# Patient Record
Sex: FEMALE | Race: WHITE | NOT HISPANIC OR LATINO | Employment: OTHER | URBAN - METROPOLITAN AREA
[De-identification: names, ages, dates, MRNs, and addresses within clinical notes are randomized per-mention and may not be internally consistent; named-entity substitution may affect disease eponyms.]

---

## 2017-12-15 ENCOUNTER — TELEPHONE (OUTPATIENT)
Dept: INTERNAL MEDICINE | Facility: CLINIC | Age: 62
End: 2017-12-15

## 2017-12-15 NOTE — TELEPHONE ENCOUNTER
Patient called and left message stating she received 6 months of refills for her synthroid instead of the yearly. She is wanting a year supply as she was recently seen for a medication check. Patient would like a call back to confirm a year supply of her synthroid.

## 2017-12-26 PROBLEM — G47.33 OBSTRUCTIVE SLEEP APNEA SYNDROME: Status: ACTIVE | Noted: 2017-12-26

## 2017-12-29 ENCOUNTER — APPOINTMENT (OUTPATIENT)
Dept: LAB | Facility: HOSPITAL | Age: 62
End: 2017-12-29
Payer: COMMERCIAL

## 2017-12-29 DIAGNOSIS — R82.81 PYURIA: ICD-10-CM

## 2017-12-29 LAB
BACTERIA #/AREA URNS AUTO: ABNORMAL /HPF
BILIRUB UR QL STRIP.AUTO: NEGATIVE
CLARITY UR: ABNORMAL
COLOR UR: YELLOW
GLUCOSE UR STRIP.AUTO-MCNC: NEGATIVE MG/DL
HGB UR QL STRIP.AUTO: NEGATIVE
KETONES UR STRIP.AUTO-MCNC: ABNORMAL MG/DL
LEUKOCYTE ESTERASE UR QL STRIP: ABNORMAL
MUCOUS THREADS #/AREA URNS HPF: PRESENT /[HPF]
NITRITE UR QL STRIP.AUTO: NEGATIVE
PH UR STRIP.AUTO: 5 PH
PROT UR STRIP.AUTO-MCNC: NEGATIVE MG/DL
RBC #/AREA URNS AUTO: ABNORMAL /HPF
SP GR UR STRIP.AUTO: 1.02 (ref 1–1.03)
SQUAMOUS #/AREA URNS AUTO: ABNORMAL /HPF
UROBILINOGEN UR STRIP.AUTO-MCNC: <2 E.U./DL
WBC #/AREA URNS AUTO: ABNORMAL /HPF

## 2017-12-29 PROCEDURE — 81001 URINALYSIS AUTO W/SCOPE: CPT

## 2018-01-02 ENCOUNTER — TELEPHONE (OUTPATIENT)
Dept: INTERNAL MEDICINE | Facility: CLINIC | Age: 63
End: 2018-01-02

## 2018-01-02 DIAGNOSIS — R31.9 HEMATURIA, UNSPECIFIED TYPE: Primary | ICD-10-CM

## 2018-01-02 NOTE — TELEPHONE ENCOUNTER
----- Message from Becka Joy MD sent at 1/2/2018 11:55 AM [facility] -----  Still shows some wbc and now a little RBC - is she having any symptoms of a UTI?  We did not order it to send for a culture so I think we should have her come back and order this one with a culture (not a reflex - make sure culture is ordered regardless).  Then if uti we can treat and then repeat again after treatment.      [facility]    Patient returned call.  Above information given.  We had ordered a 1 month repeat Ua.  I ordered a UA and Culture as directed above.

## 2018-01-02 NOTE — TELEPHONE ENCOUNTER
----- Message from Becka Joy MD sent at 1/2/2018 11:55 AM [facility] -----  Still shows some wbc and now a little RBC - is she having any symptoms of a UTI?  We did not order it to send for a culture so I think we should have her come back and order this one with a culture (not a reflex - make sure culture is ordered regardless).  Then if uti we can treat and then repeat again after treatment.

## 2018-01-03 ENCOUNTER — APPOINTMENT (OUTPATIENT)
Dept: LAB | Facility: HOSPITAL | Age: 63
End: 2018-01-03
Payer: COMMERCIAL

## 2018-01-03 DIAGNOSIS — R31.9 HEMATURIA, UNSPECIFIED TYPE: ICD-10-CM

## 2018-01-03 LAB
BACTERIA #/AREA URNS AUTO: ABNORMAL /HPF
BILIRUB UR QL STRIP.AUTO: NEGATIVE
CLARITY UR: ABNORMAL
COLOR UR: YELLOW
GLUCOSE UR STRIP.AUTO-MCNC: NEGATIVE MG/DL
HGB UR QL STRIP.AUTO: NEGATIVE
KETONES UR STRIP.AUTO-MCNC: NEGATIVE MG/DL
LEUKOCYTE ESTERASE UR QL STRIP: ABNORMAL
MUCOUS THREADS #/AREA URNS HPF: PRESENT /[HPF]
NITRITE UR QL STRIP.AUTO: NEGATIVE
PH UR STRIP.AUTO: 5 PH
PROT UR STRIP.AUTO-MCNC: NEGATIVE MG/DL
RBC #/AREA URNS AUTO: ABNORMAL /HPF
SP GR UR STRIP.AUTO: 1.02 (ref 1–1.03)
SQUAMOUS #/AREA URNS AUTO: ABNORMAL /HPF
UROBILINOGEN UR STRIP.AUTO-MCNC: <2 E.U./DL
WBC #/AREA URNS AUTO: ABNORMAL /HPF

## 2018-01-03 PROCEDURE — 81001 URINALYSIS AUTO W/SCOPE: CPT

## 2018-01-03 PROCEDURE — 87088 URINE BACTERIA CULTURE: CPT

## 2018-01-05 LAB — BACTERIA UR CULT: NORMAL

## 2018-01-08 ENCOUNTER — TELEPHONE (OUTPATIENT)
Dept: INTERNAL MEDICINE | Facility: CLINIC | Age: 63
End: 2018-01-08

## 2018-01-08 DIAGNOSIS — R82.81 PYURIA: Primary | ICD-10-CM

## 2018-01-08 DIAGNOSIS — R31.9 HEMATURIA, UNSPECIFIED TYPE: ICD-10-CM

## 2018-01-08 NOTE — TELEPHONE ENCOUNTER
----- Message from Becka Joy MD sent at 1/8/2018  8:29 AM [facility] -----  No organisms growing.  I think I would send to urology for pyuria/hematuria just to get their thoughts      [facility] 9:40am 1/8/18    Contacted patient.  Patient request Dr. Maria at [facility].  Then a copy of results to be sent to her.

## 2018-01-08 NOTE — TELEPHONE ENCOUNTER
----- Message from Becka Joy MD sent at 1/8/2018  8:29 AM [facility] -----  No organisms growing.  I think I would send to urology for pyuria/hematuria just to get their thoughts

## 2018-03-15 DIAGNOSIS — J30.9 CHRONIC ALLERGIC RHINITIS, UNSPECIFIED SEASONALITY, UNSPECIFIED TRIGGER: ICD-10-CM

## 2018-03-15 RX ORDER — FLUTICASONE PROPIONATE 50 MCG
2 SPRAY, SUSPENSION (ML) NASAL DAILY
Qty: 16 G | Refills: 1 | Status: SHIPPED | OUTPATIENT
Start: 2018-03-15 | End: 2018-03-26 | Stop reason: SDUPTHER

## 2018-03-26 DIAGNOSIS — M19.90 OSTEOARTHRITIS, UNSPECIFIED OSTEOARTHRITIS TYPE, UNSPECIFIED SITE: Primary | ICD-10-CM

## 2018-03-26 DIAGNOSIS — J30.9 CHRONIC ALLERGIC RHINITIS, UNSPECIFIED SEASONALITY, UNSPECIFIED TRIGGER: ICD-10-CM

## 2018-03-26 RX ORDER — DICLOFENAC SODIUM 10 MG/G
GEL TOPICAL
Qty: 1 TUBE | Refills: 2 | Status: SHIPPED | OUTPATIENT
Start: 2018-03-26 | End: 2018-04-09 | Stop reason: SDUPTHER

## 2018-03-26 RX ORDER — FLUTICASONE PROPIONATE 50 MCG
2 SPRAY, SUSPENSION (ML) NASAL DAILY
Qty: 16 G | Refills: 2 | Status: SHIPPED | OUTPATIENT
Start: 2018-03-26 | End: 2019-01-21 | Stop reason: SDUPTHER

## 2018-03-26 NOTE — TELEPHONE ENCOUNTER
Patient calls states she needs 3 month  Of her medications and a new Rx for diclofenac to [facility] pharm

## 2018-04-09 DIAGNOSIS — M19.90 OSTEOARTHRITIS, UNSPECIFIED OSTEOARTHRITIS TYPE, UNSPECIFIED SITE: ICD-10-CM

## 2018-04-09 RX ORDER — DICLOFENAC SODIUM 10 MG/G
GEL TOPICAL
Qty: 100 G | Status: SHIPPED | OUTPATIENT
Start: 2018-04-09 | End: 2019-05-26 | Stop reason: SDUPTHER

## 2018-04-30 ENCOUNTER — APPOINTMENT (OUTPATIENT)
Dept: LAB | Facility: HOSPITAL | Age: 63
End: 2018-04-30
Payer: COMMERCIAL

## 2018-05-25 ENCOUNTER — HOSPITAL ENCOUNTER (OUTPATIENT)
Dept: CT IMAGING | Facility: HOSPITAL | Age: 63
Discharge: HOME | End: 2018-05-25
Payer: COMMERCIAL

## 2018-05-25 VITALS — DIASTOLIC BLOOD PRESSURE: 101 MMHG | HEART RATE: 60 BPM | SYSTOLIC BLOOD PRESSURE: 161 MMHG

## 2018-05-25 DIAGNOSIS — R31.9 HEMATURIA: ICD-10-CM

## 2018-05-25 PROCEDURE — 6360000200 HC RX 636 W HCPCS (ALT 250 FOR IP): Performed by: NURSE PRACTITIONER

## 2018-05-25 PROCEDURE — 2550000100 HC RX 255: Performed by: NURSE PRACTITIONER

## 2018-05-25 PROCEDURE — 74178 CT ABD&PLV WO CNTR FLWD CNTR: CPT

## 2018-05-25 RX ORDER — IOPAMIDOL 755 MG/ML
100 INJECTION, SOLUTION INTRAVASCULAR ONCE
Status: COMPLETED | OUTPATIENT
Start: 2018-05-25 | End: 2018-05-25

## 2018-05-25 RX ORDER — FUROSEMIDE 10 MG/ML
10 INJECTION INTRAMUSCULAR; INTRAVENOUS ONCE
Status: COMPLETED | OUTPATIENT
Start: 2018-05-25 | End: 2018-05-25

## 2018-05-25 RX ADMIN — FUROSEMIDE 10 MG: 10 INJECTION, SOLUTION INTRAMUSCULAR; INTRAVENOUS at 11:22

## 2018-05-25 RX ADMIN — IOPAMIDOL 100 ML: 755 INJECTION, SOLUTION INTRAVENOUS at 11:28

## 2018-06-26 ENCOUNTER — TELEPHONE (OUTPATIENT)
Dept: INTERNAL MEDICINE | Facility: CLINIC | Age: 63
End: 2018-06-26

## 2018-06-26 NOTE — TELEPHONE ENCOUNTER
----- Message from Becka Joy MD sent at 6/25/2018 12:47 PM MDT -----  normal    LMTRC if any questions.  Mammo WNL.

## 2018-06-27 ENCOUNTER — TELEPHONE (OUTPATIENT)
Dept: INTERNAL MEDICINE | Facility: CLINIC | Age: 63
End: 2018-06-27

## 2018-06-27 NOTE — TELEPHONE ENCOUNTER
----- Message from Becka Joy MD sent at 6/25/2018 12:47 PM MDT -----  normal    Patient notified.  No questions.

## 2018-09-19 ENCOUNTER — TELEPHONE (OUTPATIENT)
Dept: INTERNAL MEDICINE | Facility: CLINIC | Age: 63
End: 2018-09-19

## 2018-09-19 DIAGNOSIS — Z23 NEED FOR VACCINATION: Primary | ICD-10-CM

## 2018-12-06 DIAGNOSIS — K21.9 GASTROESOPHAGEAL REFLUX DISEASE WITHOUT ESOPHAGITIS: ICD-10-CM

## 2018-12-06 RX ORDER — OMEPRAZOLE 20 MG/1
CAPSULE, DELAYED RELEASE ORAL
Qty: 90 CAPSULE | Status: SHIPPED | OUTPATIENT
Start: 2018-12-06 | End: 2019-01-21 | Stop reason: SDUPTHER

## 2018-12-14 ENCOUNTER — TELEPHONE (OUTPATIENT)
Dept: FAMILY MEDICINE | Facility: CLINIC | Age: 63
End: 2018-12-14

## 2018-12-14 NOTE — TELEPHONE ENCOUNTER
Caller would like to discuss (a) labs Writer has advised caller of a callback from within 24 hours.    Patient: Corrine Holden    Caller Name (Last and first, relation/role): Self      Callback Number: [phone]    Best Availability: anytime    Additional Info:   Patient will be seeing Dr. Joy on 1/21 for her yearly visit. Wanted to know if there was any labs she needed and would like a call back from [facility] about this.     Did you confirm the message with the caller: Yes    Is it okay that the nurse communicates your response through [place]? No

## 2018-12-17 DIAGNOSIS — E03.9 HYPOTHYROIDISM, UNSPECIFIED TYPE: Primary | ICD-10-CM

## 2018-12-17 DIAGNOSIS — E78.5 HYPERLIPIDEMIA, UNSPECIFIED HYPERLIPIDEMIA TYPE: ICD-10-CM

## 2018-12-17 DIAGNOSIS — R73.01 IMPAIRED FASTING GLUCOSE: ICD-10-CM

## 2018-12-26 ASSESSMENT — ENCOUNTER SYMPTOMS
COUGH: 0
EYE REDNESS: 0
AGITATION: 0
VOMITING: 0
FATIGUE: 0
SINUS PRESSURE: 0
SINUS PAIN: 0
NECK PAIN: 0
ENDOCRINE COMMENTS: HYPOTHYROIDISM
PALPITATIONS: 0
CHILLS: 0
ROS GI COMMENTS: HISTORY OF GERD
FEVER: 0
BACK PAIN: 0
APNEA: 1
ARTHRALGIAS: 0
HEADACHES: 0
SLEEP DISTURBANCE: 1
SHORTNESS OF BREATH: 0
SORE THROAT: 0
WEAKNESS: 0
NAUSEA: 0

## 2018-12-26 NOTE — PROGRESS NOTES
"Sleep Progress Note    12/27/18  11:30 AM    Chief Complaint   Patient presents with   • Sleep Apnea     Gets supplies from [facility].        HPI  Sleep study date: 8/15/2014  Severity: Moderate   AHI: 13.4  Minimum O2 saturation: 82%  Machine type: CPAP   Mask:  Nasal pillows     Corrine Holden is a 63 y.o. female who returns to the clinic in follow-up for obstructive sleep apnea and annual CPAP follow-up. It is noted that the patient has a long-standing history of SHANA dating back to 2014. At that time she underwent sleep study secondary to complaints of excessive daytime fatigability and somnolence. She also had complaints of significant morning headaches which were debilitating for her and she found it hard to work. Sleep study was completed on 8/15/2014 and demonstrated moderate obstructive sleep apnea with 13.4 apneas and hypopneas per hour. Minimum oxygen saturation was noted to be 82%. Currently the patient is on CPAP with a set pressure of 8.6 cm H2O.     She does have a past medical history positive for GERD, hypothyroidism, history of supraventricular tachycardia, osteoarthritis, and obesity.     At today’s appointment the patient reports that she tolerates CPAP and pressure well. She denies any complaints or concerns in regards to CPAP.  Patient continues to report that CPAP is a \"life changer\". She sleeps well throughout the night. She feels well rested upon awakening and denies daytime fatigue. She no longer experiences headaches, which she reports is a huge benefit of CPAP. Patient is currently utilizing a nasal pillows mask without the use of a chin strap. She recently switched from a nasal wisp to the pillows. She reports that the pillows are working well and are comfortable. Compliance report today demonstrates 100% usage over 4 hours. On average she utilizes CPAP 7 hours and 57 minutes. AHI is very well controlled at 0.4. Overall, from a sleep standpoint the patient is noted " to be doing very well. However, she does report that her current CPAP machine will occasionally turn off on its own during the night. I will request that diagnostics be run on her machine. We have also discussed the possibility of obtaining a new machine in the near future. Patient will visit with her vendor in regards to eligibility for a new machine. Her questions were addressed and answered. Patient is pleasant to visit with. 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 She really enjoys doing this. She also works part time at the [employer].    Of note, [place] Sleepiness Scale was completed in the office today with a total score of 3/24.      Histories:  Past Medical History:   Diagnosis Date   • Cardiovascular system problem     PSVT - breaks with vagal maneuvers   • Disease of thyroid gland 1997    hypothyroidism   • GERD (gastroesophageal reflux disease)    • H. pylori infection    • SHANA (obstructive sleep apnea)      Family History   Problem Relation Age of Onset   • Cancer Mother 84        Bladder   • Osteoporosis Mother    • Hypertension Mother    • Colon cancer Father 65   • Kidney cancer Father 77   • Other Father's Brother         sudden cardiac death, 60s   • Heart disease Maternal Grandfather    • Heart attack Maternal Grandfather 59   • Cancer Paternal Grandfather      Social History     Socioeconomic History   • Marital status: Single     Spouse name: Not on file   • Number of children: Not on file   • Years of education: Not on file   • Highest education level: Not on file   Social Needs   • Financial resource strain: Not on file   • Food insecurity - worry: Not on file   • Food insecurity - inability: Not on file   • Transportation needs - medical: Not on file   • Transportation needs - non-medical: Not on file   Occupational History   • Not on file   Tobacco Use   • Smoking status: Never Smoker   • Smokeless tobacco: Never Used   Substance and Sexual Activity   •  Alcohol use: No   • Drug use: No   • Sexual activity: Not on file   Other Topics Concern   • Not on file   Social History Narrative   • Not on file       Allergies   Allergen Reactions   • No Known Drug Allergies        Current Outpatient Medications   Medication Sig Dispense Refill   • acetaminophen (TYLENOL EXTRA STRENGTH) 500 mg tablet Take 2 tablets every 12 hours by oral route as needed.     • albuterol HFA (VENTOLIN HFA) 90 mcg/actuation inhaler Inhale 2 puffs every 4 hours by inhalation route as needed. 3 1   • amoxicillin (AMOXIL) 500 mg capsule PRN      • aspirin 325 mg tablet prn     • calcium carbonate (OS-RONNIE) 500 mg calcium tablet 2 Every Day, As Needed     • codeine-guaifenesin (CHERATUSSIN AC)  mg/5 mL liquid Take 10 ML EVERY 4 HOURS by oral route prn 200 0   • diazePAM (VALIUM) 5 mg tablet Take by mouth nightly as needed   30 1   • diclofenac sodium (VOLTAREN) 1 % gel APPLY 2 GRAMS TO THE AFFECTED AREA(S) BY TOPICAL ROUTE 4 TIMES PER DAY AS NEEDED 100 g PRN   • fluticasone (FLONASE) 50 mcg/actuation nasal spray Administer 2 sprays into each nostril daily. 16 g 2   • levothyroxine (SYNTHROID) 112 mcg tablet Take 1 tablet every day by oral route for 90 days. 90 3   • meloxicam (MOBIC) 15 mg tablet Take 1 tablet (15 mg total) by mouth once daily. 90 tablet 3   • multivitamin (THERAGRAN) tablet tablet 1 Every Day     • omega 3-dha-epa-fish oil 1,000 mg (120 mg-180 mg) capsule 2 Every Day     • omeprazole (PriLOSEC) 20 mg capsule TAKE 1 CAPSULE BY MOUTH DAILY 90 capsule PRN   • SHINGRIX, PF, 50 mcg/0.5 mL suspension for reconstitution vaccine      • cyclobenzaprine (FLEXERIL) 10 mg tablet Take 1 tablet (10 mg total) by mouth 2 (two) times a day as needed for muscle spasms. 60 tablet 0     No current facility-administered medications for this visit.        Review of Systems   Constitutional: Negative for chills, fatigue and fever.   HENT: Negative for congestion, ear pain, sinus pressure, sinus pain  "and sore throat.    Eyes: Negative for pain, redness and itching.   Respiratory: Positive for apnea (Known diagnosis of SHANA. On CPAP). Negative for cough and shortness of breath.         No snoring, unusual movements or abnormal dream activity, restless legs, or trouble sleeping (insomnia) and not excessively sleepy during daytime.   Cardiovascular: Negative for chest pain, palpitations and leg swelling.        History of supraventricular tachycardia   Gastrointestinal: Negative for nausea and vomiting.        History of GERD   Endocrine:        Hypothyroidism   Musculoskeletal: Negative for arthralgias, back pain and neck pain.        Osteoarthritis   Allergic/Immunologic: Negative for environmental allergies.   Neurological: Negative for syncope, weakness and headaches.   Psychiatric/Behavioral: Positive for sleep disturbance. Negative for agitation and behavioral problems.        OBJECTIVE    VS/Weight:  Ht 1.626 m (5' 4\")       Physical Exam:    Results of Compliance Report:  Pressure: 8.6 cmH2O  AHI: 0.4  % usage over 4 hours: 100%   Average hourly usage: 7:57  O2: No      Physical Exam   Constitutional: She appears well-developed and well-nourished. No distress.   HENT:   Head: Normocephalic and atraumatic.   Eyes: Conjunctivae are normal.   Neck: Neck supple.   Cardiovascular: Normal rate and regular rhythm.   Pulmonary/Chest: Effort normal and breath sounds normal.   Neurological: She is alert.   Psychiatric: She has a normal mood and affect. Her behavior is normal.   Vitals reviewed.      ASSESSMENT:     1. Patient underwent sleep study on 8/15/2014 and was found to have moderate obstructive sleep apnea with an AHI of 13.4 and a minimum oxygen saturation of 82%. She is currently on CPAP with a set pressure of 8.6 cm H2O.   2.  ESS was completed in the office today with a total score of 3/24.  3.  GERD.  4.  Hypothyroidism.  5.  History of supraventricular tachycardia.  6.  Osteoarthritis.  7.  " Obesity.      PLAN:    1. Patient is currently using and benefiting from current CPAP therapy. She tolerates CPAP well. There is no need to make any changes in regards to her settings. She is noted to be compliant with 100% usage over 4 hours. AHI is well controlled at 0.4. Overall, from a sleep standpoint, she is noted to be stable.   2. The patient will be provided with a prescription to obtain new CPAP supplies today. I will also request that diagnostics be run on patient's machine related to complaints of functioning issues.  3. She will follow up in 1 year, sooner if needed. Her questions were addressed and answered.  4. She was counseled on what to look for if pressure is too high or too low.   5. She is to stay active both physically and mentally.   6. She will follow up with her PCP for general medical needs.  7. The diagnostic assessment has been reviewed. Patient has expressed a good understanding of the assessment and recommendation from today's visit. There are no apparent barriers to understanding this information. She has been advised to contact this office or the answering service with questions or concerns that may arise. A total of 30 minutes was required for this visit. Greater than 50% of this time was spent in direct face to face communication with the patient in order to provide counseling and coordination of care in regards to the treatment and management of obstructive sleep apnea.       A voice recognition program was used to aid in documentation of the record.  Sometimes words are not printed exactly as they were spoken.  While efforts were made to carefully edit and correct any inaccuracies, some may be present; please take these into context.  Please contact the provider if areas are identified.        TOMAS Pollock

## 2018-12-27 ENCOUNTER — OFFICE VISIT (OUTPATIENT)
Dept: NEUROLOGY | Facility: CLINIC | Age: 63
End: 2018-12-27
Payer: COMMERCIAL

## 2018-12-27 VITALS — HEIGHT: 64 IN

## 2018-12-27 DIAGNOSIS — G47.33 OBSTRUCTIVE SLEEP APNEA SYNDROME: Primary | ICD-10-CM

## 2018-12-27 PROCEDURE — 99214 OFFICE O/P EST MOD 30 MIN: CPT | Performed by: PHYSICIAN ASSISTANT

## 2018-12-27 RX ORDER — AMOXICILLIN 500 MG/1
500 CAPSULE ORAL
COMMUNITY
Start: 2018-12-19 | End: 2022-09-21 | Stop reason: ALTCHOICE

## 2018-12-27 RX ORDER — ZOSTER VACCINE RECOMBINANT, ADJUVANTED 50 MCG/0.5
KIT INTRAMUSCULAR
COMMUNITY
Start: 2018-12-20 | End: 2019-01-21 | Stop reason: WASHOUT

## 2018-12-27 ASSESSMENT — ENCOUNTER SYMPTOMS
EYE ITCHING: 0
EYE PAIN: 0

## 2019-01-19 ENCOUNTER — APPOINTMENT (OUTPATIENT)
Dept: LAB | Facility: HOSPITAL | Age: 64
End: 2019-01-19
Payer: COMMERCIAL

## 2019-01-21 ENCOUNTER — APPOINTMENT (OUTPATIENT)
Dept: LAB | Facility: CLINIC | Age: 64
End: 2019-01-21
Payer: COMMERCIAL

## 2019-01-21 ENCOUNTER — OFFICE VISIT (OUTPATIENT)
Dept: INTERNAL MEDICINE | Facility: CLINIC | Age: 64
End: 2019-01-21
Payer: COMMERCIAL

## 2019-01-21 VITALS
TEMPERATURE: 97.4 F | RESPIRATION RATE: 20 BRPM | HEIGHT: 64 IN | OXYGEN SATURATION: 97 % | SYSTOLIC BLOOD PRESSURE: 140 MMHG | DIASTOLIC BLOOD PRESSURE: 90 MMHG | HEART RATE: 63 BPM

## 2019-01-21 DIAGNOSIS — R03.0 ELEVATED BLOOD PRESSURE READING IN OFFICE WITHOUT DIAGNOSIS OF HYPERTENSION: Primary | ICD-10-CM

## 2019-01-21 DIAGNOSIS — J31.0 CHRONIC NONALLERGIC RHINITIS: ICD-10-CM

## 2019-01-21 DIAGNOSIS — E83.51 HYPOCALCEMIA: ICD-10-CM

## 2019-01-21 DIAGNOSIS — M62.838 MUSCLE SPASM: ICD-10-CM

## 2019-01-21 DIAGNOSIS — K42.9 PERIUMBILICAL HERNIA: ICD-10-CM

## 2019-01-21 DIAGNOSIS — M25.571 CHRONIC PAIN OF BOTH ANKLES: ICD-10-CM

## 2019-01-21 DIAGNOSIS — K21.9 GASTROESOPHAGEAL REFLUX DISEASE WITHOUT ESOPHAGITIS: ICD-10-CM

## 2019-01-21 DIAGNOSIS — M15.9 OSTEOARTHRITIS OF MULTIPLE JOINTS, UNSPECIFIED OSTEOARTHRITIS TYPE: ICD-10-CM

## 2019-01-21 DIAGNOSIS — E03.9 HYPOTHYROIDISM, UNSPECIFIED TYPE: ICD-10-CM

## 2019-01-21 DIAGNOSIS — M25.572 CHRONIC PAIN OF BOTH ANKLES: ICD-10-CM

## 2019-01-21 DIAGNOSIS — G89.29 CHRONIC PAIN OF BOTH ANKLES: ICD-10-CM

## 2019-01-21 DIAGNOSIS — R01.1 MURMUR: ICD-10-CM

## 2019-01-21 PROCEDURE — 82306 VITAMIN D 25 HYDROXY: CPT | Performed by: INTERNAL MEDICINE

## 2019-01-21 PROCEDURE — 36415 COLL VENOUS BLD VENIPUNCTURE: CPT | Performed by: INTERNAL MEDICINE

## 2019-01-21 PROCEDURE — 99214 OFFICE O/P EST MOD 30 MIN: CPT | Performed by: INTERNAL MEDICINE

## 2019-01-21 RX ORDER — DIAZEPAM 5 MG/1
5 TABLET ORAL EVERY 12 HOURS PRN
Qty: 30 TABLET | Refills: 1 | Status: SHIPPED | OUTPATIENT
Start: 2019-01-21 | End: 2020-01-27 | Stop reason: SDUPTHER

## 2019-01-21 RX ORDER — MELOXICAM 15 MG/1
15 TABLET ORAL DAILY
Qty: 90 TABLET | Refills: 3 | Status: SHIPPED | OUTPATIENT
Start: 2019-01-21 | End: 2020-01-27 | Stop reason: SDUPTHER

## 2019-01-21 RX ORDER — FLUTICASONE PROPIONATE 50 MCG
2 SPRAY, SUSPENSION (ML) NASAL DAILY
Qty: 16 G | Refills: 3 | Status: SHIPPED | OUTPATIENT
Start: 2019-01-21 | End: 2019-01-23 | Stop reason: SDUPTHER

## 2019-01-21 RX ORDER — OMEPRAZOLE 20 MG/1
20 CAPSULE, DELAYED RELEASE ORAL DAILY
Qty: 90 CAPSULE | Refills: 3 | Status: SHIPPED | OUTPATIENT
Start: 2019-01-21 | End: 2020-01-27 | Stop reason: SDUPTHER

## 2019-01-21 RX ORDER — LEVOTHYROXINE SODIUM 112 UG/1
112 TABLET ORAL DAILY
Qty: 90 TABLET | Refills: 3 | Status: SHIPPED | OUTPATIENT
Start: 2019-01-21 | End: 2020-01-21 | Stop reason: WASHOUT

## 2019-01-21 ASSESSMENT — PAIN SCALES - GENERAL: PAINLEVEL: 0-NO PAIN

## 2019-01-21 NOTE — PROGRESS NOTES
Michaela Holden is a 63 y.o. female who presents for Follow-up (yearly med check)  .    SHANNON Castle is here for her yearly med check.    Recently, she has been struggling a lot with bilateral foot pain.  Her left foot is worse than the right.  She has had flat feet and has worn orthotics for many years.  However in February of last year she been on her feet a lot cooking for several birthdays in the family.  She had not been wearing her orthotics regularly at that time.  Since then her feet have been much more painful.  Eventually did go to see [facility] orthopedics and has had a couple x-rays done but she has had a few steroid injections as well.  They think she has flat feet but also may have some ankle issues as well.  She cannot walk in from the parking lot.  She is having her daughters drop her off at work so she does not have to walk in.  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  She also has another job at  where she is sitting most of the day but is up and down some.  She is been wearing ankle braces.  Sometimes when that feet get really bad and she is having some muscle cramping she will use a Valium.  This happens only may be a couple times per month.  She has been able to exercise nonweightbearing activities.  She goes swimming, rides a stationary bike, uses an elliptical about 2-3 times per week.  She is also taking meloxicam daily.  She is not sure if she is going to follow-up with orthopedics or perhaps get into podiatry for some new orthotics.    Like to work on weight loss.  She knows her weight is contributing to her foot and ankle pain.  She is considering referral for bariatric surgery and or a diet such as the Larsen profile.    We did refer her to urology last year.  She had some mild hematuria does have family history of urologic cancer.  That workup was negative per her report.  I do not have the clinic notes  today.    Blood pressure is elevated here today but she says she checks at home and is usually in the 130s over 80s.    She recently switched from Synthroid over to levothyroxine.  Most recent TSH is stable but she only made the switch 10 days ago.    She follows with Dr. Cevallos for her well woman visits.  She does have a history of a hysterectomy and oophorectomy.  Last exam with Mart was in 2017.  Mammogram was about 6 months ago.  She is never had a bone density scan and declines that because she would have to be weighed.  She had a flu shot in November.  She is up-to-date on her shingles vaccines.    On her labs she does have impaired fasting glucose but her A1c is in a good range of 5.5.  Lipids look good.  TSH is good.  CBC is good.  Her CMP shows hypocalcemia.  She has not been taking calcium or vitamin D.    She needs a refill of her omeprazole, fluticasone, levothyroxine, meloxicam, diazepam.  She tried taking ranitidine instead of omeprazole and still had breakthrough heartburn.    Past Medical History:   Diagnosis Date   • Allergic rhinitis    • Asthma    • Cardiovascular system problem     PSVT - breaks with vagal maneuvers   • Cellulitis    • Disease of thyroid gland 1997    hypothyroidism   • Dizziness and giddiness    • FAN (dyspnea on exertion)    • Dysuria    • Elevated blood pressure reading    • Fatigue    • GERD (gastroesophageal reflux disease)    • H. pylori infection    • Headache    • Hyperlipidemia    • Hypothyroidism    • Hypoxia    • Irregular heart beat    • Microscopic hematuria    • Motion sickness    • Obstructive sleep apnea syndrome    • SHANA (obstructive sleep apnea)    • Osteoarthritis    • Palpitations    • Snoring    • Supraventricular tachycardia (CMS/HCC) (HCC)    • Tinnitus        Current Outpatient Medications   Medication Sig Dispense Refill   • acetaminophen (TYLENOL EXTRA STRENGTH) 500 mg tablet Take 2 tablets every 12 hours by oral route as needed.     • albuterol  HFA (VENTOLIN HFA) 90 mcg/actuation inhaler Inhale 2 puffs every 4 hours by inhalation route as needed. 3 1   • amoxicillin (AMOXIL) 500 mg capsule PRN      • aspirin 325 mg tablet prn     • calcium carbonate (OS-RONNIE) 500 mg calcium tablet 2 Every Day, As Needed     • diazePAM (VALIUM) 5 mg tablet Take 1 tablet (5 mg total) by mouth every 12 (twelve) hours as needed for muscle spasms 30 tablet 1   • diclofenac sodium (VOLTAREN) 1 % gel APPLY 2 GRAMS TO THE AFFECTED AREA(S) BY TOPICAL ROUTE 4 TIMES PER DAY AS NEEDED 100 g PRN   • levothyroxine (SYNTHROID) 112 mcg tablet Take 1 tablet (112 mcg total) by mouth daily 90 tablet 3   • meloxicam (MOBIC) 15 mg tablet Take 1 tablet (15 mg total) by mouth daily 90 tablet 3   • multivitamin (THERAGRAN) tablet tablet 1 Every Day     • omega 3-dha-epa-fish oil 1,000 mg (120 mg-180 mg) capsule 2 Every Day     • omeprazole (PriLOSEC) 20 mg capsule Take 1 capsule (20 mg total) by mouth daily 90 capsule 3   • cyclobenzaprine (FLEXERIL) 10 mg tablet Take 1 tablet (10 mg total) by mouth 2 (two) times a day as needed for muscle spasms. 60 tablet 0   • fluticasone (FLONASE) 50 mcg/actuation nasal spray Administer 2 sprays into each nostril daily 48 g 1     No current facility-administered medications for this visit.       Allergies   Allergen Reactions   • No Known Drug Allergies        Past Surgical History:   Procedure Laterality Date   • CARDIAC ELECTROPHYSIOLOGY STUDY AND ABLATION  01/01/2007    attempted ablation for SVT   • COLONOSCOPY  01/21/2015    cecal polyp-tubular adenoma-recommend repeat 5 years   • DILATION AND CURETTAGE OF UTERUS  01/01/2006   • ENDOMETRIAL BIOPSY  01/01/2012    pt reports negative   • ESOPHAGOGASTRODUODENOSCOPY  01/01/2009    H pylori   • KNEE ARTHROPLASTY  04/01/2015    Dr Placido BRICE   • VAGINAL HYSTERECTOMY  12/16/2013    with BSO, fibroids     Family History   Problem Relation Age of Onset   • Cancer Mother 84        Bladder   • Osteoporosis Mother   "  • Hypertension Mother    • Colon cancer Father 65   • Kidney cancer Father 77   • Other Father's Brother         sudden cardiac death, 60s   • Heart disease Maternal Grandfather    • Heart attack Maternal Grandfather 59   • Cancer Paternal Grandfather      Social History     Socioeconomic History   • Marital status: Single     Spouse name: None   • Number of children: None   • Years of education: None   • Highest education level: None   Social Needs   • Financial resource strain: None   • Food insecurity - worry: None   • Food insecurity - inability: None   • Transportation needs - medical: None   • Transportation needs - non-medical: None   Occupational History   • None   Tobacco Use   • Smoking status: Never Smoker   • Smokeless tobacco: Never Used   Substance and Sexual Activity   • Alcohol use: No   • Drug use: No   • Sexual activity: None   Other Topics Concern   • None   Social History Narrative   • None       Review of Systems    As stated in HPI.        Objective     Vitals  /90 (BP Location: Right arm, Patient Position: Sitting, Cuff Size: Reg)   Pulse 63   Temp 36.3 °C (97.4 °F) (Temporal)   Resp 20   Ht 1.626 m (5' 4\")   SpO2 97%     Physical Exam   GEN: pleasant, NAD  HEENT: PERRLA, TMs clear, posterior pharynx benign  NECK: no LAD or thyromegaly, no carotid bruits  LUNGS: CTAB, no wheezing rhonchi or rales  HEART: RRR no murmurs rubs or gallops  ABD: Soft, non tender, non distended, normal bowel sounds , no hepatosplenomegaly, ~4cm periumbilical hernia  Ext: Warm and well perfused, no edema  SKIN: no rash  Neuro:  CN 2-12 grossly intact, non focal, moving all extremities        Assessment/Plan   Diagnoses and all orders for this visit:    Elevated blood pressure reading in office without diagnosis of hypertension    Hypocalcemia  -     25-Hydroxyvitamin D2 and D3, serum Blood, Venous; Future    Murmur  -     US Echo complete; Future    Chronic pain of both ankles    Periumbilical " hernia    Osteoarthritis of multiple joints, unspecified osteoarthritis type  -     meloxicam (MOBIC) 15 mg tablet; Take 1 tablet (15 mg total) by mouth daily    Gastroesophageal reflux disease without esophagitis  -     omeprazole (PriLOSEC) 20 mg capsule; Take 1 capsule (20 mg total) by mouth daily    Chronic nonallergic rhinitis    Hypothyroidism, unspecified type  -     levothyroxine (SYNTHROID) 112 mcg tablet; Take 1 tablet (112 mcg total) by mouth daily    Muscle spasm  -     diazePAM (VALIUM) 5 mg tablet; Take 1 tablet (5 mg total) by mouth every 12 (twelve) hours as needed for muscle spasms    1.  Blood pressure elevated here today but better control at home.  We will continue to monitor.  2.  Hypocalcemia.  Calcium slightly low.  We will send for vitamin D level.  3.  Faint murmur.  Will order echo.  4.  Pain in feet.  She is Ken been to an orthopedist.  She may need surgery.  She is thinking about making appointment with her podiatrist as well.  5.  GERD.  Controlled on omeprazole.  6.  Hypothyroidism.  Refill her levothyroxine.  7.  Periumbilical hernia.  This was noted on her CT abdomen.  I do notice it on exam.  She is not having any pain in the area.  It does feel like a fairly large hernia.  Discussed referral to surgery if starting to have pain.  8.  Overweight.  She is considering referral to bariatric surgery.  9.  Muscle spasms.  We will refill her Valium.  This has been helping on bad days with her feet.      CM MON MD

## 2019-01-23 DIAGNOSIS — J31.0 CHRONIC NONALLERGIC RHINITIS: ICD-10-CM

## 2019-01-23 RX ORDER — FLUTICASONE PROPIONATE 50 MCG
2 SPRAY, SUSPENSION (ML) NASAL DAILY
Qty: 48 G | Refills: 1 | Status: SHIPPED | OUTPATIENT
Start: 2019-01-23 | End: 2019-09-02 | Stop reason: SDUPTHER

## 2019-01-24 LAB
25(OH)D2 SERPL-MCNC: <4 NG/ML
25(OH)D3 SERPL-MCNC: 34 NG/ML
25(OH)D3+25(OH)D2 SERPL-MCNC: 34 NG/ML

## 2019-01-30 ENCOUNTER — ANCILLARY PROCEDURE (OUTPATIENT)
Dept: CARDIOLOGY | Facility: CLINIC | Age: 64
End: 2019-01-30
Payer: COMMERCIAL

## 2019-01-30 VITALS
SYSTOLIC BLOOD PRESSURE: 120 MMHG | WEIGHT: 270 LBS | BODY MASS INDEX: 46.1 KG/M2 | DIASTOLIC BLOOD PRESSURE: 60 MMHG | HEIGHT: 64 IN

## 2019-01-30 DIAGNOSIS — R01.1 MURMUR: ICD-10-CM

## 2019-01-30 PROCEDURE — 93306 TTE W/DOPPLER COMPLETE: CPT | Performed by: INTERNAL MEDICINE

## 2019-01-31 LAB
ASCENDING AORTA: 3.39 CM
AV LVOT PEAK GRADIENT: 5.5 MMHG
AV MEAN GRADIENT: 6.75 MMHG
AV PEAK GRADIENT: 14.14 MMHG
BSA FOR ECHO PROCEDURE: 2.35 M2
DOP CALC AO PEAK VEL: 1.88 M/S
DOP CALC AO VTI: 35.4 CM
DOP CALC LVOT DIAMETER: 2.11 CM
DOP CALC LVOT STROKE VOLUME: 79 CM3
DOP CALC MV VTI: 29.5 CM
DOP CALC RVOT PEAK VEL: 0.7 M/S
DOP CALCLVOT PEAK VEL VTI: 22.7 CM
E/A RATIO: 1.1
E/E' RATIO (AVERAGE): 13.5
E/E' RATIO: 13.1
EJECTION FRACTION: 66 %
ERAP: 5 MMHG
INTERVENTRICULAR SEPTUM: 1.1 CM (ref 0.6–1.1)
IVC PROX: 1.69 CM
LA AREA A4C SYSTOLE: 44.5 CM3
LA AREA A4C SYSTOLE: 44.6 CM3
LA AREA A4C SYSTOLE: 44.6 CM3
LEFT ATRIUM SIZE: 3.98 CM
LEFT ATRIUM VOLUME INDEX: 23 ML/M2
LEFT ATRIUM VOLUME: 50.9 CM3
LEFT INTERNAL DIMENSION IN SYSTOLE: 2.1 CM (ref 3.84–5.81)
LEFT VENTRICLE DIASTOLIC VOLUME: 68 CM3
LEFT VENTRICLE SYSTOLIC VOLUME: 24 CM3
LEFT VENTRICULAR INTERNAL DIMENSION IN DIASTOLE: 4.8 CM (ref 6.6–9.17)
LVAD-AP2: 24 CM2
MV DEC SLOPE: 3460 MM/S2
MV DT: 239 MS
MV MEAN GRADIENT: 1.1 MMHG
MV PEAK A VEL: 73 CM/S
MV PEAK E VEL: 81 CM/S
MV PEAK GRADIENT: 3 MMHG
MV STENOSIS PRESSURE HALF TIME: 72 MS
MV VALVE AREA P 1/2 METHOD: 3.06 CM2
MV VMAX: 88 CM/S
MVA (VTI): 2.69 CM2
POSTERIOR WALL: 1.2 CM (ref 0.6–1.1)
PULM VEIN S/D RATIO: 1.7
PV PEAK D VEL: 36 CM/S
PV PEAK GRADIENT: 4.58 MMHG
PV PEAK S VEL: 62 CM/S
PV VMAX: 1.07 M/S
RA AREA: 22.3 CM2
RH CV ECHO AV VALVE AREA VEL: 2.2 CM2
RH CV ECHO AV VALVE AREA VTI: 2.2 CM2
RH LVOT PEAK VELOCITY REST: 1.2 M/S
RIGHT VENTRICULAR INTERNAL DIMENSION IN DIASTOLE: 3.6 CM
RV AP4 BASE: 4 CM
S': 13 CM/S
TDI: 6.2 CM/S
TDILATERAL: 5.9 CM/S
TR MAX PG: 28.94 MMHG
TRICUSPID ANNULAR PLANE SYSTOLIC EXCURSION: 3 CM
TRICUSPID VALVE PEAK REGURGITATION VELOCITY: 2.69 M/S
TV REST PULMONARY ARTERY PRESSURE: 33 MMHG
Z-SCORE OF LEFT VENTRICULAR DIMENSION IN END DIASTOLE: -4.83
Z-SCORE OF LEFT VENTRICULAR DIMENSION IN END SYSTOLE: -6.4

## 2019-02-20 ENCOUNTER — TELEPHONE (OUTPATIENT)
Dept: FAMILY MEDICINE | Facility: CLINIC | Age: 64
End: 2019-02-20

## 2019-02-20 NOTE — TELEPHONE ENCOUNTER
Caller would like to discuss (a) return call Writer has advised caller of a callback from within 24 hours.    Patient: Corrine Holden    Caller Name (Last and first, relation/role): Self      Callback Number: [phone]      Additional Info: Patient would like to talk about getting a handicap sticker. Can they mail  or e-mail forms to the patient? Can call patient back and let her now.         Did you confirm the message with the caller: Yes    Is it okay that the nurse communicates your response through [place]? No

## 2019-04-02 ENCOUNTER — TELEPHONE (OUTPATIENT)
Dept: BARIATRICS | Facility: HOSPITAL | Age: 64
End: 2019-04-02

## 2019-04-18 ENCOUNTER — TELEPHONE (OUTPATIENT)
Dept: BARIATRICS | Facility: HOSPITAL | Age: 64
End: 2019-04-18

## 2019-04-18 NOTE — TELEPHONE ENCOUNTER
Pt calls asking if bariatric surgery would be possible before July 1st.  Informed pt that [facility] does require patients do a medically supervised diet but no time frame is specified.  Informed pt [facility] will want to see that pt attempted a few months of diet before considering approval for surgery.  Also the psych evaluation can take a couple weeks to a couple months to get scheduled. Pt is [facility] so if she wants it covered by insurance she has to stay in-network.  Pt states understanding and scheduled an initial consult for May 3rd.

## 2019-05-03 ENCOUNTER — OFFICE VISIT (OUTPATIENT)
Dept: BARIATRICS | Facility: HOSPITAL | Age: 64
End: 2019-05-03
Payer: COMMERCIAL

## 2019-05-03 VITALS
DIASTOLIC BLOOD PRESSURE: 90 MMHG | HEART RATE: 66 BPM | BODY MASS INDEX: 47.66 KG/M2 | SYSTOLIC BLOOD PRESSURE: 130 MMHG | WEIGHT: 279.2 LBS | OXYGEN SATURATION: 95 % | TEMPERATURE: 98.2 F | HEIGHT: 64 IN | RESPIRATION RATE: 16 BRPM

## 2019-05-03 DIAGNOSIS — E66.01 CLASS 3 SEVERE OBESITY DUE TO EXCESS CALORIES WITH SERIOUS COMORBIDITY AND BODY MASS INDEX (BMI) OF 45.0 TO 49.9 IN ADULT (CMS/HCC): Primary | ICD-10-CM

## 2019-05-03 DIAGNOSIS — G47.33 OBSTRUCTIVE SLEEP APNEA SYNDROME: ICD-10-CM

## 2019-05-03 DIAGNOSIS — K21.9 GASTROESOPHAGEAL REFLUX DISEASE, ESOPHAGITIS PRESENCE NOT SPECIFIED: ICD-10-CM

## 2019-05-03 DIAGNOSIS — M25.571 ARTHRALGIA OF BOTH ANKLES: ICD-10-CM

## 2019-05-03 DIAGNOSIS — R03.0 ELEVATED BP WITHOUT DIAGNOSIS OF HYPERTENSION: ICD-10-CM

## 2019-05-03 DIAGNOSIS — M25.572 ARTHRALGIA OF BOTH ANKLES: ICD-10-CM

## 2019-05-03 DIAGNOSIS — E66.813 CLASS 3 SEVERE OBESITY DUE TO EXCESS CALORIES WITH SERIOUS COMORBIDITY AND BODY MASS INDEX (BMI) OF 45.0 TO 49.9 IN ADULT (CMS/HCC): Primary | ICD-10-CM

## 2019-05-03 PROCEDURE — 99204 OFFICE O/P NEW MOD 45 MIN: CPT | Performed by: NURSE PRACTITIONER

## 2019-05-03 PROCEDURE — G0463 HOSPITAL OUTPT CLINIC VISIT: HCPCS

## 2019-05-03 ASSESSMENT — ENCOUNTER SYMPTOMS
FATIGUE: 0
CONSTIPATION: 0
DIZZINESS: 0
ACTIVITY CHANGE: 0
COUGH: 0
UNEXPECTED WEIGHT CHANGE: 0
NAUSEA: 0
PALPITATIONS: 0
APPETITE CHANGE: 0
CHOKING: 0
HEADACHES: 0
BRUISES/BLEEDS EASILY: 0
SORE THROAT: 0
VOMITING: 0
ABDOMINAL PAIN: 0
TROUBLE SWALLOWING: 0
SLEEP DISTURBANCE: 0
DIARRHEA: 0
SHORTNESS OF BREATH: 0
ARTHRALGIAS: 1

## 2019-05-03 ASSESSMENT — PAIN SCALES - GENERAL: PAINLEVEL: 0-NO PAIN

## 2019-05-03 NOTE — PROGRESS NOTES
History and Physical      Subjective     Chief Complaint   Patient presents with   • Weight Management     New - Possibly Surgery - has seen          HPI:  Patient is a 64 y.o. female that presents to discuss process to bariatric surgery.  She reports she has seen Dr. Henry for consult and had some questions about proceeding.  She reports that she has always struggled with her weight.  She reports her current weight of 275 pounds as her top adult weight.  She reports the least amount she weighed as an adult was 150 pounds.  She reports previous weight loss efforts to include Fen/Phen in the 70s, weight watchers as well as low carbohydrate diet.  She reports she lost over 100 pounds with weight watchers however did not meet her goals so became discouraged and stopped the program.  She did subsequently regain her weight.  Patient reports a diagnosis of sleep apnea in 2014.  She reports she uses her CPAP and notes significant benefit to use.  Patient reports typically waking around 5:00.  She will have a protein bar or some cheese and fruit for breakfast.  She states lunch is typically some sort of protein and dinner again is either a soup or a protein-based meal.  She reports snacking occasionally on anything she can.  She reports she does exercise routinely.  She does vigorous channel walking at the swim center at least 9-10 times per month.  She feels at this point she is at a place where she is willing to make substantial lifestyle changes to help with weight loss.  She does have some concerns about proceeding with weight loss surgery but has spoken with many people that have done well with the surgery and thinks this might be the best option for her.      Review of Systems   Constitutional: Negative for activity change, appetite change, fatigue and unexpected weight change.   HENT: Negative for sore throat and trouble swallowing.    Eyes: Negative for visual disturbance.   Respiratory: Negative for cough,  choking and shortness of breath.    Cardiovascular: Negative for chest pain, palpitations and leg swelling.   Gastrointestinal: Negative for abdominal pain, constipation, diarrhea, nausea and vomiting.   Musculoskeletal: Positive for arthralgias.   Neurological: Negative for dizziness and headaches.   Hematological: Does not bruise/bleed easily.   Psychiatric/Behavioral: Negative for sleep disturbance.          Past Medical History:   Diagnosis Date   • Allergic rhinitis    • Asthma    • FAN (dyspnea on exertion)    • GERD (gastroesophageal reflux disease)    • H. pylori infection    • Headache    • Hypothyroidism    • Microscopic hematuria    • SHANA (obstructive sleep apnea)    • Osteoarthritis    • Palpitations    • Supraventricular tachycardia (CMS/HCC) (HCC)    • Tinnitus        Past Surgical History:   Procedure Laterality Date   • CARDIAC ELECTROPHYSIOLOGY STUDY AND ABLATION  01/01/2007    attempted ablation for SVT   • COLONOSCOPY  01/21/2015    cecal polyp-tubular adenoma-recommend repeat 5 years   • DILATION AND CURETTAGE OF UTERUS  01/01/2006   • ENDOMETRIAL BIOPSY  01/01/2012    pt reports negative   • ESOPHAGOGASTRODUODENOSCOPY  01/01/2009    H pylori   • KNEE ARTHROPLASTY  04/01/2015    Dr Placido BRICE   • VAGINAL HYSTERECTOMY  12/16/2013    with BSO, fibroids       Allergies   Allergen Reactions   • No Known Drug Allergies          Current Outpatient Medications:   •  fluticasone (FLONASE) 50 mcg/actuation nasal spray, Administer 2 sprays into each nostril daily, Disp: 48 g, Rfl: 1  •  meloxicam (MOBIC) 15 mg tablet, Take 1 tablet (15 mg total) by mouth daily, Disp: 90 tablet, Rfl: 3  •  omeprazole (PriLOSEC) 20 mg capsule, Take 1 capsule (20 mg total) by mouth daily, Disp: 90 capsule, Rfl: 3  •  levothyroxine (SYNTHROID) 112 mcg tablet, Take 1 tablet (112 mcg total) by mouth daily, Disp: 90 tablet, Rfl: 3  •  diazePAM (VALIUM) 5 mg tablet, Take 1 tablet (5 mg total) by mouth every 12 (twelve) hours as  needed for muscle spasms, Disp: 30 tablet, Rfl: 1  •  amoxicillin (AMOXIL) 500 mg capsule, PRN , Disp: , Rfl:   •  diclofenac sodium (VOLTAREN) 1 % gel, APPLY 2 GRAMS TO THE AFFECTED AREA(S) BY TOPICAL ROUTE 4 TIMES PER DAY AS NEEDED, Disp: 100 g, Rfl: PRN  •  aspirin 325 mg tablet, prn, Disp: , Rfl:   •  calcium carbonate (OS-RONNIE) 500 mg calcium tablet, 2 Every Day, As Needed, Disp: , Rfl:   •  multivitamin (THERAGRAN) tablet tablet, 1 Every Day, Disp: , Rfl:   •  omega 3-dha-epa-fish oil 1,000 mg (120 mg-180 mg) capsule, 2 Every Day, Disp: , Rfl:   •  albuterol HFA (VENTOLIN HFA) 90 mcg/actuation inhaler, Inhale 2 puffs every 4 hours by inhalation route as needed., Disp: 3, Rfl: 1  •  acetaminophen (TYLENOL EXTRA STRENGTH) 500 mg tablet, Take 2 tablets every 12 hours by oral route as needed., Disp: , Rfl:   •  cyclobenzaprine (FLEXERIL) 10 mg tablet, Take 1 tablet (10 mg total) by mouth 2 (two) times a day as needed for muscle spasms., Disp: 60 tablet, Rfl: 0    Family History   Problem Relation Age of Onset   • Cancer Mother 84        Bladder   • Osteoporosis Mother    • Hypertension Mother    • Colon cancer Father 65   • Kidney cancer Father 77   • Other Father's Brother         sudden cardiac death, 60s   • Heart disease Maternal Grandfather    • Heart attack Maternal Grandfather 59   • Cancer Paternal Grandfather        Social History     Socioeconomic History   • Marital status: Single     Spouse name: Not on file   • Number of children: Not on file   • Years of education: Not on file   • Highest education level: Not on file   Social Needs   • Financial resource strain: Not on file   • Food insecurity - worry: Not on file   • Food insecurity - inability: Not on file   • Transportation needs - medical: Not on file   • Transportation needs - non-medical: Not on file   Occupational History   • Not on file   Tobacco Use   • Smoking status: Never Smoker   • Smokeless tobacco: Never Used   Substance and Sexual  "Activity   • Alcohol use: No   • Drug use: No   • Sexual activity: Not on file   Other Topics Concern   • Not on file   Social History Narrative   • Not on file          Objective       /90 (BP Location: Left arm, Patient Position: Sitting, Cuff Size: Large)   Pulse 66   Temp 36.8 °C (98.2 °F) (Oral)   Resp 16   Ht 1.626 m (5' 4\")   Wt 127 kg (279 lb 3.2 oz)   SpO2 95%   BMI 47.92 kg/m²       Physical Exam   Constitutional: She is oriented to person, place, and time. She appears well-developed and well-nourished. No distress.   HENT:   Head: Normocephalic and atraumatic.   Right Ear: External ear normal.   Left Ear: External ear normal.   Mouth/Throat: Oropharynx is clear and moist. No oropharyngeal exudate.   Neck: Normal range of motion. Neck supple. No thyromegaly present.   Cardiovascular: Normal rate, regular rhythm and normal heart sounds.   Pulmonary/Chest: Effort normal and breath sounds normal. No respiratory distress.   Abdominal: Soft. Bowel sounds are normal. She exhibits no distension. There is no tenderness.   Musculoskeletal: She exhibits no edema.   Neurological: She is alert and oriented to person, place, and time.   Skin: Skin is warm and dry.   Psychiatric: She has a normal mood and affect.         ASSESSMENT AND PLAN    1. Class 3 severe obesity due to excess calories with serious comorbidity and body mass index (BMI) of 45.0 to 49.9 in adult (CMS/HCC) ([facility])  Discussed the process to bariatric surgery to include preoperative medically supervised diet, education with the dietitian, psychological evaluation, medical clearance.  Patient has consulted with Dr. Henry however he is not covered by her insurance so she is interested in other options.  After discussion of other available surgeons she is interested in consulting with Dr. Richards in [place].  She would like to complete preoperative work-up here in [place] for convenience.  We will get her started with the dietitian in " the next couple weeks.  She will track all of her intake up until then for further review by the dietitian.  Also send referral to psychologist to get her set up for evaluation.  We discussed initial recommendations.  Patient should eat 3 times per day whether this is defined as a meal or snack.  Would like patient to focus on protein intake with each of these eating episodes.  Patient should aim for 20 to 30 g per episode.  Would also like patient to omit any other containing beverages.  Patient should focus on appropriate food choices, increase vegetable intake and adequate water intake.  This was all outlined for her on a handout and we did review this as well.  We also discussed the importance of exercise.  Patient is currently participating in water exercise at least 9-10 times per month.  Would like patient to do this on all of her days off of work.    - Ambulatory referral to Nutrition Services; Future  - Ambulatory referral to Neuropsychology; Future  - Ambulatory referral to General Surgery; Future    2. Obstructive sleep apnea syndrome  Patient reports compliance with CPAP therapy.    3. Gastroesophageal reflux disease, esophagitis presence not specified  Patient is currently taking PPI.    4. Arthralgia of both ankles  Patient would definitely benefit from weight loss to improve symptoms.    5. Elevated BP without diagnosis of hypertension  Patient has had multiple elevated blood pressure readings noted in her record.  She is not currently taking any antihypertensives.  We will see how she does with weight loss.  May need better control of her blood pressure prior to surgery.    All other medical conditions are stable.  We will see her back in 1 month for monitoring.            A voice recognition program was used to aid in medical record documentation. Sometimes words are printed not exactly as they were spoken. While efforts were made to carefully edit and correct any inaccuracies, some errors may be  present and should be taken within the context of the discussion.  Please contact our office if you need assistance interpreting this medical record or notice any mistakes.      Louann Edwards CNP  4:22 PM, 5/3/2019.

## 2019-05-26 DIAGNOSIS — M19.90 OSTEOARTHRITIS, UNSPECIFIED OSTEOARTHRITIS TYPE, UNSPECIFIED SITE: ICD-10-CM

## 2019-05-28 RX ORDER — DICLOFENAC SODIUM 10 MG/G
GEL TOPICAL
Qty: 100 G | Status: SHIPPED | OUTPATIENT
Start: 2019-05-28 | End: 2020-09-08

## 2019-05-29 ENCOUNTER — CONSULT (OUTPATIENT)
Dept: SURGERY | Facility: CLINIC | Age: 64
End: 2019-05-29
Payer: COMMERCIAL

## 2019-05-29 VITALS
SYSTOLIC BLOOD PRESSURE: 138 MMHG | DIASTOLIC BLOOD PRESSURE: 84 MMHG | WEIGHT: 279 LBS | OXYGEN SATURATION: 96 % | TEMPERATURE: 98.7 F | HEART RATE: 64 BPM | HEIGHT: 64 IN | BODY MASS INDEX: 47.63 KG/M2

## 2019-05-29 DIAGNOSIS — E66.01 MORBID OBESITY WITH BMI OF 45.0-49.9, ADULT (CMS/HCC): Primary | ICD-10-CM

## 2019-05-29 DIAGNOSIS — M15.9 OSTEOARTHRITIS OF MULTIPLE JOINTS, UNSPECIFIED OSTEOARTHRITIS TYPE: ICD-10-CM

## 2019-05-29 DIAGNOSIS — K42.9 PERIUMBILICAL HERNIA: ICD-10-CM

## 2019-05-29 DIAGNOSIS — K21.9 GASTROESOPHAGEAL REFLUX DISEASE, ESOPHAGITIS PRESENCE NOT SPECIFIED: ICD-10-CM

## 2019-05-29 PROCEDURE — 99205 OFFICE O/P NEW HI 60 MIN: CPT | Performed by: NURSE PRACTITIONER

## 2019-05-29 ASSESSMENT — ENCOUNTER SYMPTOMS
ACTIVITY CHANGE: 0
DIZZINESS: 0
DIARRHEA: 0
PSYCHIATRIC NEGATIVE: 1
UNEXPECTED WEIGHT CHANGE: 0
DIAPHORESIS: 0
CHILLS: 0
APPETITE CHANGE: 0
TROUBLE SWALLOWING: 0
FEVER: 0
HEADACHES: 0
NAUSEA: 0
SHORTNESS OF BREATH: 0
ARTHRALGIAS: 1
CHEST TIGHTNESS: 0
ABDOMINAL PAIN: 0
PALPITATIONS: 0
SEIZURES: 0
LIGHT-HEADEDNESS: 0
FATIGUE: 0
CONSTIPATION: 0
VOMITING: 0

## 2019-05-29 NOTE — PATIENT INSTRUCTIONS
"1. Review online media \"Time to Act on Obesity\" on You Tube with family.   2. Maintain food/fluid log- written on with mobile rik ([employer], My Fitness Pal, etc)  3. Aim for 60-80 gm protein per day (20-30 gm per meal), refer to protein handout. May use protein shake for meal replacement if needed (I.e. Premier Protein, Pure Protein).  4. Exercise by elliptical or water exercise for 10-15 or 30 minutes 3 days per week. Increase intensity, duration, and frequency as tolerated.  "

## 2019-05-29 NOTE — PROGRESS NOTES
GENERAL SURGERY ADMISSION HISTORY & PHYSICAL     5/29/2019     Corrine Holden     [id number]    REFERRING PROVIDER: PAPO FIGUEROA CNP     Chief Complaint   Patient presents with   • Obesity     Bariatric consult per KASEY Figueroa- Has also seen Dr Henry     HISTORY OF PRESENT ILLNESS:  Corrine Holden is a 64 y.o.  female who presents to the weight management and bariatric surgery clinic today to discuss weight loss options.  Her Body mass index is 47.89 kg/m².  She has a longstanding history of obesity with multiple failed attempts at weight loss in the past including self directed dieting with reduced carbohydrates, Weight Watchers program, and prescription weight loss with Fen-Phen with most successful attempt being Weight Watchers with a loss of 100 lbs.  She reports highest is current at 279 lbs, and lowest adult weight 151 lbs.  She has been overweight most of her adult life and reports a gradual gain over time, starting in her 20s.  She was very athletic in her high school years and weight about 140 lbs throughout her teens.  She is most interested in sleeve gastrectomy for weight loss.  She is a non-smoker and her biggest motivation for weight loss is to reduce chronic arthritic pain and increase quality of life.    Significant family history of obesity in sisters and paternal family.  Weight related co morbidities including obstructive sleep apnea on CPAP therapy, GERD on PPI, osteoarthritis on meloxicam, and elevated blood pressure without diagnosis of hypertension.  She has been diagnosed with asthma, but denies the need for inhaler in at least 2 years.  Also reports umbilical hernia that is not bothersome to her and denies symptoms of incarceration or even intermittent protrusion.  This was a subsequent finding on CT urogram.  Denies any psychological weight related co morbidities.      She reports a current diet that consists of:   -Breakfast: Protein bar, cheese, fresh fruit  -Lunch:  "meat-protein  -Dinner: soup or meat-protein  -Snacks: variety, \"anything\"     She reports current physical activity with water exercises, namely water walking, 2-3 days/week.       Past Medical History:   Diagnosis Date   • Allergic rhinitis    • Asthma    • FAN (dyspnea on exertion)    • GERD (gastroesophageal reflux disease)    • H. pylori infection    • Headache    • Hypothyroidism    • Microscopic hematuria    • SHANA (obstructive sleep apnea)    • Osteoarthritis    • Palpitations    • Supraventricular tachycardia (CMS/HCC) (HCC)    • Tinnitus         Past Surgical History:   Procedure Laterality Date   • CARDIAC ELECTROPHYSIOLOGY STUDY AND ABLATION  01/01/2007    attempted ablation for SVT   • COLONOSCOPY  01/21/2015    cecal polyp-tubular adenoma-recommend repeat 5 years   • DILATION AND CURETTAGE OF UTERUS  01/01/2006   • ENDOMETRIAL BIOPSY  01/01/2012    pt reports negative   • ESOPHAGOGASTRODUODENOSCOPY  01/01/2009    H pylori   • KNEE ARTHROPLASTY  04/01/2015    [facility], Dr Cummins   • VAGINAL HYSTERECTOMY  12/16/2013    with BSO, fibroids         Current Outpatient Medications:   •  diclofenac sodium (VOLTAREN) 1 % gel, APPLY 2 GRAMS TO THE AFFECTED AREA(S) BY TOPICAL ROUTE 4 TIMES PER DAY AS NEEDED, Disp: 100 g, Rfl: PRN  •  fluticasone (FLONASE) 50 mcg/actuation nasal spray, Administer 2 sprays into each nostril daily, Disp: 48 g, Rfl: 1  •  meloxicam (MOBIC) 15 mg tablet, Take 1 tablet (15 mg total) by mouth daily, Disp: 90 tablet, Rfl: 3  •  omeprazole (PriLOSEC) 20 mg capsule, Take 1 capsule (20 mg total) by mouth daily, Disp: 90 capsule, Rfl: 3  •  levothyroxine (SYNTHROID) 112 mcg tablet, Take 1 tablet (112 mcg total) by mouth daily, Disp: 90 tablet, Rfl: 3  •  diazePAM (VALIUM) 5 mg tablet, Take 1 tablet (5 mg total) by mouth every 12 (twelve) hours as needed for muscle spasms, Disp: 30 tablet, Rfl: 1  •  amoxicillin (AMOXIL) 500 mg capsule, PRN , Disp: , Rfl:   •  cyclobenzaprine (FLEXERIL) 10 mg tablet, " Take 1 tablet (10 mg total) by mouth 2 (two) times a day as needed for muscle spasms., Disp: 60 tablet, Rfl: 0  •  aspirin 325 mg tablet, prn, Disp: , Rfl:   •  calcium carbonate (OS-RONNIE) 500 mg calcium tablet, 2 Every Day, As Needed, Disp: , Rfl:   •  multivitamin (THERAGRAN) tablet tablet, 1 Every Day, Disp: , Rfl:   •  omega 3-dha-epa-fish oil 1,000 mg (120 mg-180 mg) capsule, 2 Every Day, Disp: , Rfl:   •  albuterol HFA (VENTOLIN HFA) 90 mcg/actuation inhaler, Inhale 2 puffs every 4 hours by inhalation route as needed., Disp: 3, Rfl: 1  •  acetaminophen (TYLENOL EXTRA STRENGTH) 500 mg tablet, Take 2 tablets every 12 hours by oral route as needed., Disp: , Rfl:     Allergies   Allergen Reactions   • No Known Drug Allergies        Family History   Problem Relation Age of Onset   • Cancer Mother 84        Bladder   • Osteoporosis Mother    • Hypertension Mother    • Colon cancer Father 65   • Kidney cancer Father 77   • Other Father's Brother         sudden cardiac death, 60s   • Heart disease Maternal Grandfather    • Heart attack Maternal Grandfather 59   • Cancer Paternal Grandfather        Social History     Socioeconomic History   • Marital status: Single     Spouse name: Not on file   • Number of children: Not on file   • Years of education: Not on file   • Highest education level: Not on file   Occupational History   • Not on file   Social Needs   • Financial resource strain: Not on file   • Food insecurity:     Worry: Not on file     Inability: Not on file   • Transportation needs:     Medical: Not on file     Non-medical: Not on file   Tobacco Use   • Smoking status: Never Smoker   • Smokeless tobacco: Never Used   Substance and Sexual Activity   • Alcohol use: No   • Drug use: No   • Sexual activity: Not on file   Lifestyle   • Physical activity:     Days per week: Not on file     Minutes per session: Not on file   • Stress: Not on file   Relationships   • Social connections:     Talks on phone: Not on  "file     Gets together: Not on file     Attends Jain service: Not on file     Active member of club or organization: Not on file     Attends meetings of clubs or organizations: Not on file     Relationship status: Not on file   • Intimate partner violence:     Fear of current or ex partner: Not on file     Emotionally abused: Not on file     Physically abused: Not on file     Forced sexual activity: Not on file   Other Topics Concern   • Not on file   Social History Narrative   • Not on file          Review of Systems   Constitutional: Negative for activity change, appetite change, chills, diaphoresis, fatigue, fever and unexpected weight change.   HENT: Negative for trouble swallowing.    Eyes: Negative for visual disturbance.   Respiratory: Negative for chest tightness and shortness of breath.    Cardiovascular: Negative for chest pain and palpitations.   Gastrointestinal: Negative for abdominal pain, constipation, diarrhea, nausea and vomiting.   Musculoskeletal: Positive for arthralgias (ankles).   Neurological: Negative for dizziness, seizures, syncope, light-headedness and headaches.   Psychiatric/Behavioral: Negative.        Objective    Vital Signs  /84   Pulse 64   Temp 37.1 °C (98.7 °F) (Temporal)   Ht 1.626 m (5' 4\")   Wt 127 kg (279 lb)   SpO2 96%   BMI 47.89 kg/m²         Physical Exam   Constitutional: She is oriented to person, place, and time. Vital signs are normal. She appears well-developed and well-nourished. No distress.   Morbidly obese; refused weight today- weight documented from most recent visit with ELLI Edwards CNP.   HENT:   Head: Normocephalic and atraumatic.   Eyes: Conjunctivae are normal.   Neck: Neck supple.   Cardiovascular: Normal rate, regular rhythm, S1 normal and S2 normal.   No murmur heard.  Pulses:       Radial pulses are 2+ on the right side, and 2+ on the left side.   Pulmonary/Chest: Effort normal and breath sounds normal.   Abdominal: Soft. Bowel sounds " are normal. There is no tenderness. A hernia is present. Hernia confirmed positive in the ventral area (umbilical defect).   Neurological: She is alert and oriented to person, place, and time.   Skin: Skin is warm, dry and intact. No rash (no rash to exposed skin surfaces) noted.   Nursing note and vitals reviewed.       Automated Differential:   Lab Results   Component Value Date    NEUTROABS 3.90 12/02/2017    NEUTROPCT 70 01/19/2019    LYMPHOPCT 23 01/19/2019    LYMPHOABS 1.8 01/19/2019    MONOPCT 1 (L) 01/19/2019    MONOSABS 0.50 12/02/2017    EOSPCT 5 (H) 01/19/2019    EOSABS 0.4 01/19/2019    BASOSABS 0.1 01/19/2019    BASOPCT 1 01/19/2019       CBC with Platelet:  Lab Results   Component Value Date    WBC 8.0 01/19/2019    HGB 14.7 01/19/2019    HCT 42.7 01/19/2019     01/19/2019    RBC 4.58 01/19/2019    MCV 93.3 01/19/2019    MCH 32.1 01/19/2019    MCHC 34.4 01/19/2019    RDW 13.7 01/19/2019    MPV 8.1 01/19/2019     CMP  Lab Results   Component Value Date     01/19/2019    K 4.5 01/19/2019     (H) 01/19/2019    CO2 22 01/19/2019    BUN 16 01/19/2019    CREATININE 0.83 01/19/2019    GLUCOSE 113 (H) 01/19/2019    CALCIUM 8.5 (L) 01/19/2019    PROT 6.6 01/19/2019    ALBUMIN 4.1 01/19/2019    AST 25 01/19/2019    ALT 26 01/19/2019    ALKPHOS 69 01/19/2019    BILITOT 0.40 01/19/2019     Lipid  Lab Results   Component Value Date    CHOL 179 01/19/2019    TRIG 90 01/19/2019    HDL 54 01/19/2019     Magnesium  Lab Results   Component Value Date    MG 2.1 06/05/2015     TSH  Lab Results   Component Value Date    TSH 2.438 01/19/2019        ASSESSMENT & PLAN:  1. Morbid obesity with BMI of 45.0-49.9, adult (CMS/HCC) (formerly [facility])  Corrine Holden is a 64 y.o. female.  Her Body mass index is 47.89 kg/m² and co morbid conditions that are noted in the HPI.  She presents today to discuss weight loss options in order to improve her health and quality of life.  We reviewed weight management options including  "medically supervised weight management, prescription weight loss, and bariatric surgery.  She was provided the Bariatric Welcome Packet and is aware of the bariatric support group.    Corrine Holden has had multiple failed attempts at weight loss in the past that are noted in the HPI.  We reviewed foundational components of weight loss including nutrition, physical activity, and behavioral health as well as metabolic adaptation with weight loss and the effects of dieting.  We discussed importance of protein-rich diet and resistance training to combat metabolic adaptation, as well as to prevent loss of lean muscle and bone mass following bariatric surgery.      Upon conclusion of our discussion she reports that she is most interested in pursuing a surgical weight loss solution with sleeve gastrectomy.  She will meet with the bariatric surgeon in 1 month for further discussion regarding risks/benefits of sleeve gastrectomy vs RYGB.  The following bariatric surgery program requirements were discussed:      Preop:   - Medically supervised visits x 4 visits   - Nutrition counseling x 5 visits  - Behavioral health counseling x 3 visits  - No further weight gain  - Remain free of nicotine/tobacco use  - Preoperative lab work and EGD with Dr. Atkins  - Preop diet     Postop:   - Ongoing follow-up at 3 weeks, 3 months, 6 months, 9 months, 12 months, 18 months, 2 years, and as needed thereafter  - 1 behavioral health counseling follow up within 6 months following surgery.  - Annual labs  Discussed that we prefer patients to use providers established within the [facility] Bariatric surgery program for preop counseling and postop follow up, but she would like to complete nutrition and behavioral health counseling in [place] as this is where she resides.     The following 1-month weight loss goals were discussed and mutually agreed upon:   1. Review online media \"Time to Act on Obesity\" on You Tube with family. "   2. Maintain food/fluid log- written on with mobile rik ([employer]it[employer], My Fitness Pal, etc)  3. Aim for 60-80 gm protein per day (20-30 gm per meal), refer to protein handout. May use protein shake for meal replacement if needed (I.e. Premier Protein, Pure Protein).  4. Exercise by elliptical or water exercise for 10-15 or 30 minutes 3 days per week. Increase intensity, duration, and frequency as tolerated.    2. Periumbilical hernia  Periumbilical hernia is present on exam. This is not bothersome to her at this point. We discussed that if she ever did desire umbilical hernia repair, would recommend substantial weight loss prior with achievement of weight maintenance.  Would recommend repair at least 1 year following bariatric surgery if desired.    3. Osteoarthritis of multiple joints, unspecified osteoarthritis type  She does use meloxicam in the treatment of arthritic pain and also takes high dose ASA. Discussed stopping these medications for at least 1 month prior to bariatric surgery and 6 months postop to reduce risk for leak. May continue use of acetaminophen and topical NSAID.    4. Gastroesophageal reflux disease, esophagitis presence not specified  Treated for GERD with PPI. Did review RYGB as both metabolic and anti-reflux surgery, as well incidence of worsening or new onset reflux with sleeve gastrectomy. Despite this she remains most interested in sleeve. With appropriate weight loss following sleeve, but with persistent GERD would be a potential candidate for LINX MSA.       Corrine Holden will RTC in 1 month for ongoing medically supervised weight management and to meet with the bariatric surgeon.  She participated in the decision making process and agreed with the plan of care.  All questions were sought and answered to her satisfaction.      Karely Cantor CNP  1:01 PM, 5/29/2019    Total Time spent with the patient is 60 min with more than 50% in direct counseling and coordination of care regarding  the above diagnoses.

## 2019-06-07 ENCOUNTER — OFFICE VISIT (OUTPATIENT)
Dept: BARIATRICS | Facility: HOSPITAL | Age: 64
End: 2019-06-07
Payer: COMMERCIAL

## 2019-06-07 ENCOUNTER — OFFICE VISIT NO LOS (OUTPATIENT)
Dept: NUTRITION | Facility: HOSPITAL | Age: 64
End: 2019-06-07
Payer: COMMERCIAL

## 2019-06-07 VITALS
SYSTOLIC BLOOD PRESSURE: 142 MMHG | OXYGEN SATURATION: 95 % | DIASTOLIC BLOOD PRESSURE: 86 MMHG | BODY MASS INDEX: 47.07 KG/M2 | TEMPERATURE: 98.4 F | WEIGHT: 274.2 LBS | HEART RATE: 83 BPM

## 2019-06-07 DIAGNOSIS — K21.9 GASTROESOPHAGEAL REFLUX DISEASE, ESOPHAGITIS PRESENCE NOT SPECIFIED: ICD-10-CM

## 2019-06-07 DIAGNOSIS — E66.01 CLASS 3 SEVERE OBESITY DUE TO EXCESS CALORIES WITH SERIOUS COMORBIDITY AND BODY MASS INDEX (BMI) OF 45.0 TO 49.9 IN ADULT (CMS/HCC): Primary | ICD-10-CM

## 2019-06-07 DIAGNOSIS — E66.813 CLASS 3 SEVERE OBESITY DUE TO EXCESS CALORIES WITH SERIOUS COMORBIDITY AND BODY MASS INDEX (BMI) OF 45.0 TO 49.9 IN ADULT (CMS/HCC): ICD-10-CM

## 2019-06-07 DIAGNOSIS — E66.813 CLASS 3 SEVERE OBESITY DUE TO EXCESS CALORIES WITH SERIOUS COMORBIDITY AND BODY MASS INDEX (BMI) OF 45.0 TO 49.9 IN ADULT (CMS/HCC): Primary | ICD-10-CM

## 2019-06-07 DIAGNOSIS — E66.01 CLASS 3 SEVERE OBESITY DUE TO EXCESS CALORIES WITH SERIOUS COMORBIDITY AND BODY MASS INDEX (BMI) OF 45.0 TO 49.9 IN ADULT (CMS/HCC): ICD-10-CM

## 2019-06-07 DIAGNOSIS — G47.33 OBSTRUCTIVE SLEEP APNEA SYNDROME: ICD-10-CM

## 2019-06-07 DIAGNOSIS — M25.50 ARTHRALGIA, UNSPECIFIED JOINT: ICD-10-CM

## 2019-06-07 PROCEDURE — 99402 PREV MED CNSL INDIV APPRX 30: CPT | Performed by: NURSE PRACTITIONER

## 2019-06-07 PROCEDURE — G0463 HOSPITAL OUTPT CLINIC VISIT: HCPCS

## 2019-06-07 PROCEDURE — 97802 MEDICAL NUTRITION INDIV IN: CPT | Performed by: DIETITIAN, REGISTERED

## 2019-06-07 ASSESSMENT — ENCOUNTER SYMPTOMS
FATIGUE: 0
ARTHRALGIAS: 1
SLEEP DISTURBANCE: 0
ACTIVITY CHANGE: 1

## 2019-06-07 NOTE — PATIENT INSTRUCTIONS
Preop:   - Medically supervised visits x 4 visits   - Nutrition counseling x 5 visits  - Behavioral health counseling x 3 visits  - No further weight gain  - Remain free of nicotine/tobacco use  - Preoperative lab work and EGD with Dr. Atkins  - Preop diet

## 2019-06-07 NOTE — PROGRESS NOTES
Medical Nutrition Therapy (MNT) General Bariatric  Preventative Medicine Counseling -obesity ,BMI 47.89     Beginning Time: 12:00     End Time: 12:30  MNT Provider Order: medically supervised diet for obesity/nutrition education for bariatric surgery  64 y.o.    female  Others Present: unaccompanied  Desired Procedure/Procedure Completed: gastric sleeve desired    Nutrition Assessment  Food/Nutrition - Related History   Previous MNT/Date: No previous visit found  Pertinent Medications:   Current Outpatient Medications on File Prior to Visit   Medication Sig Dispense Refill   • diclofenac sodium (VOLTAREN) 1 % gel APPLY 2 GRAMS TO THE AFFECTED AREA(S) BY TOPICAL ROUTE 4 TIMES PER DAY AS NEEDED 100 g PRN   • fluticasone (FLONASE) 50 mcg/actuation nasal spray Administer 2 sprays into each nostril daily 48 g 1   • meloxicam (MOBIC) 15 mg tablet Take 1 tablet (15 mg total) by mouth daily 90 tablet 3   • omeprazole (PriLOSEC) 20 mg capsule Take 1 capsule (20 mg total) by mouth daily 90 capsule 3   • levothyroxine (SYNTHROID) 112 mcg tablet Take 1 tablet (112 mcg total) by mouth daily 90 tablet 3   • diazePAM (VALIUM) 5 mg tablet Take 1 tablet (5 mg total) by mouth every 12 (twelve) hours as needed for muscle spasms 30 tablet 1   • amoxicillin (AMOXIL) 500 mg capsule PRN      • cyclobenzaprine (FLEXERIL) 10 mg tablet Take 1 tablet (10 mg total) by mouth 2 (two) times a day as needed for muscle spasms. 60 tablet 0   • aspirin 325 mg tablet prn     • calcium carbonate (OS-RONNIE) 500 mg calcium tablet 2 Every Day, As Needed     • multivitamin (THERAGRAN) tablet tablet 1 Every Day     • omega 3-dha-epa-fish oil 1,000 mg (120 mg-180 mg) capsule 2 Every Day     • albuterol HFA (VENTOLIN HFA) 90 mcg/actuation inhaler Inhale 2 puffs every 4 hours by inhalation route as needed. 3 1   • acetaminophen (TYLENOL EXTRA STRENGTH) 500 mg tablet Take 2 tablets every 12 hours by oral route as needed.       No current facility-administered  "medications on file prior to visit.      Supplements/Herbals: multivitamin, calcium, omega 3s  Alcohol Use:   Social History     Substance and Sexual Activity   Alcohol Use No     [employer] / Cultural / Ethnic Food Practices: none  Physical Activity: Yes   Type: cardiovascular workout on exercise equipment, walking and yard work   Frequency: intermittently Duration: NA  Usual Intake: B- coffee w/ creamer. Eggs w/ cheese, toast  L- jerky  S-steal,baked potato w/ butter, asparagus  48 oz water      Client History  Medical History:  Past Medical History:   Diagnosis Date   • Allergic rhinitis    • Asthma    • FAN (dyspnea on exertion)    • GERD (gastroesophageal reflux disease)    • H. pylori infection    • Headache    • Hypothyroidism    • Microscopic hematuria    • SHANA (obstructive sleep apnea)    • Osteoarthritis    • Palpitations    • Supraventricular tachycardia (CMS/HCC) (HCC)    • Tinnitus      Problems:  Specialty Problems     None        Occupation: RN   Socioeconomic Concerns:  none  Shops/Cooks for Self: Yes    Anthropometrics  Ht: 5'4\"  Ht Readings from Last 1 Encounters:   05/29/19 1.626 m (5' 4\")      Wt: 274.2#  Wt Readings from Last 1 Encounters:   05/29/19 127 kg (279 lb)      BMI:47.07  BMI Readings from Last 1 Encounters:   05/29/19 47.89 kg/m²      BP:   BP Readings from Last 1 Encounters:   05/29/19 138/84     Patient declined body composition, waist and neck circumference today  Patient reports usual body weight (UBW) of: top adult weight 275#, low adult 150# ( lost 100# with Weight Watchers but regained)    Recent weight loss of 5 pounds    Biochemical Data, Medical Tests, and Procedures  Pertinent Labs: Comments:  no recent but A1 C and Vitamin normal in past        Social / Diet History    Allergies/Intolerances:   Allergies   Allergen Reactions   • No Known Drug Allergies        Nutrition Prescription: 1200 kcal, 64 oz water, 60 gm protein       Nutrition Diagnosis  Problem #1:obesity  Related " to: excessive energy intake  As evidenced by: BMI 47.07    Intervention / Plan  Bariatric Diet Discussion: Adequate nutrient intake  Decreasing fat/sat fat  Menu planning/meal ideas  Increase HBV protein  CHO counting trategies  Portion control  Behavior changes  Mindful eating  Dining out/special events  Handouts provided: Nutrition, Exercise and Lifestyle Changes After Bariatric Surgery  Bariatric Shopping Guide, protein snack ideas    Goals  Adheres to activity  Meets protein intake  Adhere to portion sizes   Avoid artificial sweeteners  20 min per meal and chew food 20 times    Evaluation  Receptivity to education: good      Comment: Reviewed bariatric key points-protein, fluids, diet progression, long term use of vitamins/minerals.   Patient needs reinforcement of the information.Patient is busy and often travels for work. She states she usually inhales her food. Discussed not eating in the car and taking time for her meals.Provided healthier snack ideas for on the go.  Discussed eating high quality foods since she needs to eat things such as protein bars, ice cream bars etc. Some days she doesn't eat any vegetables. Discussed the importance of protein and vegetables. Provided encouraged patient to avoid all artificial sweeteners and reviewed rationale why.   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  Other: none    Time spent with patient: 30 minutes

## 2019-06-07 NOTE — PROGRESS NOTES
Follow Up Weight Managment    Subjective      Patient ID: Corrine Holden is a 64 y.o. female.    HPI  Patient presents today for follow-up medically supervised diet in preparation for bariatric surgery.  She has been tracking her food for the last couple days.  She is trying to focus on protein and decrease carbs in general.  She is finding some difficulty with finding appropriate food choices for when she is busy and on the go.  She states she does not necessarily eat out a hunger but is eating out of boredom.  She is trying to exercise more.  She has been biking and has recently bought an exercise bike for home as well.  She would like to continue with her current goal of exercising 3 times a week.    Review of Systems   Constitutional: Positive for activity change. Negative for fatigue.   Musculoskeletal: Positive for arthralgias.   Psychiatric/Behavioral: Negative for sleep disturbance.          Past Medical History:   Diagnosis Date   • Allergic rhinitis    • Asthma    • FAN (dyspnea on exertion)    • GERD (gastroesophageal reflux disease)    • H. pylori infection    • Headache    • Hypothyroidism    • Microscopic hematuria    • SHANA (obstructive sleep apnea)    • Osteoarthritis    • Palpitations    • Supraventricular tachycardia (CMS/HCC) (HCC)    • Tinnitus        Past Surgical History:   Procedure Laterality Date   • CARDIAC ELECTROPHYSIOLOGY STUDY AND ABLATION  01/01/2007    attempted ablation for SVT   • COLONOSCOPY  01/21/2015    cecal polyp-tubular adenoma-recommend repeat 5 years   • DILATION AND CURETTAGE OF UTERUS  01/01/2006   • ENDOMETRIAL BIOPSY  01/01/2012    pt reports negative   • ESOPHAGOGASTRODUODENOSCOPY  01/01/2009    H pylori   • KNEE ARTHROPLASTY  04/01/2015    Dr Placido BRICE   • VAGINAL HYSTERECTOMY  12/16/2013    with BSO, fibroids       Allergies   Allergen Reactions   • No Known Drug Allergies          Current Outpatient Medications:   •  diclofenac sodium (VOLTAREN) 1 % gel, APPLY 2  GRAMS TO THE AFFECTED AREA(S) BY TOPICAL ROUTE 4 TIMES PER DAY AS NEEDED, Disp: 100 g, Rfl: PRN  •  fluticasone (FLONASE) 50 mcg/actuation nasal spray, Administer 2 sprays into each nostril daily, Disp: 48 g, Rfl: 1  •  meloxicam (MOBIC) 15 mg tablet, Take 1 tablet (15 mg total) by mouth daily, Disp: 90 tablet, Rfl: 3  •  omeprazole (PriLOSEC) 20 mg capsule, Take 1 capsule (20 mg total) by mouth daily, Disp: 90 capsule, Rfl: 3  •  levothyroxine (SYNTHROID) 112 mcg tablet, Take 1 tablet (112 mcg total) by mouth daily, Disp: 90 tablet, Rfl: 3  •  diazePAM (VALIUM) 5 mg tablet, Take 1 tablet (5 mg total) by mouth every 12 (twelve) hours as needed for muscle spasms, Disp: 30 tablet, Rfl: 1  •  amoxicillin (AMOXIL) 500 mg capsule, PRN , Disp: , Rfl:   •  cyclobenzaprine (FLEXERIL) 10 mg tablet, Take 1 tablet (10 mg total) by mouth 2 (two) times a day as needed for muscle spasms., Disp: 60 tablet, Rfl: 0  •  aspirin 325 mg tablet, prn, Disp: , Rfl:   •  calcium carbonate (OS-RONNIE) 500 mg calcium tablet, 2 Every Day, As Needed, Disp: , Rfl:   •  multivitamin (THERAGRAN) tablet tablet, 1 Every Day, Disp: , Rfl:   •  omega 3-dha-epa-fish oil 1,000 mg (120 mg-180 mg) capsule, 2 Every Day, Disp: , Rfl:   •  albuterol HFA (VENTOLIN HFA) 90 mcg/actuation inhaler, Inhale 2 puffs every 4 hours by inhalation route as needed., Disp: 3, Rfl: 1  •  acetaminophen (TYLENOL EXTRA STRENGTH) 500 mg tablet, Take 2 tablets every 12 hours by oral route as needed., Disp: , Rfl:     Family History   Problem Relation Age of Onset   • Cancer Mother 84        Bladder   • Osteoporosis Mother    • Hypertension Mother    • Colon cancer Father 65   • Kidney cancer Father 77   • Other Father's Brother         sudden cardiac death, 60s   • Heart disease Maternal Grandfather    • Heart attack Maternal Grandfather 59   • Cancer Paternal Grandfather        Social History     Socioeconomic History   • Marital status: Single     Spouse name: Not on file   •  Number of children: Not on file   • Years of education: Not on file   • Highest education level: Not on file   Occupational History   • Not on file   Social Needs   • Financial resource strain: Not on file   • Food insecurity:     Worry: Not on file     Inability: Not on file   • Transportation needs:     Medical: Not on file     Non-medical: Not on file   Tobacco Use   • Smoking status: Never Smoker   • Smokeless tobacco: Never Used   Substance and Sexual Activity   • Alcohol use: No   • Drug use: No   • Sexual activity: Not on file   Lifestyle   • Physical activity:     Days per week: Not on file     Minutes per session: Not on file   • Stress: Not on file   Relationships   • Social connections:     Talks on phone: Not on file     Gets together: Not on file     Attends [employer] service: Not on file     Active member of club or organization: Not on file     Attends meetings of clubs or organizations: Not on file     Relationship status: Not on file   • Intimate partner violence:     Fear of current or ex partner: Not on file     Emotionally abused: Not on file     Physically abused: Not on file     Forced sexual activity: Not on file   Other Topics Concern   • Not on file   Social History Narrative   • Not on file          Objective   /86   Pulse 83   Temp 36.9 °C (98.4 °F)   Wt 124 kg (274 lb 3.2 oz)   SpO2 95%   BMI 47.07 kg/m²     Physical Exam   Constitutional: She is oriented to person, place, and time. She appears well-developed and well-nourished. No distress.   HENT:   Head: Normocephalic and atraumatic.   Neurological: She is alert and oriented to person, place, and time.   Psychiatric: She has a normal mood and affect. Her behavior is normal.       Assessment and Plan:    1. Class 3 severe obesity due to excess calories with serious comorbidity and body mass index (BMI) of 45.0 to 49.9 in adult (CMS/HCC) ([facility]ly [facility])  Patient continues with medically supervised weight loss in preparation for  bariatric surgery.  She is going to be having her surgery done in [place].  She will meet with the dietitian today to start education on postoperative eating as well as improvements in her current diet.  We discussed her biggest issues with eating on the go.  Did recommend to patient avoidance of any artificial sweeteners.  We also discussed ongoing efforts towards exercise so patient will continue to aim for 3 sessions of exercise per week.    2. Obstructive sleep apnea syndrome  Patient is compliant with CPAP therapy.  3. Gastroesophageal reflux disease, esophagitis presence not specified  Patient does have some symptoms of reflux.  She will have an EGD preoperatively prior to bariatric surgery.    4. Arthralgia, unspecified joint  Patient has concerns of avoidance of NSAIDs following bariatric procedure.  Discussed with patient decrease inflammation with weight loss as well as diet and exercise.  Discussed with patient likely need for last pain medication in general.    All other medical conditions are stable.  I will see her back in 1 month for monitoring.      A voice recognition program was used to aid in medical record documentation. Sometimes words are printed not exactly as they were spoken. While efforts were made to carefully edit and correct any inaccuracies, some errors may be present and should be taken within the context of the discussion.  Please contact our office if you need assistance interpreting this medical record or notice any mistakes.        Louann Edwards, CNP  06/07/19

## 2019-07-12 ENCOUNTER — OFFICE VISIT (OUTPATIENT)
Dept: URGENT CARE | Facility: URGENT CARE | Age: 64
End: 2019-07-12
Payer: COMMERCIAL

## 2019-07-12 ENCOUNTER — ANCILLARY PROCEDURE (OUTPATIENT)
Dept: RADIOLOGY | Facility: URGENT CARE | Age: 64
End: 2019-07-12
Payer: COMMERCIAL

## 2019-07-12 VITALS
OXYGEN SATURATION: 98 % | TEMPERATURE: 97.6 F | SYSTOLIC BLOOD PRESSURE: 159 MMHG | BODY MASS INDEX: 46.95 KG/M2 | DIASTOLIC BLOOD PRESSURE: 99 MMHG | WEIGHT: 275 LBS | HEIGHT: 64 IN | RESPIRATION RATE: 20 BRPM | HEART RATE: 72 BPM

## 2019-07-12 DIAGNOSIS — M54.6 ACUTE LEFT-SIDED THORACIC BACK PAIN: Primary | ICD-10-CM

## 2019-07-12 PROCEDURE — 71046 X-RAY EXAM CHEST 2 VIEWS: CPT | Mod: TC | Performed by: PHYSICIAN ASSISTANT

## 2019-07-12 PROCEDURE — 99202 OFFICE O/P NEW SF 15 MIN: CPT | Performed by: PHYSICIAN ASSISTANT

## 2019-07-12 ASSESSMENT — PAIN SCALES - GENERAL: PAINLEVEL: 6

## 2019-07-12 NOTE — PATIENT INSTRUCTIONS
Patient Education     Acute Back Pain, Adult  Acute back pain is sudden and usually short-lived. It is often caused by an injury to the muscles and tissues in the back. The injury may result from:  · A muscle or ligament getting overstretched or torn (strained). Ligaments are tissues that connect bones to each other. Lifting something improperly can cause a back strain.  · Wear and tear (degeneration) of the spinal disks. Spinal disks are circular tissue that provides cushioning between the bones of the spine (vertebrae).  · Twisting motions, such as while playing sports or doing yard work.  · A hit to the back.  · Arthritis.  You may have a physical exam, lab tests, and imaging tests to find the cause of your pain. Acute back pain usually goes away with rest and home care.  Follow these instructions at home:  Managing pain, stiffness, and swelling  · Take over-the-counter and prescription medicines only as told by your health care provider.  · Your health care provider may recommend applying ice during the first 24-48 hours after your pain starts. To do this:  ? Put ice in a plastic bag.  ? Place a towel between your skin and the bag.  ? Leave the ice on for 20 minutes, 2-3 times a day.  · If directed, apply heat to the affected area as often as told by your health care provider. Use the heat source that your health care provider recommends, such as a moist heat pack or a heating pad.  ? Place a towel between your skin and the heat source.  ? Leave the heat on for 20-30 minutes.  ? Remove the heat if your skin turns bright red. This is especially important if you are unable to feel pain, heat, or cold. You have a greater risk of getting burned.  Activity    · Do not stay in bed. Staying in bed for more than 1-2 days can delay your recovery.  · Sit up and stand up straight. Avoid leaning forward when you sit, or hunching over when you stand.  ? If you work at a desk, sit close to it so you do not need to lean  "over. Keep your chin tucked in. Keep your neck drawn back, and keep your elbows bent at a right angle. Your arms should look like the letter \"L.\"  ? Sit high and close to the steering wheel when you drive. Add lower back (lumbar) support to your car seat, if needed.  · Take short walks on even surfaces as soon as you are able. Try to increase the length of time you walk each day.  · Do not sit, drive, or  one place for more than 30 minutes at a time. Sitting or standing for long periods of time can put stress on your back.  · Do not drive or use heavy machinery while taking prescription pain medicine.  · Use proper lifting techniques. When you bend and lift, use positions that put less stress on your back:  ? Bend your knees.  ? Keep the load close to your body.  ? Avoid twisting.  · Exercise regularly as told by your health care provider. Exercising helps your back heal faster and helps prevent back injuries by keeping muscles strong and flexible.  · Work with a physical therapist to make a safe exercise program, as recommended by your health care provider. Do any exercises as told by your physical therapist.  Lifestyle  · Maintain a healthy weight. Extra weight puts stress on your back and makes it difficult to have good posture.  · Avoid activities or situations that make you feel anxious or stressed. Stress and anxiety increase muscle tension and can make back pain worse. Learn ways to manage anxiety and stress, such as through exercise.  General instructions    · Sleep on a firm mattress in a comfortable position. Try lying on your side with your knees slightly bent. If you lie on your back, put a pillow under your knees.  · Follow your treatment plan as told by your health care provider. This may include:  ? Cognitive or behavioral therapy.  ? Acupuncture or massage therapy.  ? Meditation or yoga.  Contact a health care provider if:  · You have pain that is not relieved with rest or medicine.  · You " have increasing pain going down into your legs or buttocks.  · Your pain does not improve after 2 weeks.  · You have pain at night.  · You lose weight without trying.  · You have a fever or chills.  Get help right away if:  · You develop new bowel or bladder control problems.  · You have unusual weakness or numbness in your arms or legs.  · You develop nausea or vomiting.  · You develop abdominal pain.  · You feel faint.  Summary  · Acute back pain is sudden and usually short-lived.  · Use proper lifting techniques. When you bend and lift, use positions that put less stress on your back.  · Take over-the-counter and prescription medicines and apply heat or ice as directed by your health care provider.  This information is not intended to replace advice given to you by your health care provider. Make sure you discuss any questions you have with your health care provider.  Document Released: 12/18/2006 Document Revised: 08/01/2018 Document Reviewed: 08/01/2018  Elsevier Interactive Patient Education © 2019 [employer]

## 2019-07-12 NOTE — PROGRESS NOTES
Subjective      Corrine Holden is a 64 y.o. female who presents for left sided back pain. Started two weeks ago. No known injury. Patient states that she takes anti-inflammatories which do provide her with relief briefly. She also took aspirin. Pain is fairly constant but does get worse occasionally. It hurts when she sleeps on the left side. Denies history of back problems. Denies fever, chills, chest pain, cough, congestion, shortness of breath, nausea, vomiting, abdominal pain, numbness, paresthesias, weakness. No recent URI. She has concerns she may have pleurisy and feels she should have a chest x-ray performed.       The following have been reviewed and updated as appropriate in this visit:  Past Medical, Surgical and Social History    Allergies   Allergen Reactions   • No Known Drug Allergies      Current Outpatient Medications   Medication Sig Dispense Refill   • diclofenac sodium (VOLTAREN) 1 % gel APPLY 2 GRAMS TO THE AFFECTED AREA(S) BY TOPICAL ROUTE 4 TIMES PER DAY AS NEEDED 100 g PRN   • fluticasone (FLONASE) 50 mcg/actuation nasal spray Administer 2 sprays into each nostril daily 48 g 1   • meloxicam (MOBIC) 15 mg tablet Take 1 tablet (15 mg total) by mouth daily 90 tablet 3   • omeprazole (PriLOSEC) 20 mg capsule Take 1 capsule (20 mg total) by mouth daily 90 capsule 3   • levothyroxine (SYNTHROID) 112 mcg tablet Take 1 tablet (112 mcg total) by mouth daily 90 tablet 3   • diazePAM (VALIUM) 5 mg tablet Take 1 tablet (5 mg total) by mouth every 12 (twelve) hours as needed for muscle spasms 30 tablet 1   • amoxicillin (AMOXIL) 500 mg capsule PRN      • aspirin 325 mg tablet prn     • calcium carbonate (OS-RONNIE) 500 mg calcium tablet 2 Every Day, As Needed     • multivitamin (THERAGRAN) tablet tablet 1 Every Day     • omega 3-dha-epa-fish oil 1,000 mg (120 mg-180 mg) capsule 2 Every Day     • albuterol HFA (VENTOLIN HFA) 90 mcg/actuation inhaler Inhale 2 puffs every 4 hours by inhalation route as needed.  "3 1   • acetaminophen (TYLENOL EXTRA STRENGTH) 500 mg tablet Take 2 tablets every 12 hours by oral route as needed.     • cyclobenzaprine (FLEXERIL) 10 mg tablet Take 1 tablet (10 mg total) by mouth 2 (two) times a day as needed for muscle spasms. 60 tablet 0     No current facility-administered medications for this visit.        Review of Systems  As noted above in HPI.    Objective   /99 (BP Location: Left arm, Patient Position: Sitting, Cuff Size: Reg)   Pulse 72   Temp 36.4 °C (97.6 °F)   Resp 20   Ht 1.626 m (5' 4\")   Wt 125 kg (275 lb)   SpO2 98%   BMI 47.20 kg/m²     Physical Exam   Constitutional: She is oriented to person, place, and time. She appears well-developed and well-nourished. No distress.   HENT:   Head: Normocephalic.   Cardiovascular: Normal rate, regular rhythm and normal heart sounds.   Pulmonary/Chest: Effort normal and breath sounds normal. She has no decreased breath sounds. She has no wheezes. She has no rhonchi. She has no rales.   Musculoskeletal:        Cervical back: She exhibits no tenderness and no bony tenderness.        Thoracic back: She exhibits tenderness (Tenderness with palpation of the left thoracic paravertebral musculature. ). She exhibits no bony tenderness.        Lumbar back: She exhibits no tenderness and no bony tenderness.        Back:    Neurological: She is alert and oriented to person, place, and time. No sensory deficit.   Skin: Skin is warm and dry.   Nursing note and vitals reviewed.      LABS  No results found for this or any previous visit (from the past 2 hour(s)).    X-ray Chest 2 Views    Result Date: 7/12/2019  Exam: Chest x-ray - 2 views 07/12/2019 1342 hours Clinical History: Acute left-sided thoracic back pain; left sided thoracic back pain Comparison/s:  11/29/2015 Findings:  The heart and pulmonary vasculature are normal. The mediastinal contours are normal. Lungs are clear. No evidence of pleural effusion is present. Bones are " unremarkable.     IMPRESSION:  Normal chest.      Assessment/Plan   Diagnoses and all orders for this visit:    Acute left-sided thoracic back pain  -     X-ray chest 2 views        Patient presenting with the chief complaint of back pain. On exam, there are no neurological deficits. No evidence of cauda equina. No history of cancer or IVDU. Discussed with patient I do no feel imaging necessary at this time. Patient very concerned for respiratory source. I did reassure patient that this appears to be musculoskeletal in nature. Patient would prefer to have a chest x-ray and understands that this is radiation exposure. X-ray ordered and was negative for acute abnormalities. Discussed results with the patient.  Pain appears to be musculoskeletal in nature. Advised supportive therapies including ibuprofen 600 mg every six to eight hours with small snack and plenty of water OR naproxen 500 mg BID. Alternate ice and heat, avoid bed rest as this can worsen back pain, avoid activities that worsen back pain over the next few days. Follow up with PCP in the next 7-10 days. Be evaluated sooner if develop worsening symptoms including fever, numbness or weakness of lower extremities, numbness in groin area, urinary incontinence or retention or other new concerning symptoms.     Patient expresses understanding and agrees with plan of care.     TOMAS Orozco  July 12, 2019 2:05 PM

## 2019-07-31 ENCOUNTER — HOSPITAL ENCOUNTER (OUTPATIENT)
Dept: PHYSICAL THERAPY | Facility: HOSPITAL | Age: 64
Setting detail: THERAPIES SERIES
Discharge: STILL ADMITTED OR ADMINISTRATIVE | End: 2019-07-31
Payer: COMMERCIAL

## 2019-07-31 DIAGNOSIS — M54.6 THORACIC SPINE PAIN: ICD-10-CM

## 2019-07-31 PROCEDURE — 97161 PT EVAL LOW COMPLEX 20 MIN: CPT | Mod: GP | Performed by: PHYSICAL THERAPIST

## 2019-07-31 PROCEDURE — 97110 THERAPEUTIC EXERCISES: CPT | Mod: GP | Performed by: PHYSICAL THERAPIST

## 2019-07-31 NOTE — INTERDISCIPLINARY/THERAPY
"   [facility]  [address] [place] [address]  Dept: [phone]    Physical Therapy Initial Evaluation     Patient Name: Corrine Holden  Referring Doctor: Sierra Pitt PA-C  Date of Service: 7/31/2019  Provider name and number: Kevin Fair PT  HICN: [id number]    Primary Medical Diagnosis: acute left sided thoracic pain     Treatment Diagnosis. Impaired trunk mobility, strength, ROM, and motor control affecting functional performance and relating to a connective tissue dysfunction    Visit Number: 1    Subjective:  Corrine Holden reports to the [facility] with c/o left lateral flank pain that occurred while attempting to lift an elliptical on 7/3/19.  She had strong pain and some numbness following this incident.  Over the next week her pain worsened and was constant.  She was assessed at [facility], [facility] and [facility] urgent care.  X-rays were taken of the chest and lumbar spine and were negative.  Since being assessed at the [facility], she now reports better pain management by icing, using biofreeze, rest, and massage.  She continues to have pain but it is better controlled.  She notes pain at all times, but has had better tolerance to laying on her left side and sleeping.  She describes the current pain as soreness, like a bruise or if she was \"beaten up.\"       Past Medical History:   Diagnosis Date   • Allergic rhinitis    • Asthma    • FAN (dyspnea on exertion)    • GERD (gastroesophageal reflux disease)    • H. pylori infection    • Headache    • Hypothyroidism    • Microscopic hematuria    • SHANA (obstructive sleep apnea)    • Osteoarthritis    • Palpitations    • Supraventricular tachycardia (CMS/HCC) (HCC)    • Tinnitus        Current Outpatient Medications:   •  diclofenac sodium (VOLTAREN) 1 % gel, APPLY 2 GRAMS TO THE AFFECTED AREA(S) BY TOPICAL ROUTE 4 TIMES PER DAY AS NEEDED, Disp: 100 g, Rfl: PRN  •  fluticasone (FLONASE) 50 " mcg/actuation nasal spray, Administer 2 sprays into each nostril daily, Disp: 48 g, Rfl: 1  •  meloxicam (MOBIC) 15 mg tablet, Take 1 tablet (15 mg total) by mouth daily, Disp: 90 tablet, Rfl: 3  •  omeprazole (PriLOSEC) 20 mg capsule, Take 1 capsule (20 mg total) by mouth daily, Disp: 90 capsule, Rfl: 3  •  levothyroxine (SYNTHROID) 112 mcg tablet, Take 1 tablet (112 mcg total) by mouth daily, Disp: 90 tablet, Rfl: 3  •  diazePAM (VALIUM) 5 mg tablet, Take 1 tablet (5 mg total) by mouth every 12 (twelve) hours as needed for muscle spasms, Disp: 30 tablet, Rfl: 1  •  amoxicillin (AMOXIL) 500 mg capsule, PRN , Disp: , Rfl:   •  cyclobenzaprine (FLEXERIL) 10 mg tablet, Take 1 tablet (10 mg total) by mouth 2 (two) times a day as needed for muscle spasms., Disp: 60 tablet, Rfl: 0  •  aspirin 325 mg tablet, prn, Disp: , Rfl:   •  calcium carbonate (OS-RONNIE) 500 mg calcium tablet, 2 Every Day, As Needed, Disp: , Rfl:   •  multivitamin (THERAGRAN) tablet tablet, 1 Every Day, Disp: , Rfl:   •  omega 3-dha-epa-fish oil 1,000 mg (120 mg-180 mg) capsule, 2 Every Day, Disp: , Rfl:   •  albuterol HFA (VENTOLIN HFA) 90 mcg/actuation inhaler, Inhale 2 puffs every 4 hours by inhalation route as needed., Disp: 3, Rfl: 1  •  acetaminophen (TYLENOL EXTRA STRENGTH) 500 mg tablet, Take 2 tablets every 12 hours by oral route as needed., Disp: , Rfl:     No known drug allergies    Social History     Socioeconomic History   • Marital status: Single     Spouse name: Not on file   • Number of children: Not on file   • Years of education: Not on file   • Highest education level: Not on file   Occupational History   • Not on file   Social Needs   • Financial resource strain: Not on file   • Food insecurity:     Worry: Not on file     Inability: Not on file   • Transportation needs:     Medical: Not on file     Non-medical: Not on file   Tobacco Use   • Smoking status: Never Smoker   • Smokeless tobacco: Never Used   Substance and Sexual  Activity   • Alcohol use: No   • Drug use: No   • Sexual activity: Not on file   Lifestyle   • Physical activity:     Days per week: Not on file     Minutes per session: Not on file   • Stress: Not on file   Relationships   • Social connections:     Talks on phone: Not on file     Gets together: Not on file     Attends Yazidism service: Not on file     Active member of club or organization: Not on file     Attends meetings of clubs or organizations: Not on file     Relationship status: Not on file   • Intimate partner violence:     Fear of current or ex partner: Not on file     Emotionally abused: Not on file     Physically abused: Not on file     Forced sexual activity: Not on file   Other Topics Concern   • Not on file   Social History Narrative   • Not on file       Johnson Fall Risk  History of Falling: No  Secondary Diagnosis: No  Ambulatory Aids: None/bedrest/nurse assist  Intravenous Therapy/Heparin/Saline Lock: No  Gait/Transferring: Normal/bedrest/wheelchair  Mental Status: Oriented to own ability  Johnson Fall Risk Score: 0  Johnson Fall Risk Score: 0    Pain:  Pain Assessment  Pain Assessment: 0-10  Pain Score: 3  Pain Type: Acute pain  Pain Location: Back  Pain Orientation: Mid    Activities limited by Patient's complaint: overdoing it; lying on left side    Objective:  Mobility:  -no difficulty noted with gait or sit<>stand transfers  -some caution with bed mobility due to more strain    ROM:  -capable of normal trunk movement with flexion, extension, rotation, lateral flexion with most pain noted in flexion and left rotation and reduced pain with extension and overhead reaching; tolerates lateral flexion right to open left side without strong pain  -manual lateral flexion stretch with inferior depression of the left pelvis is pain-reducing    Strength:  -sidelying hip abduction does increase pain when using the left hip and not the right    Special Test:  -long sitting slump increases pain with less pain in  spinal extension or when the knees are flexed    Initial Treatment:  Evaluation for 35 minutes.  Therapeutic exercise for 10 minutes:  HEP development and ed/practice:  1. hooklying LTR, chang  2. Seated cats/dogs  3. Right sidelying hip abduction  4. Standing left lateral line stretch with hip shift left and overhead reach    Rehab Goals/Potential/Assessment/Plan:  Corrine has left sided flank pain that is sensitive to low load contractile loading, positions of compression, and palpation.  Responds favorably to stretch/opening.  Assessment and symptom presentation suggests some potential peripheral nerve sensitivity with slump tension testing.  She will incorporate HEP activities into current home routine.  She may benefit from e-stim and this will be attempted at her f/u appointment.      Short-Term Goals:  Short Term PT Goal 1: Corrine will report less pain with work and daily activities, rating 1-2/10 or less in 4-6 weeks.      Prognosis  Assessment: Decreased ADL status  Prognosis: Good    Plan  Treatment Interventions: Manual therapy, Modalities, Therapeutic activities, Therapeutic exercises  PT Frequency: 1-2x/wk  Treatment Duration : 4-12 weeks      Thank you for allowing us to share in the care of this patient.  If you have any questions, recommendations, or further concerns regarding this patient, please feel free to contact me.  Signed by: Kevin Fair, PT  7/31/2019  8:55 AM    * I have reviewed the plan of care and certify a continuing need for medically necessary services

## 2019-08-02 ENCOUNTER — HOSPITAL ENCOUNTER (OUTPATIENT)
Dept: PHYSICAL THERAPY | Facility: HOSPITAL | Age: 64
Setting detail: THERAPIES SERIES
Discharge: STILL ADMITTED OR ADMINISTRATIVE | End: 2019-08-02
Payer: COMMERCIAL

## 2019-08-02 PROCEDURE — 97110 THERAPEUTIC EXERCISES: CPT | Mod: GP | Performed by: PHYSICAL THERAPIST

## 2019-08-02 NOTE — INTERDISCIPLINARY/THERAPY
PT Daily Treatment Note      Patient Name: Corrine Holden  Date of Service: 8/2/2019    Visit Number: 2    Subjective:  Corrine says the lateral line stretching has been helpful.  She has been using heat instead of ice.  She will have a massage tomorrow.       Has patient fallen since last visit?  No    Pain:  Pain Assessment  Pain Assessment: 0-10  Pain Score: 3  Pain Type: Acute pain  Pain Location: Back  Pain Orientation: Mid    Have there been any changes in medications? No    Objective:  Treatment:  E-stim:  -IFC to left lateral trunk x 15' in right sidelying with MHP  TherEx:  -True Flex lateral line stretching variations x 10'      Rehab Goals/Potential/Assessment/Plan:  Corrine reports better pain control.  She says that her pain is better after e-stim.    Short-Term Goals:  Short Term PT Goal 1: Corrine will report less pain with work and daily activities, rating 1-2/10 or less in 4-6 weeks.      Thank you for allowing us to share in the care of this patient.  If you have any questions, recommendations, or further concerns regarding this patient, please feel free to contact me at [phone].    Signed by: Kevin Fair, PT  8/2/2019  7:48 AM

## 2019-08-06 ENCOUNTER — HOSPITAL ENCOUNTER (OUTPATIENT)
Dept: PHYSICAL THERAPY | Facility: HOSPITAL | Age: 64
Setting detail: THERAPIES SERIES
Discharge: STILL ADMITTED OR ADMINISTRATIVE | End: 2019-08-06
Payer: COMMERCIAL

## 2019-08-06 PROCEDURE — 97110 THERAPEUTIC EXERCISES: CPT | Mod: GP | Performed by: PHYSICAL THERAPIST

## 2019-08-06 PROCEDURE — 97014 ELECTRIC STIMULATION THERAPY: CPT | Mod: GP | Performed by: PHYSICAL THERAPIST

## 2019-08-06 NOTE — INTERDISCIPLINARY/THERAPY
PT Daily Treatment Note      Patient Name: Corrine Holden  Date of Service: 8/6/2019    Visit Number: 3    Subjective:  Corrine says the lateral line stretching has been helpful.  She has been using heat instead of ice.  She will have a massage tomorrow.       Has patient fallen since last visit?  No    Pain:  Pain Assessment  Pain Assessment: 0-10  Pain Score: 2  Pain Type: Acute pain  Pain Location: Back  Pain Orientation: Mid    Have there been any changes in medications? No    Objective:  Treatment:  E-stim:  -IFC to left lateral trunk x 15' in right sidelying with MHP  TherEx:  -USFT, chang  -hooklying leg crossed with LTR, chang    Rehab Goals/Potential/Assessment/Plan:  Less tightness with rotational stretching than lateral flexion stretching in her area of pain.    Short-Term Goals:  Short Term PT Goal 1: Corrine will report less pain with work and daily activities, rating 1-2/10 or less in 4-6 weeks.      Thank you for allowing us to share in the care of this patient.  If you have any questions, recommendations, or further concerns regarding this patient, please feel free to contact me at [phone].    Signed by: Keivn Fair, PT  8/6/2019  8:18 AM

## 2019-08-14 ENCOUNTER — HOSPITAL ENCOUNTER (OUTPATIENT)
Dept: PHYSICAL THERAPY | Facility: HOSPITAL | Age: 64
Setting detail: THERAPIES SERIES
Discharge: STILL ADMITTED OR ADMINISTRATIVE | End: 2019-08-14
Payer: COMMERCIAL

## 2019-08-14 NOTE — INTERDISCIPLINARY/THERAPY
PT Daily Treatment Note      Patient Name: Corrine Holden  Date of Service: 8/14/2019    Visit Number: 4    Subjective:  Her pain is getting better but the progress is slow.  She requests an e-stim treatment today and is may purchase a Tens for home use.  She worked in her garage recently to test herself and did not increase her pain.       Has patient fallen since last visit?  No    Pain:  Pain Assessment  Pain Assessment: 0-10  Pain Score: 3  Pain Type: Acute pain  Pain Location: Back  Pain Orientation: Mid, Left    Have there been any changes in medications? No    Objective:  Treatment:  E-stim:  -IFC to left lateral trunk x 15' in right sidelying with MHP  TherEx:  -blue sport chord:  >walk out anterior stab with punches  >bilateral  >alt unilateral  -blue sport chord Paloff rotations    Rehab Goals/Potential/Assessment/Plan:  Capable of doing the abdominal/trunk exercises without increasing pain and will do for HEP.    Short-Term Goals:  Short Term PT Goal 1: Corrine will report less pain with work and daily activities, rating 1-2/10 or less in 4-6 weeks.      Thank you for allowing us to share in the care of this patient.  If you have any questions, recommendations, or further concerns regarding this patient, please feel free to contact me at [phone].    Signed by: Kevin Fair, PT  8/14/2019  10:15 AM

## 2019-08-15 ENCOUNTER — OFFICE VISIT (OUTPATIENT)
Dept: URGENT CARE | Facility: URGENT CARE | Age: 64
End: 2019-08-15
Payer: COMMERCIAL

## 2019-08-15 VITALS
SYSTOLIC BLOOD PRESSURE: 128 MMHG | HEART RATE: 68 BPM | OXYGEN SATURATION: 98 % | HEIGHT: 64 IN | WEIGHT: 275 LBS | BODY MASS INDEX: 46.95 KG/M2 | RESPIRATION RATE: 16 BRPM | DIASTOLIC BLOOD PRESSURE: 74 MMHG | TEMPERATURE: 97.6 F

## 2019-08-15 DIAGNOSIS — T63.484A INSECT STINGS, UNDETERMINED INTENT, INITIAL ENCOUNTER: Primary | ICD-10-CM

## 2019-08-15 PROCEDURE — 99213 OFFICE O/P EST LOW 20 MIN: CPT | Performed by: PHYSICIAN ASSISTANT

## 2019-08-15 RX ORDER — PREDNISONE 20 MG/1
40 TABLET ORAL DAILY
Qty: 10 TABLET | Refills: 0 | Status: SHIPPED | OUTPATIENT
Start: 2019-08-15 | End: 2019-08-20

## 2019-08-15 ASSESSMENT — PAIN SCALES - GENERAL: PAINLEVEL: 3

## 2019-08-15 NOTE — PROGRESS NOTES
"Subjective      HPI    Corrine Holden is a 64 y.o. female who presents for swelling of her left hand after hornet sting yesterday.  The hornet sting was between the third and fourth metacarpal distally.  She reports a small amount of redness with quite a bit of swelling of the dorsal surface of the hand with associated pruritus.  She did take Benadryl last night and has been applying cool compress today.  She is concerned as the swelling has gradually been getting bigger.  She denies any fevers, chills, or pussy discharge from the sting site.  She denies allergies to bee venom.      Review of Systems    As noted in HPI.      Objective   /74   Pulse 68   Temp 36.4 °C (97.6 °F) (Temporal)   Resp 16   Ht 1.626 m (5' 4\")   Wt 125 kg (275 lb)   SpO2 98%   BMI 47.20 kg/m²     Physical Exam  Vitals signs and nursing note reviewed.   Constitutional:       General: She is not in acute distress.     Appearance: She is well-developed. She is not diaphoretic.   HENT:      Head: Normocephalic and atraumatic.   Eyes:      General: No scleral icterus.  Neck:      Musculoskeletal: Normal range of motion.   Pulmonary:      Effort: Pulmonary effort is normal. No respiratory distress.   Musculoskeletal: Normal range of motion.         General: No tenderness or deformity.   Skin:     General: Skin is warm and dry.      Capillary Refill: Capillary refill takes less than 2 seconds.      Coloration: Skin is not pale.      Findings: Erythema present. No bruising, laceration, lesion or rash.          Neurological:      Mental Status: She is alert and oriented to person, place, and time.      Sensory: No sensory deficit.   Psychiatric:         Attention and Perception: She is attentive.         Speech: Speech normal.         Behavior: Behavior normal.         Thought Content: Thought content normal.         Judgement: Judgment normal.       No results found for this or any previous visit (from the past 4 " hour(s)).    Assessment/Plan   Diagnoses and all orders for this visit:    Insect stings, undetermined intent, initial encounter  -     predniSONE (DELTASONE) 20 mg tablet; Take 2 tablets (40 mg total) by mouth daily for 5 days      Discussion:   We had a long discussion about the large local reaction she is having from the sting.  At this point there are no signs or symptoms of infection.  There is minimal erythema with a moderate amount of swelling.  We will have her use Claritin and Benadryl and will treat with a oral course of prednisone as above.  She can continue to use meloxicam and cold compress.  If she develops signs and symptoms of infection including fevers, chills, hot to the touch, pain to the touch, or purulent discharge she should return to clinic.Expected course of this diagnosis discussed with pt. Pt will f/u in clinic prn persisting or worsening symptoms.  Pt verbalizes understanding and agrees with plan.         TOMAS CARUSO

## 2019-08-20 ENCOUNTER — TELEPHONE (OUTPATIENT)
Dept: SURGERY | Facility: CLINIC | Age: 64
End: 2019-08-20

## 2019-08-20 NOTE — TELEPHONE ENCOUNTER
Received a call from Corrine. She has been following with Louann Edwards, but Louann has since moved to another facility. She is planning to schedule with Tonya Gregorio, psychologist in [place]. She will schedule a follow up appointment with Dr Richards next month. She plans to continue with the bariatric program. She did have a back injury in July which did cause a set back for her.Corrine just wanted us to know that she remains interested in our program.

## 2019-09-02 DIAGNOSIS — J31.0 CHRONIC NONALLERGIC RHINITIS: ICD-10-CM

## 2019-09-03 RX ORDER — FLUTICASONE PROPIONATE 50 MCG
SPRAY, SUSPENSION (ML) NASAL
Qty: 48 G | Status: SHIPPED | OUTPATIENT
Start: 2019-09-03 | End: 2020-01-27 | Stop reason: SDUPTHER

## 2019-09-04 ENCOUNTER — TELEPHONE (OUTPATIENT)
Dept: INTERNAL MEDICINE | Facility: CLINIC | Age: 64
End: 2019-09-04

## 2019-09-04 ENCOUNTER — OFFICE VISIT (OUTPATIENT)
Dept: INTERNAL MEDICINE | Facility: CLINIC | Age: 64
End: 2019-09-04
Payer: COMMERCIAL

## 2019-09-04 VITALS
HEIGHT: 64 IN | SYSTOLIC BLOOD PRESSURE: 128 MMHG | BODY MASS INDEX: 46.95 KG/M2 | WEIGHT: 275 LBS | RESPIRATION RATE: 16 BRPM | DIASTOLIC BLOOD PRESSURE: 84 MMHG | OXYGEN SATURATION: 94 % | HEART RATE: 66 BPM | TEMPERATURE: 97.7 F

## 2019-09-04 DIAGNOSIS — Z01.818 PREOP GENERAL PHYSICAL EXAM: ICD-10-CM

## 2019-09-04 DIAGNOSIS — M54.6 THORACIC BACK PAIN, UNSPECIFIED BACK PAIN LATERALITY, UNSPECIFIED CHRONICITY: Primary | ICD-10-CM

## 2019-09-04 DIAGNOSIS — Z87.898 HISTORY OF PALPITATIONS: ICD-10-CM

## 2019-09-04 PROCEDURE — 93000 ELECTROCARDIOGRAM COMPLETE: CPT | Performed by: INTERNAL MEDICINE

## 2019-09-04 PROCEDURE — 99214 OFFICE O/P EST MOD 30 MIN: CPT | Mod: 25 | Performed by: NURSE PRACTITIONER

## 2019-09-04 ASSESSMENT — PAIN SCALES - GENERAL: PAINLEVEL: 0-NO PAIN

## 2019-09-04 ASSESSMENT — ENCOUNTER SYMPTOMS
RHINORRHEA: 0
PALPITATIONS: 0
NAUSEA: 0
COUGH: 0
FEVER: 0
VOMITING: 0
ARTHRALGIAS: 1
SHORTNESS OF BREATH: 0
DIARRHEA: 0
CHILLS: 0
SORE THROAT: 0
BACK PAIN: 1
CONSTIPATION: 0

## 2019-09-04 NOTE — TELEPHONE ENCOUNTER
Caller would like to discuss (a) order Writer has advised caller of a callback from within 24 hours.    Patient: Corrine Holden    Caller Name (Last and first, relation/role): Self    Name of Facility: na    Callback Number: [phone] she said you don't need to call her back    Best Availability: anytime    Fax Number: na    Additional Info: Ordering provider for MRI is Sierra MARIN at [facility]    Did you confirm the message with the caller: Yes    Is it okay that the nurse communicates your response through MyChart? No

## 2019-09-04 NOTE — PROGRESS NOTES
Corrine Holdne is a 64 y.o. female who presents to my office today for a preoperative evaluation at the request of Dale Houston for CRNA sedation.  Corrine Holden is scheduled for a thoracic and lumbar spine MRI with CRNA sedation on September 30, 2019 at the [facility].    History of Present Illness  Corrine is a pleasant 64-year-old female patient whose primary care provider is Dr. Becka Joy.  I am seeing Corrine today for a preprocedure exam.  She is scheduled to undergo thoracic and lumbar spine MRI with CRNA sedation on September 30, 2019.  She reports that on July 3, she and her sister were moving her elliptical, and that she tweaked her thoracic back.  She has been evaluated at urgent care 3 times.  She has also completed conservative therapy with physical therapy and massage which helped some.  However then on August 5, she sustained a bee sting to her hand and she was treated with 40 mg of prednisone.  Corrine reports that while she was on the prednisone, she felt great, but once the prednisone was discontinued, she reports that her back pain again flared up.  She has been taking Valium a few times per week, but is still not able to lay on her back due to significant back pain.    Corrine reports that she has had oral sedation previously, but has still not been able to tolerate MRI imaging due to the length of she has to lay in the MRI machine.  She denies any changes to her past medical history, family history or surgical history.  She has her had a bad reaction to anesthesia, nor has anyone in her family had a bad reaction to anesthesia.  She does have a history of sleep apnea, requiring CPAP but otherwise reports no issues.  Her main concern is the back discomfort with lying down.      Past Medical History  Past Medical History:   Diagnosis Date   • Allergic rhinitis    • Asthma    • FAN (dyspnea on exertion)    • GERD (gastroesophageal reflux disease)    • H. pylori infection    • Headache     • Hypothyroidism    • Microscopic hematuria    • SHANA (obstructive sleep apnea)    • Osteoarthritis    • Palpitations    • Supraventricular tachycardia (CMS/HCC) (HCC)    • Tinnitus        Personal History of Clotting Disorder? No  Family History of Clotting Disorders? No  Personal History of Adverse Reaction to Anesthesia? No  Family History of Adverse Reaction to Anesthesia? No  Vaccines:    Tetanus: last tetanus booster within 10 years   Influenza vaccine up date: No   Candidate for PNA vaccines? No    Current Medications    Current Outpatient Medications:   •  krill-om-3-dha-epa-phospho-ast (KRILL OIL) 550-230-96-75 mg capsule, Take 1 capsule by mouth daily, Disp: , Rfl:   •  fluticasone propionate (FLONASE) 50 mcg/actuation nasal spray, USE 2 SPRAYS IN EACH NOSTRIL DAILY, Disp: 48 g, Rfl: PRN  •  diclofenac sodium (VOLTAREN) 1 % gel, APPLY 2 GRAMS TO THE AFFECTED AREA(S) BY TOPICAL ROUTE 4 TIMES PER DAY AS NEEDED, Disp: 100 g, Rfl: PRN  •  meloxicam (MOBIC) 15 mg tablet, Take 1 tablet (15 mg total) by mouth daily, Disp: 90 tablet, Rfl: 3  •  omeprazole (PriLOSEC) 20 mg capsule, Take 1 capsule (20 mg total) by mouth daily, Disp: 90 capsule, Rfl: 3  •  levothyroxine (SYNTHROID) 112 mcg tablet, Take 1 tablet (112 mcg total) by mouth daily, Disp: 90 tablet, Rfl: 3  •  diazePAM (VALIUM) 5 mg tablet, Take 1 tablet (5 mg total) by mouth every 12 (twelve) hours as needed for muscle spasms, Disp: 30 tablet, Rfl: 1  •  amoxicillin (AMOXIL) 500 mg capsule, PRN , Disp: , Rfl:   •  aspirin 325 mg tablet, prn, Disp: , Rfl:   •  calcium carbonate (OS-RONNIE) 500 mg calcium tablet, 2 Every Day, As Needed, Disp: , Rfl:   •  multivitamin (THERAGRAN) tablet tablet, 1 Every Day, Disp: , Rfl:   •  albuterol HFA (VENTOLIN HFA) 90 mcg/actuation inhaler, Inhale 2 puffs every 4 hours by inhalation route as needed., Disp: 3, Rfl: 1  •  acetaminophen (TYLENOL EXTRA STRENGTH) 500 mg tablet, Take 2 tablets every 12 hours by oral route as  "needed., Disp: , Rfl:   •  HYDROcodone-acetaminophen (NORCO) 5-325 mg per tablet, , Disp: , Rfl:   •  cyclobenzaprine (FLEXERIL) 10 mg tablet, Take 1 tablet (10 mg total) by mouth 2 (two) times a day as needed for muscle spasms., Disp: 60 tablet, Rfl: 0    Corrine denies any recent heart palpitations, chest pain, shortness of breath with activity or limitations to daily activities.  She does not exercise on a regular basis.  Although, She is able to perform the following activities: light housework., walking indoors leisurely. and climb a full flight of stairs without shortness of breath/chest pain/chest pressure..  Therefore metabolic equivalent of activity score is: 4.      Preoperative Requirements Needed   EKG  [x]     CBC []     CMP []     CXR []     MRSA culture []     Urine []     Further imaging studies []      Review of Systems  Review of Systems   Constitutional: Negative for chills and fever.   HENT: Negative for ear pain, rhinorrhea and sore throat.    Respiratory: Negative for cough and shortness of breath.    Cardiovascular: Negative for chest pain and palpitations.   Gastrointestinal: Negative for constipation, diarrhea, nausea and vomiting.   Genitourinary: Negative.    Musculoskeletal: Positive for arthralgias, back pain (As stated in HPI--following up with MRI imaging) and gait problem (Hx of right total knee).       /84 (BP Location: Left arm, Patient Position: Sitting, Cuff Size: Reg)   Pulse 66   Temp 36.5 °C (97.7 °F) (Temporal)   Resp 16   Ht 1.626 m (5' 4\")   Wt 125 kg (275 lb)   SpO2 94%   BMI 47.20 kg/m²     Physical Exam  Physical Exam  Vitals signs reviewed.   Constitutional:       Appearance: Normal appearance.   HENT:      Head: Normocephalic.      Right Ear: Tympanic membrane, ear canal and external ear normal. There is no impacted cerumen.      Left Ear: Tympanic membrane, ear canal and external ear normal. There is no impacted cerumen.      Nose: Nose normal.      " Mouth/Throat:      Mouth: Mucous membranes are moist.   Eyes:      Pupils: Pupils are equal, round, and reactive to light.   Neck:      Musculoskeletal: Normal range of motion and neck supple.   Cardiovascular:      Rate and Rhythm: Normal rate and regular rhythm.      Heart sounds: Normal heart sounds. No murmur. No friction rub. No gallop.    Pulmonary:      Effort: Pulmonary effort is normal.      Breath sounds: Normal breath sounds.   Skin:     General: Skin is warm and dry.      Capillary Refill: Capillary refill takes less than 2 seconds.   Neurological:      Mental Status: She is alert.         Assessment & Plan  Corrine was seen today for pre-op exam.    Diagnoses and all orders for this visit:    Thoracic back pain, unspecified back pain laterality, unspecified chronicity    Preop general physical exam  -     ECG 12 lead    History of palpitations  -     ECG 12 lead    1.  Thoracic back pain.  She has been experiencing thoracic pain since early July.  She has managed this conservatively with massage, physical therapy, Voltaren gel and Valium.  Due to her lack of improvement, orthopedics have recommended a thoracic and lumbar spine MRI.    2.  Preprocedure exam.  Patient denies any history of clotting disorders or trouble with anesthesia in the past.  We will obtain a twelve-lead electrocardiogram as patient has not had a EKG since 2015.    3.  Due to her history of heart patients and requiring CRNA sedation for her MRI, we will obtain twelve-lead electric cardiogram.  Twelve-lead electrocardiogram shows minor T wave abnormalities, but these were consistent with her EKG from 2015.    All medical conditions are stable at this time.  There is no absolute contraindication for gel to undergo the MRI with CRNA sedation at this time.    30 minutes were spent face to face with patient with >50% of time spent in counseling, discussion, and coordination of care regarding past medical history, surgical history, family  history and need for CRNA sedation for MRI imaging.    Portions of this record may have been created with voice recognition software. Care has been taken to prevent errors, however occassional wrong-word, sound-a-like substitutions or grammatical errors may have occurred due to the inherent limitations of voice recognition software.

## 2019-09-16 NOTE — INTERDISCIPLINARY/THERAPY
Physical Therapy Discharge Note      Patient Name: Corrine Holedn  Referring Doctor: Sierra Pitt PA-C  Date of Service: 9/16/2019     Primary Medical Diagnosis: acute left sided thoracic pain     Treatment Diagnosis. Impaired trunk mobility, strength, ROM, and motor control affecting functional performance and relating to a connective tissue dysfunction    This patient has been discharged from Physical Therapy because: she has not been seen in over 30 days.    Visits from start of care:  Treatments were initiated on 7/31/19 and 4 treatments have been provided. Treatments ranged eqib94-89 minutes, 1-2 times per week, and included: therapeutic exercise, manual therapy, electrical stimulation.    Patient did not attend a formal discharge therapy session or has not been seen in the last 30 days.  No objective information able to be obtained and goals not reassessed secondary to not attending a formal discharge therapy session.  Please reference previous daily notes and evaluation for information related to this patients care.       Thank you for allowing us to share in the care of this patient. If you have any questions, recommendations, or further concerns regarding this patient, please feel free to contact us at [phone].      Signed by: Kevin Fair, PT  9/16/2019  8:22 AM

## 2019-09-27 NOTE — PRE-PROCEDURE INSTRUCTIONS
Pre-Surgery Instructions:   Medication Instructions   • krill-om-3-dha-epa-phospho-ast (KRILL OIL) 122-226-74-75 mg capsule Do not take morning of surgery/procedure   • fluticasone propionate (FLONASE) 50 mcg/actuation nasal spray Do not take morning of surgery/procedure   • diclofenac sodium (VOLTAREN) 1 % gel Do not take morning of surgery/procedure   • meloxicam (MOBIC) 15 mg tablet Do not take morning of surgery/procedure   • omeprazole (PriLOSEC) 20 mg capsule Do not take morning of surgery/procedure   • levothyroxine (SYNTHROID) 112 mcg tablet Take morning of surgery/procedure   • diazePAM (VALIUM) 5 mg tablet INSTRUCTED BY PHYSICIAN TO TAKE AM OF SURGERY   • amoxicillin (AMOXIL) 500 mg capsule Do not take morning of surgery/procedure   • cyclobenzaprine (FLEXERIL) 10 mg tablet Do not take morning of surgery/procedure   • calcium carbonate (OS-RONNIE) 500 mg calcium tablet Do not take morning of surgery/procedure   • multivitamin (THERAGRAN) tablet tablet Do not take morning of surgery/procedure   • albuterol HFA (VENTOLIN HFA) 90 mcg/actuation inhaler PRN morning of surgery/procedure   • acetaminophen (TYLENOL EXTRA STRENGTH) 500 mg tablet Do not take morning of surgery/procedure

## 2019-09-30 ENCOUNTER — HOSPITAL ENCOUNTER (OUTPATIENT)
Dept: MRI IMAGING | Facility: HOSPITAL | Age: 64
Discharge: HOME | End: 2019-09-30
Payer: COMMERCIAL

## 2019-09-30 ENCOUNTER — ANESTHESIA (OUTPATIENT)
Dept: MRI IMAGING | Facility: HOSPITAL | Age: 64
End: 2019-09-30
Payer: COMMERCIAL

## 2019-09-30 ENCOUNTER — ANESTHESIA EVENT (OUTPATIENT)
Dept: MRI IMAGING | Facility: HOSPITAL | Age: 64
End: 2019-09-30
Payer: COMMERCIAL

## 2019-09-30 VITALS
HEART RATE: 64 BPM | DIASTOLIC BLOOD PRESSURE: 80 MMHG | TEMPERATURE: 97 F | RESPIRATION RATE: 18 BRPM | SYSTOLIC BLOOD PRESSURE: 137 MMHG | OXYGEN SATURATION: 94 %

## 2019-09-30 DIAGNOSIS — M54.50 LOW BACK PAIN: ICD-10-CM

## 2019-09-30 PROCEDURE — 72148 MRI LUMBAR SPINE W/O DYE: CPT

## 2019-09-30 PROCEDURE — 6360000200 HC RX 636 W HCPCS (ALT 250 FOR IP): Mod: JW | Performed by: NURSE ANESTHETIST, CERTIFIED REGISTERED

## 2019-09-30 PROCEDURE — (BLANK) HC RECOVERY PHASE-2 1ST 1/2 HOUR ACUITY LEVEL 1

## 2019-09-30 PROCEDURE — 72146 MRI CHEST SPINE W/O DYE: CPT

## 2019-09-30 PROCEDURE — (BLANK) HC RECOVERY PHASE-1 1ST  HOUR ACUITY LEVEL 2

## 2019-09-30 PROCEDURE — 6360000200 HC RX 636 W HCPCS (ALT 250 FOR IP)

## 2019-09-30 PROCEDURE — 01922 ANES N-INVAS IMG/RADJ THER: CPT | Performed by: NURSE ANESTHETIST, CERTIFIED REGISTERED

## 2019-09-30 PROCEDURE — 2580000300 HC RX 258: Performed by: ANESTHESIOLOGY

## 2019-09-30 RX ORDER — MIDAZOLAM HYDROCHLORIDE 1 MG/ML
1 INJECTION INTRAMUSCULAR; INTRAVENOUS EVERY 5 MIN PRN
Status: DISCONTINUED | OUTPATIENT
Start: 2019-09-30 | End: 2019-10-01 | Stop reason: HOSPADM

## 2019-09-30 RX ORDER — SODIUM CHLORIDE, SODIUM LACTATE, POTASSIUM CHLORIDE, CALCIUM CHLORIDE 600; 310; 30; 20 MG/100ML; MG/100ML; MG/100ML; MG/100ML
100 INJECTION, SOLUTION INTRAVENOUS CONTINUOUS
Status: DISCONTINUED | OUTPATIENT
Start: 2019-09-30 | End: 2019-10-01 | Stop reason: HOSPADM

## 2019-09-30 RX ORDER — FENTANYL CITRATE/PF 50 MCG/ML
50 PLASTIC BAG, INJECTION (ML) INTRAVENOUS EVERY 5 MIN PRN
Status: DISCONTINUED | OUTPATIENT
Start: 2019-09-30 | End: 2019-10-01 | Stop reason: HOSPADM

## 2019-09-30 RX ORDER — FENTANYL CITRATE/PF 50 MCG/ML
PLASTIC BAG, INJECTION (ML) INTRAVENOUS
Status: DISCONTINUED
Start: 2019-09-30 | End: 2019-10-01 | Stop reason: HOSPADM

## 2019-09-30 RX ORDER — SODIUM CHLORIDE 0.9 % (FLUSH) 0.9 %
2-10 SYRINGE (ML) INJECTION EVERY 8 HOURS SCHEDULED
Status: DISCONTINUED | OUTPATIENT
Start: 2019-09-30 | End: 2019-10-01 | Stop reason: HOSPADM

## 2019-09-30 RX ORDER — DIPHENHYDRAMINE HYDROCHLORIDE 50 MG/ML
25 INJECTION INTRAMUSCULAR; INTRAVENOUS ONCE AS NEEDED
Status: DISCONTINUED | OUTPATIENT
Start: 2019-09-30 | End: 2019-10-01 | Stop reason: HOSPADM

## 2019-09-30 RX ORDER — MIDAZOLAM HYDROCHLORIDE 1 MG/ML
INJECTION INTRAMUSCULAR; INTRAVENOUS
Status: COMPLETED
Start: 2019-09-30 | End: 2019-09-30

## 2019-09-30 RX ORDER — LIDOCAINE HYDROCHLORIDE 20 MG/ML
INJECTION, SOLUTION EPIDURAL; INFILTRATION; INTRACAUDAL; PERINEURAL AS NEEDED
Status: DISCONTINUED | OUTPATIENT
Start: 2019-09-30 | End: 2019-09-30 | Stop reason: SURG

## 2019-09-30 RX ORDER — PROMETHAZINE HYDROCHLORIDE 25 MG/ML
25 INJECTION, SOLUTION INTRAMUSCULAR; INTRAVENOUS ONCE AS NEEDED
Status: DISCONTINUED | OUTPATIENT
Start: 2019-09-30 | End: 2019-10-01 | Stop reason: HOSPADM

## 2019-09-30 RX ORDER — LABETALOL HCL 20 MG/4 ML
5 SYRINGE (ML) INTRAVENOUS EVERY 5 MIN PRN
Status: DISCONTINUED | OUTPATIENT
Start: 2019-09-30 | End: 2019-10-01 | Stop reason: HOSPADM

## 2019-09-30 RX ORDER — PROPOFOL 10 MG/ML
INJECTION, EMULSION INTRAVENOUS AS NEEDED
Status: DISCONTINUED | OUTPATIENT
Start: 2019-09-30 | End: 2019-09-30 | Stop reason: SURG

## 2019-09-30 RX ORDER — ONDANSETRON HYDROCHLORIDE 2 MG/ML
4 INJECTION, SOLUTION INTRAVENOUS ONCE AS NEEDED
Status: DISCONTINUED | OUTPATIENT
Start: 2019-09-30 | End: 2019-10-01 | Stop reason: HOSPADM

## 2019-09-30 RX ORDER — SODIUM CHLORIDE 0.9 % (FLUSH) 0.9 %
2-10 SYRINGE (ML) INJECTION AS NEEDED
Status: DISCONTINUED | OUTPATIENT
Start: 2019-09-30 | End: 2019-10-01 | Stop reason: HOSPADM

## 2019-09-30 RX ORDER — DEXAMETHASONE SODIUM PHOSPHATE 4 MG/ML
4 INJECTION, SOLUTION INTRA-ARTICULAR; INTRALESIONAL; INTRAMUSCULAR; INTRAVENOUS; SOFT TISSUE ONCE
Status: COMPLETED | OUTPATIENT
Start: 2019-09-30 | End: 2019-09-30

## 2019-09-30 RX ORDER — DEXAMETHASONE SODIUM PHOSPHATE 4 MG/ML
INJECTION, SOLUTION INTRA-ARTICULAR; INTRALESIONAL; INTRAMUSCULAR; INTRAVENOUS; SOFT TISSUE
Status: COMPLETED
Start: 2019-09-30 | End: 2019-09-30

## 2019-09-30 RX ADMIN — Medication 100 MCG: at 07:53

## 2019-09-30 RX ADMIN — LIDOCAINE HYDROCHLORIDE 100 MG: 20 INJECTION, SOLUTION EPIDURAL; INFILTRATION; INTRACAUDAL; PERINEURAL at 07:25

## 2019-09-30 RX ADMIN — MIDAZOLAM 1 MG: 1 INJECTION INTRAMUSCULAR; INTRAVENOUS at 06:48

## 2019-09-30 RX ADMIN — DEXAMETHASONE SODIUM PHOSPHATE 4 MG: 4 INJECTION, SOLUTION INTRA-ARTICULAR; INTRALESIONAL; INTRAMUSCULAR; INTRAVENOUS; SOFT TISSUE at 06:39

## 2019-09-30 RX ADMIN — MIDAZOLAM HYDROCHLORIDE 1 MG: 1 INJECTION INTRAMUSCULAR; INTRAVENOUS at 06:48

## 2019-09-30 RX ADMIN — Medication 100 MCG: at 08:06

## 2019-09-30 RX ADMIN — SODIUM CHLORIDE, POTASSIUM CHLORIDE, SODIUM LACTATE AND CALCIUM CHLORIDE 100 ML/HR: 600; 310; 30; 20 INJECTION, SOLUTION INTRAVENOUS at 06:39

## 2019-09-30 RX ADMIN — PROPOFOL 50 MG: 10 INJECTION, EMULSION INTRAVENOUS at 07:27

## 2019-09-30 ASSESSMENT — ENCOUNTER SYMPTOMS: EXERCISE TOLERANCE: GOOD (>7 METS)

## 2019-09-30 NOTE — ANESTHESIA PREPROCEDURE EVALUATION
"Pre-Procedure Assessment    Patient: Corrine Holden, female, 64 y.o.    Ht Readings from Last 1 Encounters:   09/04/19 1.626 m (5' 4\")     Wt Readings from Last 1 Encounters:   09/04/19 125 kg (275 lb)       Last Vitals  BP      Temp      Pulse     Resp      SpO2      Pain Score         Problem list reviewed and Medical history reviewed           Airway   Mallampati: II  TM distance: >3 FB  Neck ROM: full      Dental      Pulmonary     breath sounds clear to auscultation  (+) sleep apnea,   Cardiovascular   Exercise tolerance: good (>7 METS)    Rhythm: regular  Rate: normal  ROS comment: 1/2019 ECHO      · Normal left ventricular size and systolic function. EF 66%. Mild LVH noted.  · The left ventricular wall motion is normal.  · Normal left ventricular diastolic function.  · The left atrium is normal in size.  · Right ventricle is normal in size and systolic function.  · The right atrium is mildly dilated.  · Aortic valve sclerosis without significant stenosis.  · There is no evidence of pulmonary hypertension. Estimated RVSP at 33 mmHg.       Mental Status/Neuro/Psych    Pt is alert.      (+) arthritis,     Comments: claustraphobia    GI/Hepatic/Renal    (+) GERD well controlled,     Endo/Other    (+) hypothyroidism,     Comments: BMI 47.2  Abdominal           Social History     Tobacco Use   • Smoking status: Never Smoker   • Smokeless tobacco: Never Used   Substance Use Topics   • Alcohol use: No      Hematology   WBC   Date Value Ref Range Status   01/19/2019 8.0 4.5 - 10.5 10*3/uL Final     RBC   Date Value Ref Range Status   01/19/2019 4.58 3.70 - 5.30 10*6/µL Final     MCV   Date Value Ref Range Status   01/19/2019 93.3 81.0 - 97.0 fL Final     Hemoglobin   Date Value Ref Range Status   01/19/2019 14.7 11.5 - 15.5 g/dL Final     Hematocrit   Date Value Ref Range Status   01/19/2019 42.7 34.0 - 45.0 % Final     Platelets   Date Value Ref Range Status   01/19/2019 193 140 - 350 10*3/uL Final      Coagulation "   Protime   Date Value Ref Range Status   06/05/2015 11.9 (L) 12.3 - 14.8 SEC      INR   Date Value Ref Range Status   06/05/2015 0.9 0.9 - 1.1       General Chemistry   Calcium   Date Value Ref Range Status   01/19/2019 8.5 (L) 8.6 - 10.3 mg/dL Final     BUN   Date Value Ref Range Status   01/19/2019 16 7 - 25 mg/dL Final     Creatinine   Date Value Ref Range Status   01/19/2019 0.83 0.60 - 1.20 mg/dL Final     Glucose   Date Value Ref Range Status   01/19/2019 113 (H) 70 - 105 mg/dL Final     Sodium   Date Value Ref Range Status   01/19/2019 140 135 - 145 mmol/L Final     Potassium   Date Value Ref Range Status   01/19/2019 4.5 3.5 - 5.1 mmol/L Final     Magnesium   Date Value Ref Range Status   06/05/2015 2.1 1.8 - 2.4 mg/dL      CO2   Date Value Ref Range Status   01/19/2019 22 21 - 32 mmol/L Final     Chloride   Date Value Ref Range Status   01/19/2019 109 (H) 98 - 107 mmol/L Final     Anesthesia Plan    ASA 3   NPO status reviewed: > 8 hours    General         Induction: intravenous   Airway Planning: LMA              Anesthetic plan and risks discussed with patient.

## 2019-09-30 NOTE — ANESTHESIA POSTPROCEDURE EVALUATION
Patient: Corrine Holden    Procedure Summary     Date:  09/30/19 Room / Location:  [facility]    Anesthesia Start:  0717 Anesthesia Stop:  0848    Procedure:  MR LUMBAR SPINE WO CONTRAST Diagnosis:       Low back pain      (Low back pain;)    Scheduled Providers:  Nancy Behr, CRNA; Vivek Travis MD Responsible Provider:  Vivek Travis MD    Anesthesia Type:  general ASA Status:  3          Anesthesia Type: general    Last vitals  Vitals Value Taken Time   /80 9/30/2019  9:05 AM   Temp 36 °C (96.8 °F) 9/30/2019  9:05 AM   Pulse 64 9/30/2019  9:05 AM   Resp 18 9/30/2019  9:05 AM   SpO2 94 % 9/30/2019  9:05 AM   Pain Score 0 - No pain 9/30/2019  9:05 AM       Anesthesia Post Evaluation    Patient location during evaluation: PACU  Patient participation: complete - patient participated  Level of consciousness: awake and alert  Pain management: adequate  Airway patency: patent  Anesthetic complications: no  Cardiovascular status: acceptable  Respiratory status: acceptable  Hydration status: acceptable  May dismiss recovered patient based on consultation with the appropriate physicians and/or meeting appropriate discharge criteria

## 2019-10-01 ENCOUNTER — TELEPHONE (OUTPATIENT)
Dept: INTERNAL MEDICINE | Facility: CLINIC | Age: 64
End: 2019-10-01

## 2019-10-01 NOTE — TELEPHONE ENCOUNTER
Patient would like to discuss MRI with Dr. Joy as the patient was instructed to do so.The patient has no other symptoms.    Would you like an appointment made?

## 2019-10-01 NOTE — TELEPHONE ENCOUNTER
Caller would like to discuss (a)  Writer has advised caller of a callback from within 24 hours.    Patient: Corrine Holden    Caller Name (Last and first, relation/role): self     Name of Facility: [facility]     Callback Number: [phone]    Best Availability: anytime     Fax Number: na     Additional Info: Pt had MRI yesterday would like Dr Joy to look at MRI report, states  opaqueness in area of base of lungs . Pt would like to discuss further. Pt states this is per ortho recommendations to have PCP look for further follow up.     Did you confirm the message with the caller: Yes    Is it okay that the nurse communicates your response through [facility]? No

## 2019-10-03 ENCOUNTER — TELEPHONE (OUTPATIENT)
Dept: INTERNAL MEDICINE | Facility: CLINIC | Age: 64
End: 2019-10-03

## 2019-10-03 NOTE — TELEPHONE ENCOUNTER
The patient has been notified of this information and all questions answered.    Patient will call back to schedule appointment with effie

## 2019-10-30 ENCOUNTER — OFFICE VISIT (OUTPATIENT)
Dept: SURGERY | Facility: CLINIC | Age: 64
End: 2019-10-30
Payer: COMMERCIAL

## 2019-10-30 VITALS
HEART RATE: 72 BPM | HEIGHT: 64 IN | SYSTOLIC BLOOD PRESSURE: 142 MMHG | DIASTOLIC BLOOD PRESSURE: 76 MMHG | BODY MASS INDEX: 46.37 KG/M2 | WEIGHT: 271.6 LBS

## 2019-10-30 DIAGNOSIS — E66.01 MORBID OBESITY (CMS/HCC): Primary | ICD-10-CM

## 2019-10-30 PROCEDURE — 99214 OFFICE O/P EST MOD 30 MIN: CPT | Performed by: SURGERY

## 2019-10-30 NOTE — PROGRESS NOTES
GENERAL SURGERY ADMISSION HISTORY & PHYSICAL     10/30/2019     Crorine Holden     [id number]     CHIEF COMPLAINT: Morbid obesity     HISTORY OF PRESENT ILLNESS:  Corrine Holden is a 64 y.o.  female who's Body mass index is 46.62 kg/m²..  She has a longstanding history of obesity with multiple failed attempts at weight loss in the past including self directed dieting with reduced carbohydrates, Weight Watchers program, and prescription weight loss with Fen-Phen with most successful attempt being Weight Watchers with a loss of 100 lbs. Weight related co morbidities including obstructive sleep apnea on CPAP therapy, GERD on PPI, osteoarthritis of R knee, Bilateral feet and low back on meloxicam, and elevated blood pressure without diagnosis of hypertension.  She has been diagnosed with asthma, but denies the need for inhaler in at least 2 years.  Also reports umbilical hernia that is not bothersome to her and denies symptoms of incarceration or even intermittent protrusion.  This was a subsequent finding on CT urogram.  Denies any psychological weight related co morbidities. . She is a non-smoker    Her motivating factor for weight loss is mobility, joint pain and overall quality of life.      Patient Active Problem List   Diagnosis   • Obstructive sleep apnea syndrome   • Periumbilical hernia   • Class 3 severe obesity due to excess calories with serious comorbidity and body mass index (BMI) of 45.0 to 49.9 in adult (CMS/HCC) ([facility])   • Gastroesophageal reflux disease        Past Surgical History:   Procedure Laterality Date   • CARDIAC ELECTROPHYSIOLOGY STUDY AND ABLATION  01/01/2007    attempted ablation for SVT   • COLONOSCOPY  01/21/2015    cecal polyp-tubular adenoma-recommend repeat 5 years   • DILATION AND CURETTAGE OF UTERUS  01/01/2006   • ENDOMETRIAL BIOPSY  01/01/2012    pt reports negative   • ESOPHAGOGASTRODUODENOSCOPY  01/01/2009    H pylori   • KNEE ARTHROPLASTY Right 04/01/2015    Dr YUNIEL  Mars   • VAGINAL HYSTERECTOMY  12/16/2013    with BSO, fibroids         Current Outpatient Medications:   •  krill-om-3-dha-epa-phospho-ast (KRILL OIL) [id number] mg capsule, Take 1 capsule by mouth daily, Disp: , Rfl:   •  fluticasone propionate (FLONASE) 50 mcg/actuation nasal spray, USE 2 SPRAYS IN EACH NOSTRIL DAILY, Disp: 48 g, Rfl: PRN  •  diclofenac sodium (VOLTAREN) 1 % gel, APPLY 2 GRAMS TO THE AFFECTED AREA(S) BY TOPICAL ROUTE 4 TIMES PER DAY AS NEEDED, Disp: 100 g, Rfl: PRN  •  meloxicam (MOBIC) 15 mg tablet, Take 1 tablet (15 mg total) by mouth daily, Disp: 90 tablet, Rfl: 3  •  omeprazole (PriLOSEC) 20 mg capsule, Take 1 capsule (20 mg total) by mouth daily, Disp: 90 capsule, Rfl: 3  •  levothyroxine (SYNTHROID) 112 mcg tablet, Take 1 tablet (112 mcg total) by mouth daily, Disp: 90 tablet, Rfl: 3  •  diazePAM (VALIUM) 5 mg tablet, Take 1 tablet (5 mg total) by mouth every 12 (twelve) hours as needed for muscle spasms, Disp: 30 tablet, Rfl: 1  •  amoxicillin (AMOXIL) 500 mg capsule, 500 mg With dental appointments for total knee arthroplasty , Disp: , Rfl:   •  calcium carbonate (OS-RONNIE) 500 mg calcium tablet, 2 Every Day, As Needed, Disp: , Rfl:   •  multivitamin (THERAGRAN) tablet tablet, 1 Every Day, Disp: , Rfl:   •  albuterol HFA (VENTOLIN HFA) 90 mcg/actuation inhaler, Inhale 2 puffs every 4 hours by inhalation route as needed., Disp: 3, Rfl: 1  •  acetaminophen (TYLENOL EXTRA STRENGTH) 500 mg tablet, Take 2 tablets every 12 hours by oral route as needed., Disp: , Rfl:   •  cyclobenzaprine (FLEXERIL) 10 mg tablet, Take 1 tablet (10 mg total) by mouth 2 (two) times a day as needed for muscle spasms., Disp: 60 tablet, Rfl: 0    Allergies   Allergen Reactions   • Bee Venom Protein (Honey Bee)    • No Known Drug Allergies        Family History   Problem Relation Age of Onset   • Cancer Mother 84        Bladder   • Osteoporosis Mother    • Hypertension Mother    • Colon cancer Father 65   • Kidney  cancer Father 77   • Other Father's Brother         sudden cardiac death, 60s   • Heart disease Maternal Grandfather    • Heart attack Maternal Grandfather 59   • Cancer Paternal Grandfather        Social History     Socioeconomic History   • Marital status: Single     Spouse name: Not on file   • Number of children: Not on file   • Years of education: Not on file   • Highest education level: Not on file   Occupational History   • Not on file   Social Needs   • Financial resource strain: Not on file   • Food insecurity:     Worry: Not on file     Inability: Not on file   • Transportation needs:     Medical: Not on file     Non-medical: Not on file   Tobacco Use   • Smoking status: Never Smoker   • Smokeless tobacco: Never Used   Substance and Sexual Activity   • Alcohol use: No   • Drug use: No   • Sexual activity: Not on file   Lifestyle   • Physical activity:     Days per week: Not on file     Minutes per session: Not on file   • Stress: Not on file   Relationships   • Social connections:     Talks on phone: Not on file     Gets together: Not on file     Attends Zoroastrian service: Not on file     Active member of club or organization: Not on file     Attends meetings of clubs or organizations: Not on file     Relationship status: Not on file   • Intimate partner violence:     Fear of current or ex partner: Not on file     Emotionally abused: Not on file     Physically abused: Not on file     Forced sexual activity: Not on file   Other Topics Concern   • Not on file   Social History Narrative   • Not on file           REVIEW OF SYSTEMS:     Constitutional: Negative for hot flashes.   Negative for weakness, fever, chills, night sweats, loss of appetite, fatigue or weight loss.   HEENT: Negative for headache, eye tearing, visual problems, decreased hearing tinnitus, rhinorrhea, mouth sores, and pharyngitis.   Respiratory: Negative for cough, dyspnea, hemoptysis, wheezing, and pleuritic chest pain.   Cardiac: Negative  "for exertional chest pain, pedal edema, dyspnea on exertion, and palpitations.   Gastrointestinal: Negative for nausea, vomiting, reflux, constipation, diarrhea, abdominal pain, and painful stools.   Genitourinary: Negative for frequency, dysuria and hematuria.   Dermatologic: Negative for rash, pruritus, skin discoloration, and new skin lump.   Musculoskeletal: As above.   Hematologic: Negative for easy bruising, bleeding, and lymphadenopathy.   Neurologic: Negative for dizziness, paresthesias in the fingers/hands,   paresthesias of the toes, feet, and neuropathic pain.   Psychiatric: Negative for depression, anxiety.        PHYSICAL EXAMINATION:  /76 (BP Location: Right arm, Patient Position: Sitting, Cuff Size: Large)   Pulse 72   Ht 1.626 m (5' 4\")   Wt 123 kg (271 lb 9.6 oz)   BMI 46.62 kg/m²    General appearance: alert and cooperative  Head: atraumatic  Eyes: conjunctivae/corneas clear. PERRL, EOM's intact.   Ears: normal  Lungs: clear to auscultation bilaterally  Heart: regular rate and rhythm  Abdomen: morbidly obese, soft, non tender  Extremities: extremities normal, warm and well-perfused; no cyanosis, clubbing, or edema  Neurologic: Alert and oriented X 3, normal strength and tone.          ASSESSMENT & PLAN:  This is a 64 y.o. female with a body mass index of Body mass index is 46.62 kg/m². and comorbid conditions.      She has multiple failed attempts at weight loss in the past. We discussed foundational components of weight loss including nutrition, exercise and behavioral health as well as metabolic rate and the effects of dieting. Corrine Holden Has attempted to lose weight in the past and is interested in pursuing a surgical weight loss solution. She is most interested in pursuing sleeve gastrectomy.  I would recommend the following prior to this:      - Medically supervised visits x 4 visits   - Nutrition counseling x 6 visits  - Behavioral health counseling x 3 visits  - No further " weight gain  - Remain free of nicotine/tobacco use  - Preoperative lab work  - Preop EGD       Corrine Holden participated in the decision making process and agreed with the plan of care.  All questions were sought and answered.     Julian Richards MD         Total Time spent with the patient is 30 min with more than 50% in direct counseling and coordination of care regarding her morbid obesity

## 2019-10-31 PROBLEM — E66.01 MORBID OBESITY (CMS/HCC): Status: ACTIVE | Noted: 2019-10-31

## 2019-11-11 ENCOUNTER — ANCILLARY PROCEDURE (OUTPATIENT)
Dept: RADIOLOGY | Facility: CLINIC | Age: 64
End: 2019-11-11
Payer: COMMERCIAL

## 2019-11-11 ENCOUNTER — OFFICE VISIT (OUTPATIENT)
Dept: INTERNAL MEDICINE | Facility: CLINIC | Age: 64
End: 2019-11-11
Payer: COMMERCIAL

## 2019-11-11 VITALS
TEMPERATURE: 97 F | SYSTOLIC BLOOD PRESSURE: 130 MMHG | OXYGEN SATURATION: 95 % | RESPIRATION RATE: 18 BRPM | HEART RATE: 74 BPM | DIASTOLIC BLOOD PRESSURE: 85 MMHG

## 2019-11-11 DIAGNOSIS — E83.51 HYPOCALCEMIA: ICD-10-CM

## 2019-11-11 DIAGNOSIS — E03.9 HYPOTHYROIDISM, UNSPECIFIED TYPE: ICD-10-CM

## 2019-11-11 DIAGNOSIS — J98.11 MILD BIBASILAR ATELECTASIS: ICD-10-CM

## 2019-11-11 DIAGNOSIS — R31.29 MICROSCOPIC HEMATURIA: ICD-10-CM

## 2019-11-11 DIAGNOSIS — R73.01 IMPAIRED FASTING GLUCOSE: ICD-10-CM

## 2019-11-11 DIAGNOSIS — Z13.220 SCREENING FOR LIPID DISORDERS: ICD-10-CM

## 2019-11-11 DIAGNOSIS — J98.11 MILD BIBASILAR ATELECTASIS: Primary | ICD-10-CM

## 2019-11-11 PROCEDURE — 71046 X-RAY EXAM CHEST 2 VIEWS: CPT | Mod: TC | Performed by: INTERNAL MEDICINE

## 2019-11-11 PROCEDURE — 99213 OFFICE O/P EST LOW 20 MIN: CPT | Performed by: INTERNAL MEDICINE

## 2019-11-11 RX ORDER — VIT C/E/ZN/COPPR/LUTEIN/ZEAXAN 250MG-90MG
6000 CAPSULE ORAL
COMMUNITY
End: 2024-04-05

## 2019-11-11 ASSESSMENT — PAIN SCALES - GENERAL: PAINLEVEL: 0-NO PAIN

## 2019-11-11 NOTE — PROGRESS NOTES
"Subjective      Corrine Holden is a 64 y.o. female who presents for Follow-up  .    HPI    Corrine is here for follow-up.    Last summer in July, she was lifting a heavy object with her sister.  They were supposed to lift on 3 but they got off on her count and Corrine attempted to lift it on her own.  She had immediate severe mid back pain.  She tried to ice it for a week or 2.  She could not lay on it.  She used massage, hot tub, physical therapy and stretching.  It did not resolve so she eventually went to [facility] urgent care where they initially did some thoracic x-rays.  She still was having symptoms so at the end of September ended up getting a thoracic spine MRI.  She knew she could not lie flat because of the back pain and would need an Ativan and some Norco.  She also has some mild claustrophobia.  She because of her history of obstructive sleep apnea, she thought she might obstruct on the combination of those medications.  They ended up doing the MRI eventually with the anesthesia team.  The read on her thoracic MRI noted bibasilar atelectasis that was not clearly evaluated.  She has not had any chest pain or shortness of breath.  She does go to the swim center and exercises for at least 30 minutes [place]  It does not get short of breath with that.    Past Medical History:   Diagnosis Date   • Allergic rhinitis    • GERD (gastroesophageal reflux disease)    • H. pylori infection 2009   • Headache    • Hypothyroidism    • Injury of back     STATES, \"THIS IS REASON FOR MRI\"   • Microscopic hematuria    • SHANA (obstructive sleep apnea)     USES CPAP   • Osteoarthritis    • Palpitations    • Respiratory disease    • Supraventricular tachycardia (CMS/HCC) ([facility])     STATES, \"COFFEE RELATED\", VERY INTERMITTENT   • Tinnitus        Current Outpatient Medications   Medication Sig Dispense Refill   • cholecalciferol, vitamin D3, (Vitamin D3) 1,000 unit capsule Take 5,000 Units by mouth daily     • " krill-om-3-dha-epa-phospho-ast (KRILL OIL) [id number] mg capsule Take 1 capsule by mouth daily     • fluticasone propionate (FLONASE) 50 mcg/actuation nasal spray USE 2 SPRAYS IN EACH NOSTRIL DAILY 48 g PRN   • diclofenac sodium (VOLTAREN) 1 % gel APPLY 2 GRAMS TO THE AFFECTED AREA(S) BY TOPICAL ROUTE 4 TIMES PER DAY AS NEEDED 100 g PRN   • meloxicam (MOBIC) 15 mg tablet Take 1 tablet (15 mg total) by mouth daily 90 tablet 3   • omeprazole (PriLOSEC) 20 mg capsule Take 1 capsule (20 mg total) by mouth daily 90 capsule 3   • levothyroxine (SYNTHROID) 112 mcg tablet Take 1 tablet (112 mcg total) by mouth daily 90 tablet 3   • diazePAM (VALIUM) 5 mg tablet Take 1 tablet (5 mg total) by mouth every 12 (twelve) hours as needed for muscle spasms 30 tablet 1   • amoxicillin (AMOXIL) 500 mg capsule 500 mg With dental appointments for total knee arthroplasty      • cyclobenzaprine (FLEXERIL) 10 mg tablet Take 1 tablet (10 mg total) by mouth 2 (two) times a day as needed for muscle spasms. 60 tablet 0   • calcium carbonate (OS-RONNIE) 500 mg calcium tablet 2 Every Day, As Needed     • multivitamin (THERAGRAN) tablet tablet 1 Every Day     • albuterol HFA (VENTOLIN HFA) 90 mcg/actuation inhaler Inhale 2 puffs every 4 hours by inhalation route as needed. 3 1   • acetaminophen (TYLENOL EXTRA STRENGTH) 500 mg tablet Take 2 tablets every 12 hours by oral route as needed.       No current facility-administered medications for this visit.       Allergies   Allergen Reactions   • Bee Venom Protein (Honey Bee)    • No Known Drug Allergies        Past Surgical History:   Procedure Laterality Date   • CARDIAC ELECTROPHYSIOLOGY STUDY AND ABLATION  01/01/2007    attempted ablation for SVT   • COLONOSCOPY  01/21/2015    cecal polyp-tubular adenoma-recommend repeat 5 years   • DILATION AND CURETTAGE OF UTERUS  01/01/2006   • ENDOMETRIAL BIOPSY  01/01/2012    pt reports negative   • ESOPHAGOGASTRODUODENOSCOPY  01/01/2009    H pylori   • KNEE  ARTHROPLASTY Right 04/01/2015    Dr Placido BRICE   • VAGINAL HYSTERECTOMY  12/16/2013    with BSO, fibroids     Family History   Problem Relation Age of Onset   • Cancer Mother 84        Bladder   • Osteoporosis Mother    • Hypertension Mother    • Colon cancer Father 65   • Kidney cancer Father 77   • Other Father's Brother         sudden cardiac death, 60s   • Heart disease Maternal Grandfather    • Heart attack Maternal Grandfather 59   • Cancer Paternal Grandfather      Social History     Tobacco Use   • Smoking status: Never Smoker   • Smokeless tobacco: Never Used   Substance Use Topics   • Alcohol use: No       Review of Systems    As stated in HPI.        Objective     Vitals  /85   Pulse 74   Temp 36.1 °C (97 °F)   Resp 18   SpO2 95%     Physical Exam   GEN: pleasant, NAD  HEENT: PERRLA, TMs clear, posterior pharynx benign  NECK: no LAD or thyromegaly, no carotid bruits  LUNGS: CTAB, no wheezing rhonchi or rales  HEART: RRR no murmurs rubs or gallops  Ext: Warm and well perfused, no edema  SKIN: no rash  Neuro:  CN 2-12 grossly intact, non focal, moving all extremities        Assessment/Plan   Diagnoses and all orders for this visit:    Microscopic hematuria  -     Urinalysis w/microscopic, reflex culture Urine, Clean Catch; Future    Hypothyroidism, unspecified type  -     Thyroid Stimulating Hormone, Ultrasensitive Blood, Venous; Future    Impaired fasting glucose  -     CBC w/auto differential Blood, Venous; Future  -     Comprehensive metabolic panel Blood, Venous; Future  -     Hemoglobin A1c (glycosylated) Blood, Venous; Future    Mild bibasilar atelectasis  -     X-ray chest 2 views; Future    Screening for lipid disorders  -     Lipid panel Blood, Venous; Future    Hypocalcemia  -     25-Hydroxyvitamin D2 and D3, serum Blood, Venous; Future    1.  Bibasilar opacities.  I suspect she had some atelectasis from not taking deep breaths related to the back pain.  Also may have been because she  was under sedation at the time of the MRI.  She is not having any cough or shortness of breath.  We will go ahead and just get a follow-up x-ray now.  I do not feel strongly she needs a CT to follow this up unless she is having more shortness of breath.  She is not hypoxic on exam today.  2.  For her chronic medical issues, I have ordered her labs that are due in January.  3.  She will be getting her flu shot this week at work.      CM MON MD

## 2019-11-26 ENCOUNTER — TELEPHONE (OUTPATIENT)
Dept: INTERNAL MEDICINE | Facility: CLINIC | Age: 64
End: 2019-11-26

## 2019-12-02 ENCOUNTER — OFFICE VISIT (OUTPATIENT)
Dept: SURGERY | Facility: CLINIC | Age: 64
End: 2019-12-02
Payer: COMMERCIAL

## 2019-12-02 VITALS
BODY MASS INDEX: 46.95 KG/M2 | TEMPERATURE: 99 F | DIASTOLIC BLOOD PRESSURE: 84 MMHG | OXYGEN SATURATION: 96 % | SYSTOLIC BLOOD PRESSURE: 130 MMHG | HEART RATE: 70 BPM | HEIGHT: 64 IN | WEIGHT: 275 LBS

## 2019-12-02 DIAGNOSIS — Z01.818 PREOPERATIVE EVALUATION TO RULE OUT SURGICAL CONTRAINDICATION: ICD-10-CM

## 2019-12-02 DIAGNOSIS — G47.33 OBSTRUCTIVE SLEEP APNEA SYNDROME: ICD-10-CM

## 2019-12-02 DIAGNOSIS — E66.01 MORBID OBESITY WITH BMI OF 45.0-49.9, ADULT (CMS/HCC): Primary | ICD-10-CM

## 2019-12-02 PROCEDURE — 99215 OFFICE O/P EST HI 40 MIN: CPT | Performed by: NURSE PRACTITIONER

## 2019-12-02 NOTE — PROGRESS NOTES
"Medical obesity management follow-up  Preop laparoscopic vertical sleeve gastrectomy  Date of service: 12/2/2019    Corrine Holden     [id number]     Chief Complaint   Patient presents with   • Obesity     EGD is scheduled 1-13-20 3rd visit  Has seen a nutritionist June 7th in  Is scheuled with Tonya Gregorio December 13        HISTORY OF PRESENT ILLNESS:  Corrine Holden is a 64 y.o.  female who presents today for medical obesity management follow-up prior to anticipated laparoscopic vertical sleeve gastrectomy.  Her Body mass index is 47.2 kg/m². today.  She has not followed the typical preoperative course for our bariatric surgery program having established care with Louann Edwards CNP through the [place] weight management program 05/03/2019, then with me 05/29/2019, then back to see Louann 06/07/2019 at which time she established care with Mary Barker RD as well.  She then did not return to see any of the bariatric team until 10/30/2019 when she consulted with Dr. Atkins.  She has not been back to see a dietitian and has not yet established care with a behavioral psychologist.  Biggest motivating factor for weight loss remains improved mobility, joint pain, and overall quality of life.     Psychology:  She plans to establish care with Tonya Gregorio PsyD on 12/30/2019 for preoperative psychological evaluation.    Nutrition:  She reports that she has not established care with AJIT Will RD due to inconvenience of travel to [place], but has not been back to see JUAN DAVID in [place] either.  She reports that since her visit with BENITO Barker RD on 6/7/2019 that she temporarily maintained a food and fluid log and has focused on increasing dietary protein intake.  She has now transitioned her snacks to tuna and jerky as opposed to \"anything\" as reported at her consultation with me.  She has also started using protein supplementation using products from [employer] in addition to her own OTC protein " "powder.  She denies currently doing [employer] program, but uses their supplements intermittently.  Difficult to obtain but reported food recall:  -Breakfast: 1-2 eggs  -Lunch: Tuna  -Dinner: Protein supplement with ground flaxseed, blueberries, Splenda, orange or other fruit, or on-the-go Greek yogurt with Santana seeds.     Physical activity:  Since October has been water walking/running for 30 minutes 2-3 days per week.  She refers to this as aquatics physical therapy.  She lives in a single level home and reports that she does have an elliptical, stationary bike, and recumbent bicycle for backup if she does not make it to the pool, but very rarely uses these.    I have personally reviewed the pertinent aspects of the patient's chart and updated the computerized patient record. Pertinent positives are noted above.  Items reviewed including: Tobacco  Allergies  Meds  Problems  Med Hx  Surg Hx  Fam Hx            Review of Systems   Constitutional: Negative for appetite change, chills, diaphoresis, fatigue, fever and unexpected weight change. Activity change: increased.   HENT: Negative for trouble swallowing.    Respiratory: Negative for cough and shortness of breath.    Cardiovascular: Negative for chest pain and palpitations.   Gastrointestinal: Negative for abdominal pain, constipation, diarrhea, nausea and vomiting.   Musculoskeletal: Positive for arthralgias and back pain.   Skin: Negative for color change and pallor.   Neurological: Negative for dizziness, syncope and light-headedness.         Objective    Vital Signs  /84   Pulse 70   Temp 37.2 °C (99 °F) (Temporal)   Ht 1.626 m (5' 4\")   Wt 125 kg (275 lb)   SpO2 96%   BMI 47.20 kg/m²        Physical Exam  Vitals signs and nursing note reviewed. Chaperone present: Unaccompanied.   Constitutional:       General: She is awake. She is not in acute distress.     Appearance: Normal appearance. She is well-developed and well-groomed. She is " morbidly obese.   Cardiovascular:      Rate and Rhythm: Normal rate and regular rhythm.      Pulses: Normal pulses.      Heart sounds: Normal heart sounds.   Pulmonary:      Effort: Pulmonary effort is normal.      Breath sounds: Normal breath sounds.   Abdominal:      General: Abdomen is protuberant. Bowel sounds are normal.      Palpations: Abdomen is soft.      Tenderness: There is no abdominal tenderness.      Hernia: A hernia is present. Hernia is present in the umbilical area.   Skin:     General: Skin is warm and dry.   Neurological:      Mental Status: She is alert.          ASSESSMENT & PLAN:  1. Morbid obesity with BMI of 45.0-49.9, adult (CMS/HCC) ([facility])  - Folate Blood, Venous; Future  - Iron panel Blood, Venous; Future  - Magnesium Blood, Venous; Future  - Methylmalonic acid, serum Blood, Venous; Future  - Vitamin A Blood, Venous; Future  - Vitamin B1 (Thiamin) Blood, Venous; Future  - Vitamin E Blood, Venous; Future  - Vitamin K Blood, Venous; Future  - Phosphorus Blood, Venous; Future  - Vitamin B12 Blood, Venous; Future  - Ambulatory referral to Nutrition Services; Future    2. Preoperative evaluation to rule out surgical contraindication  - Folate Blood, Venous; Future  - Iron panel Blood, Venous; Future  - Magnesium Blood, Venous; Future  - Methylmalonic acid, serum Blood, Venous; Future  - Vitamin A Blood, Venous; Future  - Vitamin B1 (Thiamin) Blood, Venous; Future  - Vitamin E Blood, Venous; Future  - Vitamin K Blood, Venous; Future  - Phosphorus Blood, Venous; Future  - Vitamin B12 Blood, Venous; Future    3. Obstructive sleep apnea syndrome    This is a 64 y.o. female with a body mass index of Body mass index is 47.2 kg/m². and comorbid conditions who presents today for ongoing medical obesity management prior to anticipated laparoscopic vertical sleeve gastrectomy.  While she has not followed our standard preop bariatric surgery requirements I did encourage her to re-establish care with a  "registered dietitian and that it would be required for her to complete 5 total nutrition counseling visits.  I let her know that she may see either Mary Barker RD located in [place] or Neli Will RD located here in [place].  I let her know that our [place] program does primarily work with Neli Will but that either provider would be sufficient to meet preoperative needs.  She plans to establish care with Neli Will RD for preoperative nutrition counseling and biometric testing.  Referral placed today.  She will establish with Tonya Gregorio PsyD in [place] as currently scheduled for preoperative psychological evaluation.  She is scheduled for preoperative EGD on 1/13/2020 at which time she will also have preoperative fasting labs drawn.  She has had multiple labs previously ordered by her PCP and therefore duplicate preoperative labs have not been ordered.  Fat-soluble vitamins and micronutrients ordered for preoperative evaluation today.  She remains compliant with CPAP therapy due to underlying sleep apnea for which she follows with sleep medicine.  We discussed the importance of continued compliance to reduce perioperative risk and aid in optimal weight loss.    The following obesity management goals were discussed and mutually agreed upon today:  1. Review online media \"Time to Act on Obesity\" on You Tube.   https://www.Econotherm.com/watch?v=u_VBEgxsUFo  2. Restart food/fluid log- written   3. Aim for 60-80 gm protein per day (20-30 gm per meal), refer to protein handout. May use protein shake for meal replacement if needed (I.e. Premier Protein, Pure Protein).  4. Physical therapy by water exercise for 30 minutes 2-3 days per week. Increase intensity, duration, and frequency as tolerated.       Corrine ANTHONY Adrianamaria del carmen will return for medical obesity management follow-up with the bariatric surgeon in 1 month and undergo preoperative EGD the following week.  She participated in the decision making process and " agreed with the plan of care.  All questions were sought and answered to her satisfaction.     Karely Cantor CNP       Total Time spent with the patient is 60 min with more than 50% in direct counseling and coordination of care regarding her morbid obesity, preoperative evaluation, and sleep apnea.    A voice recognition program was used to aid in medical record documentation. Sometimes words are printed not exactly as they were spoken. While efforts were made to carefully edit and correct any inaccuracies, some errors may be present and should be taken within the context of the discussion.  Please contact our office if you need assistance interpreting this medical record or notice any mistakes.

## 2019-12-02 NOTE — PATIENT INSTRUCTIONS
"1. Review online media \"Time to Act on Obesity\" on You Tube.   https://www.youtube.com/watch?v=u_VBEgxsUFo  2. Restart food/fluid log- written   3. Aim for 60-80 gm protein per day (20-30 gm per meal), refer to protein handout. May use protein shake for meal replacement if needed (I.e. Premier Protein, Pure Protein).  4. Physical therapy by water exercise for 30 minutes 2-3 days per week. Increase intensity, duration, and frequency as tolerated.  "

## 2019-12-09 ENCOUNTER — OFFICE VISIT NO LOS (OUTPATIENT)
Dept: DIABETES SERVICES | Facility: CLINIC | Age: 64
End: 2019-12-09
Payer: COMMERCIAL

## 2019-12-09 DIAGNOSIS — E66.01 MORBID OBESITY WITH BMI OF 45.0-49.9, ADULT (CMS/HCC): ICD-10-CM

## 2019-12-09 PROCEDURE — 97803 MED NUTRITION INDIV SUBSEQ: CPT | Performed by: DIETITIAN, REGISTERED

## 2019-12-09 NOTE — PROGRESS NOTES
Medical Nutrition Therapy (MNT) General Bariatric  12/9/19  Beginning Time: 2:10 PM     End Time: 3:00 PM  MNT Provider Order: Pre bariatric      Others Present: Alone  Desired Procedure: Sleeve    Nutrition Assessment  Food/Nutrition - Related History- reduce stress on back and joints, to prevent health problems,   Previous MNT/Date: 6/7/2019  Pertinent Medications:   Current Outpatient Medications   Medication Sig Dispense Refill   • cholecalciferol, vitamin D3, (Vitamin D3) 1,000 unit capsule Take 5,000 Units by mouth daily     • krill-om-3-dha-epa-phospho-ast (KRILL OIL) [phone] mg capsule Take 1 capsule by mouth daily     • fluticasone propionate (FLONASE) 50 mcg/actuation nasal spray USE 2 SPRAYS IN EACH NOSTRIL DAILY 48 g PRN   • diclofenac sodium (VOLTAREN) 1 % gel APPLY 2 GRAMS TO THE AFFECTED AREA(S) BY TOPICAL ROUTE 4 TIMES PER DAY AS NEEDED 100 g PRN   • meloxicam (MOBIC) 15 mg tablet Take 1 tablet (15 mg total) by mouth daily 90 tablet 3   • omeprazole (PriLOSEC) 20 mg capsule Take 1 capsule (20 mg total) by mouth daily 90 capsule 3   • levothyroxine (SYNTHROID) 112 mcg tablet Take 1 tablet (112 mcg total) by mouth daily 90 tablet 3   • diazePAM (VALIUM) 5 mg tablet Take 1 tablet (5 mg total) by mouth every 12 (twelve) hours as needed for muscle spasms 30 tablet 1   • amoxicillin (AMOXIL) 500 mg capsule 500 mg With dental appointments for total knee arthroplasty      • cyclobenzaprine (FLEXERIL) 10 mg tablet Take 1 tablet (10 mg total) by mouth 2 (two) times a day as needed for muscle spasms. 60 tablet 0   • calcium carbonate (OS-RONNIE) 500 mg calcium tablet 2 Every Day, As Needed     • multivitamin (THERAGRAN) tablet tablet 1 Every Day     • albuterol HFA (VENTOLIN HFA) 90 mcg/actuation inhaler Inhale 2 puffs every 4 hours by inhalation route as needed. 3 1   • acetaminophen (TYLENOL EXTRA STRENGTH) 500 mg tablet Take 2 tablets every 12 hours by oral route as needed.       No current  "facility-administered medications for this visit.      Supplements/Herbals: none  Alcohol Use:   Social History     Substance and Sexual Activity   Alcohol Use No      Zoroastrianism / Cultural / Ethnic Food Practices:   Physical Activity: goes to the pool 1-2 times per week       Food Recall: Breakfast: [employer] breakfast egg mcmuffin,  [place] dip meat, or 2 eggs, Lunch: yogurt and shahnaz seeds, jerky, fruit; uses some [employer] protein shakes  When has an \"attack wants carbs\"  Portions are a challenge especially in the evening when she is hungry     Client History  Medical History:  Encounter Diagnosis   Name Primary?   • Morbid obesity with BMI of 45.0-49.9, adult (CMS/[facility]) ([facility])         Occupation: works for [employer]  Socioeconomic Concerns: None  Shops/Cooks for Self: Yes    Anthropometrics  Ht Readings from Last 1 Encounters:   12/02/19 1.626 m (5' 4\")      Wt Readings from Last 1 Encounters:   12/02/19 125 kg (275 lb)      BMI Readings from Last 1 Encounters:   12/02/19 47.20 kg/m²      BP Readings from Last 1 Encounters:   12/02/19 130/84              Pre-op Post 3wk Post 6mo Post 12mo   Waist Measurement:            Hip Measurement:            Neck Measurement:             Patient reports usual body weight (UBW) of: 275#  Body Composition: Will perform closer to last pre bariatric appt.  Weight History: self directed dieting with reduced carbohydrates, Weight Watchers program, and prescription weight loss with [employer]hen with most successful attempt being Weight Watchers with a loss of 100 lbs       Biochemical Data, Medical Tests, and Procedures  Pertinent Labs:   Sodium   Date Value Ref Range Status   01/19/2019 140 135 - 145 mmol/L Final     Potassium   Date Value Ref Range Status   01/19/2019 4.5 3.5 - 5.1 mmol/L Final     Chloride   Date Value Ref Range Status   01/19/2019 109 (H) 98 - 107 mmol/L Final     CO2   Date Value Ref Range Status   01/19/2019 22 21 - 32 mmol/L " Final     BUN   Date Value Ref Range Status   01/19/2019 16 7 - 25 mg/dL Final     Creatinine   Date Value Ref Range Status   01/19/2019 0.83 0.60 - 1.20 mg/dL Final     Glucose   Date Value Ref Range Status   01/19/2019 113 (H) 70 - 105 mg/dL Final     Calcium   Date Value Ref Range Status   01/19/2019 8.5 (L) 8.6 - 10.3 mg/dL Final     Cholesterol   Date Value Ref Range Status   01/19/2019 179 0 - 199 mg/dL Final     Triglycerides   Date Value Ref Range Status   01/19/2019 90 <=149 mg/dL Final     HDL   Date Value Ref Range Status   01/19/2019 54 >=40 mg/dL Final     Lab Results   Component Value Date    HGBA1C 5.5 01/19/2019       Social / Diet History  Current Diet Stage: Pre bariatric  Allergies/Intolerances: NKA/None  Allergies   Allergen Reactions   • Bee Venom Protein (Honey Bee)    • No Known Drug Allergies          Nutrition Prescription  Good nutrition, reduced sugars, reduced fats, reduced caffeine, reduced carbonated drinks, 3 meals per day, portion controlled      Nutrition Diagnosis   Problem #1: Food and nutrition knowledge deficit  Related to: bariatric surgery nutrition principles and rationale  As evidenced by: no previous experience or knowledge base for post bariatric surgery nutrition; pt had questions r/t post bariatric nutrition     Problem #2: Obesity  Related to: hx of energy consumption exceeding energy expenditure  As evidenced by: BMI of 47.2    Pain/GI Concerns/Bowel Problems:  Pain: 0  GI Concerns: GERD  Bowel Problems: None    Intervention/Plan:    Bariatric Diet Discussion: Weight Management to include healthy food choices, healthy plate portions, label reading, importance of safe physical movement and food journaling  Discussed importance of tracking food intake.  Handouts provided: Nutrition Physical Activity and Lifestyle changes after Bariatric Surgery , Food tracking forms  Goals  Pre Op Phase  Food log kept through post op period?no  Reports eating within 1 hr of waking?  yes  Reports eating 3 x per day? yes  Reports eating every 3-4 hrs? yes  Reports eating 15g protein per meal? yes  Reports no carbonated beverages, alcohol or caffeine? no  Reports 32oz of water and 64 oz of zero calorie fluids per day? yes  Reports an increase in veg/fruits and wholegrains over baseline? yes  Reports eating out less than baseline? no  Reports mindful eating i.e. taking 20mins or more to eat?no  Reports aerobic activity and resistance training? no    Post Op Phase  Attends all post-op visits through 18 months?    Date:   Date:   Date:     3wks  3mos  6mos    Date:   Date:   Date:     9mos  12mos  18mos              Reports 60-80g protein per day? N/A   Reports 64 oz zero calorie fluid per day? N/A  Reports taking supplements? N/A  Reports routinely spending 150mins per week in physical   activity process? N/A     Evaluation  Receptivity to education: good     Patient states understanding of the information presented.      Follow-up Plan  F/U scheduled in 3 weeks    Time Spent with Patient : 50 minutes

## 2019-12-09 NOTE — PATIENT INSTRUCTIONS
Keep a food log   Eat within 90 minutes of waking  Eat 3 x per day  Eat every 3-4 hours  Eat at least 15grams protein per meal  Quit carbonated beverages, alcohol and caffeine  Drink 32ounces of water and 64 ounces of zero calorie fluids everyday  Eat 5 servings of fruits and vegetables daily  Eat out less often  Eat mindfully eating; take 20 minutes or more to eat   Perform 150 minutes of safe physical movement per week  Perform weight resistance exercises 2-3 days per week    Return to clinic on Jan 6th at 1:30 pm and on Jan 20th at 3:00pm

## 2019-12-19 RX ORDER — MIDAZOLAM HYDROCHLORIDE 1 MG/ML
10 INJECTION INTRAMUSCULAR; INTRAVENOUS ONCE
Status: CANCELLED | OUTPATIENT
Start: 2019-12-19 | End: 2019-12-19

## 2019-12-19 RX ORDER — SODIUM CHLORIDE, SODIUM LACTATE, POTASSIUM CHLORIDE, CALCIUM CHLORIDE 600; 310; 30; 20 MG/100ML; MG/100ML; MG/100ML; MG/100ML
50 INJECTION, SOLUTION INTRAVENOUS CONTINUOUS
Status: CANCELLED | OUTPATIENT
Start: 2019-12-19

## 2019-12-19 RX ORDER — FENTANYL CITRATE/PF 50 MCG/ML
250 PLASTIC BAG, INJECTION (ML) INTRAVENOUS
Status: CANCELLED | OUTPATIENT
Start: 2019-12-19

## 2019-12-19 RX ORDER — TOPICAL ANESTHETIC 200 MG/ML
1 SPRAY DENTAL; PERIODONTAL ONCE
Status: CANCELLED | OUTPATIENT
Start: 2019-12-19 | End: 2019-12-19

## 2019-12-19 ASSESSMENT — ENCOUNTER SYMPTOMS
SHORTNESS OF BREATH: 0
CHILLS: 0
FATIGUE: 0
ABDOMINAL PAIN: 0
LIGHT-HEADEDNESS: 0
APPETITE CHANGE: 0
ARTHRALGIAS: 1
PALPITATIONS: 0
DIARRHEA: 0
COUGH: 0
DIAPHORESIS: 0
TROUBLE SWALLOWING: 0
CONSTIPATION: 0
DIZZINESS: 0
NAUSEA: 0
UNEXPECTED WEIGHT CHANGE: 0
FEVER: 0
VOMITING: 0
BACK PAIN: 1
COLOR CHANGE: 0

## 2019-12-27 ENCOUNTER — OFFICE VISIT (OUTPATIENT)
Dept: NEUROLOGY | Facility: CLINIC | Age: 64
End: 2019-12-27
Payer: COMMERCIAL

## 2019-12-27 VITALS — DIASTOLIC BLOOD PRESSURE: 86 MMHG | HEART RATE: 59 BPM | SYSTOLIC BLOOD PRESSURE: 141 MMHG

## 2019-12-27 DIAGNOSIS — G47.33 OBSTRUCTIVE SLEEP APNEA SYNDROME: Primary | ICD-10-CM

## 2019-12-27 PROCEDURE — 99214 OFFICE O/P EST MOD 30 MIN: CPT | Performed by: PHYSICIAN ASSISTANT

## 2019-12-27 ASSESSMENT — ENCOUNTER SYMPTOMS
EYE PAIN: 0
COUGH: 0
CHILLS: 0
BACK PAIN: 1
WEAKNESS: 0
VOMITING: 0
ARTHRALGIAS: 0
EYE REDNESS: 0
APNEA: 1
SINUS PAIN: 0
SINUS PRESSURE: 0
ROS GI COMMENTS: GERD
HEADACHES: 0
AGITATION: 0
NAUSEA: 0
FATIGUE: 0
PALPITATIONS: 0
SLEEP DISTURBANCE: 1
FEVER: 0
NECK PAIN: 0
SHORTNESS OF BREATH: 0
SORE THROAT: 0
EYE ITCHING: 0

## 2019-12-27 NOTE — PROGRESS NOTES
"Sleep Progress Note    12/27/19  10:41 AM    Chief Complaint   Patient presents with   • Sleep Apnea     Gets supplies from [facility].        [facility]  Sleep study date: 8/15/2014  Severity: Moderate   AHI: 13.4  Minimum O2 saturation: 82%  Machine type: CPAP   Mask: Nasal    Corrine Holden is a 64 y.o. female who returns to the clinic in follow-up for obstructive sleep apnea and annual CPAP follow-up. It is noted that the patient has a long-standing history of SHANA dating back to 2014. At that time she underwent sleep study secondary to complaints of excessive daytime fatigability and somnolence. She also had complaints of significant morning headaches which were debilitating for her and she found it hard to work. Sleep study was completed on 8/15/2014 and demonstrated moderate obstructive sleep apnea with 13.4 apneas and hypopneas per hour. Minimum oxygen saturation was noted to be 82%. Currently the patient is on CPAP with a set pressure of 8.6 cmH2O.     She does have a past medical history positive for GERD, hypothyroidism, history of supraventricular tachycardia, osteoarthritis, and obesity.     At today’s appointment the patient reports that she tolerates CPAP and pressure well. She denies any complaints or concerns in regards to CPAP. Patient continues to report that CPAP is a \"life changer\". She sleeps well throughout the night. She feels well rested upon awakening and denies daytime fatigue. She no longer experiences headaches, which she reports is a huge benefit of CPAP. Patient is currently utilizing a nasal mask without the use of a chin strap. She was previously using nasal pillows but has now switched to the nasal mask. She denies any concerns or complaints in regards to this. Compliance report today demonstrates 100% usage over 4 hours. On average she utilizes CPAP 8 hours and 22 minutes. AHI is very well controlled at 0.8. Overall, from a sleep standpoint the patient is noted to be " "doing very well. Patient was previously having trouble with her machine turning off in the middle of the night. She was able to get a new cord for the machine and it worked better. However, it has started to turn off randomly again. She did obtain a used CPAP machine from a friend and she is going to have this  with her current settings. Patient also reports that she will be turning 66 in February of 2021. We have discussed the need to complete repeat diagnostic study for Medicare criteria at that time. Patient states understanding. She may also be interested in getting a new machine after repeat study. This is reasonable. Her questions were addressed and answered. Patient is pleasant to visit with. 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     Of note, [place] Sleepiness Scale was completed in the office today with a total score of 0/24.      Histories:  Past Medical History:   Diagnosis Date   • Allergic rhinitis    • GERD (gastroesophageal reflux disease)    • H. pylori infection 2009   • Headache    • Hypothyroidism    • Injury of back     STATES, \"THIS IS REASON FOR MRI\"   • Microscopic hematuria    • SHANA (obstructive sleep apnea)     USES CPAP   • Osteoarthritis    • Palpitations    • Respiratory disease    • Supraventricular tachycardia (CMS/HCC) (HCC)     STATES, \"COFFEE RELATED\", VERY INTERMITTENT   • Tinnitus      Family History   Problem Relation Age of Onset   • Cancer Mother 84        Bladder   • Osteoporosis Mother    • Hypertension Mother    • Colon cancer Father 65   • Kidney cancer Father 77   • Other Father's Brother         sudden cardiac death, 60s   • Heart disease Maternal Grandfather    • Heart attack Maternal Grandfather 59   • Cancer Paternal Grandfather      Social History     Socioeconomic History   • Marital status: Single     Spouse name: Not on file   • Number of children: Not on file   • Years of education: Not on file   • Highest " education level: Not on file   Occupational History   • Not on file   Social Needs   • Financial resource strain: Not on file   • Food insecurity     Worry: Not on file     Inability: Not on file   • Transportation needs     Medical: Not on file     Non-medical: Not on file   Tobacco Use   • Smoking status: Never Smoker   • Smokeless tobacco: Never Used   Substance and Sexual Activity   • Alcohol use: No   • Drug use: No   • Sexual activity: Not on file   Lifestyle   • Physical activity     Days per week: Not on file     Minutes per session: Not on file   • Stress: Not on file   Relationships   • Social connections     Talks on phone: Not on file     Gets together: Not on file     Attends Rastafarian service: Not on file     Active member of club or organization: Not on file     Attends meetings of clubs or organizations: Not on file     Relationship status: Not on file   • Intimate partner violence     Fear of current or ex partner: Not on file     Emotionally abused: Not on file     Physically abused: Not on file     Forced sexual activity: Not on file   Other Topics Concern   • Not on file   Social History Narrative   • Not on file       Allergies   Allergen Reactions   • Bee Venom Protein (Honey Bee)    • No Known Drug Allergies        Current Outpatient Medications   Medication Sig Dispense Refill   • cholecalciferol, vitamin D3, (Vitamin D3) 1,000 unit capsule Take 5,000 Units by mouth daily     • krill-om-3-dha-epa-phospho-ast (KRILL OIL) [id number] mg capsule Take 1 capsule by mouth daily     • fluticasone propionate (FLONASE) 50 mcg/actuation nasal spray USE 2 SPRAYS IN EACH NOSTRIL DAILY 48 g PRN   • diclofenac sodium (VOLTAREN) 1 % gel APPLY 2 GRAMS TO THE AFFECTED AREA(S) BY TOPICAL ROUTE 4 TIMES PER DAY AS NEEDED 100 g PRN   • meloxicam (MOBIC) 15 mg tablet Take 1 tablet (15 mg total) by mouth daily 90 tablet 3   • omeprazole (PriLOSEC) 20 mg capsule Take 1 capsule (20 mg total) by mouth daily 90  capsule 3   • levothyroxine (SYNTHROID) 112 mcg tablet Take 1 tablet (112 mcg total) by mouth daily 90 tablet 3   • diazePAM (VALIUM) 5 mg tablet Take 1 tablet (5 mg total) by mouth every 12 (twelve) hours as needed for muscle spasms 30 tablet 1   • amoxicillin (AMOXIL) 500 mg capsule 500 mg With dental appointments for total knee arthroplasty      • calcium carbonate (OS-RONNIE) 500 mg calcium tablet 2 Every Day, As Needed     • multivitamin (THERAGRAN) tablet tablet 1 Every Day     • albuterol HFA (VENTOLIN HFA) 90 mcg/actuation inhaler Inhale 2 puffs every 4 hours by inhalation route as needed. 3 1   • acetaminophen (TYLENOL EXTRA STRENGTH) 500 mg tablet Take 2 tablets every 12 hours by oral route as needed.     • cyclobenzaprine (FLEXERIL) 10 mg tablet Take 1 tablet (10 mg total) by mouth 2 (two) times a day as needed for muscle spasms. 60 tablet 0     No current facility-administered medications for this visit.        Review of Systems   Constitutional: Negative for chills, fatigue and fever.   HENT: Negative for congestion, ear pain, sinus pressure, sinus pain and sore throat.    Eyes: Negative for pain, redness and itching.   Respiratory: Positive for apnea (Known diagnosis of SHANA. On CPAP). Negative for cough and shortness of breath.         No snoring, unusual movements or abnormal dream activity, restless legs, or trouble sleeping (insomnia) and not excessively sleepy during daytime.   Cardiovascular: Negative for chest pain, palpitations and leg swelling.        History of supraventricular tachycardia   Gastrointestinal: Negative for nausea and vomiting.        GERD   Endocrine:        Hypothyriodism   Musculoskeletal: Positive for back pain (lower back). Negative for arthralgias and neck pain.        Osteoarthritis   Allergic/Immunologic: Negative for environmental allergies.   Neurological: Negative for syncope, weakness and headaches.   Psychiatric/Behavioral: Positive for sleep disturbance. Negative for  agitation and behavioral problems.       OBJECTIVE    VS/Weight:  /86   Pulse 59       Physical Exam:    Results of Compliance Report:  Pressure: 8.6 cmH2O  AHI: 0.8  % usage over 4 hours: 100%   Average hourly usage: 8:22  O2: No      Physical Exam  Vitals signs reviewed.   Constitutional:       General: She is not in acute distress.     Appearance: She is well-developed.   HENT:      Head: Normocephalic and atraumatic.   Eyes:      Conjunctiva/sclera: Conjunctivae normal.   Neck:      Musculoskeletal: Neck supple.   Cardiovascular:      Rate and Rhythm: Normal rate and regular rhythm.   Pulmonary:      Effort: Pulmonary effort is normal.      Breath sounds: Normal breath sounds.   Neurological:      Mental Status: She is alert.   Psychiatric:         Behavior: Behavior normal.       ASSESSMENT:     1. Patient underwent sleep study on 8/15/2014 and was found to have moderate obstructive sleep apnea with an AHI of 13.4 and a minimum oxygen saturation of 82%. She is currently on CPAP with a set pressure of 8.6 cmH2O.   2.  ESS was completed in the office today with a total score of 0/24.  3.  GERD.  4.  Hypothyroidism.  5.  History of supraventricular tachycardia.  6.  Osteoarthritis.  7.  Obesity. Patient is currently undergoing bariatric evaluation.         PLAN:     1. Patient is currently using and benefiting from current CPAP therapy. She tolerates CPAP well. There is no need to make any changes in regards to her settings. She is noted to be compliant with 100% usage over 4 hours. AHI is well controlled at 0.8. Overall, from a sleep standpoint, she is noted to be stable.   2. The patient will be provided with a prescription to obtain new CPAP supplies today.  3. She will follow up in 1 year, sooner if needed. Her questions were addressed and answered.  4. She was counseled on what to look for if pressure is too high or too low.   5. She is to stay active both physically and mentally.   6. She will follow  up with her PCP for general medical needs.  7. The diagnostic assessment has been reviewed. Patient has expressed a good understanding of the assessment and recommendation from today's visit. There are no apparent barriers to understanding this information. She has been advised to contact this office or the answering service with questions or concerns that may arise. A total of 30 minutes was required for this visit. Greater than 50% of this time was spent in direct face to face communication with the patient in order to provide counseling and coordination of care in regards to the treatment and management of obstructive sleep apnea.       A voice recognition program was used to aid in documentation of the record.  Sometimes words are not printed exactly as they were spoken.  While efforts were made to carefully edit and correct any inaccuracies, some may be present; please take these into context.  Please contact the provider if areas are identified.        TOMAS Phan

## 2019-12-30 ENCOUNTER — HOSPITAL ENCOUNTER (OUTPATIENT)
Age: 64
Discharge: STILL ADMITTED OR ADMINISTRATIVE | End: 2019-12-30
Payer: COMMERCIAL

## 2019-12-30 DIAGNOSIS — E66.01 MORBID OBESITY (CMS/HCC): ICD-10-CM

## 2019-12-30 PROCEDURE — 96131 PSYCL TST EVAL PHYS/QHP EA: CPT | Performed by: PSYCHOLOGIST

## 2019-12-30 PROCEDURE — 96130 PSYCL TST EVAL PHYS/QHP 1ST: CPT | Performed by: PSYCHOLOGIST

## 2019-12-30 PROCEDURE — 90791 PSYCH DIAGNOSTIC EVALUATION: CPT | Performed by: PSYCHOLOGIST

## 2020-01-06 ENCOUNTER — TELEPHONE (OUTPATIENT)
Dept: SURGERY | Facility: CLINIC | Age: 65
End: 2020-01-06

## 2020-01-06 NOTE — TELEPHONE ENCOUNTER
"Spoke with pt. She would like to postpone her participation in the bariatric program. She has met with \"neuropysch\" and \"I want some time to think about it.\" Today appointment with Dr Atkins cancelled. Did cancel her EGD scheduled for 1/13/2020. Will have Arabella return call to pt and discuss further.   "

## 2020-01-07 ENCOUNTER — TELEPHONE (OUTPATIENT)
Dept: INTERNAL MEDICINE | Facility: CLINIC | Age: 65
End: 2020-01-07

## 2020-01-07 NOTE — TELEPHONE ENCOUNTER
I spoke with the patient who said she is no longer needing a pre op so she would like the appointment on the 27th to be just a annual physical

## 2020-01-07 NOTE — TELEPHONE ENCOUNTER
Caller would like to discuss (a) appointment Writer has advised caller of a callback from within 24 hours.    Patient: Corrine Holden    Caller Name (Last and first, relation/role): Corrine Holden    Name of Facility: NA    Callback Number: [phone]    Best Availability: Anytime     Fax Number: NA    Additional Info: Corrine would like a call back from the nurse in regards to the upcoming appointment.    Did you confirm the message with the caller: Yes    Is it okay that the nurse communicates your response through [employer]? No

## 2020-01-09 NOTE — TELEPHONE ENCOUNTER
I spoke will Corrine and she would like to put plans for bariatric surgery on hold for now. She will contact us when she is ready to get started again.

## 2020-01-20 NOTE — PROGRESS NOTES
IDENTIFYING DATA  Age: 64 y.o.  Cultural identification: White,   Marital status: is .  Gender identification: female   Referral source: Louann Edwards CNP    RELEVANT HISTORY AND CURRENT LIFE SITUATION    Current medical comorbidities:  Patient Active Problem List   Diagnosis   • Obstructive sleep apnea syndrome   • Periumbilical hernia   • Class 3 severe obesity due to excess calories with serious comorbidity and body mass index (BMI) of 45.0 to 49.9 in adult (CMS/HCC) ([facility])   • Gastroesophageal reflux disease   • Morbid obesity (CMS/[facility]) ([facility])    .   Weight history: Obese since childhood   Previous weight loss behavior: Diet  Success: Mild success, patient has lost between 8 and 20 pounds multiple occasions, when she was 27 she lost 45 pounds.  She recently has been involved with the [place] [employer] program which substitutes shakes and bars for several meals per day.  Barriers:   oversized portions  grazing  boredom eating  inactivity.   Impact of obesity: Reduced mobility and energy    CURRENT MEDICATIONS:   Current Outpatient Medications   Medication Sig Dispense Refill   • cholecalciferol, vitamin D3, (Vitamin D3) 1,000 unit capsule Take 5,000 Units by mouth daily     • krill-om-3-dha-epa-phospho-ast (KRILL OIL) [id number] mg capsule Take 1 capsule by mouth daily     • fluticasone propionate (FLONASE) 50 mcg/actuation nasal spray USE 2 SPRAYS IN EACH NOSTRIL DAILY 48 g PRN   • diclofenac sodium (VOLTAREN) 1 % gel APPLY 2 GRAMS TO THE AFFECTED AREA(S) BY TOPICAL ROUTE 4 TIMES PER DAY AS NEEDED 100 g PRN   • meloxicam (MOBIC) 15 mg tablet Take 1 tablet (15 mg total) by mouth daily 90 tablet 3   • omeprazole (PriLOSEC) 20 mg capsule Take 1 capsule (20 mg total) by mouth daily 90 capsule 3   • levothyroxine (SYNTHROID) 112 mcg tablet Take 1 tablet (112 mcg total) by mouth daily 90 tablet 3   • diazePAM (VALIUM) 5 mg tablet Take 1 tablet (5 mg total) by mouth every 12 (twelve) hours as needed for muscle  spasms 30 tablet 1   • amoxicillin (AMOXIL) 500 mg capsule 500 mg With dental appointments for total knee arthroplasty      • cyclobenzaprine (FLEXERIL) 10 mg tablet Take 1 tablet (10 mg total) by mouth 2 (two) times a day as needed for muscle spasms. 60 tablet 0   • calcium carbonate (OS-RONNIE) 500 mg calcium tablet 2 Every Day, As Needed     • multivitamin (THERAGRAN) tablet tablet 1 Every Day     • albuterol HFA (VENTOLIN HFA) 90 mcg/actuation inhaler Inhale 2 puffs every 4 hours by inhalation route as needed. 3 1   • acetaminophen (TYLENOL EXTRA STRENGTH) 500 mg tablet Take 2 tablets every 12 hours by oral route as needed.       No current facility-administered medications for this encounter.         MENTAL HEALTH:  Patient has been a nurse for 40 years.  Currently she works for [employer] in the [employer] division, working part-time.  She travels to [place] 1 day/week with part of her duties for [employer].  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  Patient is  and lives alone.  She has 3 adult daughters.  Her daughters, friends, and sisters are all supportive of her attempts to lose weight.  She is not in a relationship currently.  Patient denies any history of significant depression or anxiety.  She denies any history of mental health treatment or diagnosis.  Current psychosocial stressors: weight  Current coping strategies: Spending time with family and friends.    SUBSTANCE USE & OTHER ADDICTIVE BEHAVIOR   Nicotine: has never used tobacco products.  Alcohol: Patient denies any alcohol use.  Substances: denied a history of substance abuse    MENTAL STATUS   Attendance: on time  Appearance: neatly dressed, appears stated age  Demeanor: pleasant  Engagement: engaged   Eye contact: good  Speech: normal pitch and normal volume  Behavior: Cooperative but mildly agitated  Ambulation: independently    Mood: Moderately  anxious  Affect: congruent with mood  Thought processes:mildly scattered, she frequently does not finish her sentences due to anxiety or distraction.  Associations: intact  Orientation: time, place and person  Insight: diminished  Judgment: Judgment is good apparently vocationally, but there are limitations to her judgment about personal health, for instance she has been involved in 2 weight loss programs, 1) the [place] [facility] weight loss program over over the last several months but not with complete compliance, and 2) the [facility] [place] bariatric programs, but she started in the bariatric program in [place] with Louann Edwards CNP and dietitian Mary Barker once, then not following with any dietitian for many months, and then was seen by the surgeon Dr. Faulkner in the [place] bariatric program and appears to be following there as of December 2019 with the nurse practitioner Ilana Geller CNP.  She she continues to be minimally involved with the [place] program and impresses as indecisive regarding her commitment to either program.    Intelligence: above average    HEALTH BEHAVIORS  Level of insight into behavioral factors: Good   Behaviors related to obesity:   oversized portions  grazing  emotional eating  boredom eating   Preoperative behavioral changes:   Diet: Patient is making some changes to her diet but is not established with a dietitian in the bariatric program at this time.  Exercise: Patient reports that she has used the elliptical 30 or 40 times in the last 6 months she also enjoys swimming.  Patient describes her daily lifestyle style activity as mildly active.  She reports that she walks about a half a mile per day and does 1 flight of stairs per day.  Her physical activity is limited by orthopedic issues including pain in her left ankle and foot.    MOTIVATION FOR AND UNDERSTANDING OF SURGERY   Motivation: Patient appears to have varying motivation for program involvement  although generally she is motivated to lose weight.  Motivating influence(s):  Quality of life  To increase activity level  Pain reduction  Availability of practical supports: Patient identifies friends and family members as practical supports  Understanding of procedure: above average  Understanding of risks and benefits: average  Understanding of postoperative lifestyle changes: average    TESTS ADMINISTERED: PAWAN/Weight & Lifestyle Inventory in the [facility] clinical multiaxial inventory-4/MCMI-4.    OBJECTIVE FINDINGS:  PAWAN results do not support a diagnosis of eating disorder.  Patient has fairly steadily gained weight throughout adulthood.  She reports that she is 275 pounds at a height of 5 4.  Previous attempts at weight loss have included diet pills, fasting for 5 days drinking water only, weight watchers and her mid 20s, and not eating due to stress in her late 30s.  She has had no sustained weight loss.  She hopes to lose 125 pounds to achieve a body weight of about 150 pounds.  She attributes her weight gain to aging and inactivity.  When asked to identify situations which contribute to weight gain she identifies socializing or eating with family and friends, eating when alone or eating when bored.  She also eats when stressed, she snacks between meals, she continues to eat because she does not feel full after a meal even though she overeats at dinner.  She eats when she is happy and when she sees or smells food.  She denies binge eating behaviors although she reports that about 1 day/week she will overeat until uncomfortably full and feel very guilty about overeating.  Occasionally she will have a day where she grazes throughout the day, particularly when traveling once per week for work patient reports that in the last 6 months her weight and shape of played a part and how she feels about herself.  She describes that she may graze throughout the day.  She denies any purging or compensatory behaviors  "including self-induced vomiting, overuse of diet pills, diuretics, or laxatives.  She denies fasting for more than 24 hours and denies excessive exercise to compensate for overeating.  She reports that she is very dissatisfied with her current weight but only slightly dissatisfied with her shape or appearance.  She has good self-esteem and she is very happy with how she is except for her weight she says.  She is planning no major life changes during the next 6 months.  She denies any greater than usual stress and any major life activity.  She denies any current or recent symptoms of depression.  She states that the reason she is pursuing surgery now is that she is \"facing the fact of aging and remaining mobile.  I do not want weight to be a complicating factor as I age.\"  She indicates that she can definitely find 30 minutes/day for weight control.    MCMI-4 results do not indicate significant symptoms of depression, anxiety, cyclic mood disorder, or psychosis.  Patient views herself as efficient responsible and hard-working.  She views herself as someone who can motivate or inspire others.  Her responses indicate she may be unlikely to examine her own role in difficult situations and may behave erratically with extended periods of high stress or emotional distress.    DIAGNOSTIC IMPRESSIONS:    Diagnosis Plan   1. Morbid obesity (CMS/HCC) (HCC)  Ambulatory referral to Neuropsychology    Ambulatory referral to Neuropsychology       RECOMMENDATIONS  Psychological readiness for surgery:   Patient 's indecisiveness about which providers to work with and her lack of follow-through so far on working with a dietitian and following the recommendations that were outlined to her by Dr. Faulkner October 30, 2019 indicates that she is not ready for surgery yet.  Requirements were for nutrition counseling, 6 visits, patient indicates she seen a dietitian twice, but once was last June with no follow-through.  It was recommended " that she participate in behavioral health counseling for 3 visits with Yahaira George PhD in [place], which has not occurred.  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  What needs to occur is some consistency with working with a dietitian, and participating in group therapy focusing on maintaining lifestyle changes pre-and post surgically.  I recommend the patient be referred to [facility] in [place] to participate in their bariatric psychotherapy group.  Patient  will receive the best services if working within the integrated bariatric program in [place] as she is currently established with the medical providers there.  Note that there is a mindful eating group held at the [facility] neuropsychology department in [place], facilitated by Monika Cartwright, with groups beginning in late January and again in March as an option if patient cannot attend group therapy in [place] (this would require a referral from the bariatric program for participation).  Once patient has met the requirements of the program to work with a dietitian and has established in either group or individual psychotherapy, focusing on preparation for bariatric surgery and maintaining the lifestyle recommendations postsurgically, she will be a good candidate for surgery.    This report was dictated using voice recognition software which occasionally can make spelling errors or sound alike word substitutions, please take this into context when reading this report, and contact this neuropsychologist if needed for clarification.    Time spent: 30 minutes records review, 60 minutes diagnostic interview, 60 minutes test administration scoring and interpretation, and 25 minutes report writing.

## 2020-01-25 ENCOUNTER — LAB (OUTPATIENT)
Dept: LAB | Facility: HOSPITAL | Age: 65
End: 2020-01-25
Payer: COMMERCIAL

## 2020-01-25 DIAGNOSIS — R73.01 IMPAIRED FASTING GLUCOSE: ICD-10-CM

## 2020-01-25 DIAGNOSIS — E66.01 MORBID OBESITY WITH BMI OF 45.0-49.9, ADULT (CMS/HCC): ICD-10-CM

## 2020-01-25 DIAGNOSIS — R31.29 MICROSCOPIC HEMATURIA: ICD-10-CM

## 2020-01-25 DIAGNOSIS — E03.9 HYPOTHYROIDISM, UNSPECIFIED TYPE: ICD-10-CM

## 2020-01-25 DIAGNOSIS — Z13.220 SCREENING FOR LIPID DISORDERS: ICD-10-CM

## 2020-01-25 DIAGNOSIS — E83.51 HYPOCALCEMIA: ICD-10-CM

## 2020-01-25 DIAGNOSIS — Z01.818 PREOPERATIVE EVALUATION TO RULE OUT SURGICAL CONTRAINDICATION: ICD-10-CM

## 2020-01-25 LAB
ALBUMIN SERPL-MCNC: 4.6 G/DL (ref 3.5–5.3)
ALP SERPL-CCNC: 66 U/L (ref 50–130)
ALT SERPL-CCNC: 32 U/L (ref 0–52)
ANION GAP SERPL CALC-SCNC: 11 MMOL/L (ref 3–11)
AST SERPL-CCNC: 25 U/L (ref 0–39)
BACTERIA #/AREA URNS AUTO: NORMAL /HPF
BASOPHILS # BLD AUTO: 0 10*3/UL
BASOPHILS NFR BLD AUTO: 1 % (ref 0–2)
BILIRUB SERPL-MCNC: 0.68 MG/DL (ref 0–1.4)
BILIRUB UR QL STRIP.AUTO: NEGATIVE
BUN SERPL-MCNC: 18 MG/DL (ref 7–25)
CALCIUM ALBUM COR SERPL-MCNC: 8.7 MG/DL (ref 8.6–10.3)
CALCIUM SERPL-MCNC: 9.2 MG/DL (ref 8.6–10.3)
CHLORIDE SERPL-SCNC: 106 MMOL/L (ref 98–107)
CHOLEST SERPL-MCNC: 177 MG/DL (ref 0–199)
CLARITY UR: CLEAR
CO2 SERPL-SCNC: 26 MMOL/L (ref 21–32)
COLOR UR: ABNORMAL
CREAT SERPL-MCNC: 0.82 MG/DL (ref 0.6–1.2)
EOSINOPHIL # BLD AUTO: 0.1 10*3/UL
EOSINOPHIL NFR BLD AUTO: 2 % (ref 0–3)
ERYTHROCYTE [DISTWIDTH] IN BLOOD BY AUTOMATED COUNT: 13 % (ref 11.5–14)
EST. AVERAGE GLUCOSE BLD GHB EST-MCNC: 111.2 MG/DL
FASTING STATUS PATIENT QL REPORTED: YES
GFR SERPL CREATININE-BSD FRML MDRD: 76 ML/MIN/1.73M*2
GLUCOSE SERPL-MCNC: 111 MG/DL (ref 70–105)
GLUCOSE UR STRIP.AUTO-MCNC: NEGATIVE MG/DL
HBA1C MFR BLD: 5.5 % (ref 4–6)
HCT VFR BLD AUTO: 40.9 % (ref 34–45)
HDLC SERPL-MCNC: 49 MG/DL
HGB BLD-MCNC: 14.1 G/DL (ref 11.5–15.5)
HGB UR QL STRIP.AUTO: NEGATIVE
KETONES UR STRIP.AUTO-MCNC: NEGATIVE MG/DL
LDLC SERPL CALC-MCNC: 103 MG/DL (ref 20–99)
LEUKOCYTE ESTERASE UR QL STRIP: ABNORMAL
LYMPHOCYTES # BLD AUTO: 1.2 10*3/UL
LYMPHOCYTES NFR BLD AUTO: 23 % (ref 11–47)
MCH RBC QN AUTO: 32.9 PG (ref 28–33)
MCHC RBC AUTO-ENTMCNC: 34.5 G/DL (ref 32–36)
MCV RBC AUTO: 95.3 FL (ref 81–97)
MONOCYTES # BLD AUTO: 0.3 10*3/UL
MONOCYTES NFR BLD AUTO: 7 % (ref 3–11)
NEUTROPHILS # BLD AUTO: 3.4 10*3/UL
NEUTROPHILS NFR BLD AUTO: 68 % (ref 41–81)
NITRITE UR QL STRIP.AUTO: NEGATIVE
PH UR STRIP.AUTO: 6 PH
PLATELET # BLD AUTO: 306 10*3/UL (ref 140–350)
PMV BLD AUTO: 7.1 FL (ref 6.9–10.8)
POTASSIUM SERPL-SCNC: 3.7 MMOL/L (ref 3.5–5.1)
PROT SERPL-MCNC: 7 G/DL (ref 6–8.3)
PROT UR STRIP.AUTO-MCNC: NEGATIVE MG/DL
RBC # BLD AUTO: 4.29 10*6/ΜL (ref 3.7–5.3)
RBC #/AREA URNS AUTO: NORMAL /HPF
SODIUM SERPL-SCNC: 143 MMOL/L (ref 135–145)
SP GR UR STRIP.AUTO: 1.01 (ref 1–1.03)
SQUAMOUS #/AREA URNS AUTO: NORMAL /HPF
TRIGL SERPL-MCNC: 127 MG/DL
TSH SERPL DL<=0.05 MIU/L-ACNC: 2.92 UIU/ML (ref 0.34–4.82)
UROBILINOGEN UR STRIP.AUTO-MCNC: <2 E.U./DL
WBC # BLD AUTO: 5.1 10*3/UL (ref 4.5–10.5)
WBC #/AREA URNS AUTO: NORMAL /HPF

## 2020-01-25 PROCEDURE — 80061 LIPID PANEL: CPT

## 2020-01-25 PROCEDURE — 85025 COMPLETE CBC W/AUTO DIFF WBC: CPT

## 2020-01-25 PROCEDURE — 82306 VITAMIN D 25 HYDROXY: CPT

## 2020-01-25 PROCEDURE — 83036 HEMOGLOBIN GLYCOSYLATED A1C: CPT

## 2020-01-25 PROCEDURE — 80053 COMPREHEN METABOLIC PANEL: CPT

## 2020-01-25 PROCEDURE — 36415 COLL VENOUS BLD VENIPUNCTURE: CPT

## 2020-01-25 PROCEDURE — 84443 ASSAY THYROID STIM HORMONE: CPT

## 2020-01-25 PROCEDURE — 87088 URINE BACTERIA CULTURE: CPT

## 2020-01-25 PROCEDURE — 81001 URINALYSIS AUTO W/SCOPE: CPT

## 2020-01-26 LAB — BACTERIA UR CULT: NORMAL

## 2020-01-27 ENCOUNTER — ANCILLARY PROCEDURE (OUTPATIENT)
Dept: RADIOLOGY | Facility: CLINIC | Age: 65
End: 2020-01-27
Payer: COMMERCIAL

## 2020-01-27 ENCOUNTER — OFFICE VISIT (OUTPATIENT)
Dept: INTERNAL MEDICINE | Facility: CLINIC | Age: 65
End: 2020-01-27
Payer: COMMERCIAL

## 2020-01-27 VITALS
TEMPERATURE: 99.3 F | HEART RATE: 60 BPM | OXYGEN SATURATION: 95 % | SYSTOLIC BLOOD PRESSURE: 128 MMHG | DIASTOLIC BLOOD PRESSURE: 84 MMHG | RESPIRATION RATE: 16 BRPM

## 2020-01-27 DIAGNOSIS — J31.0 CHRONIC NONALLERGIC RHINITIS: ICD-10-CM

## 2020-01-27 DIAGNOSIS — R31.29 MICROSCOPIC HEMATURIA: ICD-10-CM

## 2020-01-27 DIAGNOSIS — K21.9 GASTROESOPHAGEAL REFLUX DISEASE WITHOUT ESOPHAGITIS: ICD-10-CM

## 2020-01-27 DIAGNOSIS — E03.9 HYPOTHYROIDISM, UNSPECIFIED TYPE: Primary | ICD-10-CM

## 2020-01-27 DIAGNOSIS — Z23 IMMUNIZATION DUE: ICD-10-CM

## 2020-01-27 DIAGNOSIS — M62.838 MUSCLE SPASM: ICD-10-CM

## 2020-01-27 DIAGNOSIS — G47.33 OBSTRUCTIVE SLEEP APNEA SYNDROME: ICD-10-CM

## 2020-01-27 DIAGNOSIS — R73.01 IMPAIRED FASTING GLUCOSE: ICD-10-CM

## 2020-01-27 DIAGNOSIS — Z13.820 SCREENING FOR OSTEOPOROSIS: ICD-10-CM

## 2020-01-27 DIAGNOSIS — R04.2 HEMOPTYSIS: ICD-10-CM

## 2020-01-27 DIAGNOSIS — Z13.220 SCREENING FOR LIPID DISORDERS: ICD-10-CM

## 2020-01-27 DIAGNOSIS — K21.9 GASTROESOPHAGEAL REFLUX DISEASE, ESOPHAGITIS PRESENCE NOT SPECIFIED: ICD-10-CM

## 2020-01-27 DIAGNOSIS — E83.51 HYPOCALCEMIA: ICD-10-CM

## 2020-01-27 DIAGNOSIS — M15.9 OSTEOARTHRITIS OF MULTIPLE JOINTS, UNSPECIFIED OSTEOARTHRITIS TYPE: ICD-10-CM

## 2020-01-27 DIAGNOSIS — Z00.00 ENCOUNTER FOR PREVENTATIVE ADULT HEALTH CARE EXAMINATION: Primary | ICD-10-CM

## 2020-01-27 DIAGNOSIS — E03.9 HYPOTHYROIDISM, UNSPECIFIED TYPE: ICD-10-CM

## 2020-01-27 PROCEDURE — 90732 PPSV23 VACC 2 YRS+ SUBQ/IM: CPT

## 2020-01-27 PROCEDURE — 99396 PREV VISIT EST AGE 40-64: CPT | Mod: 25 | Performed by: INTERNAL MEDICINE

## 2020-01-27 PROCEDURE — 99213 OFFICE O/P EST LOW 20 MIN: CPT | Mod: 25 | Performed by: INTERNAL MEDICINE

## 2020-01-27 PROCEDURE — 90471 IMMUNIZATION ADMIN: CPT

## 2020-01-27 PROCEDURE — 71046 X-RAY EXAM CHEST 2 VIEWS: CPT | Mod: TC | Performed by: INTERNAL MEDICINE

## 2020-01-27 RX ORDER — OMEPRAZOLE 20 MG/1
20 CAPSULE, DELAYED RELEASE ORAL DAILY
Qty: 90 CAPSULE | Refills: 3 | Status: SHIPPED | OUTPATIENT
Start: 2020-01-27 | End: 2020-12-21 | Stop reason: SDUPTHER

## 2020-01-27 RX ORDER — FLUTICASONE PROPIONATE 50 MCG
2 SPRAY, SUSPENSION (ML) NASAL DAILY
Qty: 48 G | Refills: 1 | Status: SHIPPED | OUTPATIENT
Start: 2020-01-27 | End: 2020-09-08

## 2020-01-27 RX ORDER — LEVOTHYROXINE SODIUM 112 UG/1
112 TABLET ORAL DAILY
COMMUNITY
End: 2020-01-27 | Stop reason: SDUPTHER

## 2020-01-27 RX ORDER — DIAZEPAM 5 MG/1
5 TABLET ORAL EVERY 12 HOURS PRN
Qty: 30 TABLET | Refills: 1 | Status: SHIPPED | OUTPATIENT
Start: 2020-01-27 | End: 2020-12-21 | Stop reason: SDUPTHER

## 2020-01-27 RX ORDER — LEVOTHYROXINE SODIUM 112 UG/1
112 TABLET ORAL DAILY
Qty: 90 TABLET | Refills: 3 | Status: SHIPPED | OUTPATIENT
Start: 2020-01-27 | End: 2020-12-21 | Stop reason: SDUPTHER

## 2020-01-27 RX ORDER — MELOXICAM 15 MG/1
15 TABLET ORAL DAILY
Qty: 90 TABLET | Refills: 3 | Status: SHIPPED | OUTPATIENT
Start: 2020-01-27 | End: 2021-02-04 | Stop reason: SDUPTHER

## 2020-01-27 ASSESSMENT — ENCOUNTER SYMPTOMS
CHILLS: 0
JOINT SWELLING: 0
HEADACHES: 0
HEMATOLOGIC/LYMPHATIC NEGATIVE: 1
FEVER: 1
FREQUENCY: 1
ENDOCRINE NEGATIVE: 1
VOICE CHANGE: 0
NUMBNESS: 0
SPEECH DIFFICULTY: 0
NEUROLOGICAL NEGATIVE: 1
FATIGUE: 1
HEMATURIA: 0
ADENOPATHY: 0
NECK PAIN: 1
ABDOMINAL PAIN: 0
SLEEP DISTURBANCE: 0
COUGH: 1
ACTIVITY CHANGE: 0
CONSTIPATION: 0
PHOTOPHOBIA: 0
TREMORS: 0
SEIZURES: 0
FLANK PAIN: 0
DIAPHORESIS: 0
VOMITING: 0
DIARRHEA: 0
ABDOMINAL DISTENTION: 0
DIFFICULTY URINATING: 0
BACK PAIN: 0
UNEXPECTED WEIGHT CHANGE: 0
ARTHRALGIAS: 1
SINUS PRESSURE: 0
CHEST TIGHTNESS: 0
GASTROINTESTINAL NEGATIVE: 1
MYALGIAS: 0
EYES NEGATIVE: 1
WEAKNESS: 0
TROUBLE SWALLOWING: 0
DYSURIA: 0
SHORTNESS OF BREATH: 0
SORE THROAT: 0
PSYCHIATRIC NEGATIVE: 1
DYSPHORIC MOOD: 0
CARDIOVASCULAR NEGATIVE: 1
WHEEZING: 0
BLOOD IN STOOL: 0
PALPITATIONS: 0
NAUSEA: 0
DIZZINESS: 0
POLYDIPSIA: 0
APPETITE CHANGE: 0
BRUISES/BLEEDS EASILY: 0
LIGHT-HEADEDNESS: 0
POLYPHAGIA: 0
RHINORRHEA: 0
NECK STIFFNESS: 0
NERVOUS/ANXIOUS: 0

## 2020-01-27 ASSESSMENT — PAIN SCALES - GENERAL: PAINLEVEL: 0-NO PAIN

## 2020-01-27 NOTE — PROGRESS NOTES
"Michaela Holden is a 64 y.o. female who presents for Annual Exam  .    SHANNON Castle is here for physical exam.    She says she is on day 16 of a respiratory illness.  It started out with malaise and fatigue.  Then progressed to a cough.  This is been productive, mostly in the morning of green sputum.  Has had some postnasal drip.  Started using Mucinex immediately so has not really had much of the usual sinus congestion.  She had an old prescription for albuterol and Pulmicort.  She is used that 3 times.  Initially felt feverish.  Over the weekend she coughed up one small drop of blood in her sputum.  Has never been a smoker.    She still thinking about her bariatric surgery.  She has been trying to increase her activity level.  She had some back pain last summer and so has been trying to use her exercise bike more.  Has not had any numbness tingling in her legs.    She does have history of GERD.  She is on omeprazole daily.  As long as she takes that it controls her heartburn.  She did do a trial on Zantac but it was not working and has stopped taking it since the recall.    She has history of vitamin D deficiency.  She is currently taking 5000 international units daily.  Vitamin D levels pending.  Calcium level was a little low last year so she is now taking 400 mg of calcium daily plus whatever is in her multivitamin.    She occasionally gets some muscle spasms and tightness.  This is more likely to happen if she has been on her feet and been really busy.  She takes either cyclobenzaprine or Valium for this.  She does need refills.    There have been no changes to her family history.  No history of tobacco use.  No alcohol or drug use.    Past Medical History:   Diagnosis Date   • Allergic rhinitis    • GERD (gastroesophageal reflux disease)    • H. pylori infection 2009   • Headache    • Hypothyroidism    • Injury of back     STATES, \"THIS IS REASON FOR MRI\"   • Microscopic hematuria    • SHANA " "(obstructive sleep apnea)     USES CPAP   • Osteoarthritis    • Palpitations    • Respiratory disease    • Supraventricular tachycardia (CMS/HCC) ([facility])     STATES, \"COFFEE RELATED\", VERY INTERMITTENT   • Tinnitus        Current Outpatient Medications   Medication Sig Dispense Refill   • levothyroxine (SYNTHROID, LEVOTHROID) 112 mcg tablet Take 112 mcg by mouth daily     • cholecalciferol, vitamin D3, (Vitamin D3) 1,000 unit capsule Take 5,000 Units by mouth daily     • krill-om-3-dha-epa-phospho-ast (KRILL OIL) 065-338-50-75 mg capsule Take 1 capsule by mouth daily     • fluticasone propionate (FLONASE) 50 mcg/actuation nasal spray USE 2 SPRAYS IN EACH NOSTRIL DAILY 48 g PRN   • diclofenac sodium (VOLTAREN) 1 % gel APPLY 2 GRAMS TO THE AFFECTED AREA(S) BY TOPICAL ROUTE 4 TIMES PER DAY AS NEEDED 100 g PRN   • meloxicam (MOBIC) 15 mg tablet Take 1 tablet (15 mg total) by mouth daily 90 tablet 3   • omeprazole (PriLOSEC) 20 mg capsule Take 1 capsule (20 mg total) by mouth daily 90 capsule 3   • amoxicillin (AMOXIL) 500 mg capsule 500 mg With dental appointments for total knee arthroplasty      • calcium carbonate (OS-RONNIE) 500 mg calcium tablet Take 1 tablet by mouth daily       • multivitamin (THERAGRAN) tablet tablet 1 Every Day     • albuterol HFA (VENTOLIN HFA) 90 mcg/actuation inhaler Inhale 2 puffs every 4 hours by inhalation route as needed. 3 1   • acetaminophen (TYLENOL EXTRA STRENGTH) 500 mg tablet Take 2 tablets every 12 hours by oral route as needed.     • diazePAM (VALIUM) 5 mg tablet Take 1 tablet (5 mg total) by mouth every 12 (twelve) hours as needed for muscle spasms 30 tablet 1   • cyclobenzaprine (FLEXERIL) 10 mg tablet Take 1 tablet (10 mg total) by mouth 2 (two) times a day as needed for muscle spasms. 60 tablet 0     No current facility-administered medications for this visit.       Allergies   Allergen Reactions   • Bee Venom Protein (Honey Bee)    • No Known Drug Allergies        Past Surgical " History:   Procedure Laterality Date   • CARDIAC ELECTROPHYSIOLOGY STUDY AND ABLATION  01/01/2007    attempted ablation for SVT   • COLONOSCOPY  01/21/2015    cecal polyp-tubular adenoma-recommend repeat 5 years   • DILATION AND CURETTAGE OF UTERUS  01/01/2006   • ENDOMETRIAL BIOPSY  01/01/2012    pt reports negative   • ESOPHAGOGASTRODUODENOSCOPY  01/01/2009    H pylori   • KNEE ARTHROPLASTY Right 04/01/2015    [facility], Dr Cummins   • VAGINAL HYSTERECTOMY  12/16/2013    with BSO, fibroids     Family History   Problem Relation Age of Onset   • Cancer Mother 84        Bladder   • Osteoporosis Mother    • Hypertension Mother    • Colon cancer Father 65   • Kidney cancer Father 77   • Other Father's Brother         sudden cardiac death, 60s   • Heart disease Maternal Grandfather    • Heart attack Maternal Grandfather 59   • Cancer Paternal Grandfather      Social History     Tobacco Use   • Smoking status: Never Smoker   • Smokeless tobacco: Never Used   Substance Use Topics   • Alcohol use: No       Review of Systems   Constitutional: Positive for fatigue and fever. Negative for activity change, appetite change, chills, diaphoresis and unexpected weight change.   HENT: Positive for postnasal drip and tinnitus. Negative for congestion, ear pain, hearing loss, mouth sores, nosebleeds, rhinorrhea, sinus pressure, sneezing, sore throat, trouble swallowing and voice change.    Eyes: Negative.  Negative for photophobia and visual disturbance.   Respiratory: Positive for cough. Negative for chest tightness, shortness of breath and wheezing.         Hemoptysis   Cardiovascular: Negative.  Negative for chest pain, palpitations and leg swelling.   Gastrointestinal: Negative.  Negative for abdominal distention, abdominal pain, blood in stool, constipation, diarrhea, nausea and vomiting.   Endocrine: Negative.  Negative for cold intolerance, heat intolerance, polydipsia, polyphagia and polyuria.   Genitourinary: Positive for frequency.  Negative for difficulty urinating, dysuria, flank pain, hematuria and urgency.   Musculoskeletal: Positive for arthralgias and neck pain. Negative for back pain, gait problem, joint swelling, myalgias and neck stiffness.   Skin: Negative.  Negative for rash.   Neurological: Negative.  Negative for dizziness, tremors, seizures, syncope, speech difficulty, weakness, light-headedness, numbness and headaches.   Hematological: Negative.  Negative for adenopathy. Does not bruise/bleed easily.   Psychiatric/Behavioral: Negative.  Negative for dysphoric mood and sleep disturbance. The patient is not nervous/anxious.            Objective     Vitals  /84   Pulse 60   Temp 37.4 °C (99.3 °F) (Temporal)   Resp 16   SpO2 95%     Physical Exam   GEN: pleasant, NAD  HEENT: PERRLA, TMs clear, posterior pharynx with some cobblestoning  NECK: no LAD or thyromegaly, no carotid bruits  LUNGS: CTAB, no wheezing rhonchi or rales  HEART: RRR no murmurs rubs or gallops  Breast: Symmetric, no masses, no nipple discharge, no axillary lymphadenopathy  ABD: Soft, non tender, non distended, normal bowel sounds , no hepatosplenomegaly  : Deferred  Rectal: Deferred  Ext: Warm and well perfused, no edema  SKIN: no rash  Neuro:  CN 2-12 grossly intact, non focal, moving all extremities        Assessment/Plan   Diagnoses and all orders for this visit:    Encounter for preventative adult health care examination    Hemoptysis  -     X-ray chest 2 views; Future    Gastroesophageal reflux disease, esophagitis presence not specified  -     Comprehensive metabolic panel Blood, Venous; Future  -     CBC w/auto differential Blood, Venous; Future  -     Vitamin B12 Blood, Venous; Future    Obstructive sleep apnea syndrome    Microscopic hematuria  -     Urinalysis w/microscopic, reflex culture Urine, Clean Catch; Future    Muscle spasm    Screening for osteoporosis  -     DXA bone density; Future    Immunization due  -     Pneumococcal  polysaccharide vaccine 23-valent greater than or equal to 1yo subcutaneous/IM    Screening for lipid disorders  -     Lipid panel Blood, Venous; Future    Impaired fasting glucose  -     Hemoglobin A1c (glycosylated) Blood, Venous; Future    Hypocalcemia  -     25-Hydroxyvitamin D2 and D3, serum Blood, Venous; Future    Hypothyroidism, unspecified type  -     Thyroid Stimulating Hormone, Ultrasensitive Blood, Venous; Future      1.  Adult health examination.  Mammogram is due in June.  No longer needs Paps or pelvics.  Colonoscopy was 2015 with a tubular adenoma.  She sent in the paperwork to get this scheduled already.  Has not had a screening bone density so we will order that.  Had her flu shot November 2019.  Tdap unknown date summer 2019.  Will give Pneumovax today.  Has had both Shingrix vaccines.  2.  Upper respiratory infection and episode of hemoptysis.  Probably bronchitis.  Low risk due to no history of tobacco use.  Start with chest x-ray.  3.  GERD.  Controlled on omeprazole.  She can trial off that again with Pepcid over-the-counter at least twice daily.  4.  Muscle spasms.  She uses Flexeril or Valium sparingly.  Refills today.  5.  Obstructive sleep apnea.  She had a follow-up within the last year.  6.  Microscopic hematuria.  UA before today's visit is normal.  Does have family history of bladder cancer.  7.  Impaired fasting glucose.  Blood sugars are stable.    CM MON MD

## 2020-01-28 DIAGNOSIS — J40 BRONCHITIS: Primary | ICD-10-CM

## 2020-01-28 RX ORDER — AZITHROMYCIN 250 MG/1
TABLET, FILM COATED ORAL
Qty: 6 TABLET | Refills: 0 | Status: SHIPPED | OUTPATIENT
Start: 2020-01-28 | End: 2020-02-02

## 2020-01-29 LAB
25(OH)D2 SERPL-MCNC: <4 NG/ML
25(OH)D3 SERPL-MCNC: 65 NG/ML
25(OH)D3+25(OH)D2 SERPL-MCNC: 65 NG/ML

## 2020-01-31 ENCOUNTER — TELEPHONE (OUTPATIENT)
Dept: INTERNAL MEDICINE | Facility: CLINIC | Age: 65
End: 2020-01-31

## 2020-01-31 NOTE — TELEPHONE ENCOUNTER
Caller would like to discuss (a) return call Writer has advised caller of a callback from within 24 hours.    Patient: Corrine Holden    Caller Name (Last and first, relation/role): self     Name of Facility:     Callback Number: [phone]    Best Availability: anytime     Fax Number:     Additional Info: pt needs a handicap sticker, the one she got last year is due to  in February. Please mail form to her, thank you    Did you confirm the message with the caller: Yes    Is it okay that the nurse communicates your response through [employer]? No

## 2020-02-10 ENCOUNTER — TELEPHONE (OUTPATIENT)
Dept: INTERNAL MEDICINE | Facility: CLINIC | Age: 65
End: 2020-02-10

## 2020-02-10 DIAGNOSIS — J40 BRONCHITIS: Primary | ICD-10-CM

## 2020-02-10 NOTE — TELEPHONE ENCOUNTER
Caller would like to discuss (a) return call Writer has advised caller of a callback from within 24 hours.    Patient: Corrine Holden    Caller Name (Last and first, relation/role): self    Name of Facility: na    Callback Number: [phone]    Best Availability: anytime    Fax Number: na    Additional Info: pt would like to discuss her medication for bronchitis. Pt feels she may be relapsing into bronchitis     Did you confirm the message with the caller: Yes    Is it okay that the nurse communicates your response through MyChart? No

## 2020-02-10 NOTE — TELEPHONE ENCOUNTER
"Corrine called back. Corrine advised that her symptoms that have worsened include chills, body aches, she is now coughing up \"green stuff\" and her cough has gotten worse again like it was when she was first prescribed the z-wiliam. Corrine advised that she has been using her inhalers to try and help with the cough. Corrine stated that if a prescription was going to be sent in tonight that she would like it sent to the [facility] pharmacy on [address].   "

## 2020-02-10 NOTE — TELEPHONE ENCOUNTER
Tried contacting Corrine. No answer, left message to call back. Message asked if Corrine what symptoms have gotten worse and if she has been using her inhaler.

## 2020-02-10 NOTE — TELEPHONE ENCOUNTER
Patient says its day 26 of the bronchitis. She states she has been off antibiotics for 10 days and was feeling better but is starting to feel worse and is warn down

## 2020-02-11 RX ORDER — BUDESONIDE 90 UG/1
1 AEROSOL, POWDER RESPIRATORY (INHALATION) 2 TIMES DAILY
Qty: 1 INHALER | Refills: 1 | Status: SHIPPED | OUTPATIENT
Start: 2020-02-11 | End: 2020-02-18

## 2020-02-11 NOTE — TELEPHONE ENCOUNTER
The patient is going to  the script of Pulmicort that dr. Joy sent in to [facility] pharmacy

## 2020-02-19 ENCOUNTER — TELEPHONE (OUTPATIENT)
Dept: INTERNAL MEDICINE | Facility: CLINIC | Age: 65
End: 2020-02-19

## 2020-02-19 NOTE — TELEPHONE ENCOUNTER
Caller would like to discuss (a) return call Writer has advised caller of a callback from within 24 hours.    Patient: Corrine Holden    Caller Name (Last and first, relation/role): self     Name of Facility:     Callback Number: [phone]    Best Availability: anytime after 3 pm      Fax Number:     Additional Info: pt requested form for handicap sticker a couple weeks ago , has not received in the mail     Did you confirm the message with the caller: Yes    Is it okay that the nurse communicates your response through [employer]? No

## 2020-03-03 NOTE — TELEPHONE ENCOUNTER
I spoke with the patient and let her know that dr. sutton has singed the paper work. The patient asked that we send the paper to her in the mail

## 2020-04-16 ENCOUNTER — TELEPHONE (OUTPATIENT)
Dept: INTERNAL MEDICINE | Facility: CLINIC | Age: 65
End: 2020-04-16

## 2020-04-16 NOTE — TELEPHONE ENCOUNTER
Caller would like to discuss (a) Dr Note. Writer has advised caller of a callback from within 24 hours.    Patient: Corrine Holden    Caller Name (Last and first, relation/role): Self    Name of Facility: na    Callback Number: [phone]    Best Availability: anytime    Fax Number: na    Additional Info: Pt is needing a dr note for her work & is asking for nurse to call her back     Did you confirm the message with the caller: Yes    Is it okay that the nurse communicates your response through [employer]? No

## 2020-04-20 NOTE — TELEPHONE ENCOUNTER
Pt called and stated that she is still waiting on a return call. I tried nurse, got vm. Pt would like Corry to specifically call her back.

## 2020-04-21 ENCOUNTER — DOCUMENTATION (OUTPATIENT)
Dept: INTERNAL MEDICINE | Facility: CLINIC | Age: 65
End: 2020-04-21

## 2020-04-21 NOTE — PROGRESS NOTES
Patient contacted clinic with request of      the patient who said she would like a note stating she cant be on her feet (left foot ankle) for more then 2 hours. She needs a note stating what her limits are she is worried about being able to stand for more then a few hours, but is willing to be a binh and walk to the room then sit by the bed side. She said she would be able to do screening.        I did review her chart and appears that she has a large bone spur within th left ankle.  Due to this and the presumed pain--as this has been documented on several times by other disciplines.  It has been noted that she has flat feet and has worn orthotics for many years.  She has required steroid injections in the past. I am ok with writing note to limit patient's consecutive standing periods to less than 2 hours.  She would be ok to offer  services at the bedside if needed.

## 2020-04-21 NOTE — TELEPHONE ENCOUNTER
I spoke with the patient who said she would like a note stating she cant be on her feet (left foot ankle) for more then 2 hours. She needs a note stating what her limits are she is worried about being able to stand for more then a few hours, but is willing to be a binh and walk to the room then sit by the bed side. She said she would be able to do screening.

## 2020-05-13 DIAGNOSIS — Z12.11 SCREENING FOR MALIGNANT NEOPLASM OF COLON: ICD-10-CM

## 2020-05-13 DIAGNOSIS — Z71.3 PRE-BARIATRIC SURGERY NUTRITION EVALUATION: Primary | ICD-10-CM

## 2020-06-03 ENCOUNTER — OFFICE VISIT NO LOS (OUTPATIENT)
Dept: DIABETES SERVICES | Facility: CLINIC | Age: 65
End: 2020-06-03
Payer: COMMERCIAL

## 2020-06-03 DIAGNOSIS — E66.01 MORBID OBESITY WITH BMI OF 45.0-49.9, ADULT (CMS/HCC): Primary | ICD-10-CM

## 2020-06-03 PROCEDURE — 97803 MED NUTRITION INDIV SUBSEQ: CPT | Performed by: DIETITIAN, REGISTERED

## 2020-06-03 NOTE — PROGRESS NOTES
Medical Nutrition Therapy (MNT) General Bariatric  6/3/20  Beginning Time: 10:10 AM     End Time: 11:00 AM  MNT Provider Order: Pre bariatric      Others Present: Alone  Desired Procedure: Sleeve    Nutrition Assessment  Food/Nutrition - Related History- reduce stress on back and joints, to prevent health problems,   Previous MNT/Date: 6/7/2019, 12/9/19  Pertinent Medications:   Current Outpatient Medications   Medication Sig Dispense Refill   • diazePAM (VALIUM) 5 mg tablet Take 1 tablet (5 mg total) by mouth every 12 (twelve) hours as needed for muscle spasms 30 tablet 1   • levothyroxine (SYNTHROID, LEVOTHROID) 112 mcg tablet Take 1 tablet (112 mcg total) by mouth daily 90 tablet 3   • meloxicam (MOBIC) 15 mg tablet Take 1 tablet (15 mg total) by mouth daily 90 tablet 3   • omeprazole (PriLOSEC) 20 mg capsule Take 1 capsule (20 mg total) by mouth daily 90 capsule 3   • fluticasone propionate (FLONASE) 50 mcg/actuation nasal spray Administer 2 sprays into each nostril daily 48 g 1   • cholecalciferol, vitamin D3, (Vitamin D3) 1,000 unit capsule Take 5,000 Units by mouth daily     • krill-om-3-dha-epa-phospho-ast (KRILL OIL) 325-276-33-75 mg capsule Take 1 capsule by mouth daily     • diclofenac sodium (VOLTAREN) 1 % gel APPLY 2 GRAMS TO THE AFFECTED AREA(S) BY TOPICAL ROUTE 4 TIMES PER DAY AS NEEDED 100 g PRN   • amoxicillin (AMOXIL) 500 mg capsule 500 mg With dental appointments for total knee arthroplasty      • cyclobenzaprine (FLEXERIL) 10 mg tablet Take 1 tablet (10 mg total) by mouth 2 (two) times a day as needed for muscle spasms. 60 tablet 0   • calcium carbonate (OS-RONNIE) 500 mg calcium tablet Take 1 tablet by mouth daily       • multivitamin (THERAGRAN) tablet tablet 1 Every Day     • albuterol HFA (VENTOLIN HFA) 90 mcg/actuation inhaler Inhale 2 puffs every 4 hours by inhalation route as needed. 3 1   • acetaminophen (TYLENOL EXTRA STRENGTH) 500 mg tablet Take 2 tablets every 12 hours by oral route as  "needed.       No current facility-administered medications for this visit.      Supplements/Herbals: none  Alcohol Use:   Social History     Substance and Sexual Activity   Alcohol Use No      Orthodox / Cultural / Ethnic Food Practices: none  Physical Activity: unable to go to the pool d/t COVID 19 restrictions       Food Recall: Breakfast: [employer] breakfast egg mcmuffin,  Nepalese dip meat, or 2 eggs, Lunch: yogurt and shahnaz seeds, jerky, fruit; uses some [employer] Profile protein shakes  When has an \"attack wants carbs\"  Portions are a challenge especially in the evening when she is hungry     Client History  Medical History:  SHANA, GERD, Morbid Obesity    Occupation: RN, works for [employer] with [employer]  Socioeconomic Concerns: None  Shops/Cooks for Self: Yes    Anthropometrics  Ht Readings from Last 1 Encounters:   12/02/19 1.626 m (5' 4\")      Wt Readings from Last 1 Encounters:   12/02/19 124.7 kg (275 lb)      BMI Readings from Last 1 Encounters:   12/02/19 47.20 kg/m²      BP Readings from Last 1 Encounters:   01/27/20 128/84              Pre-op Post 3wk Post 6mo Post 12mo   Waist Measurement:            Hip Measurement:            Neck Measurement:             Patient reports usual body weight (UBW) of: 275#  Body Composition: Will perform closer to last pre bariatric appt.  Weight History: self directed dieting with reduced carbohydrates, Weight Watchers program, and prescription weight loss with Fen-Phen with most successful attempt being Weight Watchers with a loss of 100 lbs       Biochemical Data, Medical Tests, and Procedures  Pertinent Labs:   Sodium   Date Value Ref Range Status   01/25/2020 143 135 - 145 mmol/L Final     Potassium   Date Value Ref Range Status   01/25/2020 3.7 3.5 - 5.1 mmol/L Final     Chloride   Date Value Ref Range Status   01/25/2020 106 98 - 107 mmol/L Final     CO2   Date Value Ref Range Status   01/25/2020 26 21 - 32 mmol/L Final     BUN   Date Value Ref Range " Status   01/25/2020 18 7 - 25 mg/dL Final     Creatinine   Date Value Ref Range Status   01/25/2020 0.82 0.60 - 1.20 mg/dL Final     Glucose   Date Value Ref Range Status   01/25/2020 111 (H) 70 - 105 mg/dL Final     Calcium   Date Value Ref Range Status   01/25/2020 9.2 8.6 - 10.3 mg/dL Final     Cholesterol   Date Value Ref Range Status   01/25/2020 177 0 - 199 mg/dL Final     Triglycerides   Date Value Ref Range Status   01/25/2020 127 <=149 mg/dL Final     HDL   Date Value Ref Range Status   01/25/2020 49 >=40 mg/dL Final     Lab Results   Component Value Date    HGBA1C 5.5 01/25/2020       Social / Diet History  Current Diet Stage: Pre bariatric  Allergies/Intolerances: NKA/None  Allergies   Allergen Reactions   • Bee Venom Protein (Honey Bee)    • No Known Drug Allergies          Nutrition Prescription  Good nutrition, reduced sugars, reduced fats, reduced caffeine, reduced carbonated drinks, 3 meals per day, portion controlled      Nutrition Diagnosis   Problem #1: Food and nutrition knowledge deficit  Related to: bariatric surgery nutrition principles and rationale  As evidenced by: no previous experience or knowledge base for post bariatric surgery nutrition; pt had questions r/t post bariatric nutrition     Problem #2: Obesity Class 3  Related to: hx of energy consumption exceeding energy expenditure  As evidenced by: BMI of 47.2    Pain/GI Concerns/Bowel Problems:  Pain: 0  GI Concerns: GERD  Bowel Problems: None    Intervention/Plan:    Bariatric Diet Discussion: Pt states she is more seriously contemplating surgery. She is concerned about possible surgical complications and wt regain after surgery. Discussed and let pt vent concerns. Discussed benefits of support group and mentorship program. Discussed importance of protein in diet, protein sources, protein supplement options. Pt is keeping food logs.  Handouts provided: Perfect Protein, Unjury protein samples.   Goals  Pre Op Phase  Food log kept  through post op period?yes  Reports eating within 1 hr of waking? yes  Reports eating 3 x per day? yes  Reports eating every 3-4 hrs? yes  Reports eating 15g protein per meal? yes  Reports no carbonated beverages, alcohol or caffeine? no  Reports 32oz of water and 64 oz of zero calorie fluids per day? yes  Reports an increase in veg/fruits and wholegrains over baseline? yes  Reports eating out less than baseline? no  Reports mindful eating i.e. taking 20mins or more to eat?no  Reports aerobic activity and resistance training? no    Post Op Phase  Attends all post-op visits through 18 months?    Date:   Date:   Date:     3wks  3mos  6mos    Date:   Date:   Date:     9mos  12mos  18mos              Reports 60-80g protein per day? N/A   Reports 64 oz zero calorie fluid per day? N/A  Reports taking supplements? N/A  Reports routinely spending 150mins per week in physical   activity process? N/A     Evaluation  Receptivity to education: good     Patient states understanding of the information presented.      Follow-up Plan  F/U scheduled in 3 weeks    Time Spent with Patient : 50 minutes

## 2020-06-15 ENCOUNTER — APPOINTMENT (OUTPATIENT)
Dept: LAB | Facility: URGENT CARE | Age: 65
End: 2020-06-15
Payer: COMMERCIAL

## 2020-06-15 DIAGNOSIS — Z11.59 SPECIAL SCREENING EXAMINATION FOR VIRAL DISEASE: ICD-10-CM

## 2020-06-15 DIAGNOSIS — Z01.818 PRE-OP TESTING: ICD-10-CM

## 2020-06-15 PROCEDURE — 87635 SARS-COV-2 COVID-19 AMP PRB: CPT | Performed by: FAMILY MEDICINE

## 2020-06-15 PROCEDURE — C9803 HOPD COVID-19 SPEC COLLECT: HCPCS | Performed by: FAMILY MEDICINE

## 2020-06-16 LAB
SARS-COV-2 RNA RESP QL NAA+PROBE: NORMAL
SPECIMEN SOURCE: NORMAL

## 2020-06-17 ENCOUNTER — APPOINTMENT (OUTPATIENT)
Dept: LAB | Facility: HOSPITAL | Age: 65
End: 2020-06-17
Payer: COMMERCIAL

## 2020-06-17 ENCOUNTER — OFFICE VISIT NO LOS (OUTPATIENT)
Dept: DIABETES SERVICES | Facility: CLINIC | Age: 65
End: 2020-06-17
Payer: COMMERCIAL

## 2020-06-17 DIAGNOSIS — E66.01 MORBID OBESITY WITH BMI OF 45.0-49.9, ADULT (CMS/HCC): Primary | ICD-10-CM

## 2020-06-17 LAB
FERRITIN SERPL-MCNC: 49.3 NG/ML (ref 11–307)
FOLATE SERPL-MCNC: >22.3 NG/ML
IRON SATN MFR SERPL: 29 % (ref 13–50)
IRON SERPL-MCNC: 106 UG/DL (ref 50–175)
MAGNESIUM SERPL-MCNC: 2 MG/DL (ref 1.8–2.4)
PHOSPHATE SERPL-MCNC: 4.2 MG/DL (ref 2.5–4.9)
TIBC SERPL-MCNC: 365 UG/DL (ref 250–450)
UIBC SERPL-MCNC: 259 UG/DL (ref 155–355)
VIT B12 SERPL-MCNC: >1500 PG/ML (ref 180–914)

## 2020-06-17 PROCEDURE — 84597 ASSAY OF VITAMIN K: CPT

## 2020-06-17 PROCEDURE — 84425 ASSAY OF VITAMIN B-1: CPT

## 2020-06-17 PROCEDURE — 83550 IRON BINDING TEST: CPT

## 2020-06-17 PROCEDURE — 84446 ASSAY OF VITAMIN E: CPT

## 2020-06-17 PROCEDURE — 84590 ASSAY OF VITAMIN A: CPT

## 2020-06-17 PROCEDURE — 82607 VITAMIN B-12: CPT

## 2020-06-17 PROCEDURE — 82728 ASSAY OF FERRITIN: CPT

## 2020-06-17 PROCEDURE — 97803 MED NUTRITION INDIV SUBSEQ: CPT | Performed by: DIETITIAN, REGISTERED

## 2020-06-17 PROCEDURE — 84100 ASSAY OF PHOSPHORUS: CPT

## 2020-06-17 PROCEDURE — 82746 ASSAY OF FOLIC ACID SERUM: CPT

## 2020-06-17 PROCEDURE — 36415 COLL VENOUS BLD VENIPUNCTURE: CPT

## 2020-06-17 PROCEDURE — 83921 ORGANIC ACID SINGLE QUANT: CPT

## 2020-06-17 PROCEDURE — 83735 ASSAY OF MAGNESIUM: CPT

## 2020-06-17 NOTE — PROGRESS NOTES
Medical Nutrition Therapy (MNT) General Bariatric #4  6/17/20  Beginning Time: 11:10 AM     End Time: 12:00 AM  MNT Provider Order: Pre bariatric      Others Present: Alone  Desired Procedure: Sleeve on July 29    Nutrition Assessment  Food/Nutrition - Related History- reduce stress on back and joints, to prevent health problems,   Previous MNT/Date: 6/7/2019, 12/9/19  Pertinent Medications:   Current Outpatient Medications   Medication Sig Dispense Refill   • diazePAM (VALIUM) 5 mg tablet Take 1 tablet (5 mg total) by mouth every 12 (twelve) hours as needed for muscle spasms 30 tablet 1   • levothyroxine (SYNTHROID, LEVOTHROID) 112 mcg tablet Take 1 tablet (112 mcg total) by mouth daily 90 tablet 3   • meloxicam (MOBIC) 15 mg tablet Take 1 tablet (15 mg total) by mouth daily 90 tablet 3   • omeprazole (PriLOSEC) 20 mg capsule Take 1 capsule (20 mg total) by mouth daily 90 capsule 3   • fluticasone propionate (FLONASE) 50 mcg/actuation nasal spray Administer 2 sprays into each nostril daily 48 g 1   • cholecalciferol, vitamin D3, (Vitamin D3) 1,000 unit capsule Take 5,000 Units by mouth daily     • krill-om-3-dha-epa-phospho-ast (KRILL OIL) 403-508-09-75 mg capsule Take 1 capsule by mouth daily     • diclofenac sodium (VOLTAREN) 1 % gel APPLY 2 GRAMS TO THE AFFECTED AREA(S) BY TOPICAL ROUTE 4 TIMES PER DAY AS NEEDED 100 g PRN   • amoxicillin (AMOXIL) 500 mg capsule 500 mg With dental appointments for total knee arthroplasty      • cyclobenzaprine (FLEXERIL) 10 mg tablet Take 1 tablet (10 mg total) by mouth 2 (two) times a day as needed for muscle spasms. 60 tablet 0   • calcium carbonate (OS-RONNIE) 500 mg calcium tablet Take 1 tablet by mouth daily       • multivitamin (THERAGRAN) tablet tablet 1 Every Day     • albuterol HFA (VENTOLIN HFA) 90 mcg/actuation inhaler Inhale 2 puffs every 4 hours by inhalation route as needed. 3 1   • acetaminophen (TYLENOL EXTRA STRENGTH) 500 mg tablet Take 2 tablets every 12 hours by  "oral route as needed.       No current facility-administered medications for this visit.      Supplements/Herbals: none  Alcohol Use:   Social History     Substance and Sexual Activity   Alcohol Use No      Episcopal / Cultural / Ethnic Food Practices: none  Physical Activity: elliptical or stationary bike for 7 minutes       Food Recall: Breakfast: [employer] breakfast egg mcmuffin or protein shake or cottage cheese,  French dip meat, or 2 eggs, Lunch: yogurt and shahnaz seeds, jerky, fruit; uses some, protein shake or cheese stick Larsen Profile protein shakes, evening meal: tuna; pt is consuming about 6 cups of herbal tea day  Has 1919-1992 kcal per day  When has an \"attack wants carbs\"  Portions are a challenge especially in the evening when she is hungry     Client History  Medical History:  SHANA, GERD, Morbid Obesity    Occupation: RN, works for [employer] with [employer]  Socioeconomic Concerns: None  Shops/Cooks for Self: Yes    Anthropometrics  Ht Readings from Last 1 Encounters:   12/02/19 1.626 m (5' 4\")   Wt. 270.6# on 6/17/20; down from 273 on 6/7/20  Wt Readings from Last 1 Encounters:   12/02/19 124.7 kg (275 lb)    BMI: 47.2  BMI Readings from Last 1 Encounters:   12/02/19 47.20 kg/m²      BP Readings from Last 1 Encounters:   01/27/20 128/84              Pre-op Post 3wk Post 6mo Post 12mo   Waist Measurement:            Hip Measurement:            Neck Measurement:             Patient reports usual body weight (UBW) of: 275#  Body Composition: Will perform closer to last pre bariatric appt.  Weight History: self directed dieting with reduced carbohydrates, Weight Watchers program, and prescription weight loss with Fen-Phen with most successful attempt being Weight Watchers with a loss of 100 lbs       Biochemical Data, Medical Tests, and Procedures  Pertinent Labs:   Sodium   Date Value Ref Range Status   01/25/2020 143 135 - 145 mmol/L Final     Potassium   Date Value Ref Range Status "   01/25/2020 3.7 3.5 - 5.1 mmol/L Final     Chloride   Date Value Ref Range Status   01/25/2020 106 98 - 107 mmol/L Final     CO2   Date Value Ref Range Status   01/25/2020 26 21 - 32 mmol/L Final     BUN   Date Value Ref Range Status   01/25/2020 18 7 - 25 mg/dL Final     Creatinine   Date Value Ref Range Status   01/25/2020 0.82 0.60 - 1.20 mg/dL Final     Glucose   Date Value Ref Range Status   01/25/2020 111 (H) 70 - 105 mg/dL Final     Calcium   Date Value Ref Range Status   01/25/2020 9.2 8.6 - 10.3 mg/dL Final     Cholesterol   Date Value Ref Range Status   01/25/2020 177 0 - 199 mg/dL Final     Triglycerides   Date Value Ref Range Status   01/25/2020 127 <=149 mg/dL Final     HDL   Date Value Ref Range Status   01/25/2020 49 >=40 mg/dL Final     Lab Results   Component Value Date    HGBA1C 5.5 01/25/2020       Social / Diet History  Current Diet Stage: Pre bariatric  Allergies/Intolerances: NKA/None  Allergies   Allergen Reactions   • Bee Venom Protein (Honey Bee)    • No Known Drug Allergies          Nutrition Prescription  Good nutrition, reduced sugars, reduced fats, reduced caffeine, reduced carbonated drinks, 3 meals per day, portion controlled      Nutrition Diagnosis   Problem #1: Food and nutrition knowledge deficit  Related to: bariatric surgery nutrition principles and rationale  As evidenced by: no previous experience or knowledge base for post bariatric surgery nutrition; pt had questions r/t post bariatric nutrition     Problem #2: Obesity Class 3  Related to: hx of energy consumption exceeding energy expenditure  As evidenced by: BMI of 47.2    Pain/GI Concerns/Bowel Problems:  Pain: 0  GI Concerns: GERD  Bowel Problems: None    Intervention/Plan:    Bariatric Diet Discussion: Pt states she is is considering trying to lose wt without surgery. She is going to make more of an effort at keeping a food log and exercising. She has a couple of new pieces of exercise equipment that she plans to use at  least 3 days per week. Discussed limiting added sugars, label reading, sugar content of foods. Handouts provided: Sugar Shockers  Goals  Exercise 4 days per week for 10 minutes  Keep food log  Email on 7/2 with report of whether made goal or not  Will attempt to lose 2# by that time    Pre Op Phase  Food log kept through post op period?yes  Reports eating within 1 hr of waking? yes  Reports eating 3 x per day? yes  Reports eating every 3-4 hrs? yes  Reports eating 15g protein per meal? yes  Reports no carbonated beverages, alcohol or caffeine? no  Reports 32oz of water and 64 oz of zero calorie fluids per day? yes  Reports an increase in veg/fruits and wholegrains over baseline? yes  Reports eating out less than baseline? no  Reports mindful eating i.e. taking 20mins or more to eat?no  Reports aerobic activity and resistance training? no    Post Op Phase  Attends all post-op visits through 18 months?    Date:   Date:   Date:     3wks  3mos  6mos    Date:   Date:   Date:     9mos  12mos  18mos              Reports 60-80g protein per day? N/A   Reports 64 oz zero calorie fluid per day? N/A  Reports taking supplements? N/A  Reports routinely spending 150mins per week in physical   activity process? N/A     Evaluation  Receptivity to education: good     Patient states understanding of the information presented.      Follow-up Plan  F/U scheduled in 3 weeks    Time Spent with Patient : 50 minutes

## 2020-06-17 NOTE — PRE-PROCEDURE INSTRUCTIONS
Pre-Surgery Instructions:   Medication Instructions   • vit B complex no.12/niacin,B3, (VITAMIN B COMPLEX NO.12-NIACIN ORAL) Do not take morning of surgery/procedure   • diazePAM (VALIUM) 5 mg tablet Do not take morning of surgery/procedure   • levothyroxine (SYNTHROID, LEVOTHROID) 112 mcg tablet Do not take morning of surgery/procedure   • meloxicam (MOBIC) 15 mg tablet Stop taking 1 week prior to surgery/procedure   • omeprazole (PriLOSEC) 20 mg capsule Do not take morning of surgery/procedure   • fluticasone propionate (FLONASE) 50 mcg/actuation nasal spray PRN morning of surgery/procedure   • cholecalciferol, vitamin D3, (Vitamin D3) 1,000 unit capsule Do not take morning of surgery/procedure   • krill-om-3-dha-epa-phospho-ast (KRILL OIL) [id number] mg capsule Do not take morning of surgery/procedure   • diclofenac sodium (VOLTAREN) 1 % gel Do not take morning of surgery/procedure   • amoxicillin (AMOXIL) 500 mg capsule Take morning of surgery/procedure   • cyclobenzaprine (FLEXERIL) 10 mg tablet Do not take morning of surgery/procedure   • calcium carbonate (OS-RONNIE) 500 mg calcium tablet Do not take morning of surgery/procedure   • multivitamin (THERAGRAN) tablet tablet Do not take morning of surgery/procedure   • albuterol HFA (VENTOLIN HFA) 90 mcg/actuation inhaler PRN morning of surgery/procedure   • acetaminophen (TYLENOL EXTRA STRENGTH) 500 mg tablet PRN morning of surgery/procedure   Colonoscopy:  The following patient education was provided about prepping for the procedure:  Have a clear liquid diet the entire day before the procedure, with no red/purple liquids. Coffee and tea is aloud, as long as no dairy products are used. No alcohol use.  Drink one glass of water every hour while awake.   No seeds, nuts, corn, or popcorn for one week prior to the procedure (or from this day forward).   Do not use aspirin or NSAID products within 1 week of the procedure. Anticoagulant use should be discussed with  your primary care provider or cardiologist.   Do not use supplements (i.e. multivitamins, iron, fish oil) within one week prior to the procedure (or from this day forward).  The day of the procedure the patient was instructed to have no sips of water after ________________ (2 hours prior to arrival).   If you take your blood pressure medication in the morning, you should take it the morning of the procedure (with a small sip of water, at least 2 hours prior to arrival).  Stock up on clear liquids to drink during your bowel prep.   You will need to make sure you have your bowel prep medication prior to your prep day. (You can get the medications from any pharmacy - Miralax/Dulcolx prep will be an over the counter medication; Prepopik will require a prescription).  You will need at least 64 ounces of a clear liquid to mix with your bowel prep medication If you are using the Miralax and Dulcolax prep. Mix your Miralax in the morning of your prep day and put it in the refrigerator so it can chill. It may help to drink the prep through a straw.  Please follow the instructions on the packet you received from the clinic on when to start your prep.

## 2020-06-18 ENCOUNTER — ANESTHESIA EVENT (OUTPATIENT)
Dept: GASTROENTEROLOGY | Facility: HOSPITAL | Age: 65
End: 2020-06-18
Payer: COMMERCIAL

## 2020-06-18 ASSESSMENT — ENCOUNTER SYMPTOMS: DYSRHYTHMIAS: 1

## 2020-06-18 NOTE — ANESTHESIA PREPROCEDURE EVALUATION
"Pre-Procedure Assessment    Patient: Corrine Holden, female, 65 y.o.    Ht Readings from Last 1 Encounters:   12/02/19 1.626 m (5' 4\")     Wt Readings from Last 1 Encounters:   12/02/19 124.7 kg (275 lb)       Last Vitals  BP      Temp      Pulse     Resp      SpO2      Pain Score         Problem list reviewed and Medical history reviewed           Airway   Mallampati: III  TM distance: >3 FB  Neck ROM: full      Dental      Pulmonary    (+) sleep apnea on CPAP,   Cardiovascular   Exercise tolerance: good (4-7 METS)  (+) dysrhythmias (intermittent svt, \"coffee related\"),     ECG reviewed  Rhythm: regular  Rate: normal  ROS comment: SVT ablation    EKG 4/2015  SR,   borderline L axis deviation,   Borderline T wave abnormality,   Compared to 4/2015, inverted T waves in leads 3 and AVF are more pronounced    Mental Status/Neuro/Psych    Pt is alert.      (+) arthritis, back injury,     GI/Hepatic/Renal    (+) GERD,     Endo/Other    (+) hypothyroidism,   Abdominal  - normal exam  (+) obese,           Social History     Tobacco Use   • Smoking status: Never Smoker   • Smokeless tobacco: Never Used   Substance Use Topics   • Alcohol use: No      Hematology   WBC   Date Value Ref Range Status   01/25/2020 5.1 4.5 - 10.5 10*3/uL Final     RBC   Date Value Ref Range Status   01/25/2020 4.29 3.70 - 5.30 10*6/µL Final     MCV   Date Value Ref Range Status   01/25/2020 95.3 81.0 - 97.0 fL Final     Hemoglobin   Date Value Ref Range Status   01/25/2020 14.1 11.5 - 15.5 g/dL Final     Hematocrit   Date Value Ref Range Status   01/25/2020 40.9 34.0 - 45.0 % Final     Platelets   Date Value Ref Range Status   01/25/2020 306 140 - 350 10*3/uL Final      Coagulation   Protime   Date Value Ref Range Status   06/05/2015 11.9 (L) 12.3 - 14.8 SEC      INR   Date Value Ref Range Status   06/05/2015 0.9 0.9 - 1.1       General Chemistry   Calcium   Date Value Ref Range Status   01/25/2020 9.2 8.6 - 10.3 mg/dL Final     BUN   Date Value " Ref Range Status   01/25/2020 18 7 - 25 mg/dL Final     Creatinine   Date Value Ref Range Status   01/25/2020 0.82 0.60 - 1.20 mg/dL Final     Glucose   Date Value Ref Range Status   01/25/2020 111 (H) 70 - 105 mg/dL Final     Sodium   Date Value Ref Range Status   01/25/2020 143 135 - 145 mmol/L Final     Potassium   Date Value Ref Range Status   01/25/2020 3.7 3.5 - 5.1 mmol/L Final     Magnesium   Date Value Ref Range Status   06/17/2020 2.0 1.8 - 2.4 mg/dL Final     CO2   Date Value Ref Range Status   01/25/2020 26 21 - 32 mmol/L Final     Chloride   Date Value Ref Range Status   01/25/2020 106 98 - 107 mmol/L Final     Anesthesia Plan    ASA 3   NPO status reviewed: > 8 hours    MAC         Induction: intravenous                 Anesthetic plan and risks discussed with patient.

## 2020-06-19 ENCOUNTER — APPOINTMENT (OUTPATIENT)
Dept: LAB | Facility: CLINIC | Age: 65
End: 2020-06-19
Payer: COMMERCIAL

## 2020-06-19 ENCOUNTER — OFFICE VISIT (OUTPATIENT)
Dept: INTERNAL MEDICINE | Facility: CLINIC | Age: 65
End: 2020-06-19
Payer: COMMERCIAL

## 2020-06-19 VITALS
SYSTOLIC BLOOD PRESSURE: 130 MMHG | HEART RATE: 65 BPM | TEMPERATURE: 98 F | BODY MASS INDEX: 46.95 KG/M2 | DIASTOLIC BLOOD PRESSURE: 82 MMHG | RESPIRATION RATE: 16 BRPM | WEIGHT: 275 LBS | OXYGEN SATURATION: 97 % | HEIGHT: 64 IN

## 2020-06-19 DIAGNOSIS — Z01.818 PREOP EXAMINATION: Primary | ICD-10-CM

## 2020-06-19 DIAGNOSIS — G89.29 CHRONIC LEFT SHOULDER PAIN: ICD-10-CM

## 2020-06-19 DIAGNOSIS — Z87.898 HISTORY OF PALPITATIONS: ICD-10-CM

## 2020-06-19 DIAGNOSIS — M25.512 CHRONIC LEFT SHOULDER PAIN: ICD-10-CM

## 2020-06-19 DIAGNOSIS — G56.03 BILATERAL CARPAL TUNNEL SYNDROME: ICD-10-CM

## 2020-06-19 LAB
A-TOCOPHEROL VIT E SERPL-MCNC: 15.2 MG/L (ref 5.5–17)
ALBUMIN SERPL-MCNC: 4.5 G/DL (ref 3.5–5.3)
ALP SERPL-CCNC: 75 U/L (ref 50–130)
ALT SERPL-CCNC: 21 U/L (ref 7–52)
ANION GAP SERPL CALC-SCNC: 7 MMOL/L (ref 3–11)
AST SERPL-CCNC: 16 U/L
BASOPHILS # BLD AUTO: 0.1 10*3/UL
BASOPHILS NFR BLD AUTO: 1 % (ref 0–2)
BILIRUB SERPL-MCNC: 0.32 MG/DL (ref 0.2–1.4)
BUN SERPL-MCNC: 19 MG/DL (ref 7–25)
CALCIUM ALBUM COR SERPL-MCNC: 8.9 MG/DL (ref 8.6–10.3)
CALCIUM SERPL-MCNC: 9.3 MG/DL (ref 8.6–10.3)
CHLORIDE SERPL-SCNC: 106 MMOL/L (ref 98–107)
CO2 SERPL-SCNC: 29 MMOL/L (ref 21–32)
CREAT SERPL-MCNC: 0.83 MG/DL (ref 0.6–1.1)
EOSINOPHIL # BLD AUTO: 0.2 10*3/UL
EOSINOPHIL NFR BLD AUTO: 2 % (ref 0–3)
ERYTHROCYTE [DISTWIDTH] IN BLOOD BY AUTOMATED COUNT: 13.4 % (ref 11.5–14)
GFR SERPL CREATININE-BSD FRML MDRD: 75 ML/MIN/1.73M*2
GLUCOSE SERPL-MCNC: 119 MG/DL (ref 70–105)
HCT VFR BLD AUTO: 42.9 % (ref 34–45)
HGB BLD-MCNC: 14.7 G/DL (ref 11.5–15.5)
LYMPHOCYTES # BLD AUTO: 2 10*3/UL
LYMPHOCYTES NFR BLD AUTO: 25 % (ref 11–47)
MCH RBC QN AUTO: 32.6 PG (ref 28–33)
MCHC RBC AUTO-ENTMCNC: 34.2 G/DL (ref 32–36)
MCV RBC AUTO: 95.3 FL (ref 81–97)
METHYLMALONATE SERPL-SCNC: 0.18 NMOL/ML
MONOCYTES # BLD AUTO: 0.6 10*3/UL
MONOCYTES NFR BLD AUTO: 7 % (ref 3–11)
NEUTROPHILS # BLD AUTO: 5.2 10*3/UL
NEUTROPHILS NFR BLD AUTO: 65 % (ref 41–81)
PHYTONADIONE SERPL-MCNC: 1.08 NG/ML (ref 0.1–2.2)
PLATELET # BLD AUTO: 281 10*3/UL (ref 140–350)
PMV BLD AUTO: 6.8 FL (ref 6.9–10.8)
POTASSIUM SERPL-SCNC: 4.6 MMOL/L (ref 3.5–5.1)
PROT SERPL-MCNC: 6.6 G/DL (ref 6–8.3)
RBC # BLD AUTO: 4.5 10*6/ΜL (ref 3.7–5.3)
SODIUM SERPL-SCNC: 142 MMOL/L (ref 135–145)
VIT A SERPL-MCNC: 68.8 MCG/DL (ref 32.5–78)
WBC # BLD AUTO: 8 10*3/UL (ref 4.5–10.5)

## 2020-06-19 PROCEDURE — 36415 COLL VENOUS BLD VENIPUNCTURE: CPT | Performed by: NURSE PRACTITIONER

## 2020-06-19 PROCEDURE — 99214 OFFICE O/P EST MOD 30 MIN: CPT | Performed by: NURSE PRACTITIONER

## 2020-06-19 PROCEDURE — 85025 COMPLETE CBC W/AUTO DIFF WBC: CPT | Performed by: NURSE PRACTITIONER

## 2020-06-19 PROCEDURE — 93000 ELECTROCARDIOGRAM COMPLETE: CPT | Performed by: INTERNAL MEDICINE

## 2020-06-19 PROCEDURE — 80053 COMPREHEN METABOLIC PANEL: CPT | Performed by: NURSE PRACTITIONER

## 2020-06-19 RX ORDER — BUDESONIDE 90 UG/1
1 AEROSOL, POWDER RESPIRATORY (INHALATION) AS NEEDED
COMMUNITY
End: 2025-01-13 | Stop reason: ALTCHOICE

## 2020-06-19 ASSESSMENT — ENCOUNTER SYMPTOMS
WEAKNESS: 1
FEVER: 0
GASTROINTESTINAL NEGATIVE: 1
NUMBNESS: 1
ARTHRALGIAS: 1
RESPIRATORY NEGATIVE: 1
CHILLS: 0
PALPITATIONS: 1

## 2020-06-19 ASSESSMENT — PAIN SCALES - GENERAL: PAINLEVEL: 0-NO PAIN

## 2020-06-19 NOTE — PROGRESS NOTES
"Corrine Holden is a 65 y.o. female who presents to my office today for a preoperative evaluation at the request of Dr. Riggs.  Corrine Holden is scheduled for a bilateral wrist endoscopic versus open carpal tunnel release [facility]    History of Present Illness  Corrine is a pleasant 65-year-old female patient who I am seeing today at the request of Dr. Sun.  Corrine reports that over the last couple of months, she has had progression of her carpal tunnel in her bilateral wrist.  She is now experiencing quite a bit of numbness and tingling as well as some weakness, more so in her right hand than left.  As a result, she is scheduled to undergo a bilateral wrist endoscopic versus open carpal tunnel release with Dr. Sun on June 26, 2020.  Surgical procedure is scheduled to take place at the [facility].  We did review patient's past medical history, surgical history, social history and family history.  She denies any personal or family history of clotting disorders, personal or family history of adverse reactions to anesthesia, and she has already stopped her meloxicam and all of her vitamin supplements.  She does have her colonoscopy and EGD scheduled for this coming Monday.  This surgical procedure will take place with Dr. Atikns.  Corrine also has completed her COVID-19 testing which has resulted negative.    Patient did have a recent left shoulder injection performed with Dr. Chavarria.  However, she is requesting a referral to pain management to have repeat injection done under fluoroscopy.  I will send that referral today as well.  Steroid injection was performed on June 11, 2020.      Prior to the COVID-19 pandemic, Corrine was swimming at the [employer].  She reports that her physical activity was \"sporadic at that time\".  Since the swim center has been close, she has been riding her stationary bike more and she has also been walking more at her job.  She " "reports that she is able to complete light housework and walks leisurely without any shortness of breath or chest pressure.  She states that she is able to climb a full flight of stairs without shortness of breath, chest pain or chest pressure, but she is limited due to discomfort in her feet.    Other past medical history for gel includes obstructive sleep apnea, SVT, and microscopic hematuria.  Patient has had no issues with these issues recently.        Past Medical History  Past Medical History:   Diagnosis Date   • Allergic rhinitis    • GERD (gastroesophageal reflux disease)    • H. pylori infection 2009   • Headache    • Hypothyroidism    • Injury of back     STATES, \"THIS IS REASON FOR MRI\"   • Microscopic hematuria    • SHANA (obstructive sleep apnea)     USES CPAP   • Osteoarthritis    • Palpitations    • Respiratory disease    • Supraventricular tachycardia (CMS/HCC) (HCC)     STATES, \"COFFEE RELATED\", VERY INTERMITTENT   • Tinnitus        Personal History of Clotting Disorder? No  Family History of Clotting Disorders? No  Personal History of Adverse Reaction to Anesthesia? No  Family History of Adverse Reaction to Anesthesia? No  Vaccines:    Tetanus: last tetanus booster within 10 years   Influenza vaccine up date: No--not flu season   Candidate for PNA vaccines? Yes - Has received PPSV23  Recent Steroid Use: Shoulder injection on 6/11/2020, left shoulder     Current Medications    Current Outpatient Medications:   •  budesonide (Pulmicort Flexhaler) 90 mcg/actuation inhaler, as needed, Disp: , Rfl:   •  vit B complex no.12/niacin,B3, (VITAMIN B COMPLEX NO.12-NIACIN ORAL), Take by mouth, Disp: , Rfl:   •  diazePAM (VALIUM) 5 mg tablet, Take 1 tablet (5 mg total) by mouth every 12 (twelve) hours as needed for muscle spasms, Disp: 30 tablet, Rfl: 1  •  levothyroxine (SYNTHROID, LEVOTHROID) 112 mcg tablet, Take 1 tablet (112 mcg total) by mouth daily, Disp: 90 tablet, Rfl: 3  •  meloxicam (MOBIC) 15 mg " tablet, Take 1 tablet (15 mg total) by mouth daily, Disp: 90 tablet, Rfl: 3  •  omeprazole (PriLOSEC) 20 mg capsule, Take 1 capsule (20 mg total) by mouth daily, Disp: 90 capsule, Rfl: 3  •  fluticasone propionate (FLONASE) 50 mcg/actuation nasal spray, Administer 2 sprays into each nostril daily, Disp: 48 g, Rfl: 1  •  cholecalciferol, vitamin D3, (Vitamin D3) 1,000 unit capsule, Take 5,000 Units by mouth daily, Disp: , Rfl:   •  krill-om-3-dha-epa-phospho-ast (KRILL OIL) [id number] mg capsule, Take 1 capsule by mouth daily, Disp: , Rfl:   •  diclofenac sodium (VOLTAREN) 1 % gel, APPLY 2 GRAMS TO THE AFFECTED AREA(S) BY TOPICAL ROUTE 4 TIMES PER DAY AS NEEDED, Disp: 100 g, Rfl: PRN  •  amoxicillin (AMOXIL) 500 mg capsule, 500 mg With dental appointments for total knee arthroplasty , Disp: , Rfl:   •  calcium carbonate (OS-RONNIE) 500 mg calcium tablet, Take 1 tablet by mouth daily  , Disp: , Rfl:   •  multivitamin (THERAGRAN) tablet tablet, 1 Every Day, Disp: , Rfl:   •  albuterol HFA (VENTOLIN HFA) 90 mcg/actuation inhaler, Inhale 2 puffs every 4 hours by inhalation route as needed., Disp: 3, Rfl: 1  •  acetaminophen (TYLENOL EXTRA STRENGTH) 500 mg tablet, Take 1,000 mg by mouth 2 (two) times a day  , Disp: , Rfl:   •  cyclobenzaprine (FLEXERIL) 10 mg tablet, Take 1 tablet (10 mg total) by mouth 2 (two) times a day as needed for muscle spasms., Disp: 60 tablet, Rfl: 0    Corrine denies any recent heart palpitations, chest pain, shortness of breath with activity or limitations to daily activities.  She does not exercise on a regular basis--however she was swimming off/on while the swim center was open.  She has been riding her stationary bike and walking quite a bit.  Although, She is able to perform the following activities: light housework., walking indoors leisurely. and climb a full flight of stairs without shortness of breath/chest pain/chest pressure..  Therefore metabolic equivalent of activity score is: 4.  "    Preoperative Requirements Needed   EKG  [x]     CBC [x]     CMP [x]     CXR []     MRSA culture []     Urine []     Further imaging studies []      Review of Systems  Review of Systems   Constitutional: Negative for chills and fever.   HENT: Negative.    Respiratory: Negative.    Cardiovascular: Positive for palpitations (\"coffee related\"). Negative for chest pain.   Gastrointestinal: Negative.    Musculoskeletal: Positive for arthralgias (Left shoulder pain, had recent injection, some pain at times to bilateral wrists, R>L).   Neurological: Positive for weakness (with dexterity) and numbness (Bilateral hands, tingling to bilateral more R>L).       /82 (BP Location: Left arm, Patient Position: Sitting, Cuff Size: Long Adult)   Pulse 65   Temp 36.7 °C (98 °F) (Temporal)   Resp 16   Ht 1.626 m (5' 4\")   Wt 124.7 kg (275 lb)   SpO2 97%   BMI 47.20 kg/m²     Physical Exam  Physical Exam  Vitals signs reviewed.   Constitutional:       Appearance: Normal appearance.   HENT:      Head: Normocephalic.      Right Ear: Tympanic membrane, ear canal and external ear normal.      Left Ear: Tympanic membrane, ear canal and external ear normal.      Mouth/Throat:      Mouth: Mucous membranes are moist.      Pharynx: No posterior oropharyngeal erythema.   Cardiovascular:      Rate and Rhythm: Normal rate and regular rhythm.      Heart sounds: Normal heart sounds. No murmur. No friction rub. No gallop.    Pulmonary:      Effort: Pulmonary effort is normal. No respiratory distress.      Breath sounds: Normal breath sounds. No stridor. No wheezing, rhonchi or rales.   Chest:      Chest wall: No tenderness.   Skin:     General: Skin is warm and dry.      Capillary Refill: Capillary refill takes less than 2 seconds.   Neurological:      General: No focal deficit present.      Mental Status: She is alert.         Assessment & Plan  Diagnoses and all orders for this visit:    Corrine was seen today for pre-op " exam.    Diagnoses and all orders for this visit:    Preop examination  -     ECG 12 lead -Normal, Today    Bilateral carpal tunnel syndrome    History of palpitations  -     ECG 12 lead -Normal, Today  -     Comprehensive metabolic panel Blood, Venous; Future  -     CBC w/auto differential Blood, Venous    Chronic left shoulder pain  -     Ambulatory referral to Pain Medicine; Future      1.  Preoperative examination.  Again, patient is scheduled to undergo bilateral risk endoscopic versus open carpal tunnel release with Dr. Sun.  Surgery is [facility] for [facility], [facility].  Patient denies any heart palpitations that are not caffeine related, chest pain, shortness of breath with activity or limitations to daily activity due to chest tightness, chest pressure or chest pain.  She is somewhat limited due to her chronic foot pain.  She does report that she is able to walk up a flight of stairs without any chest discomfort therefore, her metabolic equivalent is roughly 4.  Patient has a stopped her meloxicam as well as all of her over-the-counter multivitamins.    2.  Bilateral carpal tunnel syndrome.  Patient is now experiencing significant numbness, tingling and new onset weakness to her hands.  She does report that this is more noticeable in her right hand over her left.  She is hopeful that this surgical procedure will alleviate the symptoms.    3.  History of heart palpitations.  Patient does have a documented history of SVT.  Her last 2 EKGs have been negative for this.  We will update EKG today.  This shows normal sinus rhythm with a heart rate of 60.  EKG will be confirmed by Dr. Rodriguez.  I am also going to update her CBC and CMP per Dr. Sun's orders.    4.  Chronic left shoulder pain.  Patient did recently have steroid injection done by Dr. Chavarria.  However, she is hopeful that she can have a repeat injection done under fluoroscopy.  Therefore, referral sent to pain  management per patient request.    All medical conditions are stable at this time.  I appreciate the opportunity to perform a preoperative evaluation for you on this patient.  Corrine is a lower risk candidate to undergo this procedure with you at this time, however there is no contraindication for the patient to proceed with surgery at this time.        35 minutes were spent face to face with patient with >50% of time spent in counseling, discussion, and coordination of care regarding Discussion of past medical history, surgical history, family history and social history, surgical risk factors, associated symptoms, and further workup.    Portions of this record may have been created with voice recognition software. Care has been taken to prevent errors, however occassional wrong-word, sound-a-like substitutions or grammatical errors may have occurred due to the inherent limitations of voice recognition software.

## 2020-06-20 LAB — VIT B1 BLD-SCNC: 206 NMOL/L (ref 70–180)

## 2020-06-22 ENCOUNTER — TELEPHONE (OUTPATIENT)
Dept: PAIN MEDICINE | Facility: HOSPITAL | Age: 65
End: 2020-06-22

## 2020-06-22 ENCOUNTER — HOSPITAL ENCOUNTER (OUTPATIENT)
Facility: HOSPITAL | Age: 65
Setting detail: OUTPATIENT SURGERY
Discharge: HOME | End: 2020-06-22
Attending: SURGERY | Admitting: SURGERY
Payer: COMMERCIAL

## 2020-06-22 ENCOUNTER — ANESTHESIA (OUTPATIENT)
Dept: GASTROENTEROLOGY | Facility: HOSPITAL | Age: 65
End: 2020-06-22
Payer: COMMERCIAL

## 2020-06-22 VITALS
HEART RATE: 62 BPM | SYSTOLIC BLOOD PRESSURE: 134 MMHG | TEMPERATURE: 97.4 F | OXYGEN SATURATION: 95 % | RESPIRATION RATE: 16 BRPM | DIASTOLIC BLOOD PRESSURE: 90 MMHG

## 2020-06-22 VITALS — WEIGHT: 270.6 LBS | BODY MASS INDEX: 46.45 KG/M2

## 2020-06-22 DIAGNOSIS — Z12.11 SCREENING FOR MALIGNANT NEOPLASM OF COLON: ICD-10-CM

## 2020-06-22 DIAGNOSIS — Z71.3 PRE-BARIATRIC SURGERY NUTRITION EVALUATION: ICD-10-CM

## 2020-06-22 PROCEDURE — (BLANK) HC RECOVERY PHASE-2 1ST 1/2 HOUR ACUITY LEVEL 1: Performed by: SURGERY

## 2020-06-22 PROCEDURE — 2580000300 HC RX 258: Performed by: SURGERY

## 2020-06-22 PROCEDURE — (BLANK): Performed by: SURGERY

## 2020-06-22 PROCEDURE — 00813 ANES UPR LWR GI NDSC PX: CPT | Performed by: NURSE ANESTHETIST, CERTIFIED REGISTERED

## 2020-06-22 PROCEDURE — 2500000200 HC RX 250 WO HCPCS: Performed by: NURSE ANESTHETIST, CERTIFIED REGISTERED

## 2020-06-22 PROCEDURE — 6360000200 HC RX 636 W HCPCS (ALT 250 FOR IP): Mod: JW | Performed by: NURSE ANESTHETIST, CERTIFIED REGISTERED

## 2020-06-22 PROCEDURE — 45385 COLONOSCOPY W/LESION REMOVAL: CPT | Performed by: SURGERY

## 2020-06-22 PROCEDURE — 43239 EGD BIOPSY SINGLE/MULTIPLE: CPT | Mod: 51 | Performed by: SURGERY

## 2020-06-22 PROCEDURE — (BLANK) HC MAC ANESTHESIA FACILITY CHARGE 1ST 15 MIN: Performed by: SURGERY

## 2020-06-22 PROCEDURE — 2500000200 HC RX 250 WO HCPCS: Performed by: SURGERY

## 2020-06-22 PROCEDURE — (BLANK) HC MAC ANESTHESIA FACILITY CHARGE EACH ADDITIONAL MIN: Performed by: SURGERY

## 2020-06-22 RX ORDER — TOPICAL ANESTHETIC 200 MG/ML
SPRAY DENTAL; PERIODONTAL
Status: DISCONTINUED
Start: 2020-06-22 | End: 2020-06-22 | Stop reason: HOSPADM

## 2020-06-22 RX ORDER — LIDOCAINE HYDROCHLORIDE 20 MG/ML
INJECTION, SOLUTION EPIDURAL; INFILTRATION; INTRACAUDAL; PERINEURAL AS NEEDED
Status: DISCONTINUED | OUTPATIENT
Start: 2020-06-22 | End: 2020-06-22 | Stop reason: SURG

## 2020-06-22 RX ORDER — TOPICAL ANESTHETIC 200 MG/ML
SPRAY DENTAL; PERIODONTAL AS NEEDED
Status: DISCONTINUED | OUTPATIENT
Start: 2020-06-22 | End: 2020-06-22 | Stop reason: HOSPADM

## 2020-06-22 RX ORDER — PROPOFOL 10 MG/ML
INJECTION, EMULSION INTRAVENOUS AS NEEDED
Status: DISCONTINUED | OUTPATIENT
Start: 2020-06-22 | End: 2020-06-22 | Stop reason: SURG

## 2020-06-22 RX ORDER — SODIUM CHLORIDE, SODIUM LACTATE, POTASSIUM CHLORIDE, CALCIUM CHLORIDE 600; 310; 30; 20 MG/100ML; MG/100ML; MG/100ML; MG/100ML
125 INJECTION, SOLUTION INTRAVENOUS CONTINUOUS
Status: DISCONTINUED | OUTPATIENT
Start: 2020-06-22 | End: 2020-06-22 | Stop reason: HOSPADM

## 2020-06-22 RX ADMIN — PROPOFOL 20 MG: 10 INJECTION, EMULSION INTRAVENOUS at 07:38

## 2020-06-22 RX ADMIN — PROPOFOL 40 MG: 10 INJECTION, EMULSION INTRAVENOUS at 07:44

## 2020-06-22 RX ADMIN — DEXMEDETOMIDINE HYDROCHLORIDE 4 MCG: 4 INJECTION, SOLUTION INTRAVENOUS at 07:45

## 2020-06-22 RX ADMIN — PROPOFOL 20 MG: 10 INJECTION, EMULSION INTRAVENOUS at 07:53

## 2020-06-22 RX ADMIN — LIDOCAINE HYDROCHLORIDE 100 MG: 20 INJECTION, SOLUTION EPIDURAL; INFILTRATION; INTRACAUDAL; PERINEURAL at 07:38

## 2020-06-22 RX ADMIN — LIDOCAINE HYDROCHLORIDE 100 MG: 20 INJECTION, SOLUTION EPIDURAL; INFILTRATION; INTRACAUDAL; PERINEURAL at 07:40

## 2020-06-22 RX ADMIN — DEXMEDETOMIDINE HYDROCHLORIDE 8 MCG: 4 INJECTION, SOLUTION INTRAVENOUS at 07:29

## 2020-06-22 RX ADMIN — DEXMEDETOMIDINE HYDROCHLORIDE 4 MCG: 4 INJECTION, SOLUTION INTRAVENOUS at 07:39

## 2020-06-22 RX ADMIN — PROPOFOL 20 MG: 10 INJECTION, EMULSION INTRAVENOUS at 07:42

## 2020-06-22 RX ADMIN — PROPOFOL 20 MG: 10 INJECTION, EMULSION INTRAVENOUS at 07:36

## 2020-06-22 RX ADMIN — SODIUM CHLORIDE, POTASSIUM CHLORIDE, SODIUM LACTATE AND CALCIUM CHLORIDE: 600; 310; 30; 20 INJECTION, SOLUTION INTRAVENOUS at 07:29

## 2020-06-22 RX ADMIN — PROPOFOL 20 MG: 10 INJECTION, EMULSION INTRAVENOUS at 07:50

## 2020-06-22 RX ADMIN — PROPOFOL 40 MG: 10 INJECTION, EMULSION INTRAVENOUS at 07:46

## 2020-06-22 RX ADMIN — PROPOFOL 20 MG: 10 INJECTION, EMULSION INTRAVENOUS at 07:34

## 2020-06-22 RX ADMIN — PROPOFOL 20 MG: 10 INJECTION, EMULSION INTRAVENOUS at 07:55

## 2020-06-22 RX ADMIN — LIDOCAINE HYDROCHLORIDE 20 MG: 20 INJECTION, SOLUTION EPIDURAL; INFILTRATION; INTRACAUDAL; PERINEURAL at 07:34

## 2020-06-22 RX ADMIN — DEXMEDETOMIDINE HYDROCHLORIDE 8 MCG: 4 INJECTION, SOLUTION INTRAVENOUS at 07:32

## 2020-06-22 RX ADMIN — PROPOFOL 20 MG: 10 INJECTION, EMULSION INTRAVENOUS at 07:48

## 2020-06-22 RX ADMIN — PROPOFOL 20 MG: 10 INJECTION, EMULSION INTRAVENOUS at 07:40

## 2020-06-22 ASSESSMENT — ENCOUNTER SYMPTOMS: EXERCISE TOLERANCE: GOOD (4-7 METS)

## 2020-06-22 NOTE — DISCHARGE INSTRUCTIONS
Post Endoscopy Discharge Instructions                                                                                                                                                 EGD, with Biopsies of the Stomach, AND COLONOSCOPY, with removal of  2  Colon Polyp      NORMAL SYMPTOMS YOU MAY EXPERIENCE IN THE NEXT 24 HOURS  -Slight bloated feeling and/or Gas  -A Small amount of blood in your poop for 24 hours  -Mild Cramping  -Sore throat  -Drowsiness and/or Forgetfulness    WHEN TO CALL THE DOCTOR  -Unusual Pain  -Fever  -Prolonged or Heavy Bleeding  -Blood in poop 2-3 days after the procedure   -Any new symptom  -Vomiting that is unrelieved     Activity  When you have received medication for your procedure you may feel tired, forgetful, and lightheaded. You judgement and motor abilities may be slightly impaired for a period of twenty-four (24) hours.   -Do not drive a vehicle or operate machinery  -Avoid alcholic beverages  -Postpone signing of legal documents or making important decisions  -Limit your activities  To assist with cramping and bloating use a heating pad or heat pack. Place a towel between the pack and your skin. Leave the heat applied for 20-30 minutes. Remove if your skin turns bright red.     Diet  Begin your diet with liquids and light food (jello, soups, toast, juice, etc.). Progress to your normal diet if you are not nauseated unless otherwise ordered by your physician. Drink enough liquids to keep your pee clear or pale yellow.     Medications  Continue to take regularly scheduled medications as prescribed by your physician    Follow-up  Refer back to the last page of this After Visit Summary to view upcoming appointments you have scheduled.     Results  Dr. Faulkner's Office will call you with results in 3- 5 Business Days for Follow up Care.       *If you have any questions or problems, please call the [facility] at [phone]   or call the Physician's  Office at [phone].   If you feel it is an emergency, go directly to the ER at [facility]. [phone]          Gastritis, Adult    Gastritis is swelling (inflammation) of the stomach. Gastritis can develop quickly (acute). It can also develop slowly over time (chronic). It is important to get help for this condition. If you do not get help, your stomach can bleed, and you can get sores (ulcers) in your stomach.  What are the causes?  This condition may be caused by:  · Germs that get to your stomach.  · Drinking too much alcohol.  · Medicines you are taking.  · Too much acid in the stomach.  · A disease of the intestines or stomach.  · Stress.  · An allergic reaction.  · Crohn's disease.  · Some cancer treatments (radiation).  Sometimes the cause of this condition is not known.  What are the signs or symptoms?  Symptoms of this condition include:  · Pain in your stomach.  · A burning feeling in your stomach.  · Feeling sick to your stomach (nauseous).  · Throwing up (vomiting).  · Feeling too full after you eat.  · Weight loss.  · Bad breath.  · Throwing up blood.  · Blood in your poop (stool).  How is this diagnosed?  This condition may be diagnosed with:  · Your medical history and symptoms.  · A physical exam.  · Tests. These can include:  ? Blood tests.  ? Stool tests.  ? A procedure to look inside your stomach (upper endoscopy).  ? A test in which a sample of tissue is taken for testing (biopsy).  How is this treated?  Treatment for this condition depends on what caused it. You may be given:  · Antibiotic medicine, if your condition was caused by germs.  · H2 blockers and similar medicines, if your condition was caused by too much acid.  Follow these instructions at home:  Medicines  · Take over-the-counter and prescription medicines only as told by your doctor.  · If you were prescribed an antibiotic medicine, take it as told by your doctor. Do not stop taking it even if you start  to feel better.  Eating and drinking    · Eat small meals often, instead of large meals.  · Avoid foods and drinks that make your symptoms worse.  · Drink enough fluid to keep your pee (urine) pale yellow.  Alcohol use  · Do not drink alcohol if:  ? Your doctor tells you not to drink.  ? You are pregnant, may be pregnant, or are planning to become pregnant.  · If you drink alcohol:  ? Limit your use to:  § 0-1 drink a day for women.  § 0-2 drinks a day for men.  ? Be aware of how much alcohol is in your drink. In the [place] one drink equals one 12 oz bottle of beer (355 mL), one 5 oz glass of wine (148 mL), or one 1½ oz glass of hard liquor (44 mL).  General instructions  · Talk with your doctor about ways to manage stress. You can exercise or do deep breathing, meditation, or yoga.  · Do not smoke or use products that have nicotine or tobacco. If you need help quitting, ask your doctor.  · Keep all follow-up visits as told by your doctor. This is important.  Contact a doctor if:  · Your symptoms get worse.  · Your symptoms go away and then come back.  Get help right away if:  · You throw up blood or something that looks like coffee grounds.  · You have black or dark red poop.  · You throw up any time you try to drink fluids.  · Your stomach pain gets worse.  · You have a fever.  · You do not feel better after one week.  Summary  · Gastritis is swelling (inflammation) of the stomach.  · You must get help for this condition. If you do not get help, your stomach can bleed, and you can get sores (ulcers).  · This condition is diagnosed with medical history, physical exam, or tests.  · You can be treated with medicines for germs or medicines to block too much acid in your stomach.              Colon Polyps    Polyps are tissue growths inside the body. Polyps can grow in many places, including the large intestine (colon). A polyp may be a round bump or a mushroom-shaped growth. You could have one polyp or several.  Most  colon polyps are noncancerous (benign). However, some colon polyps can become cancerous over time. Finding and removing the polyps early can help prevent this.  What are the causes?  The exact cause of colon polyps is not known.  What increases the risk?  You are more likely to develop this condition if you:  · Have a family history of colon cancer or colon polyps.  · Are older than 50 or older than 45 if you are .  · Have inflammatory bowel disease, such as ulcerative colitis or Crohn's disease.  · Have certain hereditary conditions, such as:  ? Familial adenomatous polyposis.  ? Dunne syndrome.  ? Turcot syndrome.  ? Peutz-Jeghers syndrome.  · Are overweight.  · Smoke cigarettes.  · Do not get enough exercise.  · Drink too much alcohol.  · Eat a diet that is high in fat and red meat and low in fiber.  · Had childhood cancer that was treated with abdominal radiation.  What are the signs or symptoms?  Most polyps do not cause symptoms.  If you have symptoms, they may include:  · Blood coming from your rectum when having a bowel movement.  · Blood in your stool. The stool may look dark red or black.  · Abdominal pain.  · A change in bowel habits, such as constipation or diarrhea.  How is this diagnosed?  This condition is diagnosed with a colonoscopy. This is a procedure in which a lighted, flexible scope is inserted into the anus and then passed into the colon to examine the area. Polyps are sometimes found when a colonoscopy is done as part of routine cancer screening tests.  How is this treated?  Treatment for this condition involves removing any polyps that are found. Most polyps can be removed during a colonoscopy. Those polyps will then be tested for cancer. Additional treatment may be needed depending on the results of testing.  Follow these instructions at home:  Lifestyle  · Maintain a healthy weight, or lose weight if recommended by your health care provider.  · Exercise every day or as told  by your health care provider.  · Do not use any products that contain nicotine or tobacco, such as cigarettes and e-cigarettes. If you need help quitting, ask your health care provider.  · If you drink alcohol, limit how much you have:  ? 0-1 drink a day for women.  ? 0-2 drinks a day for men.  · Be aware of how much alcohol is in your drink. In the [place] one drink equals one 12 oz bottle of beer (355 mL), one 5 oz glass of wine (148 mL), or one 1½ oz shot of hard liquor (44 mL).  Eating and drinking    · Eat foods that are high in fiber, such as fruits, vegetables, and whole grains.  · Eat foods that are high in calcium and vitamin D, such as milk, cheese, yogurt, eggs, liver, fish, and broccoli.  · Limit foods that are high in fat, such as fried foods and desserts.  · Limit the amount of red meat and processed meat you eat, such as hot dogs, sausage, gordillo, and lunch meats.  General instructions  · Keep all follow-up visits as told by your health care provider. This is important.  ? This includes having regularly scheduled colonoscopies.  ? Talk to your health care provider about when you need a colonoscopy.  Contact a health care provider if:  · You have new or worsening bleeding during a bowel movement.  · You have new or increased blood in your stool.  · You have a change in bowel habits.  · You lose weight for no known reason.  Summary  · Polyps are tissue growths inside the body. Polyps can grow in many places, including the colon.  · Most colon polyps are noncancerous (benign), but some can become cancerous over time.  · This condition is diagnosed with a colonoscopy.  · Treatment for this condition involves removing any polyps that are found. Most polyps can be removed during a colonoscopy.

## 2020-06-22 NOTE — TELEPHONE ENCOUNTER
----- Message from Kathleen Lopez RN sent at 6/19/2020 10:09 AM MDT -----  Thank you we will find a spot!!    Kathleen   ----- Message -----  From: Richard Tavarez MD  Sent: 6/19/2020   8:25 AM MDT  To: Ying Hamlin RN, Kathleen Lopez RN    Lets squeeze her in somewhere, It would be a super quick visit.   You guys are the best,   Daysi  ----- Message -----  From: Ying Hamlin RN  Sent: 6/18/2020   2:15 PM MDT  To: Kathleen Lopez RN, Richard Tavarez MD    Patient called wanting to get a left shoulder injection scheduled with Dr. Tavarez prior to end of June since her insurance deductibles are all met. Informed patient that we would need a referral to be able to treat her shoulder. Also, informed patient that Dr. Tavarez is completely booked and overbooked with his remaining June dates. Gave Cira this information as well since she technically would be a new patient. 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 Patient stated she would get a referral tomorrow from her physician.

## 2020-06-22 NOTE — ANESTHESIA POSTPROCEDURE EVALUATION
Patient: Corrine Holden    Procedure Summary     Date:  06/22/20 Room / Location:  [facility] ENDO 02 / [facility]    Anesthesia Start:  0732 Anesthesia Stop:      Procedures:       COLONOSCOPY and ESOPHAGOGASTRODUODENOSCOPY (N/A Anus)      ESOPHAGOGASTRODUODENOSCOPY (N/A Esophagus) Diagnosis:       Pre-bariatric surgery nutrition evaluation      Screening for malignant neoplasm of colon      (COLON POLYPS)      (GASTRITIS)    Provider:  Julian Faulkner MD Responsible Provider:  Rodrick Hale CRNA    Anesthesia Type:  MAC ASA Status:  3          Anesthesia Type: MAC    Last vitals  Vitals Value Taken Time   /97 6/22/2020  8:26 AM   Temp     Pulse 62 6/22/2020  8:26 AM   Resp 16 6/22/2020  8:26 AM   SpO2 95 % 6/22/2020  8:26 AM   Pain Score         Anesthesia Post Evaluation    Patient location during evaluation: PACU  Patient participation: complete - patient participated  Level of consciousness: awake and alert  Pain management: adequate  Airway patency: patent  Anesthetic complications: no  Cardiovascular status: acceptable  Respiratory status: acceptable  Hydration status: acceptable  May dismiss recovered patient based on consultation with the appropriate physicians and/or meeting appropriate discharge criteria      Cosmetic?

## 2020-06-22 NOTE — OP NOTE
Corrine ANTHONY Michaelzmaria del carmen  6/22/2020    Pre op Diagnosis: heartburn, pre op bariatric surgery, history of polyps    Post Op Diagnosis: bile gastritis, colon polyps    Procedure: Upper and Lower endoscopy    Surgeon(s):  Julian Faulkner MD    Anesthesia:  MAC due to OSAS and valium use    Staff:   Circulator: Shelia Mitchell RN  Endo Technician: Laura Avalos    Estimated Blood Loss:  0 mL    Indications:  heartburn and pre op bariatric surgery  previous adenomatous polyp    Specimens:  Order Name Source Comment Collection Info Order Time   PATHOLOGY SPECIMEN (HISTOLOGY) EGD (Esophagogastroduodenoscopy) specimen  Collected By: Julian Faulkner MD 6/22/2020  8:04 AM   PATHOLOGY SPECIMEN (HISTOLOGY) Large Intestine (colon)  Collected By: Julian Faulkner MD 6/22/2020  8:04 AM   PATHOLOGY SPECIMEN (HISTOLOGY) Large Intestine, Cecum  Collected By: uJlian Faulkner MD 6/22/2020  8:04 AM         Findings:  Hill grade 2  -GE junction at 37cm  -stomach full of bile  -gastritis  -Benign appearing colon polyps, removed as above., -Otherwise normal colonoscopy to the terminal ileum.          Procedure:   The patient was brought to the endoscopy suite and placed in a left lateral decubitus position on the operative table. A time out to patient and procedure was performed. After adequate sedation and topical benzocaine spray the gastroscope was introduced and passed through the oropharynx and into the esophagus without difficulty. It was advanced down the fourth portion of the duodenum. Duodenum appeared to be grossly normal. The scope was retroflexed in the stomach and entire stomach examined. Findings on withdrawal are as follows:  -no signficant hiatal hernia      Bile in the stomach    Mild distal gastritis with antral biopsies taken to rule out H. Pylori, cold forceps biopsies taken, 6 biopsies taken with 4 from the antrum, 1 from the incisura and 1 from the greater curve      The patient was brought to the endoscopy suite and placed  in a left lateral decubitus position on the operative table. A time out to patient and procedure was performed. After adequate sedation a digital rectal exam was negative without mass, lesions or tenderness. The colonoscope was then introduced and advanced to the level of the cecum using a water immersion technique. Cecum was confirmed with identification of the  the ileocecal valve and appendiceal orifice . The scope was then slowly withdrawn and mucosal surfaces inspected. The scope was retroflexed in the rectum and absence of anal verge pathology was noted.  The prep was adequate and visualization was adequate. Findings on withdrawal are as follows:   polyp(s) #1, 10 mm in size, located in the cecum removed by cold snare and retrieved for pathology  #2, 6 mm in size, located in the ascending colon removed by cold snare and retrieved for pathology.       Complications:  None    Julian Faulkner MD  Phone Number: [phone]    Plan:  -Follow up with me.  -If adenoma is present, repeat colonoscopy in 5 years.    Date: 6/22/2020  Time: 8:08 AM

## 2020-06-22 NOTE — H&P
"  No chief complaint on file.      HPI:  Corrine Holden is an 65 y.o. female. Who presents for upper endoscopy for pre op sleeve gastrectomy. She is also due for colonoscopy, she has a history of polyps and last colonoscopy was 5 years ago with tubular adenoma    Past Medical History:   Diagnosis Date   • GERD (gastroesophageal reflux disease)    • H. pylori infection 2009   • Hypothyroidism    • Injury of back     STATES, \"THIS IS REASON FOR MRI\"   • Microscopic hematuria    • SHANA (obstructive sleep apnea)     USES CPAP   • Osteoarthritis    • Palpitations    • Respiratory disease    • Supraventricular tachycardia (CMS/HCC) (HCC)     STATES, \"COFFEE RELATED\", VERY INTERMITTENT   • Tinnitus         Past Surgical History:   Procedure Laterality Date   • CARDIAC ELECTROPHYSIOLOGY STUDY AND ABLATION  01/01/2007    attempted ablation for SVT   • COLONOSCOPY  01/21/2015    cecal polyp-tubular adenoma-recommend repeat 5 years   • DILATION AND CURETTAGE OF UTERUS  01/01/2006   • ENDOMETRIAL BIOPSY  01/01/2012    pt reports negative   • ESOPHAGOGASTRODUODENOSCOPY  01/01/2009    H pylori   • KNEE ARTHROPLASTY Right 04/01/2015    Dr Placido BRICE   • VAGINAL HYSTERECTOMY  12/16/2013    with BSO, fibroids       No current outpatient medications on file.    Allergies   Allergen Reactions   • Bee Venom Protein (Honey Bee)    • No Known Drug Allergies        Family History   Problem Relation Age of Onset   • Cancer Mother 84        Bladder   • Osteoporosis Mother    • Hypertension Mother    • Colon cancer Father 65   • Kidney cancer Father 77   • Other Father's Brother         sudden cardiac death, 60s   • Heart disease Maternal Grandfather    • Heart attack Maternal Grandfather 59   • Cancer Paternal Grandfather        Social History     Socioeconomic History   • Marital status: Single     Spouse name: Not on file   • Number of children: Not on file   • Years of education: Not on file   • Highest education level: Not on file "   Occupational History   • Not on file   Social Needs   • Financial resource strain: Not on file   • Food insecurity     Worry: Not on file     Inability: Not on file   • Transportation needs     Medical: Not on file     Non-medical: Not on file   Tobacco Use   • Smoking status: Never Smoker   • Smokeless tobacco: Never Used   Substance and Sexual Activity   • Alcohol use: No   • Drug use: No   • Sexual activity: Not on file   Lifestyle   • Physical activity     Days per week: Not on file     Minutes per session: Not on file   • Stress: Not on file   Relationships   • Social connections     Talks on phone: Not on file     Gets together: Not on file     Attends [employer] service: Not on file     Active member of club or organization: Not on file     Attends meetings of clubs or organizations: Not on file     Relationship status: Not on file   • Intimate partner violence     Fear of current or ex partner: Not on file     Emotionally abused: Not on file     Physically abused: Not on file     Forced sexual activity: Not on file   Other Topics Concern   • Not on file   Social History Narrative   • Not on file       Complete 9 point ROS performed and negative other than known medical condiations    /96   Pulse 69   Temp 36.6 °C (97.8 °F) (Temporal)   Resp 16   SpO2 94%     Physical:  Gen: alert, oriented  Heart: RRR  Lungs: CTA bilaterally  Abdomen: soft, non tender    Assessment/Plan   Risks and benefits of upper endoscopy reviewed including bleeding, perforation, reaction to medications or injury to other structures. Will plan to proceed.    Active Problems:    Pre-bariatric surgery nutrition evaluation    Screening for malignant neoplasm of colon

## 2020-06-22 NOTE — TELEPHONE ENCOUNTER
Patient obtained referral and is being worked into new patient slot by [facility]a as one comes available with Dr. Tavarez. Cira has reached out to patient regarding this.

## 2020-06-23 NOTE — PATIENT INSTRUCTIONS
Keep a food log   Eat within 90 minutes of waking  Eat 3 x per day  Eat every 3-4 hours  Eat at least 15grams protein per meal  Quit carbonated beverages, alcohol and caffeine  Drink 32ounces of water and 64 ounces of zero calorie fluids everyday  Eat 5 servings of fruits and vegetables daily  Eat out less often  Eat mindfully eating; take 20 minutes or more to eat   Perform 150 minutes of safe physical movement weekly  Perform weight resistance exercises 2-3 days per week

## 2020-09-06 DIAGNOSIS — J31.0 CHRONIC NONALLERGIC RHINITIS: ICD-10-CM

## 2020-09-06 DIAGNOSIS — M19.90 OSTEOARTHRITIS, UNSPECIFIED OSTEOARTHRITIS TYPE, UNSPECIFIED SITE: ICD-10-CM

## 2020-09-08 RX ORDER — DICLOFENAC SODIUM 10 MG/G
GEL TOPICAL
Qty: 300 G | Refills: 1 | Status: SHIPPED | OUTPATIENT
Start: 2020-09-08 | End: 2021-06-21

## 2020-09-08 RX ORDER — FLUTICASONE PROPIONATE 50 MCG
SPRAY, SUSPENSION (ML) NASAL
Qty: 48 G | Refills: 1 | Status: SHIPPED | OUTPATIENT
Start: 2020-09-08 | End: 2021-06-18

## 2020-09-16 ENCOUNTER — APPOINTMENT (OUTPATIENT)
Dept: LAB | Facility: URGENT CARE | Age: 65
End: 2020-09-16
Payer: COMMERCIAL

## 2020-09-16 ENCOUNTER — NURSE TRIAGE (OUTPATIENT)
Dept: FAMILY MEDICINE | Facility: CLINIC | Age: 65
End: 2020-09-16

## 2020-09-16 DIAGNOSIS — Z20.828 EXPOSURE TO SARS-ASSOCIATED CORONAVIRUS: ICD-10-CM

## 2020-09-16 DIAGNOSIS — Z20.828 EXPOSURE TO SARS-ASSOCIATED CORONAVIRUS: Primary | ICD-10-CM

## 2020-09-16 LAB — SARS-COV-2 RNA RESP QL NAA+PROBE: NEGATIVE

## 2020-09-16 PROCEDURE — 99211 OFF/OP EST MAY X REQ PHY/QHP: CPT | Mod: CS,LAB | Performed by: FAMILY MEDICINE

## 2020-09-16 PROCEDURE — 87635 SARS-COV-2 COVID-19 AMP PRB: CPT | Performed by: FAMILY MEDICINE

## 2020-09-16 NOTE — TELEPHONE ENCOUNTER
COVID-19 SCREENING ASSESSMENT     CRITERIA FOR TESTING  Yes to Any Symptoms or Asymptomatic Testing With Approved Criteria/MH Agreement  What symptoms are you experiencing? 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 No  Are you a dialysis or current cancer patient? No  Are you currently pregnant? No  Do you work in an educational setting? No     OTHER QUESTIONS  Are you a [employer] employee? Yes  Have you had close contact with a lab confirmed case of COVID-19 in the last 14 days? yes  Details on close contact: family member or household member     Caller was in contact with a friend who tested positive on Saturday 12th who was ill with bladder UTI symptoms and was positive.    Reason for Disposition  • COVID-19 symptoms per CDC guidelines.    Protocols used: COVID-19  EMPLOYEE SCREENING    Caller will follow protocol and CDC quidelines.

## 2020-09-17 ENCOUNTER — TELEPHONE (OUTPATIENT)
Dept: INTERNAL MEDICINE | Facility: CLINIC | Age: 65
End: 2020-09-17

## 2020-09-17 NOTE — TELEPHONE ENCOUNTER
Caller would like to discuss (a) return call Writer has advised caller of a callback from within 24 hours.    Patient: Corrine Holden    Caller Name (Last and first, relation/role): self    Name of Facility:     Callback Number: [phone]    Best Availability: any    Fax Number:     Additional Info: pt scheduled appt 9.18 @ 2pm for right hip pain and is requesting xray in conjunction w/this appt or states she will not come in; requesting call back to confirm and schedule xray    Did you confirm the message with the caller: Yes    Is it okay that the nurse communicates your response through [employer]? No

## 2020-09-18 ENCOUNTER — ANCILLARY PROCEDURE (OUTPATIENT)
Dept: RADIOLOGY | Facility: CLINIC | Age: 65
End: 2020-09-18
Payer: COMMERCIAL

## 2020-09-18 ENCOUNTER — OFFICE VISIT (OUTPATIENT)
Dept: INTERNAL MEDICINE | Facility: CLINIC | Age: 65
End: 2020-09-18
Payer: COMMERCIAL

## 2020-09-18 VITALS
HEART RATE: 91 BPM | TEMPERATURE: 98.2 F | BODY MASS INDEX: 46.95 KG/M2 | HEIGHT: 64 IN | OXYGEN SATURATION: 96 % | WEIGHT: 275 LBS | RESPIRATION RATE: 18 BRPM | SYSTOLIC BLOOD PRESSURE: 138 MMHG | DIASTOLIC BLOOD PRESSURE: 82 MMHG

## 2020-09-18 DIAGNOSIS — M25.551 RIGHT HIP PAIN: Primary | ICD-10-CM

## 2020-09-18 DIAGNOSIS — M25.572 CHRONIC PAIN OF LEFT ANKLE: ICD-10-CM

## 2020-09-18 DIAGNOSIS — G89.29 CHRONIC PAIN OF LEFT ANKLE: ICD-10-CM

## 2020-09-18 PROCEDURE — 72200 X-RAY EXAM SI JOINTS: CPT | Mod: TC | Performed by: NURSE PRACTITIONER

## 2020-09-18 PROCEDURE — 99213 OFFICE O/P EST LOW 20 MIN: CPT | Performed by: NURSE PRACTITIONER

## 2020-09-18 PROCEDURE — 73502 X-RAY EXAM HIP UNI 2-3 VIEWS: CPT | Mod: TC,RT | Performed by: NURSE PRACTITIONER

## 2020-09-18 RX ORDER — GABAPENTIN 100 MG/1
100 CAPSULE ORAL NIGHTLY
Qty: 30 CAPSULE | Refills: 0 | Status: SHIPPED | OUTPATIENT
Start: 2020-09-18 | End: 2020-09-21 | Stop reason: SDUPTHER

## 2020-09-18 ASSESSMENT — ENCOUNTER SYMPTOMS
BACK PAIN: 1
CHILLS: 0
ARTHRALGIAS: 1
FEVER: 0
ACTIVITY CHANGE: 1

## 2020-09-18 ASSESSMENT — PAIN SCALES - GENERAL: PAINLEVEL: 5

## 2020-09-18 NOTE — TELEPHONE ENCOUNTER
I spoke with the patient and made her aware that effie is happy to see her and order x-rays at that time
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
## 2020-09-21 DIAGNOSIS — M25.551 RIGHT HIP PAIN: Primary | ICD-10-CM

## 2020-09-21 DIAGNOSIS — M25.572 CHRONIC PAIN OF LEFT ANKLE: ICD-10-CM

## 2020-09-21 DIAGNOSIS — G89.29 CHRONIC PAIN OF LEFT ANKLE: ICD-10-CM

## 2020-09-21 RX ORDER — GABAPENTIN 100 MG/1
100 CAPSULE ORAL NIGHTLY
Qty: 90 CAPSULE | Refills: 0 | Status: SHIPPED | OUTPATIENT
Start: 2020-09-21 | End: 2021-02-26 | Stop reason: ALTCHOICE

## 2020-09-24 DIAGNOSIS — M25.551 RIGHT HIP PAIN: Primary | ICD-10-CM

## 2020-11-22 ENCOUNTER — NURSE TRIAGE (OUTPATIENT)
Dept: FAMILY MEDICINE | Facility: CLINIC | Age: 65
End: 2020-11-22

## 2020-11-22 ENCOUNTER — APPOINTMENT (OUTPATIENT)
Dept: LAB | Facility: URGENT CARE | Age: 65
End: 2020-11-22
Payer: COMMERCIAL

## 2020-11-22 DIAGNOSIS — Z11.52 ENCOUNTER FOR SCREENING LABORATORY TESTING FOR COVID-19 VIRUS: ICD-10-CM

## 2020-11-22 LAB — SARS-COV-2 RNA RESP QL NAA+PROBE: NEGATIVE

## 2020-11-22 PROCEDURE — 99211 OFF/OP EST MAY X REQ PHY/QHP: CPT | Mod: CS,LAB | Performed by: FAMILY MEDICINE

## 2020-11-22 PROCEDURE — 87635 SARS-COV-2 COVID-19 AMP PRB: CPT | Performed by: FAMILY MEDICINE

## 2020-11-22 NOTE — TELEPHONE ENCOUNTER
COVID-19 SCREENING ASSESSMENT     CRITERIA FOR TESTING  Yes to Any Symptoms or Asymptomatic Testing With Approved Criteria/ Agreement  What symptoms are you experiencing? 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 No  Are you a dialysis or current cancer patient? No  Are you currently pregnant? No  Do you work in an educational setting? No     OTHER QUESTIONS  Are you a [employer] employee? Yes  Have you had close contact with a lab confirmed case of COVID-19 in the last 14 days? yes  Details on close contact: family member or household member    Reason for Disposition  • COVID-19 symptoms per CDC guidelines.    Protocols used: COVID-19  EMPLOYEE SCREENING

## 2020-12-21 DIAGNOSIS — E03.9 HYPOTHYROIDISM, UNSPECIFIED TYPE: ICD-10-CM

## 2020-12-21 DIAGNOSIS — K21.9 GASTROESOPHAGEAL REFLUX DISEASE WITHOUT ESOPHAGITIS: ICD-10-CM

## 2020-12-21 DIAGNOSIS — M62.838 MUSCLE SPASM: ICD-10-CM

## 2020-12-22 RX ORDER — LEVOTHYROXINE SODIUM 112 UG/1
112 TABLET ORAL DAILY
Qty: 90 TABLET | Refills: 3 | Status: SHIPPED | OUTPATIENT
Start: 2020-12-22 | End: 2021-10-25 | Stop reason: SDUPTHER

## 2020-12-22 RX ORDER — DIAZEPAM 5 MG/1
5 TABLET ORAL EVERY 12 HOURS PRN
Qty: 30 TABLET | Refills: 1 | Status: SHIPPED | OUTPATIENT
Start: 2020-12-22 | End: 2021-04-01

## 2020-12-22 RX ORDER — OMEPRAZOLE 20 MG/1
20 CAPSULE, DELAYED RELEASE ORAL DAILY
Qty: 90 CAPSULE | Refills: 3 | Status: SHIPPED | OUTPATIENT
Start: 2020-12-22 | End: 2022-01-11

## 2021-01-11 ENCOUNTER — APPOINTMENT (OUTPATIENT)
Dept: LAB | Facility: URGENT CARE | Age: 66
End: 2021-01-11

## 2021-01-11 ENCOUNTER — NURSE TRIAGE (OUTPATIENT)
Dept: FAMILY MEDICINE | Facility: CLINIC | Age: 66
End: 2021-01-11

## 2021-01-11 DIAGNOSIS — Z11.52 ENCOUNTER FOR SCREENING LABORATORY TESTING FOR COVID-19 VIRUS: ICD-10-CM

## 2021-01-11 LAB — SARS-COV-2 RNA RESP QL NAA+PROBE: POSITIVE

## 2021-01-11 PROCEDURE — 87635 SARS-COV-2 COVID-19 AMP PRB: CPT | Performed by: FAMILY MEDICINE

## 2021-01-11 PROCEDURE — C9803 HOPD COVID-19 SPEC COLLECT: HCPCS | Performed by: FAMILY MEDICINE

## 2021-01-11 NOTE — TELEPHONE ENCOUNTER
COVID-19 SCREENING ASSESSMENT     CRITERIA FOR TESTING  Yes to Any Symptoms or Asymptomatic Testing With Approved Criteria/MH Agreement  What symptoms are you experiencing? 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 No  Are you a dialysis or current cancer patient? No  Are you currently pregnant? N/A  Do you work in an educational setting? No     OTHER QUESTIONS  Are you a [employer] employee? Yes  Have you had close contact with a lab confirmed case of COVID-19 in the last 14 days? no  Details on close contact: n/a    Reason for Disposition  • COVID-19 symptoms per CDC guidelines.    Protocols used: COVID-19  EMPLOYEE SCREENING

## 2021-01-12 ENCOUNTER — TELEPHONE (OUTPATIENT)
Dept: INTERNAL MEDICINE | Facility: CLINIC | Age: 66
End: 2021-01-12

## 2021-01-12 ENCOUNTER — HOME MONITORING (OUTPATIENT)
Dept: FAMILY MEDICINE | Facility: CLINIC | Age: 66
End: 2021-01-12

## 2021-01-12 ENCOUNTER — APPOINTMENT (OUTPATIENT)
Dept: RADIOLOGY | Facility: HOSPITAL | Age: 66
End: 2021-01-12
Payer: COMMERCIAL

## 2021-01-12 ENCOUNTER — HOSPITAL ENCOUNTER (EMERGENCY)
Facility: HOSPITAL | Age: 66
Discharge: HOME | End: 2021-01-13
Attending: EMERGENCY MEDICINE
Payer: COMMERCIAL

## 2021-01-12 DIAGNOSIS — E66.01 CLASS 3 SEVERE OBESITY DUE TO EXCESS CALORIES WITH SERIOUS COMORBIDITY AND BODY MASS INDEX (BMI) OF 45.0 TO 49.9 IN ADULT (CMS/HCC): Primary | ICD-10-CM

## 2021-01-12 DIAGNOSIS — E66.813 CLASS 3 SEVERE OBESITY DUE TO EXCESS CALORIES WITH SERIOUS COMORBIDITY AND BODY MASS INDEX (BMI) OF 45.0 TO 49.9 IN ADULT (CMS/HCC): Primary | ICD-10-CM

## 2021-01-12 DIAGNOSIS — M79.10 MYALGIA: ICD-10-CM

## 2021-01-12 DIAGNOSIS — U07.1 COVID-19: Primary | ICD-10-CM

## 2021-01-12 DIAGNOSIS — U07.1 COVID-19: ICD-10-CM

## 2021-01-12 DIAGNOSIS — R06.02 SHORTNESS OF BREATH: ICD-10-CM

## 2021-01-12 DIAGNOSIS — G47.33 OBSTRUCTIVE SLEEP APNEA SYNDROME: ICD-10-CM

## 2021-01-12 LAB
ALBUMIN SERPL-MCNC: 4.6 G/DL (ref 3.5–5.3)
ALP SERPL-CCNC: 67 U/L (ref 50–130)
ALT SERPL-CCNC: 21 U/L (ref 7–52)
ANION GAP SERPL CALC-SCNC: 11 MMOL/L (ref 3–11)
AST SERPL-CCNC: 19 U/L
BASOPHILS # BLD AUTO: 0 10*3/UL
BASOPHILS NFR BLD AUTO: 1 % (ref 0–2)
BILIRUB SERPL-MCNC: 0.4 MG/DL (ref 0.2–1.4)
BUN SERPL-MCNC: 15 MG/DL (ref 7–25)
CALCIUM ALBUM COR SERPL-MCNC: 9.8 MG/DL (ref 8.6–10.3)
CALCIUM SERPL-MCNC: 10.3 MG/DL (ref 8.6–10.3)
CHLORIDE SERPL-SCNC: 102 MMOL/L (ref 98–107)
CO2 SERPL-SCNC: 26 MMOL/L (ref 21–32)
CREAT SERPL-MCNC: 0.78 MG/DL (ref 0.6–1.1)
EOSINOPHIL # BLD AUTO: 0 10*3/UL
EOSINOPHIL NFR BLD AUTO: 1 % (ref 0–3)
ERYTHROCYTE [DISTWIDTH] IN BLOOD BY AUTOMATED COUNT: 13.6 % (ref 11.5–14)
GFR SERPL CREATININE-BSD FRML MDRD: 80 ML/MIN/1.73M*2
GLUCOSE SERPL-MCNC: 134 MG/DL (ref 70–105)
HCT VFR BLD AUTO: 40.5 % (ref 34–45)
HGB BLD-MCNC: 14.6 G/DL (ref 11.5–15.5)
LYMPHOCYTES # BLD AUTO: 0.8 10*3/UL
LYMPHOCYTES NFR BLD AUTO: 12 % (ref 11–47)
MAGNESIUM SERPL-MCNC: 2.1 MG/DL (ref 1.8–2.4)
MCH RBC QN AUTO: 33 PG (ref 28–33)
MCHC RBC AUTO-ENTMCNC: 36 G/DL (ref 32–36)
MCV RBC AUTO: 91.6 FL (ref 81–97)
MONOCYTES # BLD AUTO: 0.7 10*3/UL
MONOCYTES NFR BLD AUTO: 11 % (ref 3–11)
NEUTROPHILS # BLD AUTO: 5.2 10*3/UL
NEUTROPHILS NFR BLD AUTO: 76 % (ref 41–81)
PLATELET # BLD AUTO: 217 10*3/UL (ref 140–350)
PMV BLD AUTO: 6.9 FL (ref 6.9–10.8)
POTASSIUM SERPL-SCNC: 3.7 MMOL/L (ref 3.5–5.1)
PROT SERPL-MCNC: 7 G/DL (ref 6–8.3)
RBC # BLD AUTO: 4.42 10*6/ΜL (ref 3.7–5.3)
SODIUM SERPL-SCNC: 139 MMOL/L (ref 135–145)
TROPONIN I SERPL-MCNC: 2.8 PG/ML
WBC # BLD AUTO: 6.9 10*3/UL (ref 4.5–10.5)

## 2021-01-12 PROCEDURE — 84484 ASSAY OF TROPONIN QUANT: CPT | Performed by: EMERGENCY MEDICINE

## 2021-01-12 PROCEDURE — 85025 COMPLETE CBC W/AUTO DIFF WBC: CPT | Performed by: EMERGENCY MEDICINE

## 2021-01-12 PROCEDURE — 36415 COLL VENOUS BLD VENIPUNCTURE: CPT | Performed by: EMERGENCY MEDICINE

## 2021-01-12 PROCEDURE — 83735 ASSAY OF MAGNESIUM: CPT | Performed by: EMERGENCY MEDICINE

## 2021-01-12 PROCEDURE — 6360000200 HC RX 636 W HCPCS (ALT 250 FOR IP): Performed by: EMERGENCY MEDICINE

## 2021-01-12 PROCEDURE — 71045 X-RAY EXAM CHEST 1 VIEW: CPT

## 2021-01-12 PROCEDURE — 99284 EMERGENCY DEPT VISIT MOD MDM: CPT | Performed by: EMERGENCY MEDICINE

## 2021-01-12 PROCEDURE — 80053 COMPREHEN METABOLIC PANEL: CPT | Performed by: EMERGENCY MEDICINE

## 2021-01-12 PROCEDURE — 93005 ELECTROCARDIOGRAM TRACING: CPT

## 2021-01-12 RX ORDER — ALBUTEROL SULFATE 90 MCG
1 HFA AEROSOL WITH ADAPTER (GRAM) INHALATION ONCE
Status: COMPLETED | OUTPATIENT
Start: 2021-01-12 | End: 2021-01-12

## 2021-01-12 RX ORDER — DEXAMETHASONE SODIUM PHOSPHATE 4 MG/ML
6 INJECTION, SOLUTION INTRA-ARTICULAR; INTRALESIONAL; INTRAMUSCULAR; INTRAVENOUS; SOFT TISSUE ONCE
Status: COMPLETED | OUTPATIENT
Start: 2021-01-12 | End: 2021-01-12

## 2021-01-12 RX ADMIN — Medication 1 PACKAGE: at 22:21

## 2021-01-12 RX ADMIN — DEXAMETHASONE SODIUM PHOSPHATE 6 MG: 4 INJECTION, SOLUTION INTRAMUSCULAR; INTRAVENOUS at 22:21

## 2021-01-12 NOTE — TELEPHONE ENCOUNTER
Caller would like to discuss (a) return call Writer has advised caller of a callback from within 24 hours.    Patient: Corrine Holden    Caller Name (Last and first, relation/role): self    Name of Facility: n/a     Callback Number: [phone]    Best Availability: anytime     Fax Number: n/a     Additional Info: pt states on 1/11/2021 she tested positive for COVID-19, states she has a question regarding infusion.     Did you confirm the message with the caller: Yes    Is it okay that the nurse communicates your response through "[employer]hart? No

## 2021-01-12 NOTE — TELEPHONE ENCOUNTER
I spoke with the patient who asked if Dr. Joy would talk with her about the infusion and if she recommends it. Patient is very fatigued with a fever that's controlled with tylenol and a cough.    Symptoms started: 1/10/2021

## 2021-01-13 ENCOUNTER — TELEPHONE (OUTPATIENT)
Dept: INTERNAL MEDICINE | Facility: CLINIC | Age: 66
End: 2021-01-13

## 2021-01-13 ENCOUNTER — TELEPHONE - BILLABLE (OUTPATIENT)
Dept: INTERNAL MEDICINE | Facility: CLINIC | Age: 66
End: 2021-01-13
Payer: COMMERCIAL

## 2021-01-13 ENCOUNTER — HOSPITAL ENCOUNTER (OUTPATIENT)
Dept: INFUSION THERAPY | Facility: HOSPITAL | Age: 66
Discharge: HOME | End: 2021-01-13
Payer: COMMERCIAL

## 2021-01-13 VITALS
TEMPERATURE: 98.2 F | SYSTOLIC BLOOD PRESSURE: 142 MMHG | BODY MASS INDEX: 46.1 KG/M2 | OXYGEN SATURATION: 90 % | WEIGHT: 270 LBS | HEART RATE: 86 BPM | RESPIRATION RATE: 20 BRPM | DIASTOLIC BLOOD PRESSURE: 105 MMHG | HEIGHT: 64 IN

## 2021-01-13 VITALS
RESPIRATION RATE: 16 BRPM | HEART RATE: 67 BPM | SYSTOLIC BLOOD PRESSURE: 136 MMHG | TEMPERATURE: 98.4 F | OXYGEN SATURATION: 91 % | DIASTOLIC BLOOD PRESSURE: 82 MMHG

## 2021-01-13 DIAGNOSIS — U07.1 COVID-19: Primary | ICD-10-CM

## 2021-01-13 DIAGNOSIS — U07.1 COVID-19: ICD-10-CM

## 2021-01-13 DIAGNOSIS — E66.813 CLASS 3 SEVERE OBESITY DUE TO EXCESS CALORIES WITH SERIOUS COMORBIDITY AND BODY MASS INDEX (BMI) OF 45.0 TO 49.9 IN ADULT (CMS/HCC): ICD-10-CM

## 2021-01-13 DIAGNOSIS — E66.01 CLASS 3 SEVERE OBESITY DUE TO EXCESS CALORIES WITH SERIOUS COMORBIDITY AND BODY MASS INDEX (BMI) OF 45.0 TO 49.9 IN ADULT (CMS/HCC): ICD-10-CM

## 2021-01-13 DIAGNOSIS — G47.33 OBSTRUCTIVE SLEEP APNEA SYNDROME: Primary | ICD-10-CM

## 2021-01-13 LAB
DELTA HIGH SENSITIVITY TROPONIN I, 1 HOUR: -0.1
TROPONIN I SERPL-MCNC: 2.7 PG/ML

## 2021-01-13 PROCEDURE — 96374 THER/PROPH/DIAG INJ IV PUSH: CPT

## 2021-01-13 PROCEDURE — M0239 BAMLANIVIMAB-XXXX INFUSION: HCPCS | Performed by: INTERNAL MEDICINE

## 2021-01-13 PROCEDURE — 2560000200 HC RX 256 W OR WO HCPCS: Mod: FB | Performed by: INTERNAL MEDICINE

## 2021-01-13 PROCEDURE — 2580000300 HC RX 258: Performed by: INTERNAL MEDICINE

## 2021-01-13 PROCEDURE — 99441 *INACTIVE DO NOT USE* PR PHYS/QHP TELEPHONE EVALUATION 5-10 MIN: CPT | Performed by: INTERNAL MEDICINE

## 2021-01-13 RX ORDER — DEXAMETHASONE 4 MG/1
6 TABLET ORAL DAILY
Qty: 6 TABLET | Refills: 0 | Status: SHIPPED | OUTPATIENT
Start: 2021-01-13 | End: 2021-01-17

## 2021-01-13 RX ADMIN — SODIUM CHLORIDE 700 MG: 9 INJECTION, SOLUTION INTRAVENOUS at 13:33

## 2021-01-13 NOTE — TELEPHONE ENCOUNTER
Caller would like to discuss (a) order Writer has advised caller of a callback from within 24 hours.    Patient: Corrine Holden    Caller Name (Last and first, relation/role): Annabel/daughter    Name of Facility: na    Callback Number: [phone]    Best Availability: anytime    Fax Number: na    Additional Info: Daughter would to see if she can get an order for oxygen.  Would like to speak to the nurse.     Did you confirm the message with the caller: Yes    Is it okay that the nurse communicates your response through [employer]? No

## 2021-01-13 NOTE — TELEPHONE ENCOUNTER
Returned call, tried nurse, got vm     Being treated for covid wondering about a zpak. Is it recommended in there therapy? Would like a call back please

## 2021-01-13 NOTE — PROGRESS NOTES
Subjective   Per discussion with Charles Donahue MD, Corrine, has verbally consented to be treated via a telephone based visit: Yes. A total of  10 minutes were required for this telephone based visit.   Patient Location: Home  Provider Location: Clinic  Technology used by Provider: Phone    HPI  Corrine Holden is a 65 y.o. female who presents for discussing oxygen requirement for COVID-19 infection.    HPI    Patient is a 65-year-old female with a past medical history of obesity, obstructive sleep apnea, who was recently diagnosed with COVID-19.  We discussed today her recent symptoms of worsening dyspnea.  Overnight she was noted to have an oxygen saturation of about 91% while laying flat.  Because of her worsening shortness of breath she presented to the emergency department last night where she was noted to have an oxygenation of about 92% and was discharged home after being treated with albuterol and Decadron.  Of note her chest x-ray was unremarkable.    Patient states that she feels better compared to yesterday.  She states that she still has some congestion and coughing however her sputum production is clear.  She denies any chest pain.  She denies any lightheadedness or dizziness.  She denies any nausea vomiting or diarrhea.  She denies any skin changes including any discoloration of the fingers or toes.  She denies any rashes and states that her fever has been improving.    The patient is currently in route to get her balance of a map and fusion.  She is wondering if she requires oxygen.    The following have been reviewed and updated as appropriate in this visit:    Allergies   Allergen Reactions   • Bee Venom Protein (Honey Bee)    • No Known Drug Allergies      Current Outpatient Medications   Medication Sig Dispense Refill   • dexAMETHasone (DECADRON) 4 mg tablet Take 1.5 tablets (6 mg total) by mouth daily for 4 days 6 tablet 0   • omeprazole (PriLOSEC) 20 mg capsule Take 1 capsule (20 mg total) by mouth  "daily 90 capsule 3   • levothyroxine (SYNTHROID, LEVOTHROID) 112 mcg tablet Take 1 tablet (112 mcg total) by mouth daily 90 tablet 3   • diazePAM (VALIUM) 5 mg tablet Take 1 tablet (5 mg total) by mouth every 12 (twelve) hours as needed for muscle spasms 30 tablet 1   • gabapentin (NEURONTIN) 100 mg capsule Take 1 capsule (100 mg total) by mouth nightly 90 capsule 0   • fluticasone propionate (FLONASE) 50 mcg/actuation nasal spray USE 2 SPRAYS IN EACH NOSTRIL DAILY 48 g 1   • diclofenac sodium (VOLTAREN) 1 % gel APPLY 2 GRAMS TO THE AFFECTED AREA(S) 4 TIMES PER DAY AS NEEDED 300 g 1   • budesonide (Pulmicort Flexhaler) 90 mcg/actuation inhaler as needed     • vit B complex no.12/niacin,B3, (VITAMIN B COMPLEX NO.12-NIACIN ORAL) Take by mouth     • meloxicam (MOBIC) 15 mg tablet Take 1 tablet (15 mg total) by mouth daily 90 tablet 3   • cholecalciferol, vitamin D3, (Vitamin D3) 1,000 unit capsule Take 5,000 Units by mouth daily     • krill-om-3-dha-epa-phospho-ast (KRILL OIL) [id number] mg capsule Take 1 capsule by mouth daily     • amoxicillin (AMOXIL) 500 mg capsule 500 mg With dental appointments for total knee arthroplasty      • cyclobenzaprine (FLEXERIL) 10 mg tablet Take 1 tablet (10 mg total) by mouth 2 (two) times a day as needed for muscle spasms. 60 tablet 0   • calcium carbonate (OS-RONNIE) 500 mg calcium tablet Take 1 tablet by mouth daily       • multivitamin (THERAGRAN) tablet tablet 1 Every Day     • albuterol HFA (VENTOLIN HFA) 90 mcg/actuation inhaler Inhale 2 puffs every 4 hours by inhalation route as needed. 3 1   • acetaminophen (TYLENOL EXTRA STRENGTH) 500 mg tablet Take 1,000 mg by mouth 2 (two) times a day         No current facility-administered medications for this visit.      Past Medical History:   Diagnosis Date   • GERD (gastroesophageal reflux disease)    • H. pylori infection 2009   • Hypothyroidism    • Injury of back     STATES, \"THIS IS REASON FOR MRI\"   • Microscopic hematuria    • " "SHANA (obstructive sleep apnea)     USES CPAP   • Osteoarthritis    • Palpitations    • Respiratory disease    • Supraventricular tachycardia (CMS/HCC) (HCC)     STATES, \"COFFEE RELATED\", VERY INTERMITTENT   • Tinnitus      Past Surgical History:   Procedure Laterality Date   • CARDIAC ELECTROPHYSIOLOGY STUDY AND ABLATION  01/01/2007    attempted ablation for SVT   • CARPAL TUNNEL RELEASE Bilateral 6/26/2020    Procedure: LEFT ENDOSCOPIC CARPAL TUNNEL RELEASE, RIGHT OPEN CARPAL TUNNEL;  Surgeon: Mendez Sun MD;  Location: [facility] OR;  Service: Orthopedics;  Laterality: Bilateral;   • COLONOSCOPY  01/21/2015    cecal polyp-tubular adenoma-recommend repeat 5 years   • COLONOSCOPY N/A 6/22/2020    repeat 5 years - adenomatous polyp   • DILATION AND CURETTAGE OF UTERUS  01/01/2006   • ENDOMETRIAL BIOPSY  01/01/2012    pt reports negative   • ESOPHAGOGASTRODUODENOSCOPY  01/01/2009    H pylori   • ESOPHAGOGASTRODUODENOSCOPY N/A 6/22/2020    Procedure: ESOPHAGOGASTRODUODENOSCOPY;  Surgeon: Julian Faulkner MD;  Location: [facility];  Service: Endoscopy;  Laterality: N/A;   • KNEE ARTHROPLASTY Right 04/01/2015    Dr Placido BRICE   • VAGINAL HYSTERECTOMY  12/16/2013    with BSO, fibroids     Family History   Problem Relation Age of Onset   • Cancer Mother 84        Bladder   • Osteoporosis Mother    • Hypertension Mother    • Colon cancer Father 65   • Kidney cancer Father 77   • Other Father's Brother         sudden cardiac death, 60s   • Heart disease Maternal Grandfather    • Heart attack Maternal Grandfather 59   • Cancer Paternal Grandfather      Social History     Occupational History   • Not on file   Tobacco Use   • Smoking status: Never Smoker   • Smokeless tobacco: Never Used   Substance and Sexual Activity   • Alcohol use: No   • Drug use: No   • Sexual activity: Not on file   Social History Narrative   • Not on file       Review of Systems    8 point review of systems performed and negative except as per " HPI    Objective   Physical Exam    Unable to perform    Assessment/Plan   Diagnoses and all orders for this visit:    Obstructive sleep apnea syndrome    Class 3 severe obesity due to excess calories with serious comorbidity and body mass index (BMI) of 45.0 to 49.9 in adult (CMS/[facility]) ([facility])    COVID-19    I discussed with the patient today management of her oxygen level.  92% oxygenation does not currently warrant oxygen supplementation therefore I do think that she still does qualify for the band maneuver Mab infusion.  However if she does continue to worsen she is very close to requiring oxygenation.  Studies have shown that an oxygen requirement of 92% is associated with improved mortality compared to a more conservative management with 88% goal oxygenation.  I feel that 92% while up and awake and at 90% while laying down would be an adequate goal for this patient.  Therefore I will be putting an order in for oxygen in order for her to be able to receive this if her oxygen level does decrease further.

## 2021-01-13 NOTE — ED PROVIDER NOTES
"CC: Shortness of Breath  HPI:  Patient is a 65 year old female presenting to the emergency department for constant moderate increasing shortness of breath since yesterday. Patient reports that she tested positive for COVID-19 yesterday. Her symptoms of shortness of breath, nausea, and fever and chills have increased since yesterday. She states that her oxygen saturations at home have only been able to get as high as 92%. There are no alleviating or exacerbating factors. She also complains of a headache, sore throat, coughing, and joint pains. She denies chest pain, abdominal pain, or urinary complaints.     Past Medical History:   Diagnosis Date   • GERD (gastroesophageal reflux disease)    • H. pylori infection 2009   • Hypothyroidism    • Injury of back     STATES, \"THIS IS REASON FOR MRI\"   • Microscopic hematuria    • SHANA (obstructive sleep apnea)     USES CPAP   • Osteoarthritis    • Palpitations    • Respiratory disease    • Supraventricular tachycardia (CMS/HCC) (HCC)     STATES, \"COFFEE RELATED\", VERY INTERMITTENT   • Tinnitus        Past Surgical History:   Procedure Laterality Date   • CARDIAC ELECTROPHYSIOLOGY STUDY AND ABLATION  01/01/2007    attempted ablation for SVT   • CARPAL TUNNEL RELEASE Bilateral 6/26/2020    Procedure: LEFT ENDOSCOPIC CARPAL TUNNEL RELEASE, RIGHT OPEN CARPAL TUNNEL;  Surgeon: Mendez Sun MD;  Location: [facility] OR;  Service: Orthopedics;  Laterality: Bilateral;   • COLONOSCOPY  01/21/2015    cecal polyp-tubular adenoma-recommend repeat 5 years   • COLONOSCOPY N/A 6/22/2020    repeat 5 years - adenomatous polyp   • DILATION AND CURETTAGE OF UTERUS  01/01/2006   • ENDOMETRIAL BIOPSY  01/01/2012    pt reports negative   • ESOPHAGOGASTRODUODENOSCOPY  01/01/2009    H pylori   • ESOPHAGOGASTRODUODENOSCOPY N/A 6/22/2020    Procedure: ESOPHAGOGASTRODUODENOSCOPY;  Surgeon: Julian Faulkner MD;  Location: [facility];  Service: Endoscopy;  Laterality: N/A;   • KNEE ARTHROPLASTY Right " 04/01/2015    Dr Placido BRICE   • VAGINAL HYSTERECTOMY  12/16/2013    with BSO, fibroids       Social History     Socioeconomic History   • Marital status: Single     Spouse name: Not on file   • Number of children: Not on file   • Years of education: Not on file   • Highest education level: Not on file   Occupational History   • Not on file   Social Needs   • Financial resource strain: Not on file   • Food insecurity     Worry: Not on file     Inability: Not on file   • Transportation needs     Medical: Not on file     Non-medical: Not on file   Tobacco Use   • Smoking status: Never Smoker   • Smokeless tobacco: Never Used   Substance and Sexual Activity   • Alcohol use: No   • Drug use: No   • Sexual activity: Not on file   Lifestyle   • Physical activity     Days per week: Not on file     Minutes per session: Not on file   • Stress: Not on file   Relationships   • Social connections     Talks on phone: Not on file     Gets together: Not on file     Attends Caodaism service: Not on file     Active member of club or organization: Not on file     Attends meetings of clubs or organizations: Not on file     Relationship status: Not on file   • Intimate partner violence     Fear of current or ex partner: Not on file     Emotionally abused: Not on file     Physically abused: Not on file     Forced sexual activity: Not on file   Other Topics Concern   • Not on file   Social History Narrative   • Not on file       Family History   Problem Relation Age of Onset   • Cancer Mother 84        Bladder   • Osteoporosis Mother    • Hypertension Mother    • Colon cancer Father 65   • Kidney cancer Father 77   • Other Father's Brother         sudden cardiac death, 60s   • Heart disease Maternal Grandfather    • Heart attack Maternal Grandfather 59   • Cancer Paternal Grandfather        Allergies   Allergen Reactions   • Bee Venom Protein (Honey Bee)    • No Known Drug Allergies          Current Outpatient Medications:   •   dexAMETHasone (DECADRON) 4 mg tablet, Take 1.5 tablets (6 mg total) by mouth daily for 4 days, Disp: 6 tablet, Rfl: 0  •  omeprazole (PriLOSEC) 20 mg capsule, Take 1 capsule (20 mg total) by mouth daily, Disp: 90 capsule, Rfl: 3  •  levothyroxine (SYNTHROID, LEVOTHROID) 112 mcg tablet, Take 1 tablet (112 mcg total) by mouth daily, Disp: 90 tablet, Rfl: 3  •  diazePAM (VALIUM) 5 mg tablet, Take 1 tablet (5 mg total) by mouth every 12 (twelve) hours as needed for muscle spasms, Disp: 30 tablet, Rfl: 1  •  gabapentin (NEURONTIN) 100 mg capsule, Take 1 capsule (100 mg total) by mouth nightly, Disp: 90 capsule, Rfl: 0  •  fluticasone propionate (FLONASE) 50 mcg/actuation nasal spray, USE 2 SPRAYS IN EACH NOSTRIL DAILY, Disp: 48 g, Rfl: 1  •  diclofenac sodium (VOLTAREN) 1 % gel, APPLY 2 GRAMS TO THE AFFECTED AREA(S) 4 TIMES PER DAY AS NEEDED, Disp: 300 g, Rfl: 1  •  budesonide (Pulmicort Flexhaler) 90 mcg/actuation inhaler, as needed, Disp: , Rfl:   •  vit B complex no.12/niacin,B3, (VITAMIN B COMPLEX NO.12-NIACIN ORAL), Take by mouth, Disp: , Rfl:   •  meloxicam (MOBIC) 15 mg tablet, Take 1 tablet (15 mg total) by mouth daily, Disp: 90 tablet, Rfl: 3  •  cholecalciferol, vitamin D3, (Vitamin D3) 1,000 unit capsule, Take 5,000 Units by mouth daily, Disp: , Rfl:   •  krill-om-3-dha-epa-phospho-ast (KRILL OIL) 201-479-60-75 mg capsule, Take 1 capsule by mouth daily, Disp: , Rfl:   •  amoxicillin (AMOXIL) 500 mg capsule, 500 mg With dental appointments for total knee arthroplasty , Disp: , Rfl:   •  cyclobenzaprine (FLEXERIL) 10 mg tablet, Take 1 tablet (10 mg total) by mouth 2 (two) times a day as needed for muscle spasms., Disp: 60 tablet, Rfl: 0  •  calcium carbonate (OS-RONNIE) 500 mg calcium tablet, Take 1 tablet by mouth daily  , Disp: , Rfl:   •  multivitamin (THERAGRAN) tablet tablet, 1 Every Day, Disp: , Rfl:   •  albuterol HFA (VENTOLIN HFA) 90 mcg/actuation inhaler, Inhale 2 puffs every 4 hours by inhalation route  as needed., Disp: 3, Rfl: 1  •  acetaminophen (TYLENOL EXTRA STRENGTH) 500 mg tablet, Take 1,000 mg by mouth 2 (two) times a day  , Disp: , Rfl:       ROS:    Constitutional: positive for fever and chills   Eyes: negative for blurred vision  ENT: positive for sore throat  Cardiovascular: negative for chest pain  Respiratory: positive for shortness of breath  GI: negative for abdominal pain, negative for vomiting, positive for nausea   : negative for burning with urination  Musculoskeletal: positive for new joint pain  Neuro: positive for headache  Rheumatologic: positive for new joint pain        ED Triage Vitals   Temp Heart Rate Resp BP SpO2   01/12/21 1853 01/12/21 1853 01/12/21 1853 01/12/21 1853 01/12/21 1853   37.1 °C (98.8 °F) 87 18 (!) 154/115 92 %      Temp src Heart Rate Source Patient Position BP Location FiO2 (%)   -- -- 01/12/21 2050 -- --     Left lateral           Physical Exam:  Nursing note and vitals reviewed.  Constitutional: No acute distress. Very elevated BMI.   HENT: Conjunctiva is normal and non-injected. Mucous membranes moist.   Eyes: Pupils are equal, round, and reactive to light.   Neck: Trachea midline.   Cardiovascular: Well perfused, no cyanosis.   Pulmonary/Chest: No respiratory distress, equal chest rise, full sentences between breaths.   Abdominal: Non-distended.  Musculoskeletal: No edema.   Neurological: Alert. Normal mentation.   Skin: Exposed areas of skin without rash or lesion.  Psychiatric: Normal affect.    Labs Reviewed   COMPREHENSIVE METABOLIC PANEL - Abnormal       Result Value    Sodium 139      Potassium 3.7      Chloride 102      CO2 26      Anion Gap 11      BUN 15      Creatinine 0.78      Glucose 134 (*)     Calcium 10.3      AST 19      ALT (SGPT) 21      Alkaline Phosphatase 67      Total Protein 7.0      Albumin 4.6      Total Bilirubin 0.40      eGFR 80      Corrected Calcium 9.8      Narrative:     Estimated GFR calculated using the 2009 CKD-EPI creatinine  equation.   MAGNESIUM - Normal    Magnesium 2.1     HIGH SENSITIVE TROPONIN I - Normal    hsTnI 0 Hour 2.8     HIGH SENSITIVE TROPONIN I, 1 HOUR - Normal    hsTnI 1 hr 2.7      Delta from 0 Hour -0.1     CBC WITH AUTO DIFFERENTIAL    WBC 6.9      RBC 4.42      Hemoglobin 14.6      Hematocrit 40.5      MCV 91.6      MCH 33.0      MCHC 36.0      RDW 13.6      Platelets 217      MPV 6.9      Neutrophils% 76      Lymphocytes% 12      Monocytes% 11      Eosinophils% 1      Basophils% 1      Neutrophils Absolute 5.20      Lymphocytes Absolute 0.80      Monocytes Absolute 0.70      Eosinophils Absolute 0.00      Basophils Absolute 0.00     HIGH SENSITIVE TROPONIN I PANEL (0HR, 1HR, 2HR)    Narrative:     The following orders were created for panel order HS Troponin I Panel (0HR, 1HR, 2HR) Blood, Venous.  Procedure                               Abnormality         Status                     ---------                               -----------         ------                     HS Troponin I[[id number]]                 Normal              Final result               1HR High Sensitive Trop I[[id number]]     Normal              Final result               2HR High Sensitive Tropon...[[id number]]                                                   Please view results for these tests on the individual orders.       XR chest portable 1 view   Final Result   IMPRESSION:   No acute disease.            Procedures:  ECG 12 lead - Shortness of breath    Date/Time: 1/12/2021 10:23 PM  Performed by: Jesus Manuel Casillas MD  Authorized by: Jesus Manuel Casillas MD     ECG reviewed by ED Physician in the absence of a cardiologist: yes    Previous ECG:     Previous ECG:  Compared to current    Comparison ECG info:  New ST depressions   Rate:     ECG rate:  86    ECG rate assessment: normal    Rhythm:     Rhythm: sinus rhythm    Ectopy:     Ectopy: none    QRS:     QRS axis:  Normal    QRS intervals:  Normal  Conduction:     Conduction: normal    ST segments:     ST  segments:  Abnormal    Depression:  V3, V4, V5 and V6  T waves:     T waves: normal    Other findings:     Other findings comment:  Impression: Sinus rhyhtm with new ST depressions.           MDM:    Patient is a 65 year old female presenting to the emergency department for increasing shortness of breath and other COVID-19 symptoms. Patient arrived with saturations in the 90's on room air. Patient was treated with Albuterol and Decadron. Chest Xray was unremarkable and without evidence of bacterial pneumonia. EKG was obtained and was significant for ST depressions in V3-V6, therefore a troponin was obtained and was negative. CBC, CMP, and magnesium were unremarkable. I offered the patient hospitalization for continued oxygen therapy as her oxygen varies between 88% while resting and 92% while walking. She states that she would rather go home at this time and keep a close on her oxygen saturations and return oxygen saturations remain under 88%. She was taught how to use an albuterol inhaler. Patient was discharged home in stable condition with instruction to return for new or worsening symptoms. Patient with stable vitals at discharge.     Corrine Holden was evaluated in the Emergency Department on 1/13/2021 for the symptoms described in the history of present illness. She was evaluated in the context of the global COVID-19 pandemic. Institutional protocols and algorithms that pertain to the evaluation of patients at risk for COVID-19 are in a state of rapid change based on information released by regulatory bodies including the CDC and federal and state regulations. These policies and algorithms were followed during the patient's care in the ED.                Clinical Impression:  Final diagnoses:   [U07.1] COVID-19   [R06.02] Shortness of breath   [M79.10] Myalgia     By signing my name, I, Aravind Ward, attest that this documentation has been prepared under the direction and in the presence of Dr. Casillas,  1/12/2021, 10:03 PM.    I, Dr. Casillas personally performed the services described in this document as written by the scribe in my presence.  It has been reviewed and is both accurate and complete.    A voice recognition program was used to aid in the documentation of this record. Sometimes words are not printed exactly as they were spoken. While efforts were made to carefully edit and correct any inaccuracies, some errors may be present; please take these into context. Please contact the provider if errors are identified.      Jesus Manuel Casillas MD  01/13/21 0101

## 2021-01-13 NOTE — TELEPHONE ENCOUNTER
Rick says that Dr. Joy is aware of her mom having COVID.  She asks that I get a message to Dr. Joy requesting home oxygen. Corrine has had O2 sats around 91% when she is awake and on her CPAP. Please advise.  Annabel's number is [phone].

## 2021-01-14 NOTE — TELEPHONE ENCOUNTER
Spoke with dr lucio regarding the use of a zpak. Per dr lucio pt was informed that it is not warranted at this time, as she received IV decadron in the ED last night.  If pt starts to require oxygen or develops pneumonia an antibiotic may be considered at that time.    Pt informed and all questions answered.

## 2021-01-14 NOTE — TELEPHONE ENCOUNTER
I spoke with the patient who said she is not worse but is still fatigued and she put up her oxygen to 2L, Shortness of breath increases with ambulation dropping into the 80's and goes up to 91 once ambulation stops on room air.

## 2021-01-18 ENCOUNTER — TELEPHONE (OUTPATIENT)
Dept: INTERNAL MEDICINE | Facility: CLINIC | Age: 66
End: 2021-01-18

## 2021-01-18 NOTE — TELEPHONE ENCOUNTER
I spoke with the patient who reported having some dizziness this morning when she woke up but it is better after eating. pateint reports O2 in high 80's with exertion, but O2 is in low 90's at rest. The patient is asking if she should continue with her  2L O2 at night? patient also mentioned she started a aspirin daily

## 2021-01-22 NOTE — TELEPHONE ENCOUNTER
Dr. Joy gave a verbal for the patient to continue and that if the patient felt ok she could try 1L at night

## 2021-02-04 ENCOUNTER — OFFICE VISIT (OUTPATIENT)
Dept: INTERNAL MEDICINE | Facility: CLINIC | Age: 66
End: 2021-02-04
Payer: COMMERCIAL

## 2021-02-04 VITALS
DIASTOLIC BLOOD PRESSURE: 70 MMHG | SYSTOLIC BLOOD PRESSURE: 130 MMHG | HEART RATE: 69 BPM | RESPIRATION RATE: 18 BRPM | OXYGEN SATURATION: 94 % | TEMPERATURE: 98.4 F

## 2021-02-04 DIAGNOSIS — Z13.220 SCREENING FOR LIPID DISORDERS: ICD-10-CM

## 2021-02-04 DIAGNOSIS — R31.29 MICROSCOPIC HEMATURIA: ICD-10-CM

## 2021-02-04 DIAGNOSIS — M15.9 OSTEOARTHRITIS OF MULTIPLE JOINTS, UNSPECIFIED OSTEOARTHRITIS TYPE: ICD-10-CM

## 2021-02-04 DIAGNOSIS — E83.51 HYPOCALCEMIA: ICD-10-CM

## 2021-02-04 DIAGNOSIS — Z00.00 ENCOUNTER FOR PREVENTATIVE ADULT HEALTH CARE EXAMINATION: Primary | ICD-10-CM

## 2021-02-04 DIAGNOSIS — E03.9 HYPOTHYROIDISM, UNSPECIFIED TYPE: ICD-10-CM

## 2021-02-04 DIAGNOSIS — R73.01 IMPAIRED FASTING GLUCOSE: ICD-10-CM

## 2021-02-04 PROCEDURE — 99396 PREV VISIT EST AGE 40-64: CPT | Performed by: INTERNAL MEDICINE

## 2021-02-04 RX ORDER — MELOXICAM 15 MG/1
7.5 TABLET ORAL DAILY
Qty: 45 TABLET | Refills: 3 | Status: SHIPPED | OUTPATIENT
Start: 2021-02-04 | End: 2021-04-22 | Stop reason: SDUPTHER

## 2021-02-04 ASSESSMENT — ENCOUNTER SYMPTOMS
FLANK PAIN: 0
SORE THROAT: 0
EYES NEGATIVE: 1
PALPITATIONS: 0
FATIGUE: 0
NERVOUS/ANXIOUS: 0
NECK PAIN: 0
POLYDIPSIA: 0
SHORTNESS OF BREATH: 0
HEADACHES: 0
SEIZURES: 0
NECK STIFFNESS: 0
ENDOCRINE NEGATIVE: 1
WHEEZING: 0
ADENOPATHY: 0
SINUS PRESSURE: 0
NEUROLOGICAL NEGATIVE: 1
VOMITING: 0
ACTIVITY CHANGE: 0
MYALGIAS: 0
POLYPHAGIA: 0
TREMORS: 0
PHOTOPHOBIA: 0
CARDIOVASCULAR NEGATIVE: 1
CHEST TIGHTNESS: 0
DYSURIA: 0
WEAKNESS: 0
HEMATOLOGIC/LYMPHATIC NEGATIVE: 1
CHILLS: 0
SPEECH DIFFICULTY: 0
RHINORRHEA: 0
VOICE CHANGE: 0
CONSTITUTIONAL NEGATIVE: 1
LIGHT-HEADEDNESS: 0
APPETITE CHANGE: 0
UNEXPECTED WEIGHT CHANGE: 0
BRUISES/BLEEDS EASILY: 0
NAUSEA: 0
COUGH: 0
JOINT SWELLING: 1
DYSPHORIC MOOD: 0
ABDOMINAL PAIN: 0
BLOOD IN STOOL: 0
BACK PAIN: 0
APNEA: 1
DIAPHORESIS: 0
DIFFICULTY URINATING: 0
PSYCHIATRIC NEGATIVE: 1
DIARRHEA: 0
FREQUENCY: 0
NUMBNESS: 0
ROS GI COMMENTS: GERD
DIZZINESS: 0
TROUBLE SWALLOWING: 0
SLEEP DISTURBANCE: 0
CONSTIPATION: 0
HEMATURIA: 0
FEVER: 0
ARTHRALGIAS: 1
ABDOMINAL DISTENTION: 0

## 2021-02-04 ASSESSMENT — PAIN SCALES - GENERAL: PAINLEVEL: 0-NO PAIN

## 2021-02-04 NOTE — PROGRESS NOTES
"Subjective      Corrine Holden is a 65 y.o. female who presents for No chief complaint on file.  .    HPI    Corrine is here for a physical.    She recently had COVID-19.  She was starting to feel worse and was having some borderline oxygen readings so we did get her set up for monoclonal antibody infusion.  We did eventually also get her overnight oxygen.  She feels better day by day now.  Occasionally is using her inhaler if she feels a little tight in her chest.  She had been exercising 20 minutes on a stationary bike before she got sick and has not yet restarted that.  She is still taking an aspirin that she plans to take until the end of February for clot prevention.  She tried removing her overnight oxygen the last few nights and has checked her oxygen in the middle night and says she has been satting 90-93 now.    Heartburn is stable.  She takes Prilosec daily.  She had an EGD and colonoscopy earlier this year.  EGD showed some antral gastritis.  It was negative for H. pylori.  She has not had any abdominal pain.  She does take meloxicam 15 mg daily for her feet, low back, knees.    Past Medical History:   Diagnosis Date   • GERD (gastroesophageal reflux disease)    • H. pylori infection 2009   • Hypothyroidism    • Injury of back     STATES, \"THIS IS REASON FOR MRI\"   • Microscopic hematuria    • SHANA (obstructive sleep apnea)     USES CPAP   • Osteoarthritis    • Palpitations    • Respiratory disease    • Supraventricular tachycardia (CMS/HCC) (HCC)     STATES, \"COFFEE RELATED\", VERY INTERMITTENT   • Tinnitus        Current Outpatient Medications   Medication Sig Dispense Refill   • meloxicam (MOBIC) 15 mg tablet Take 0.5 tablets (7.5 mg total) by mouth daily 45 tablet 3   • omeprazole (PriLOSEC) 20 mg capsule Take 1 capsule (20 mg total) by mouth daily 90 capsule 3   • levothyroxine (SYNTHROID, LEVOTHROID) 112 mcg tablet Take 1 tablet (112 mcg total) by mouth daily 90 tablet 3   • diazePAM (VALIUM) 5 mg " tablet Take 1 tablet (5 mg total) by mouth every 12 (twelve) hours as needed for muscle spasms 30 tablet 1   • fluticasone propionate (FLONASE) 50 mcg/actuation nasal spray USE 2 SPRAYS IN EACH NOSTRIL DAILY 48 g 1   • diclofenac sodium (VOLTAREN) 1 % gel APPLY 2 GRAMS TO THE AFFECTED AREA(S) 4 TIMES PER DAY AS NEEDED 300 g 1   • budesonide (Pulmicort Flexhaler) 90 mcg/actuation inhaler as needed     • vit B complex no.12/niacin,B3, (VITAMIN B COMPLEX NO.12-NIACIN ORAL) Take by mouth     • cholecalciferol, vitamin D3, (Vitamin D3) 1,000 unit capsule Take 5,000 Units by mouth daily     • krill-om-3-dha-epa-phospho-ast (KRILL OIL) [id number] mg capsule Take 1 capsule by mouth daily     • amoxicillin (AMOXIL) 500 mg capsule 500 mg With dental appointments for total knee arthroplasty      • cyclobenzaprine (FLEXERIL) 10 mg tablet Take 1 tablet (10 mg total) by mouth 2 (two) times a day as needed for muscle spasms. 60 tablet 0   • calcium carbonate (OS-RONNIE) 500 mg calcium tablet Take 1 tablet by mouth daily       • multivitamin (THERAGRAN) tablet tablet 1 Every Day     • albuterol HFA (VENTOLIN HFA) 90 mcg/actuation inhaler Inhale 2 puffs every 4 hours by inhalation route as needed. 3 1   • acetaminophen (TYLENOL EXTRA STRENGTH) 500 mg tablet Take 1,000 mg by mouth 2 (two) times a day       • gabapentin (NEURONTIN) 100 mg capsule Take 1 capsule (100 mg total) by mouth nightly (Patient not taking: Reported on 2/4/2021 ) 90 capsule 0     No current facility-administered medications for this visit.       Allergies   Allergen Reactions   • Bee Venom Protein (Honey Bee)    • No Known Drug Allergies        Past Surgical History:   Procedure Laterality Date   • CARDIAC ELECTROPHYSIOLOGY STUDY AND ABLATION  01/01/2007    attempted ablation for SVT   • CARPAL TUNNEL RELEASE Bilateral 6/26/2020    Procedure: LEFT ENDOSCOPIC CARPAL TUNNEL RELEASE, RIGHT OPEN CARPAL TUNNEL;  Surgeon: Mendez Sun MD;  Location: [facility] OR;   Service: Orthopedics;  Laterality: Bilateral;   • COLONOSCOPY  01/21/2015    cecal polyp-tubular adenoma-recommend repeat 5 years   • COLONOSCOPY N/A 6/22/2020    repeat 5 years - adenomatous polyp   • DILATION AND CURETTAGE OF UTERUS  01/01/2006   • ENDOMETRIAL BIOPSY  01/01/2012    pt reports negative   • ESOPHAGOGASTRODUODENOSCOPY  01/01/2009    H pylori   • ESOPHAGOGASTRODUODENOSCOPY N/A 6/22/2020    Procedure: ESOPHAGOGASTRODUODENOSCOPY;  Surgeon: Julian Faulkner MD;  Location: [facility];  Service: Endoscopy;  Laterality: N/A;   • KNEE ARTHROPLASTY Right 04/01/2015    Dr Placido BRICE   • VAGINAL HYSTERECTOMY  12/16/2013    with BSO, fibroids     Family History   Problem Relation Age of Onset   • Cancer Mother 84        Bladder   • Osteoporosis Mother    • Hypertension Mother    • Colon cancer Father 65   • Kidney cancer Father 77   • Other Father's Brother         sudden cardiac death, 60s   • Heart disease Maternal Grandfather    • Heart attack Maternal Grandfather 59   • Cancer Paternal Grandfather      Social History     Tobacco Use   • Smoking status: Never Smoker   • Smokeless tobacco: Never Used   Substance Use Topics   • Alcohol use: No       Review of Systems   Constitutional: Negative.  Negative for activity change, appetite change, chills, diaphoresis, fatigue, fever and unexpected weight change.   HENT: Negative for congestion, ear pain, hearing loss, mouth sores, nosebleeds, postnasal drip, rhinorrhea, sinus pressure, sneezing, sore throat, tinnitus, trouble swallowing and voice change.    Eyes: Negative.  Negative for photophobia and visual disturbance.   Respiratory: Positive for apnea. Negative for cough, chest tightness, shortness of breath and wheezing.    Cardiovascular: Negative.  Negative for chest pain, palpitations and leg swelling.   Gastrointestinal: Negative for abdominal distention, abdominal pain, blood in stool, constipation, diarrhea, nausea and vomiting.        GERD    Endocrine: Negative.  Negative for cold intolerance, heat intolerance, polydipsia, polyphagia and polyuria.   Genitourinary: Negative.  Negative for difficulty urinating, dysuria, flank pain, frequency, hematuria and urgency.   Musculoskeletal: Positive for arthralgias and joint swelling. Negative for back pain, gait problem, myalgias, neck pain and neck stiffness.   Skin: Negative.  Negative for rash.   Neurological: Negative.  Negative for dizziness, tremors, seizures, syncope, speech difficulty, weakness, light-headedness, numbness and headaches.   Hematological: Negative.  Negative for adenopathy. Does not bruise/bleed easily.   Psychiatric/Behavioral: Negative.  Negative for dysphoric mood and sleep disturbance. The patient is not nervous/anxious.          Objective     Vitals  /70   Pulse 69   Temp 36.9 °C (98.4 °F)   Resp 18   SpO2 94%     Physical Exam   GEN: pleasant, NAD  HEENT: PERRLA, TMs clear, sclera anicteric  NECK: no LAD or thyromegaly, no carotid bruits  LUNGS: CTAB, no wheezing rhonchi or rales  HEART: RRR no murmurs rubs or gallops  Breast: Symmetric, no masses, no nipple discharge, no axillar lymphadenopathy  ABD: Soft, non tender, non distended, normal bowel sounds , no hepatosplenomegaly  : Deferred  Rectal: Deferred  Ext: Trace ankle edema  SKIN: no rash  Neuro:  CN 2-12 grossly intact, non focal, moving all extremities        Assessment/Plan   Diagnoses and all orders for this visit:    Encounter for preventative adult health care examination    Hypothyroidism, unspecified type    Impaired fasting glucose  -     CBC w/auto differential Blood, Venous; Future  -     Comprehensive metabolic panel Blood, Venous; Future  -     Lipid panel Blood, Venous; Future  -     Vitamin B12 Blood, Venous; Future  -     Thyroid Stimulating Hormone, Ultrasensitive Blood, Venous; Future  -     Hemoglobin A1c (glycosylated) Blood, Venous; Future    Screening for lipid disorders    Hypocalcemia  -      25-Hydroxyvitamin D2 and D3, serum Blood, Venous; Future    Microscopic hematuria  -     Urinalysis w/microscopic Urine, Clean Catch; Future  -     Urinalysis, dipstick Urine, Clean Catch; Future    Osteoarthritis of multiple joints, unspecified osteoarthritis type  -     meloxicam (MOBIC) 15 mg tablet; Take 0.5 tablets (7.5 mg total) by mouth daily    1.  Adult health examination.  Mammogram was done June 2020.  No longer needs Paps as she has had hysterectomy.  Reports no history of abnormals.  Colonoscopy was June 2020 and will need a 5-year follow-up for adenomas.  Flu shot was October 2020.  Shingrix 2018x2.  Tdap 2015.  She remembers having measles and reports that she has had positive titers when they were drawn for employment.  She had a Pneumovax January 2020.  Will turn 66 later this month.  I told her she can schedule a Prevnar with me anytime after that or she can see if her local pharmacy will give it to her.  She is not sure she wants the COVID-19 vaccine.  I told her because she received the IV monoclonal antibodies she is not a candidate for 3 months anyway so she wants to learn more about the vaccine over the next few months, she has some time to decide.  She declines HIV and hepatitis C screening.  Bone density was normal in January 2020.  2.  Gastritis.  Because of the antral gastritis seen on her EGD, I would recommend a trial down to 7.5 mg daily of the meloxicam.  However it is very helpful for her so she may not be able to do this.  She will let us know if she prefers the higher dose of meloxicam.  She needs to stay on the Prilosec and watch for any abdominal pain.  2.  Hypothyroidism, impaired fasting glucose, hypocalcemia, microscope topic hematuria.  Will check fasting labs this week.    CM MON MD

## 2021-02-09 ENCOUNTER — TELEPHONE (OUTPATIENT)
Dept: INTERNAL MEDICINE | Facility: CLINIC | Age: 66
End: 2021-02-09

## 2021-02-09 NOTE — TELEPHONE ENCOUNTER
Caller would like to discuss (a) return call Writer has advised caller of a callback from within 24 hours.    Patient: Corrine Holden    Caller Name (Last and first, relation/role): self    Name of Facility: n/a    Callback Number: [phone]    Best Availability: anytime: anytime    Fax Number: n/a    Additional Info: Patient advises her handicap sticker expires 3/2021 and she is needing the paper to get it renewed. Patient would like the paper to be sent to her mailing address, (verified it is correct)    Did you confirm the message with the caller: Yes    Is it okay that the nurse communicates your response through [employer]? No

## 2021-02-19 ENCOUNTER — APPOINTMENT (OUTPATIENT)
Dept: LAB | Facility: CLINIC | Age: 66
End: 2021-02-19
Payer: COMMERCIAL

## 2021-02-19 DIAGNOSIS — E03.9 HYPOTHYROIDISM, UNSPECIFIED TYPE: ICD-10-CM

## 2021-02-19 DIAGNOSIS — R31.29 MICROSCOPIC HEMATURIA: ICD-10-CM

## 2021-02-19 DIAGNOSIS — K21.9 GASTROESOPHAGEAL REFLUX DISEASE: ICD-10-CM

## 2021-02-19 DIAGNOSIS — R73.01 IMPAIRED FASTING GLUCOSE: ICD-10-CM

## 2021-02-19 DIAGNOSIS — Z13.220 SCREENING FOR LIPID DISORDERS: ICD-10-CM

## 2021-02-19 DIAGNOSIS — E83.51 HYPOCALCEMIA: ICD-10-CM

## 2021-02-19 LAB
ALBUMIN SERPL-MCNC: 4.6 G/DL (ref 3.5–5.3)
ALP SERPL-CCNC: 58 U/L (ref 50–130)
ALT SERPL-CCNC: 22 U/L (ref 7–52)
ANION GAP SERPL CALC-SCNC: 9 MMOL/L (ref 3–11)
AST SERPL-CCNC: 19 U/L
BACTERIA #/AREA URNS HPF: NORMAL /HPF
BASOPHILS # BLD AUTO: 0.1 10*3/UL
BASOPHILS NFR BLD AUTO: 1 % (ref 0–2)
BILIRUB SERPL-MCNC: 0.49 MG/DL (ref 0.2–1.4)
BILIRUB UR QL: NEGATIVE
BUN SERPL-MCNC: 16 MG/DL (ref 7–25)
CALCIUM ALBUM COR SERPL-MCNC: 9.2 MG/DL (ref 8.6–10.3)
CALCIUM SERPL-MCNC: 9.7 MG/DL (ref 8.6–10.3)
CHLORIDE SERPL-SCNC: 103 MMOL/L (ref 98–107)
CHOLEST SERPL-MCNC: 215 MG/DL (ref 0–199)
CLARITY UR: CLEAR
CO2 SERPL-SCNC: 30 MMOL/L (ref 21–32)
COLOR UR: YELLOW
CREAT SERPL-MCNC: 0.77 MG/DL (ref 0.6–1.1)
EOSINOPHIL # BLD AUTO: 0.3 10*3/UL
EOSINOPHIL NFR BLD AUTO: 4 % (ref 0–3)
ERYTHROCYTE [DISTWIDTH] IN BLOOD BY AUTOMATED COUNT: 14.2 % (ref 11.5–14)
EST. AVERAGE GLUCOSE BLD GHB EST-MCNC: 128.4 MG/DL
FASTING STATUS PATIENT QL REPORTED: YES
GFR SERPL CREATININE-BSD FRML MDRD: 81 ML/MIN/1.73M*2
GLUCOSE SERPL-MCNC: 127 MG/DL (ref 70–105)
GLUCOSE UR QL: NEGATIVE MG/DL
HBA1C MFR BLD: 6.1 % (ref 4–6)
HCT VFR BLD AUTO: 43.1 % (ref 34–45)
HDLC SERPL-MCNC: 54 MG/DL
HGB BLD-MCNC: 14.7 G/DL (ref 11.5–15.5)
HGB UR QL: NEGATIVE
KETONES UR-MCNC: NEGATIVE MG/DL
LDLC SERPL CALC-MCNC: 129 MG/DL (ref 20–99)
LEUKOCYTE ESTERASE UR QL STRIP: NEGATIVE
LYMPHOCYTES # BLD AUTO: 1.7 10*3/UL
LYMPHOCYTES NFR BLD AUTO: 25 % (ref 11–47)
MCH RBC QN AUTO: 32.3 PG (ref 28–33)
MCHC RBC AUTO-ENTMCNC: 34.1 G/DL (ref 32–36)
MCV RBC AUTO: 94.7 FL (ref 81–97)
MONOCYTES # BLD AUTO: 0.6 10*3/UL
MONOCYTES NFR BLD AUTO: 9 % (ref 3–11)
NEUTROPHILS # BLD AUTO: 4.2 10*3/UL
NEUTROPHILS NFR BLD AUTO: 61 % (ref 41–81)
NITRITE UR QL: NEGATIVE
PH UR: 5.5 PH
PLATELET # BLD AUTO: 230 10*3/UL (ref 140–350)
PMV BLD AUTO: 7.4 FL (ref 6.9–10.8)
POTASSIUM SERPL-SCNC: 4.4 MMOL/L (ref 3.5–5.1)
PROT SERPL-MCNC: 6.9 G/DL (ref 6–8.3)
PROT UR STRIP-MCNC: NEGATIVE MG/DL
RBC # BLD AUTO: 4.55 10*6/ΜL (ref 3.7–5.3)
RBC #/AREA URNS HPF: NORMAL /HPF
SODIUM SERPL-SCNC: 142 MMOL/L (ref 135–145)
SP GR UR: 1.02 (ref 1–1.03)
SQUAMOUS #/AREA URNS HPF: NORMAL /HPF
TRIGL SERPL-MCNC: 161 MG/DL
TSH SERPL DL<=0.05 MIU/L-ACNC: 2.8 UIU/ML (ref 0.34–4.82)
UROBILINOGEN UR-MCNC: 0.2 E.U./DL
VIT B12 SERPL-MCNC: >1500 PG/ML (ref 180–914)
WBC # BLD AUTO: 6.9 10*3/UL (ref 4.5–10.5)
WBC #/AREA URNS HPF: NORMAL /HPF

## 2021-02-19 PROCEDURE — 81001 URINALYSIS AUTO W/SCOPE: CPT | Performed by: INTERNAL MEDICINE

## 2021-02-19 PROCEDURE — 84443 ASSAY THYROID STIM HORMONE: CPT | Performed by: INTERNAL MEDICINE

## 2021-02-19 PROCEDURE — 82607 VITAMIN B-12: CPT | Performed by: INTERNAL MEDICINE

## 2021-02-19 PROCEDURE — 36415 COLL VENOUS BLD VENIPUNCTURE: CPT | Performed by: INTERNAL MEDICINE

## 2021-02-19 PROCEDURE — 82306 VITAMIN D 25 HYDROXY: CPT | Performed by: INTERNAL MEDICINE

## 2021-02-19 PROCEDURE — 83036 HEMOGLOBIN GLYCOSYLATED A1C: CPT | Performed by: INTERNAL MEDICINE

## 2021-02-19 PROCEDURE — 85025 COMPLETE CBC W/AUTO DIFF WBC: CPT | Performed by: INTERNAL MEDICINE

## 2021-02-19 PROCEDURE — 80061 LIPID PANEL: CPT | Performed by: INTERNAL MEDICINE

## 2021-02-19 PROCEDURE — 80053 COMPREHEN METABOLIC PANEL: CPT | Performed by: INTERNAL MEDICINE

## 2021-02-23 LAB
25(OH)D2 SERPL-MCNC: <4 NG/ML
25(OH)D3 SERPL-MCNC: 71 NG/ML
25(OH)D3+25(OH)D2 SERPL-MCNC: 71 NG/ML

## 2021-02-25 ASSESSMENT — ENCOUNTER SYMPTOMS
COUGH: 0
SLEEP DISTURBANCE: 1
APNEA: 1
FATIGUE: 0
SINUS PAIN: 0
SORE THROAT: 0
NECK PAIN: 0
ARTHRALGIAS: 0
AGITATION: 0
NAUSEA: 0
BACK PAIN: 1
EYE ITCHING: 0
SHORTNESS OF BREATH: 0
EYE REDNESS: 0
PALPITATIONS: 0
WEAKNESS: 0
HEADACHES: 0
SINUS PRESSURE: 0
VOMITING: 0
CHILLS: 0
ROS GI COMMENTS: GERD
EYE PAIN: 0
FEVER: 0

## 2021-02-25 NOTE — PROGRESS NOTES
"7Sleep Progress Note    02/26/21  9:30 AM    Chief Complaint   Patient presents with   • Sleep Apnea       Subjective   Per discussion with TOMAS Phan, Corrine, has verbally consented to be treated via a telemedicine based visit: Yes. A total of 25 minutes were required for this telemedicine based visit. Greater than 50% of this time was spent in counseling and/or coordinating care in regards to SHANA and CPAP.  Patient Location: Home  Provider Location: Clinic  Technology used by Provider: Phone    Due to technical difficulties this video visit was converted to a telephone based visit.   A total of 25 minutes were required for this telephone based visit.      Gets supplies from [employer].       HPI  Sleep study date: 8/15/2014  Severity: Moderate   AHI: 13.4  Minimum O2 saturation: 82%  Machine type: CPAP   Mask: Nasal    Corrine Holden is a 66 y.o. female who presents over the phone today in follow-up for obstructive sleep apnea and annual CPAP follow-up. It is noted that the patient has a long-standing history of SHANA dating back to 2014. At that time she underwent sleep study secondary to complaints of excessive daytime fatigability and somnolence. She also had complaints of significant morning headaches which were debilitating for her and she found it hard to work. Sleep study was completed on 8/15/2014 and demonstrated moderate obstructive sleep apnea with 13.4 apneas and hypopneas per hour. Minimum oxygen saturation was noted to be 82%. Currently the patient is on CPAP with a set pressure of 8.6 cmH2O.     She does have a past medical history positive for GERD, hypothyroidism, history of supraventricular tachycardia, osteoarthritis, and obesity.     At today’s appointment the patient reports that she tolerates CPAP and pressure well. She denies any complaints or concerns in regards to CPAP. Patient continues to report that CPAP is a \"life changer\". She sleeps well throughout the " "night and feels that she is getting restful sleep. She will awaken a few times a night to utilize the bathroom, but denies trouble getting back to sleep. She feels well rested upon awakening and denies daytime fatigue. She denies the need to nap. She no longer experiences headaches, which she reports is a huge benefit of CPAP. Patient is currently utilizing a nasal mask without the use of a chin strap. She was previously using nasal pillows but has now switched to the nasal mask. She denies any concerns or complaints in regards to this and reports that current mask fit is very comfortable for her. Unfortunately, updated compliance report is unavailable for review at this time as patient's machine does not have a data card and is not wireless. However, she reports that she is utilizing her machine on a consistent nightly basis and she feels that things are going well in regards to PAP therapy. Overall, from a sleep standpoint the patient is noted to be doing very well. As noted, patient's machine is a very old machine that she had gotten from a friend. She is interested in obtaining a new machine at this time. Prescription for a new machine will be sent today. Patient is pleasant to visit with. 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 She did have COVID-19 in January of 2021, but has recovered well.       Histories:  Past Medical History:   Diagnosis Date   • GERD (gastroesophageal reflux disease)    • H. pylori infection 2009   • Hypothyroidism    • Injury of back     STATES, \"THIS IS REASON FOR MRI\"   • Microscopic hematuria    • SHANA (obstructive sleep apnea)     USES CPAP   • Osteoarthritis    • Palpitations    • Respiratory disease    • Supraventricular tachycardia (CMS/HCC) ([facility])     STATES, \"COFFEE RELATED\", VERY INTERMITTENT   • Tinnitus      Family History   Problem Relation Age of Onset   • Cancer Mother 84        Bladder   • Osteoporosis Mother    • Hypertension " Mother    • Colon cancer Father 65   • Kidney cancer Father 77   • Other Father's Brother         sudden cardiac death, 60s   • Heart disease Maternal Grandfather    • Heart attack Maternal Grandfather 59   • Cancer Paternal Grandfather      Social History     Socioeconomic History   • Marital status: Single     Spouse name: Not on file   • Number of children: Not on file   • Years of education: Not on file   • Highest education level: Not on file   Occupational History   • Not on file   Tobacco Use   • Smoking status: Never Smoker   • Smokeless tobacco: Never Used   Substance and Sexual Activity   • Alcohol use: No   • Drug use: No   • Sexual activity: Not on file   Other Topics Concern   • Not on file   Social History Narrative   • Not on file     Social Determinants of Health     Financial Resource Strain:    • Difficulty of Paying Living Expenses:    Food Insecurity:    • Worried About Running Out of Food in the Last Year:    • Ran Out of Food in the Last Year:    Transportation Needs:    • Lack of Transportation (Medical):    • Lack of Transportation (Non-Medical):    Physical Activity:    • Days of Exercise per Week:    • Minutes of Exercise per Session:    Stress:    • Feeling of Stress :    Social Connections:    • Frequency of Communication with Friends and Family:    • Frequency of Social Gatherings with Friends and Family:    • Attends Nondenominational Services:    • Active Member of Clubs or Organizations:    • Attends Club or Organization Meetings:    • Marital Status:    Intimate Partner Violence:    • Fear of Current or Ex-Partner:    • Emotionally Abused:    • Physically Abused:    • Sexually Abused:        Allergies   Allergen Reactions   • Bee Venom Protein (Honey Bee)    • No Known Drug Allergies        Current Outpatient Medications   Medication Sig Dispense Refill   • meloxicam (MOBIC) 15 mg tablet Take 0.5 tablets (7.5 mg total) by mouth daily 45 tablet 3   • omeprazole (PriLOSEC) 20 mg capsule Take 1  capsule (20 mg total) by mouth daily 90 capsule 3   • levothyroxine (SYNTHROID, LEVOTHROID) 112 mcg tablet Take 1 tablet (112 mcg total) by mouth daily 90 tablet 3   • diazePAM (VALIUM) 5 mg tablet Take 1 tablet (5 mg total) by mouth every 12 (twelve) hours as needed for muscle spasms 30 tablet 1   • fluticasone propionate (FLONASE) 50 mcg/actuation nasal spray USE 2 SPRAYS IN EACH NOSTRIL DAILY 48 g 1   • diclofenac sodium (VOLTAREN) 1 % gel APPLY 2 GRAMS TO THE AFFECTED AREA(S) 4 TIMES PER DAY AS NEEDED 300 g 1   • budesonide (Pulmicort Flexhaler) 90 mcg/actuation inhaler as needed     • vit B complex no.12/niacin,B3, (VITAMIN B COMPLEX NO.12-NIACIN ORAL) Take by mouth     • cholecalciferol, vitamin D3, (Vitamin D3) 1,000 unit capsule Take 5,000 Units by mouth daily     • krill-om-3-dha-epa-phospho-ast (KRILL OIL) [phone] mg capsule Take 1 capsule by mouth daily     • amoxicillin (AMOXIL) 500 mg capsule 500 mg With dental appointments for total knee arthroplasty      • cyclobenzaprine (FLEXERIL) 10 mg tablet Take 1 tablet (10 mg total) by mouth 2 (two) times a day as needed for muscle spasms. 60 tablet 0   • calcium carbonate (OS-RONNIE) 500 mg calcium tablet Take 1 tablet by mouth daily       • multivitamin (THERAGRAN) tablet tablet 1 Every Day     • albuterol HFA (VENTOLIN HFA) 90 mcg/actuation inhaler Inhale 2 puffs every 4 hours by inhalation route as needed. 3 1   • acetaminophen (TYLENOL EXTRA STRENGTH) 500 mg tablet Take 1,000 mg by mouth 2 (two) times a day         No current facility-administered medications for this visit.       Review of Systems   Constitutional: Negative for chills, fatigue and fever.   HENT: Negative for congestion, ear pain, sinus pressure, sinus pain and sore throat.    Eyes: Negative for pain, redness and itching.   Respiratory: Positive for apnea (Known diagnosis of SHANA. On CPAP). Negative for cough and shortness of breath.         No snoring, unusual movements or abnormal  dream activity, restless legs, or trouble sleeping (insomnia) and not excessively sleepy during daytime.   Cardiovascular: Negative for chest pain, palpitations and leg swelling.        History of supraventricular tachycardia   Gastrointestinal: Negative for nausea and vomiting.        GERD   Endocrine:        Hypothyriodism   Musculoskeletal: Positive for back pain (lower back). Negative for arthralgias and neck pain.        Osteoarthritis   Allergic/Immunologic: Negative for environmental allergies.   Neurological: Negative for dizziness, syncope, weakness and headaches.   Psychiatric/Behavioral: Positive for sleep disturbance. Negative for agitation and behavioral problems.       OBJECTIVE    VS/Weight:  There were no vitals taken for this visit.      Physical Exam:    Results of Compliance Report:  Updated compliance report is unavailable for review at this time.      Physical Exam      ASSESSMENT:     1. Patient underwent sleep study on 8/15/2014 and was found to have moderate obstructive sleep apnea with an AHI of 13.4 and a minimum oxygen saturation of 82%. She is currently on CPAP with a set pressure of 8.6 cmH2O.   2.  Obesity. Patient is currently undergoing bariatric evaluation.   3.  GERD.  4.  Hypothyroidism.  5.  History of supraventricular tachycardia.  6.  Osteoarthritis.        PLAN:     1.  Based upon patient's subjective reports, she is currently using and benefiting from current CPAP therapy. She tolerates CPAP well. Unfortunately, updated compliance report is unavailable for review at this time as patient's current machine does not have recording capabilities. However, she reports that she is utilizing her machine consistently on a nightly basis.  2. The patient will be provided with a prescription to obtain new CPAP supplies today. Patient is interested in obtaining a new machine. Prescription for a new machine will be sent today.  3. She will follow up in 1 year, sooner if needed. Her  questions were addressed and answered.  4. She was counseled on what to look for if pressure is too high or too low.   5. She is to stay active both physically and mentally.   6. She will follow up with her PCP for general medical needs.  7. The diagnostic assessment has been reviewed. Patient has expressed a good understanding of the assessment and recommendation from today's visit. There are no apparent barriers to understanding this information. She has been advised to contact this office or the answering service with questions or concerns that may arise.       A voice recognition program was used to aid in documentation of the record. Sometimes words are not printed exactly as they were spoken. While efforts were made to carefully edit and correct any inaccuracies, some may be present; please take these into context. Please contact the provider if areas are identified.        TOMAS Phan

## 2021-02-26 ENCOUNTER — TELEMEDICINE (OUTPATIENT)
Dept: NEUROLOGY | Facility: CLINIC | Age: 66
End: 2021-02-26
Payer: COMMERCIAL

## 2021-02-26 DIAGNOSIS — G47.33 OBSTRUCTIVE SLEEP APNEA SYNDROME: Primary | ICD-10-CM

## 2021-02-26 PROCEDURE — 99443 *INACTIVE DO NOT USE* PR PHYS/QHP TELEPHONE EVALUATION 21-30 MIN: CPT | Performed by: PHYSICIAN ASSISTANT

## 2021-02-26 ASSESSMENT — ENCOUNTER SYMPTOMS: DIZZINESS: 0

## 2021-03-03 DIAGNOSIS — R73.01 IMPAIRED FASTING GLUCOSE: ICD-10-CM

## 2021-03-03 DIAGNOSIS — E78.5 HYPERLIPIDEMIA, UNSPECIFIED HYPERLIPIDEMIA TYPE: Primary | ICD-10-CM

## 2021-04-01 DIAGNOSIS — M62.838 MUSCLE SPASM: ICD-10-CM

## 2021-04-01 RX ORDER — DIAZEPAM 5 MG/1
TABLET ORAL
Qty: 30 TABLET | Refills: 0 | Status: SHIPPED | OUTPATIENT
Start: 2021-04-01 | End: 2022-09-30 | Stop reason: SDUPTHER

## 2021-04-01 NOTE — TELEPHONE ENCOUNTER
No new care gaps identified.  Powered by CDP. Reference number: [id number]. 4/01/2021 10:49:47 AM JOSUÉ

## 2021-04-22 DIAGNOSIS — M15.9 OSTEOARTHRITIS OF MULTIPLE JOINTS, UNSPECIFIED OSTEOARTHRITIS TYPE: ICD-10-CM

## 2021-04-22 RX ORDER — MELOXICAM 15 MG/1
15 TABLET ORAL DAILY
Qty: 90 TABLET | Refills: 0 | Status: SHIPPED | OUTPATIENT
Start: 2021-04-22 | End: 2021-10-15 | Stop reason: SDUPTHER

## 2021-04-22 NOTE — TELEPHONE ENCOUNTER
Patient is requesting to send in 90 days worth of the 15mg tablets and she will split them herself but she will take 7.5mg daily

## 2021-05-16 NOTE — LETTER
April 21, 2020    Corrine Holden  [address]  [facility] [address]      To Whom it May Concern:     The above named patient is under the care of my office.  She has a history of chronic foot pain.  She has tried supportive measures in the past and while somewhat effective, she is not able to stand consecutively for over 2 hours due to increased pain.  Please limit patient's consecutive standing periods to less than 2 hours.  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      If you have any questions or concerns, please don't hesitate to call.    Sincerely,             GIBRAN LONGO CNP        CC: No Recipients   16-May-2021 04:43

## 2021-06-18 DIAGNOSIS — J31.0 CHRONIC NONALLERGIC RHINITIS: ICD-10-CM

## 2021-06-18 DIAGNOSIS — M19.90 OSTEOARTHRITIS, UNSPECIFIED OSTEOARTHRITIS TYPE, UNSPECIFIED SITE: ICD-10-CM

## 2021-06-18 RX ORDER — FLUTICASONE PROPIONATE 50 MCG
SPRAY, SUSPENSION (ML) NASAL
Qty: 48 G | Refills: 2 | Status: SHIPPED | OUTPATIENT
Start: 2021-06-18

## 2021-06-18 NOTE — TELEPHONE ENCOUNTER
Per protocol, Nurse Triage unable to fill.  This med request sent to primary nurse for review.  Thank you.

## 2021-06-18 NOTE — TELEPHONE ENCOUNTER
Care Due:                  Date            Visit Type   Department     Provider  --------------------------------------------------------------------------------                                           [facility] INTERNAL  Last Visit: 02-      PHYSICAL     MED            CM MON                              ESTABLISHED   [facility] INTERNAL  Next Visit: 09-      PATIENT      MED            CM MON                                                            Last  Test          Frequency    Reason                     Performed    Due Date  --------------------------------------------------------------------------------  BP Check      12 months..  meloxicam................  Not Found    Overdue  (Content  Only).......    Powered by [employer]. Reference number: [id number]. 6/18/2021 2:42:08 PM JOSUÉ

## 2021-06-21 RX ORDER — DICLOFENAC SODIUM 10 MG/G
GEL TOPICAL
Qty: 300 G | Refills: 2 | Status: SHIPPED | OUTPATIENT
Start: 2021-06-21

## 2021-09-14 ENCOUNTER — APPOINTMENT (OUTPATIENT)
Dept: LAB | Facility: CLINIC | Age: 66
End: 2021-09-14
Payer: MEDICARE

## 2021-09-14 DIAGNOSIS — E78.5 HYPERLIPIDEMIA, UNSPECIFIED HYPERLIPIDEMIA TYPE: ICD-10-CM

## 2021-09-14 DIAGNOSIS — R73.01 IMPAIRED FASTING GLUCOSE: ICD-10-CM

## 2021-09-14 DIAGNOSIS — E03.9 HYPOTHYROIDISM, UNSPECIFIED TYPE: ICD-10-CM

## 2021-09-14 LAB
ALBUMIN SERPL-MCNC: 4.4 G/DL (ref 3.5–5.3)
ALP SERPL-CCNC: 62 U/L (ref 50–130)
ALT SERPL-CCNC: 25 U/L (ref 7–52)
ANION GAP SERPL CALC-SCNC: 8 MMOL/L (ref 3–11)
AST SERPL-CCNC: 22 U/L
BILIRUB SERPL-MCNC: 0.56 MG/DL (ref 0.2–1.4)
BUN SERPL-MCNC: 21 MG/DL (ref 7–25)
CALCIUM ALBUM COR SERPL-MCNC: 9.2 MG/DL (ref 8.6–10.3)
CALCIUM SERPL-MCNC: 9.5 MG/DL (ref 8.6–10.3)
CHLORIDE SERPL-SCNC: 104 MMOL/L (ref 98–107)
CHOLEST SERPL-MCNC: 226 MG/DL (ref 0–199)
CO2 SERPL-SCNC: 29 MMOL/L (ref 21–32)
CREAT SERPL-MCNC: 0.8 MG/DL (ref 0.6–1.1)
EST. AVERAGE GLUCOSE BLD GHB EST-MCNC: 125.5 MG/DL
FASTING STATUS PATIENT QL REPORTED: YES
GFR SERPL CREATININE-BSD FRML MDRD: 77 ML/MIN/1.73M*2
GLUCOSE SERPL-MCNC: 114 MG/DL (ref 70–105)
HBA1C MFR BLD: 6 % (ref 4–6)
HDLC SERPL-MCNC: 54 MG/DL
LDLC SERPL CALC-MCNC: 140 MG/DL (ref 20–99)
POTASSIUM SERPL-SCNC: 4.3 MMOL/L (ref 3.5–5.1)
PROT SERPL-MCNC: 6.8 G/DL (ref 6–8.3)
SODIUM SERPL-SCNC: 141 MMOL/L (ref 135–145)
TRIGL SERPL-MCNC: 161 MG/DL

## 2021-09-14 PROCEDURE — 36415 COLL VENOUS BLD VENIPUNCTURE: CPT | Mod: PO

## 2021-09-14 PROCEDURE — 80053 COMPREHEN METABOLIC PANEL: CPT | Mod: PO

## 2021-09-14 PROCEDURE — 84443 ASSAY THYROID STIM HORMONE: CPT | Mod: PO

## 2021-09-14 PROCEDURE — 83036 HEMOGLOBIN GLYCOSYLATED A1C: CPT | Mod: PO

## 2021-09-14 PROCEDURE — 80061 LIPID PANEL: CPT | Mod: PO,NCP

## 2021-09-15 DIAGNOSIS — E03.9 HYPOTHYROIDISM, UNSPECIFIED TYPE: Primary | ICD-10-CM

## 2021-09-15 LAB — TSH SERPL DL<=0.05 MIU/L-ACNC: 6.84 UIU/ML (ref 0.34–4.82)

## 2021-09-17 ENCOUNTER — OFFICE VISIT (OUTPATIENT)
Dept: INTERNAL MEDICINE | Facility: CLINIC | Age: 66
End: 2021-09-17
Payer: MEDICARE

## 2021-09-17 VITALS
TEMPERATURE: 98.4 F | DIASTOLIC BLOOD PRESSURE: 100 MMHG | RESPIRATION RATE: 18 BRPM | SYSTOLIC BLOOD PRESSURE: 140 MMHG | OXYGEN SATURATION: 90 % | HEART RATE: 78 BPM

## 2021-09-17 DIAGNOSIS — E78.5 BORDERLINE HYPERLIPIDEMIA: ICD-10-CM

## 2021-09-17 DIAGNOSIS — G47.33 OBSTRUCTIVE SLEEP APNEA SYNDROME: ICD-10-CM

## 2021-09-17 DIAGNOSIS — E03.9 HYPOTHYROIDISM, UNSPECIFIED TYPE: Primary | ICD-10-CM

## 2021-09-17 DIAGNOSIS — E66.813 CLASS 3 SEVERE OBESITY DUE TO EXCESS CALORIES WITH SERIOUS COMORBIDITY AND BODY MASS INDEX (BMI) OF 45.0 TO 49.9 IN ADULT (CMS/HCC): ICD-10-CM

## 2021-09-17 DIAGNOSIS — I51.7 LEFT VENTRICULAR HYPERTROPHY: ICD-10-CM

## 2021-09-17 DIAGNOSIS — R73.01 IMPAIRED FASTING GLUCOSE: ICD-10-CM

## 2021-09-17 DIAGNOSIS — E66.01 CLASS 3 SEVERE OBESITY DUE TO EXCESS CALORIES WITH SERIOUS COMORBIDITY AND BODY MASS INDEX (BMI) OF 45.0 TO 49.9 IN ADULT (CMS/HCC): ICD-10-CM

## 2021-09-17 DIAGNOSIS — R03.0 ELEVATED BLOOD PRESSURE READING: ICD-10-CM

## 2021-09-17 PROCEDURE — G0463 HOSPITAL OUTPT CLINIC VISIT: HCPCS | Mod: PO | Performed by: INTERNAL MEDICINE

## 2021-09-17 PROCEDURE — 99214 OFFICE O/P EST MOD 30 MIN: CPT | Performed by: INTERNAL MEDICINE

## 2021-09-17 ASSESSMENT — PAIN SCALES - GENERAL: PAINLEVEL: 0-NO PAIN

## 2021-09-17 NOTE — PROGRESS NOTES
"Subjective      Corrine Holden is a 66 y.o. female who presents for No chief complaint on file.  .    SHANNON Castle is here for follow-up.    Blood pressure is elevated today at 140/100.  She has a cuff at home but has not been checking it recently.  Echocardiogram from about a year and a half ago did mention left ventricular hypertrophy.  She had normal diastolic function at that time.    TSH is off.  She is currently taking 112 mcg daily.    She is trying to work on weight loss.  She is looking into ways to be accountable.  She is not really interested in weight watchers.  She had considered bariatric surgery but has opted out of that right now, especially given the healthcare climate we have with Covid right now.  She does work part-time but it is still difficult to get exercise into her routine.    She occasionally has some palpitations.  She feels they are mostly caffeine related.  Has about 2 cups of coffee daily.    Lipids were elevated today.    Fasting sugar was high at 114 but A1c is normal at 6.0.    Past Medical History:   Diagnosis Date   • GERD (gastroesophageal reflux disease)    • H. pylori infection 2009   • Hypothyroidism    • Injury of back     STATES, \"THIS IS REASON FOR MRI\"   • Microscopic hematuria    • SHANA (obstructive sleep apnea)     USES CPAP   • Osteoarthritis    • Palpitations    • Respiratory disease    • Supraventricular tachycardia (CMS/HCC) (HCC)     STATES, \"COFFEE RELATED\", VERY INTERMITTENT   • Tinnitus        Current Outpatient Medications   Medication Sig Dispense Refill   • zinc 50 mg tablet Take 50 mg by mouth daily     • diclofenac sodium (VOLTAREN) 1 % gel APPLY 2 GRAMS TO THE AFFECTED AREA(S) 4 TIMES PER DAY AS NEEDED 300 g 2   • fluticasone propionate (FLONASE) 50 mcg/actuation nasal spray USE 2 SPRAYS IN EACH NOSTRIL DAILY 48 g 2   • meloxicam (MOBIC) 15 mg tablet Take 1 tablet (15 mg total) by mouth daily (Patient taking differently: Take 7.5 mg by mouth daily  ) 90 " tablet 0   • diazePAM (VALIUM) 5 mg tablet TAKE 1 TABLET BY MOUTH EVERY 12 HOURS AS NEEDED FOR MUSCLE SPASMS 30 tablet 0   • omeprazole (PriLOSEC) 20 mg capsule Take 1 capsule (20 mg total) by mouth daily 90 capsule 3   • levothyroxine (SYNTHROID, LEVOTHROID) 112 mcg tablet Take 1 tablet (112 mcg total) by mouth daily 90 tablet 3   • budesonide (Pulmicort Flexhaler) 90 mcg/actuation inhaler as needed     • vit B complex no.12/niacin,B3, (VITAMIN B COMPLEX NO.12-NIACIN ORAL) Take by mouth     • cholecalciferol, vitamin D3, (Vitamin D3) 1,000 unit capsule Take 5,000 Units by mouth daily     • krill-om-3-dha-epa-phospho-ast (KRILL OIL) [phone] mg capsule Take 1 capsule by mouth daily     • cyclobenzaprine (FLEXERIL) 10 mg tablet Take 1 tablet (10 mg total) by mouth 2 (two) times a day as needed for muscle spasms. 60 tablet 0   • calcium carbonate (OS-RONNIE) 500 mg calcium tablet Take 1 tablet by mouth daily       • multivitamin (THERAGRAN) tablet tablet 1 Every Day     • albuterol HFA (VENTOLIN HFA) 90 mcg/actuation inhaler Inhale 2 puffs every 4 hours by inhalation route as needed. 3 1   • acetaminophen (TYLENOL EXTRA STRENGTH) 500 mg tablet Take 1,000 mg by mouth 2 (two) times a day       • vit C/E/Zn/coppr/lutein/zeaxan (OCUVITE LUTEIN AND ZEAXANTHIN ORAL) PreserVision AREDS-2     • amoxicillin (AMOXIL) 500 mg capsule 500 mg With dental appointments for total knee arthroplasty        No current facility-administered medications for this visit.      Allergies   Allergen Reactions   • Bee Venom Protein (Honey Bee)    • No Known Drug Allergies        Past Surgical History:   Procedure Laterality Date   • CARDIAC ELECTROPHYSIOLOGY STUDY AND ABLATION  01/01/2007    attempted ablation for SVT   • CARPAL TUNNEL RELEASE Bilateral 6/26/2020    Procedure: LEFT ENDOSCOPIC CARPAL TUNNEL RELEASE, RIGHT OPEN CARPAL TUNNEL;  Surgeon: Mendez Sun MD;  [facility];  Service: Orthopedics;  Laterality: Bilateral;   •  COLONOSCOPY  01/21/2015    cecal polyp-tubular adenoma-recommend repeat 5 years   • COLONOSCOPY N/A 6/22/2020    repeat 5 years - adenomatous polyp   • DILATION AND CURETTAGE OF UTERUS  01/01/2006   • ENDOMETRIAL BIOPSY  01/01/2012    pt reports negative   • ESOPHAGOGASTRODUODENOSCOPY  01/01/2009    H pylori   • ESOPHAGOGASTRODUODENOSCOPY N/A 6/22/2020    Procedure: ESOPHAGOGASTRODUODENOSCOPY;  Surgeon: Julian Faulkner MD;  Location: [facility];  Service: Endoscopy;  Laterality: N/A;   • KNEE ARTHROPLASTY Right 04/01/2015    RTKA, Dr Cummins   • VAGINAL HYSTERECTOMY  12/16/2013    with BSO, fibroids     Family History   Problem Relation Age of Onset   • Cancer Mother 84        Bladder   • Osteoporosis Mother    • Hypertension Mother    • Colon cancer Father 65   • Kidney cancer Father 77   • Other Father's Brother         sudden cardiac death, 60s   • Heart disease Maternal Grandfather    • Heart attack Maternal Grandfather 59   • Cancer Paternal Grandfather      Social History     Tobacco Use   • Smoking status: Never Smoker   • Smokeless tobacco: Never Used   Substance Use Topics   • Alcohol use: No       Review of Systems    As stated in HPI.        Objective     Vitals  /100   Pulse 78   Temp 36.9 °C (98.4 °F)   Resp 18   SpO2 90%     Physical Exam   GEN: pleasant, NAD  HEENT: PERRLA, sclera anicteric  NECK: Supple  LUNGS: CTAB, no wheezing rhonchi or rales  HEART: RRR, faint systolic murmur, no rubs or gallops  ABD: Nondistended  Ext: Warm and well perfused, no edema  SKIN: no rash  Neuro:  CN 2-12 grossly intact, non focal, moving all extremities        Assessment/Plan   Diagnoses and all orders for this visit:    Hypothyroidism, unspecified type  -     Thyroid Stimulating Hormone, Ultrasensitive Blood, Venous; Future    Elevated blood pressure reading  -     Basic metabolic panel Blood, Venous; Future    Borderline hyperlipidemia    Left ventricular hypertrophy    Obstructive sleep apnea  syndrome    Impaired fasting glucose    Class 3 severe obesity due to excess calories with serious comorbidity and body mass index (BMI) of 45.0 to 49.9 in adult (CMS/[facility]) ([facility])      1.  Hypothyroidism.  TSH is off.  She has not run out of medication and takes it consistently in the middle the night.  We will have her take 8 pills/week of the 112 mcg and repeat TSH in 6 to 8 weeks.  2.  Elevated blood pressure reading.  I like her to start monitoring her blood pressure at home.  She can bring in her home cuff to compare before then if she would like.  Then, I will see her back in about 6 to 8 weeks to review her blood pressure readings.  I am concerned that she had mild ventricular hypertrophy on her echocardiogram.  Would like to potentially start her on some lisinopril.  We will discuss more at next visit.  3.  Heart murmur.  She has aortic valve sclerosis without stenosis.  4.  Impaired fasting glucose, obesity, borderline hyperlipidemia.  She does not want to go on a statin at this time.  He has a family member that had rhabdomyolysis.  She wants to continue to work on lifestyle changes.  Briefly discussed medication trial of Topamax or Ozempic for weight loss.  5.  Prevention.  Had planned on giving her Prevnar 13 today but I forgot.  We will do it next visit.  She plans to get flu shot with [employer] or request an exemption.     CM MON MD

## 2021-10-15 ENCOUNTER — TELEPHONE (OUTPATIENT)
Dept: INTERNAL MEDICINE | Facility: CLINIC | Age: 66
End: 2021-10-15

## 2021-10-15 DIAGNOSIS — M15.9 OSTEOARTHRITIS OF MULTIPLE JOINTS, UNSPECIFIED OSTEOARTHRITIS TYPE: ICD-10-CM

## 2021-10-15 RX ORDER — MELOXICAM 15 MG/1
7.5 TABLET ORAL DAILY
Qty: 45 TABLET | Refills: 0 | Status: SHIPPED | OUTPATIENT
Start: 2021-10-15 | End: 2022-01-11 | Stop reason: DRUGHIGH

## 2021-10-15 NOTE — TELEPHONE ENCOUNTER
Patient has contacted the pharmacy? No    Patient Advised: they will receive a call back.    Name of Medications (Have patient read from the bottle or snip from medication list):    Are you having any trouble with the medication: no    How many doses do you have left: about a month left     Is the Diagnosis (DX) active? not applicable    Additional Information: about out     Patient's preferred pharmacy has been noted and populated.    Is it okay that the nurse communicates your response through [employer]? No      Caller has been advised: Your request  does not guarantee an immediate refill.

## 2021-10-18 DIAGNOSIS — M15.9 OSTEOARTHRITIS OF MULTIPLE JOINTS, UNSPECIFIED OSTEOARTHRITIS TYPE: ICD-10-CM

## 2021-10-18 NOTE — TELEPHONE ENCOUNTER
No new care gaps identified.  Powered by [employer] by [employer]. Reference number: [id number]. 10/18/2021 11:28:24 AM JOSUÉ

## 2021-10-19 RX ORDER — MELOXICAM 15 MG/1
7.5 TABLET ORAL DAILY
OUTPATIENT
Start: 2021-10-19 | End: 2022-10-19

## 2021-10-21 ENCOUNTER — APPOINTMENT (OUTPATIENT)
Dept: LAB | Facility: CLINIC | Age: 66
End: 2021-10-21
Payer: MEDICARE

## 2021-10-21 DIAGNOSIS — E03.9 HYPOTHYROIDISM, UNSPECIFIED TYPE: ICD-10-CM

## 2021-10-21 DIAGNOSIS — R03.0 ELEVATED BLOOD PRESSURE READING: ICD-10-CM

## 2021-10-21 LAB
ANION GAP SERPL CALC-SCNC: 9 MMOL/L (ref 3–11)
BUN SERPL-MCNC: 16 MG/DL (ref 7–25)
CALCIUM SERPL-MCNC: 9.4 MG/DL (ref 8.6–10.3)
CHLORIDE SERPL-SCNC: 105 MMOL/L (ref 98–107)
CO2 SERPL-SCNC: 27 MMOL/L (ref 21–32)
CREAT SERPL-MCNC: 0.75 MG/DL (ref 0.6–1.1)
GFR SERPL CREATININE-BSD FRML MDRD: 84 ML/MIN/1.73M*2
GLUCOSE SERPL-MCNC: 121 MG/DL (ref 70–105)
POTASSIUM SERPL-SCNC: 3.8 MMOL/L (ref 3.5–5.1)
SODIUM SERPL-SCNC: 141 MMOL/L (ref 135–145)
TSH SERPL DL<=0.05 MIU/L-ACNC: 2.1 UIU/ML (ref 0.34–4.82)

## 2021-10-21 PROCEDURE — 84443 ASSAY THYROID STIM HORMONE: CPT | Mod: PO

## 2021-10-21 PROCEDURE — 80048 BASIC METABOLIC PNL TOTAL CA: CPT | Mod: PO

## 2021-10-21 PROCEDURE — 36415 COLL VENOUS BLD VENIPUNCTURE: CPT | Mod: PO

## 2021-10-25 ENCOUNTER — OFFICE VISIT (OUTPATIENT)
Dept: INTERNAL MEDICINE | Facility: CLINIC | Age: 66
End: 2021-10-25
Payer: MEDICARE

## 2021-10-25 VITALS
HEART RATE: 93 BPM | TEMPERATURE: 97.8 F | OXYGEN SATURATION: 96 % | DIASTOLIC BLOOD PRESSURE: 82 MMHG | SYSTOLIC BLOOD PRESSURE: 121 MMHG | RESPIRATION RATE: 18 BRPM

## 2021-10-25 DIAGNOSIS — E03.9 HYPOTHYROIDISM, UNSPECIFIED TYPE: ICD-10-CM

## 2021-10-25 DIAGNOSIS — Z23 IMMUNIZATION DUE: ICD-10-CM

## 2021-10-25 DIAGNOSIS — R05.3 CHRONIC COUGH: ICD-10-CM

## 2021-10-25 DIAGNOSIS — R03.0 ELEVATED BLOOD PRESSURE READING: Primary | ICD-10-CM

## 2021-10-25 PROCEDURE — G0463 HOSPITAL OUTPT CLINIC VISIT: HCPCS | Mod: PO | Performed by: INTERNAL MEDICINE

## 2021-10-25 PROCEDURE — 99213 OFFICE O/P EST LOW 20 MIN: CPT | Performed by: INTERNAL MEDICINE

## 2021-10-25 PROCEDURE — 90670 PCV13 VACCINE IM: CPT | Mod: PO

## 2021-10-25 RX ORDER — LEVOTHYROXINE SODIUM 125 UG/1
125 TABLET ORAL DAILY
Qty: 90 TABLET | Refills: 3 | Status: SHIPPED | OUTPATIENT
Start: 2021-10-25 | End: 2022-10-06

## 2021-10-25 ASSESSMENT — PAIN SCALES - GENERAL: PAINLEVEL: 0-NO PAIN

## 2021-10-25 NOTE — PROGRESS NOTES
"Subjective      Corrine Holden is a 66 y.o. female who presents for No chief complaint on file.  .    SHANNON Castle is here for follow-up.    At last visit, we increased her levothyroxine to 112 mcg taking 1 extra on the weekend.  Repeat TSH is in a better range.  She is due for refill now.    Blood pressure at last visit was elevated.  Looks good here today.  She was checking it at home initially and was getting 130s over 90s but has not been checking it the last couple weeks.    She does note a sporadic cough for 6 weeks or so.  She is on omeprazole.    Past Medical History:   Diagnosis Date   • GERD (gastroesophageal reflux disease)    • H. pylori infection 2009   • Hypothyroidism    • Injury of back     STATES, \"THIS IS REASON FOR MRI\"   • Microscopic hematuria    • SHANA (obstructive sleep apnea)     USES CPAP   • Osteoarthritis    • Palpitations    • Respiratory disease    • Supraventricular tachycardia (CMS/HCC) (HCC)     STATES, \"COFFEE RELATED\", VERY INTERMITTENT   • Tinnitus        Current Outpatient Medications   Medication Sig Dispense Refill   • levothyroxine (SYNTHROID, LEVOTHROID) 125 mcg tablet Take 1 tablet (125 mcg total) by mouth daily 90 tablet 3   • meloxicam (MOBIC) 15 mg tablet Take 0.5 tablets (7.5 mg total) by mouth daily 45 tablet 0   • vit C/E/Zn/coppr/lutein/zeaxan (OCUVITE LUTEIN AND ZEAXANTHIN ORAL) PreserVision AREDS-2     • zinc 50 mg tablet Take 50 mg by mouth daily     • diclofenac sodium (VOLTAREN) 1 % gel APPLY 2 GRAMS TO THE AFFECTED AREA(S) 4 TIMES PER DAY AS NEEDED 300 g 2   • fluticasone propionate (FLONASE) 50 mcg/actuation nasal spray USE 2 SPRAYS IN EACH NOSTRIL DAILY 48 g 2   • diazePAM (VALIUM) 5 mg tablet TAKE 1 TABLET BY MOUTH EVERY 12 HOURS AS NEEDED FOR MUSCLE SPASMS 30 tablet 0   • omeprazole (PriLOSEC) 20 mg capsule Take 1 capsule (20 mg total) by mouth daily 90 capsule 3   • budesonide (Pulmicort Flexhaler) 90 mcg/actuation inhaler as needed     • vit B complex " no.12/niacin,B3, (VITAMIN B COMPLEX NO.12-NIACIN ORAL) Take by mouth     • cholecalciferol, vitamin D3, (Vitamin D3) 1,000 unit capsule Take 5,000 Units by mouth daily     • krill-om-3-dha-epa-phospho-ast (KRILL OIL) 420-717-37-75 mg capsule Take 1 capsule by mouth daily     • amoxicillin (AMOXIL) 500 mg capsule 500 mg With dental appointments for total knee arthroplasty      • cyclobenzaprine (FLEXERIL) 10 mg tablet Take 1 tablet (10 mg total) by mouth 2 (two) times a day as needed for muscle spasms. 60 tablet 0   • calcium carbonate (OS-RONNIE) 500 mg calcium tablet Take 1 tablet by mouth daily       • multivitamin (THERAGRAN) tablet tablet 1 Every Day     • albuterol HFA (VENTOLIN HFA) 90 mcg/actuation inhaler Inhale 2 puffs every 4 hours by inhalation route as needed. 3 1   • acetaminophen (TYLENOL EXTRA STRENGTH) 500 mg tablet Take 1,000 mg by mouth 2 (two) times a day         No current facility-administered medications for this visit.      Allergies   Allergen Reactions   • Bee Venom Protein (Honey Bee)    • No Known Drug Allergies        Past Surgical History:   Procedure Laterality Date   • CARDIAC ELECTROPHYSIOLOGY STUDY AND ABLATION  01/01/2007    attempted ablation for SVT   • CARPAL TUNNEL RELEASE Bilateral 6/26/2020    Procedure: LEFT ENDOSCOPIC CARPAL TUNNEL RELEASE, RIGHT OPEN CARPAL TUNNEL;  Surgeon: Mendez Sun MD;  Location: [facility] OR;  Service: Orthopedics;  Laterality: Bilateral;   • COLONOSCOPY  01/21/2015    cecal polyp-tubular adenoma-recommend repeat 5 years   • COLONOSCOPY N/A 6/22/2020    repeat 5 years - adenomatous polyp   • DILATION AND CURETTAGE OF UTERUS  01/01/2006   • ENDOMETRIAL BIOPSY  01/01/2012    pt reports negative   • ESOPHAGOGASTRODUODENOSCOPY  01/01/2009    H pylori   • ESOPHAGOGASTRODUODENOSCOPY N/A 6/22/2020    Procedure: ESOPHAGOGASTRODUODENOSCOPY;  Surgeon: Julian Falukner MD;  Location: [facility];  Service: Endoscopy;  Laterality: N/A;   • KNEE ARTHROPLASTY Right  04/01/2015    Dr Placido BRICE   • VAGINAL HYSTERECTOMY  12/16/2013    with BSO, fibroids     Family History   Problem Relation Age of Onset   • Cancer Mother 84        Bladder   • Osteoporosis Mother    • Hypertension Mother    • Colon cancer Father 65   • Kidney cancer Father 77   • Other Father's Brother         sudden cardiac death, 60s   • Heart disease Maternal Grandfather    • Heart attack Maternal Grandfather 59   • Cancer Paternal Grandfather      Social History     Tobacco Use   • Smoking status: Never Smoker   • Smokeless tobacco: Never Used   Substance Use Topics   • Alcohol use: No       Review of Systems    As stated in HPI.        Objective     Vitals  /82   Pulse 93   Temp 36.6 °C (97.8 °F)   Resp 18   SpO2 96%     Physical Exam   GEN: pleasant, NAD  HEENT: PERRLA, sclera anicteric  NECK: no LADs  LUNGS: CTAB, no wheezing rhonchi or rales  HEART: RRR faint systolic murmur  ABD: Nondistended  Ext: Warm and well perfused, no edema  SKIN: no rash  Neuro:  CN 2-12 grossly intact, non focal, moving all extremities        Assessment/Plan   Diagnoses and all orders for this visit:    Elevated blood pressure reading    Hypothyroidism, unspecified type  -     levothyroxine (SYNTHROID, LEVOTHROID) 125 mcg tablet; Take 1 tablet (125 mcg total) by mouth daily    Immunization due  -     Pneumococcal conjugate vaccine 13-valent IM    Chronic cough    1.  Elevated blood pressure reading.  Blood pressure actually better today.  She will continue to monitor at home.  We will also have her bring in her home cuff to compare to ours.  2.  Hypothyroidism.  Will call in 125 mcg daily.  3.  Chronic cough.  Will check in in 1 month.  If still coughing intermittently, will need a chest x-ray.  4.  Prevnar vaccine today.    CM MON MD

## 2021-11-09 ENCOUNTER — TELEPHONE (OUTPATIENT)
Dept: INTERNAL MEDICINE | Facility: CLINIC | Age: 66
End: 2021-11-09

## 2021-11-09 ENCOUNTER — ANCILLARY PROCEDURE (OUTPATIENT)
Dept: RADIOLOGY | Facility: CLINIC | Age: 66
End: 2021-11-09
Payer: MEDICARE

## 2021-11-09 ENCOUNTER — OFFICE VISIT (OUTPATIENT)
Dept: INTERNAL MEDICINE | Facility: CLINIC | Age: 66
End: 2021-11-09
Payer: MEDICARE

## 2021-11-09 VITALS
TEMPERATURE: 97.6 F | SYSTOLIC BLOOD PRESSURE: 128 MMHG | DIASTOLIC BLOOD PRESSURE: 84 MMHG | HEART RATE: 72 BPM | OXYGEN SATURATION: 98 %

## 2021-11-09 DIAGNOSIS — R07.89 CHEST WALL PAIN: ICD-10-CM

## 2021-11-09 DIAGNOSIS — R07.89 CHEST WALL PAIN: Primary | ICD-10-CM

## 2021-11-09 PROCEDURE — 96372 THER/PROPH/DIAG INJ SC/IM: CPT | Mod: PO | Performed by: NURSE PRACTITIONER

## 2021-11-09 PROCEDURE — 99213 OFFICE O/P EST LOW 20 MIN: CPT | Performed by: NURSE PRACTITIONER

## 2021-11-09 PROCEDURE — 6360000200 HC RX 636 W HCPCS (ALT 250 FOR IP): Mod: PO | Performed by: NURSE PRACTITIONER

## 2021-11-09 PROCEDURE — G0463 HOSPITAL OUTPT CLINIC VISIT: HCPCS | Mod: PO | Performed by: NURSE PRACTITIONER

## 2021-11-09 PROCEDURE — 71046 X-RAY EXAM CHEST 2 VIEWS: CPT | Mod: PO

## 2021-11-09 RX ORDER — KETOROLAC TROMETHAMINE 30 MG/ML
60 INJECTION, SOLUTION INTRAMUSCULAR; INTRAVENOUS ONCE
Status: COMPLETED | OUTPATIENT
Start: 2021-11-09 | End: 2021-11-09

## 2021-11-09 RX ADMIN — KETOROLAC TROMETHAMINE 60 MG: 30 INJECTION, SOLUTION INTRAMUSCULAR at 10:09

## 2021-11-09 ASSESSMENT — PAIN SCALES - GENERAL: PAINLEVEL: 5

## 2021-11-09 NOTE — PROGRESS NOTES
"    Subjective      Patient ID: Corrine Holden is a 66 y.o. female.    Chief Complaint   Patient presents with   • Mass     Right chest x 1 week        HPI  HPI   Patient presents clinic today for intermittent chest pain and that can be reproduced with palpation.  Patient does have some intercostal mediastinum tenderness upon palpation.  Patient was requesting a chest x-ray.  It was remarkably clear and appears that patient may have costochondritis.  Patient benefit from high-dose NSAIDs and she continues to take meloxicam as directed.  I recommended position changes while she sleeps as well as notifying her posture.  Patient also benefit from physical therapy if symptoms do not improve.  ROS  Review of Systems   Constitutional: Negative.    HENT: Negative.    Respiratory: Negative.    Cardiovascular: Positive for chest pain.   Musculoskeletal: Positive for myalgias.        Comprehensive Medical and Social History  Patient Active Problem List   Diagnosis   • Obstructive sleep apnea syndrome   • Periumbilical hernia   • Class 3 severe obesity due to excess calories with serious comorbidity and body mass index (BMI) of 45.0 to 49.9 in adult (CMS/[facility]) ([facility])   • Gastroesophageal reflux disease   • Morbid obesity (CMS/HCC) ([facility])   • Pre-bariatric surgery nutrition evaluation   • Screening for malignant neoplasm of colon   • COVID-19     Past Medical History:   Diagnosis Date   • GERD (gastroesophageal reflux disease)    • H. pylori infection 2009   • Hypothyroidism    • Injury of back     STATES, \"THIS IS REASON FOR MRI\"   • Microscopic hematuria    • SHANA (obstructive sleep apnea)     USES CPAP   • Osteoarthritis    • Palpitations    • Respiratory disease    • Supraventricular tachycardia (CMS/HCC) ([facility])     STATES, \"COFFEE RELATED\", VERY INTERMITTENT   • Tinnitus      Past Surgical History:   Procedure Laterality Date   • CARDIAC ELECTROPHYSIOLOGY STUDY AND ABLATION  01/01/2007    attempted ablation for SVT   • CARPAL " TUNNEL RELEASE Bilateral 6/26/2020    Procedure: LEFT ENDOSCOPIC CARPAL TUNNEL RELEASE, RIGHT OPEN CARPAL TUNNEL;  Surgeon: Mendez Sun MD;  Location: [facility] OR;  Service: Orthopedics;  Laterality: Bilateral;   • COLONOSCOPY  01/21/2015    cecal polyp-tubular adenoma-recommend repeat 5 years   • COLONOSCOPY N/A 6/22/2020    repeat 5 years - adenomatous polyp   • DILATION AND CURETTAGE OF UTERUS  01/01/2006   • ENDOMETRIAL BIOPSY  01/01/2012    pt reports negative   • ESOPHAGOGASTRODUODENOSCOPY  01/01/2009    H pylori   • ESOPHAGOGASTRODUODENOSCOPY N/A 6/22/2020    Procedure: ESOPHAGOGASTRODUODENOSCOPY;  Surgeon: Julian Faulkner MD;  Location: [facility];  Service: Endoscopy;  Laterality: N/A;   • KNEE ARTHROPLASTY Right 04/01/2015    Dr Placido BRICE   • VAGINAL HYSTERECTOMY  12/16/2013    with BSO, fibroids     Allergies   Allergen Reactions   • Bee Venom Protein (Honey Bee)    • No Known Drug Allergies      Current Outpatient Medications   Medication Sig Dispense Refill   • levothyroxine (SYNTHROID, LEVOTHROID) 125 mcg tablet Take 1 tablet (125 mcg total) by mouth daily 90 tablet 3   • meloxicam (MOBIC) 15 mg tablet Take 0.5 tablets (7.5 mg total) by mouth daily 45 tablet 0   • vit C/E/Zn/coppr/lutein/zeaxan (OCUVITE LUTEIN AND ZEAXANTHIN ORAL) PreserVision AREDS-2     • zinc 50 mg tablet Take 50 mg by mouth daily     • diclofenac sodium (VOLTAREN) 1 % gel APPLY 2 GRAMS TO THE AFFECTED AREA(S) 4 TIMES PER DAY AS NEEDED 300 g 2   • fluticasone propionate (FLONASE) 50 mcg/actuation nasal spray USE 2 SPRAYS IN EACH NOSTRIL DAILY 48 g 2   • diazePAM (VALIUM) 5 mg tablet TAKE 1 TABLET BY MOUTH EVERY 12 HOURS AS NEEDED FOR MUSCLE SPASMS 30 tablet 0   • omeprazole (PriLOSEC) 20 mg capsule Take 1 capsule (20 mg total) by mouth daily 90 capsule 3   • budesonide (Pulmicort Flexhaler) 90 mcg/actuation inhaler as needed     • vit B complex no.12/niacin,B3, (VITAMIN B COMPLEX NO.12-NIACIN ORAL) Take by mouth     •  cholecalciferol, vitamin D3, (Vitamin D3) 1,000 unit capsule Take 5,000 Units by mouth daily     • krill-om-3-dha-epa-phospho-ast (KRILL OIL) [id number] mg capsule Take 1 capsule by mouth daily     • amoxicillin (AMOXIL) 500 mg capsule 500 mg With dental appointments for total knee arthroplasty      • cyclobenzaprine (FLEXERIL) 10 mg tablet Take 1 tablet (10 mg total) by mouth 2 (two) times a day as needed for muscle spasms. 60 tablet 0   • calcium carbonate (OS-RONNIE) 500 mg calcium tablet Take 1 tablet by mouth daily       • multivitamin (THERAGRAN) tablet tablet 1 Every Day     • albuterol HFA (VENTOLIN HFA) 90 mcg/actuation inhaler Inhale 2 puffs every 4 hours by inhalation route as needed. 3 1   • acetaminophen (TYLENOL EXTRA STRENGTH) 500 mg tablet Take 1,000 mg by mouth 2 (two) times a day         No current facility-administered medications for this visit.     Social History     Occupational History   • Not on file   Tobacco Use   • Smoking status: Never Smoker   • Smokeless tobacco: Never Used   Substance and Sexual Activity   • Alcohol use: No   • Drug use: No   • Sexual activity: Not on file   Social History Narrative   • Not on file       Physical Exam  /84 (BP Location: Left arm, Patient Position: Sitting, Cuff Size: Long Adult)   Pulse 72   Temp 36.4 °C (97.6 °F) (Temporal)   SpO2 98%     GENERAL: Patient is alert and oriented, in no acute distress.  HEENT: Normocephalic, atraumatic. EOMI, PERRLA, sclerae anicteric.  Oropharynx within normal limits.  No tenderness to palpation of sinuses.    NECK: No cervical lymphadenopathy.  No carotid bruits, thyromegaly, or supraclavicular lymph nodes appreciated.  Trachea is midline.  LUNGS:  Clear to auscultation bilaterally, both anterior and posterior.  CARDIOVASCULAR EXAM: Regular rate and rhythm without murmur, rub, or gallop.    ABDOMEN:  Soft. Positive bowel sounds in all four quadrants.  No rebound. No guarding.  No hepatosplenomegaly or renal  bruits appreciated.  Nontender, nondistended.  EXTREMITIES:  No clubbing, cyanosis, or edema.    Assessment/Plan     Problem List Items Addressed This Visit     None      Visit Diagnoses     Chest wall pain    -  Primary    Relevant Medications    ketorolac (TORADOL) injection 60 mg (Completed)    Other Relevant Orders    X-ray chest 2 views (Completed)         Patient benefit from Toradol injection 60 mg IM x1 dose.  Patient's x-ray was completely clear and no evidence of abnormality.  If symptoms do persist, I recommend that she reach out to her primary care provider Dr. Jaramillo.        YASMANI BRUNNER, CNP  8:03 AM, 11/18/2021.        A voice recognition program was used to aid in documentation of this record. Sometimes words are not presented exactly as they were spoken.  While efforts were made to carefully edit and correct any inaccuracies, some errors may be present.  Please take this into context.  Please contact the provider if errors are identified.

## 2021-11-18 ASSESSMENT — ENCOUNTER SYMPTOMS
RESPIRATORY NEGATIVE: 1
CONSTITUTIONAL NEGATIVE: 1
MYALGIAS: 1

## 2021-12-28 ENCOUNTER — TELEPHONE (OUTPATIENT)
Dept: INTERNAL MEDICINE | Facility: CLINIC | Age: 66
End: 2021-12-28

## 2021-12-28 NOTE — TELEPHONE ENCOUNTER
Caller would like to discuss (a) return call Writer has advised caller of a callback from within 24 hours.    Patient: Corrine Holden    Caller Name (Last and first, relation/role): self     Name of Facility: n/a     Callback Number: [phone]    Best Availability: anytime    Fax Number: n/a    Additional Info: per pt requesting to speak with nurse in lifestyle medicine regarding nutrition. States it has been a few years since she has seen [facility]. I mentioned that a referral is required for nutrition. Pt would like to like to speak with LM nurse at this time.     Did you confirm the message with the caller: No    Is it okay that the nurse communicates your response through [facility]? No

## 2022-01-04 ENCOUNTER — OFFICE VISIT (OUTPATIENT)
Dept: FAMILY MEDICINE | Facility: CLINIC | Age: 67
End: 2022-01-04
Payer: MEDICARE

## 2022-01-04 ENCOUNTER — TELEPHONE (OUTPATIENT)
Dept: FAMILY MEDICINE | Facility: CLINIC | Age: 67
End: 2022-01-04

## 2022-01-04 ENCOUNTER — NURSE TRIAGE (OUTPATIENT)
Dept: FAMILY MEDICINE | Facility: CLINIC | Age: 67
End: 2022-01-04
Payer: MEDICARE

## 2022-01-04 VITALS
SYSTOLIC BLOOD PRESSURE: 132 MMHG | DIASTOLIC BLOOD PRESSURE: 98 MMHG | HEIGHT: 64 IN | RESPIRATION RATE: 16 BRPM | TEMPERATURE: 97.8 F | HEART RATE: 75 BPM | OXYGEN SATURATION: 96 % | BODY MASS INDEX: 46.35 KG/M2

## 2022-01-04 DIAGNOSIS — J32.9 SINUSITIS, UNSPECIFIED CHRONICITY, UNSPECIFIED LOCATION: Primary | ICD-10-CM

## 2022-01-04 PROCEDURE — 99214 OFFICE O/P EST MOD 30 MIN: CPT | Performed by: FAMILY MEDICINE

## 2022-01-04 PROCEDURE — G0463 HOSPITAL OUTPT CLINIC VISIT: HCPCS | Mod: PO | Performed by: FAMILY MEDICINE

## 2022-01-04 RX ORDER — AMOXICILLIN AND CLAVULANATE POTASSIUM 875; 125 MG/1; MG/1
1 TABLET, FILM COATED ORAL 2 TIMES DAILY
Qty: 20 TABLET | Refills: 0 | Status: SHIPPED | OUTPATIENT
Start: 2022-01-04 | End: 2022-01-14

## 2022-01-04 RX ORDER — GUAIFENESIN 600 MG/1
1200 TABLET, EXTENDED RELEASE ORAL 2 TIMES DAILY
COMMUNITY
End: 2024-05-14 | Stop reason: ALTCHOICE

## 2022-01-04 RX ORDER — IBUPROFEN 200 MG
200 TABLET ORAL EVERY 6 HOURS PRN
COMMUNITY
End: 2025-01-13

## 2022-01-04 ASSESSMENT — PAIN SCALES - GENERAL: PAINLEVEL: 6

## 2022-01-04 NOTE — TELEPHONE ENCOUNTER
[facility] Nurse Triage Note    INITIAL REQUIRED QUESTIONS:    Are you currently in [place]? Yes    Are you currently driving?: No      CHIEF COMPLAINT AND HISTORY:                                               Reason for call:  sinus pressure, orbit pain, teeth pain, ear pressure/ache    Do you follow with a specialist for this condition: No    Onset: 1 days ago    Duration: CONSTANT    Severity: Moderate    Pain Level Reported: 6    Location:  sinus    Associated Symptoms: denies    Are symptoms interfering with Activities of Daily Living?: denies    History of similar symptoms: reports hx of sinus infections    Aggravating/Relieving Factors: reports humidity, ice,, mucinex, rush pot    LMP (for females ages 10-60): N/A     Allergies - Patient reported: reviewed      CARE ADVICE:   Please review Encounters in Chart review for Specific Care Advice given by Triage RN.       DISPOSITION:   No disposition on file.       PCP COMMUNICATION:             Charlotte Collins RN  January 4, 2022 8:32 AM    Reason for Disposition  • Earache    Protocols used: SINUS PAIN OR CONGESTION-A-AH

## 2022-01-04 NOTE — PROGRESS NOTES
Physical Examination:   Constitutional: Patient is oriented to person, place, and time. Patient appears well-developed and well-nourished.   Head: Normocephalic and atraumatic.   Eyes: Conjunctivae and lids are normal.   Cardiovascular: Normal rate, regular rhythm, S1 normal, S2 normal and normal heart sounds.    Pulmonary/Chest: Effort normal and breath sounds normal. Patient has no decreased breath sounds.   Musculoskeletal: Normal range of motion.   Neurological: Patient is alert and oriented to person, place, and time.   Skin: Skin is warm and dry.   Psychiatric: Patient has a normal mood and affect. normal.       Assessment/Plan     Diagnoses and all orders for this visit:    Sinusitis, unspecified chronicity, unspecified location  -     amoxicillin-pot clavulanate (AUGMENTIN) 875-125 mg per tablet; Take 1 tablet by mouth 2 (two) times a day for 10 days      Sinusitis    - Medical Decision Making: Patient has a history of sinusitis and states that these symptoms are similar to the past infections. She states that she has significant sinus pressure, with bilateral ear and orbit pain. She states that she also has pressure in her teeth. She states antibiotics have resolved these symptoms in the past.    - Abx as above   - FU as needed    - Advised to avoid smoke exposure   - Follow up with PCP as needed   - Patient advised to seek immediate medical attention should alarming symptoms arise based on today's complaint. Patient expressed understanding and was in agreement.    This visit was performed by Behzad Thompson MD.  All extraneous collected information was purged from this note to provide useful review.  In addition to voice activated recording software this visit was completed in a team-based fashion with Bettye Taylor (scribe and nurse), support staff and myself all actively participating in care.

## 2022-01-04 NOTE — TELEPHONE ENCOUNTER
Caller would like to discuss (a) return call Writer has advised caller of a callback from within 24 hours.    Patient: Corrine Holden    Caller Name (Last and first, relation/role): self    Name of Facility: na    Callback Number: [phone]    Best Availability: anytime    Fax Number: na    Additional Info: Heart rate has gone up and spiked a temp feels worse then earlier     Refused triage     Did you confirm the message with the caller: Yes    Is it okay that the nurse communicates your response through MyChart? No

## 2022-01-06 NOTE — TELEPHONE ENCOUNTER
Contacted patient and she states she is 5 doses into her antibiotic and her symptoms have not improved, almost worsened. She was advised to proceed to Urgent Care for evaluation. She verbalized understanding and will do that this afternoon.

## 2022-01-11 DIAGNOSIS — K21.9 GASTROESOPHAGEAL REFLUX DISEASE WITHOUT ESOPHAGITIS: ICD-10-CM

## 2022-01-11 DIAGNOSIS — M15.9 OSTEOARTHRITIS OF MULTIPLE JOINTS, UNSPECIFIED OSTEOARTHRITIS TYPE: ICD-10-CM

## 2022-01-11 DIAGNOSIS — M15.9 OSTEOARTHRITIS OF MULTIPLE JOINTS, UNSPECIFIED OSTEOARTHRITIS TYPE: Primary | ICD-10-CM

## 2022-01-11 RX ORDER — OMEPRAZOLE 20 MG/1
CAPSULE, DELAYED RELEASE ORAL
Qty: 90 CAPSULE | Refills: 3 | Status: SHIPPED | OUTPATIENT
Start: 2022-01-11 | End: 2022-09-15 | Stop reason: SDUPTHER

## 2022-01-11 RX ORDER — LEVOTHYROXINE SODIUM 112 UG/1
TABLET ORAL
Qty: 90 TABLET | Refills: 0 | OUTPATIENT
Start: 2022-01-11

## 2022-01-11 RX ORDER — MELOXICAM 7.5 MG/1
7.5 TABLET ORAL DAILY
Qty: 90 TABLET | Refills: 3 | Status: SHIPPED | OUTPATIENT
Start: 2022-01-11 | End: 2024-09-03 | Stop reason: ALTCHOICE

## 2022-01-11 NOTE — TELEPHONE ENCOUNTER
No new care gaps identified.  Powered by [employer]. Reference number: [id number]. 1/11/2022 3:12:19 PM MST

## 2022-01-11 NOTE — TELEPHONE ENCOUNTER
No new care gaps identified.  Powered by [employer]. Reference number: [id number]. 1/11/2022 1:44:32 PM MST

## 2022-01-11 NOTE — TELEPHONE ENCOUNTER
No new care gaps identified.  Powered by [employer]. Reference number: [id number]. 1/11/2022 10:30:17 AM MST

## 2022-01-12 RX ORDER — OMEPRAZOLE 20 MG/1
CAPSULE, DELAYED RELEASE ORAL
OUTPATIENT
Start: 2022-01-12

## 2022-01-12 RX ORDER — MELOXICAM 7.5 MG/1
7.5 TABLET ORAL DAILY
OUTPATIENT
Start: 2022-01-12

## 2022-02-07 ENCOUNTER — OFFICE VISIT (OUTPATIENT)
Dept: INTERNAL MEDICINE | Facility: CLINIC | Age: 67
End: 2022-02-07
Payer: MEDICARE

## 2022-02-07 ENCOUNTER — APPOINTMENT (OUTPATIENT)
Dept: LAB | Facility: CLINIC | Age: 67
End: 2022-02-07
Payer: MEDICARE

## 2022-02-07 VITALS — DIASTOLIC BLOOD PRESSURE: 70 MMHG | SYSTOLIC BLOOD PRESSURE: 122 MMHG

## 2022-02-07 DIAGNOSIS — Z00.00 MEDICARE ANNUAL WELLNESS VISIT, INITIAL: Primary | ICD-10-CM

## 2022-02-07 DIAGNOSIS — E67.3 HYPERVITAMINOSIS D: ICD-10-CM

## 2022-02-07 DIAGNOSIS — E03.9 HYPOTHYROIDISM, UNSPECIFIED TYPE: ICD-10-CM

## 2022-02-07 DIAGNOSIS — E78.5 BORDERLINE HYPERLIPIDEMIA: ICD-10-CM

## 2022-02-07 DIAGNOSIS — R31.29 MICROSCOPIC HEMATURIA: ICD-10-CM

## 2022-02-07 DIAGNOSIS — K21.9 GASTROESOPHAGEAL REFLUX DISEASE, UNSPECIFIED WHETHER ESOPHAGITIS PRESENT: ICD-10-CM

## 2022-02-07 DIAGNOSIS — E66.9 OBESITY WITHOUT SERIOUS COMORBIDITY, UNSPECIFIED CLASSIFICATION, UNSPECIFIED OBESITY TYPE: ICD-10-CM

## 2022-02-07 DIAGNOSIS — R73.01 IMPAIRED FASTING GLUCOSE: ICD-10-CM

## 2022-02-07 LAB
25(OH)D3 SERPL-MCNC: 44 NG/ML (ref 30–100)
BACTERIA #/AREA URNS HPF: NORMAL /HPF
BASOPHILS # BLD AUTO: 0.1 10*3/UL
BASOPHILS NFR BLD AUTO: 2 % (ref 0–2)
BILIRUB UR QL: NEGATIVE
CLARITY UR: CLEAR
COLOR UR: YELLOW
EOSINOPHIL # BLD AUTO: 0.1 10*3/UL
EOSINOPHIL NFR BLD AUTO: 2 % (ref 0–3)
ERYTHROCYTE [DISTWIDTH] IN BLOOD BY AUTOMATED COUNT: 13.5 % (ref 11.5–14)
GLUCOSE UR QL: NEGATIVE MG/DL
HCT VFR BLD AUTO: 41.7 % (ref 34–45)
HGB BLD-MCNC: 14.6 G/DL (ref 11.5–15.5)
HGB UR QL: NEGATIVE
KETONES UR-MCNC: NEGATIVE MG/DL
LEUKOCYTE ESTERASE UR QL STRIP: NEGATIVE
LYMPHOCYTES # BLD AUTO: 1.8 10*3/UL
LYMPHOCYTES NFR BLD AUTO: 26 % (ref 11–47)
MCH RBC QN AUTO: 32.8 PG (ref 28–33)
MCHC RBC AUTO-ENTMCNC: 35 G/DL (ref 32–36)
MCV RBC AUTO: 93.9 FL (ref 81–97)
MONOCYTES # BLD AUTO: 0.5 10*3/UL
MONOCYTES NFR BLD AUTO: 7 % (ref 3–11)
NEUTROPHILS # BLD AUTO: 4.4 10*3/UL
NEUTROPHILS NFR BLD AUTO: 63 % (ref 41–81)
NITRITE UR QL: NEGATIVE
PH UR: 6 PH
PLATELET # BLD AUTO: 264 10*3/UL (ref 140–350)
PMV BLD AUTO: 6.5 FL (ref 6.9–10.8)
PROT UR STRIP-MCNC: NEGATIVE MG/DL
RBC # BLD AUTO: 4.44 10*6/ΜL (ref 3.7–5.3)
RBC #/AREA URNS HPF: NORMAL /HPF
SP GR UR: 1.02 (ref 1–1.03)
SQUAMOUS #/AREA URNS HPF: NORMAL /HPF
TSH SERPL DL<=0.05 MIU/L-ACNC: 1.65 UIU/ML (ref 0.34–4.82)
UROBILINOGEN UR-MCNC: 0.2 E.U./DL
WBC # BLD AUTO: 7 10*3/UL (ref 4.5–10.5)
WBC #/AREA URNS HPF: NORMAL /HPF

## 2022-02-07 PROCEDURE — 85025 COMPLETE CBC W/AUTO DIFF WBC: CPT | Mod: PO

## 2022-02-07 PROCEDURE — G0402 INITIAL PREVENTIVE EXAM: HCPCS | Performed by: INTERNAL MEDICINE

## 2022-02-07 PROCEDURE — 84443 ASSAY THYROID STIM HORMONE: CPT | Mod: PO

## 2022-02-07 PROCEDURE — G0463 HOSPITAL OUTPT CLINIC VISIT: HCPCS | Mod: 25,PO | Performed by: INTERNAL MEDICINE

## 2022-02-07 PROCEDURE — 36415 COLL VENOUS BLD VENIPUNCTURE: CPT | Mod: PO

## 2022-02-07 PROCEDURE — 82306 VITAMIN D 25 HYDROXY: CPT

## 2022-02-07 PROCEDURE — 81001 URINALYSIS AUTO W/SCOPE: CPT | Mod: PO

## 2022-02-07 PROCEDURE — 99214 OFFICE O/P EST MOD 30 MIN: CPT | Mod: 25 | Performed by: INTERNAL MEDICINE

## 2022-02-07 PROCEDURE — G0402 INITIAL PREVENTIVE EXAM: HCPCS | Mod: PO | Performed by: INTERNAL MEDICINE

## 2022-02-07 ASSESSMENT — ENCOUNTER SYMPTOMS
NEUROLOGICAL NEGATIVE: 1
HEADACHES: 0
SORE THROAT: 0
TREMORS: 0
CHILLS: 0
CHEST TIGHTNESS: 0
SPEECH DIFFICULTY: 0
DIARRHEA: 0
FREQUENCY: 0
HEMATOLOGIC/LYMPHATIC NEGATIVE: 1
WHEEZING: 0
APPETITE CHANGE: 0
ARTHRALGIAS: 1
BRUISES/BLEEDS EASILY: 0
ABDOMINAL DISTENTION: 0
JOINT SWELLING: 0
COUGH: 0
NERVOUS/ANXIOUS: 0
PSYCHIATRIC NEGATIVE: 1
WEAKNESS: 0
DIZZINESS: 0
ABDOMINAL PAIN: 0
VOICE CHANGE: 0
NECK PAIN: 0
CONSTIPATION: 0
ADENOPATHY: 0
FLANK PAIN: 0
TROUBLE SWALLOWING: 0
FEVER: 0
SHORTNESS OF BREATH: 0
NECK STIFFNESS: 0
PHOTOPHOBIA: 0
POLYPHAGIA: 0
RESPIRATORY NEGATIVE: 1
FATIGUE: 0
CONSTITUTIONAL NEGATIVE: 1
BLOOD IN STOOL: 0
PALPITATIONS: 0
DIFFICULTY URINATING: 0
RHINORRHEA: 0
EYES NEGATIVE: 1
UNEXPECTED WEIGHT CHANGE: 0
HEMATURIA: 0
GASTROINTESTINAL NEGATIVE: 1
DIAPHORESIS: 0
ENDOCRINE NEGATIVE: 1
VOMITING: 0
BACK PAIN: 0
SINUS PRESSURE: 0
LIGHT-HEADEDNESS: 0
NUMBNESS: 0
ACTIVITY CHANGE: 0
NAUSEA: 0
CARDIOVASCULAR NEGATIVE: 1
POLYDIPSIA: 0
MYALGIAS: 0
SLEEP DISTURBANCE: 0
DYSPHORIC MOOD: 0
SEIZURES: 0
DYSURIA: 0

## 2022-02-07 ASSESSMENT — VISUAL ACUITY
OD_CC: 20/20
OS_CC: 20/20

## 2022-02-07 ASSESSMENT — PATIENT HEALTH QUESTIONNAIRE - PHQ9: SUM OF ALL RESPONSES TO PHQ QUESTIONS 1-9: 0

## 2022-02-07 NOTE — PROGRESS NOTES
Corrine Holden is a 66 y.o. female who presents for No chief complaint on file.        HPI:    Corrine is a very pleasant 66-year-old lady who is here for a welcome to Medicare physical.    Overall, she is feeling well.  I saw her last fall.  That time she had been complaining of a chronic cough.  She says that that has pretty much resolved.  She did end up getting costochondritis shortly after that and saw one of our nurse practitioners who gave her a shot of Toradol and had a chest x-ray that was normal.    She does get some arthritis pains at times.  Her left foot and ankle sometimes hurt.  Her thumbs can be painful.  She had a hip bursa injection last summer.  She does take meloxicam regularly.    She is retired from her work last November.  Prior to that, she had been on her feet a lot and by the end of the day would have some restless leg symptoms and would occasionally use Valium for that.  Valium was also ordered previously for some of her neck spasms that contributed to chronic headaches.  She states since retiring and she is not on her feet, she really has not been having much of restless legs.    She did get sick in early January with a sinus infection.  Was given Augmentin.  After that, had a yeast infection and some GI upset but seems to be almost back to normal now.    She wants to work on some weight loss now that she is not busy with her job.  A few years ago, she had gone to weight management.  She is interested in going back.  She started going back to the swim center and has a stationary bike in her home that she uses on days it is too cold to go out.    She has had no changes in her family history.  No tobacco, alcohol or drug use.  She has a healthcare power of  document on file.  Her power of  is her daughter Annabel followed by her daughter Neelam followed by her daughter Angely.    Last fall, we put her on a different dose of levothyroxine.  She states she used up her old supply  "of medication but has not had a TSH since she has been on the 125's.    Past Medical History:   Diagnosis Date   • GERD (gastroesophageal reflux disease)    • H. pylori infection 2009   • Hypothyroidism    • Injury of back     STATES, \"THIS IS REASON FOR MRI\"   • Microscopic hematuria    • SHANA (obstructive sleep apnea)     USES CPAP   • Osteoarthritis    • Palpitations    • Respiratory disease    • Supraventricular tachycardia (CMS/HCC) (HCC)     STATES, \"COFFEE RELATED\", VERY INTERMITTENT   • Tinnitus        Current Outpatient Medications   Medication Sig Dispense Refill   • omeprazole (PriLOSEC) 20 mg capsule TAKE ONE CAPSULE BY MOUTH ONE TIME DAILY 90 capsule 3   • meloxicam (MOBIC) 7.5 mg tablet Take 1 tablet (7.5 mg total) by mouth daily 90 tablet 3   • guaiFENesin (Mucinex) 600 mg 12 hr tablet Take 1,200 mg by mouth 2 (two) times a day     • ibuprofen (ADVIL,MOTRIN) 200 mg tablet Take 200 mg by mouth every 6 (six) hours as needed     • levothyroxine (SYNTHROID, LEVOTHROID) 125 mcg tablet Take 1 tablet (125 mcg total) by mouth daily 90 tablet 3   • vit C/E/Zn/coppr/lutein/zeaxan (OCUVITE LUTEIN AND ZEAXANTHIN ORAL) PreserVision AREDS-2     • zinc 50 mg tablet Take 50 mg by mouth daily     • diclofenac sodium (VOLTAREN) 1 % gel APPLY 2 GRAMS TO THE AFFECTED AREA(S) 4 TIMES PER DAY AS NEEDED 300 g 2   • fluticasone propionate (FLONASE) 50 mcg/actuation nasal spray USE 2 SPRAYS IN EACH NOSTRIL DAILY 48 g 2   • diazePAM (VALIUM) 5 mg tablet TAKE 1 TABLET BY MOUTH EVERY 12 HOURS AS NEEDED FOR MUSCLE SPASMS 30 tablet 0   • budesonide (Pulmicort Flexhaler) 90 mcg/actuation inhaler as needed     • vit B complex no.12/niacin,B3, (VITAMIN B COMPLEX NO.12-NIACIN ORAL) Take by mouth     • cholecalciferol, vitamin D3, (Vitamin D3) 1,000 unit capsule Take 5,000 Units by mouth Takes 5 times daily      • krill-om-3-dha-epa-phospho-ast (KRILL OIL) 651-822-46-75 mg capsule Take 1 capsule by mouth daily     • amoxicillin (AMOXIL) " 500 mg capsule 500 mg With dental appointments for total knee arthroplasty      • cyclobenzaprine (FLEXERIL) 10 mg tablet Take 1 tablet (10 mg total) by mouth 2 (two) times a day as needed for muscle spasms. 60 tablet 0   • calcium carbonate (OS-RONNIE) 500 mg calcium tablet Take 1 tablet by mouth daily       • multivitamin (THERAGRAN) tablet tablet 1 Every Day     • albuterol HFA (VENTOLIN HFA) 90 mcg/actuation inhaler Inhale 2 puffs every 4 hours by inhalation route as needed. 3 1   • acetaminophen (TYLENOL EXTRA STRENGTH) 500 mg tablet Take 1,000 mg by mouth 2 (two) times a day         No current facility-administered medications for this visit.        Allergies   Allergen Reactions   • Bee Venom Protein (Honey Bee)    • No Known Drug Allergies        Past Surgical History:   Procedure Laterality Date   • CARDIAC ELECTROPHYSIOLOGY STUDY AND ABLATION  01/01/2007    attempted ablation for SVT   • CARPAL TUNNEL RELEASE Bilateral 6/26/2020    Procedure: LEFT ENDOSCOPIC CARPAL TUNNEL RELEASE, RIGHT OPEN CARPAL TUNNEL;  Surgeon: Mendez Sun MD;  Location: [facility] OR;  Service: Orthopedics;  Laterality: Bilateral;   • COLONOSCOPY  01/21/2015    cecal polyp-tubular adenoma-recommend repeat 5 years   • COLONOSCOPY N/A 6/22/2020    repeat 5 years - adenomatous polyp   • DILATION AND CURETTAGE OF UTERUS  01/01/2006   • ENDOMETRIAL BIOPSY  01/01/2012    pt reports negative   • ESOPHAGOGASTRODUODENOSCOPY  01/01/2009    H pylori   • ESOPHAGOGASTRODUODENOSCOPY N/A 6/22/2020    Procedure: ESOPHAGOGASTRODUODENOSCOPY;  Surgeon: Julian Faulkner MD;  Location: [facility];  Service: Endoscopy;  Laterality: N/A;   • KNEE ARTHROPLASTY Right 04/01/2015    Dr Placido BRICE   • VAGINAL HYSTERECTOMY  12/16/2013    with BSO, fibroids       Family History   Problem Relation Age of Onset   • Cancer Mother 84        Bladder   • Osteoporosis Mother    • Hypertension Mother    • Colon cancer Father 65   • Kidney cancer Father 77   • Other  "Father's Brother         sudden cardiac death, 60s   • Heart disease Maternal Grandfather    • Heart attack Maternal Grandfather 59   • Cancer Paternal Grandfather          Social History     Tobacco Use   • Smoking status: Never Smoker   • Smokeless tobacco: Never Used   Substance Use Topics   • Alcohol use: No       Screenings    Vision Screening  Edited by: Corry Boyce LPN      Right eye Left eye Both eyes    With correction 20/20 20/20 20/20          Depression Screening  Mood Interview  Little Interest or Pleasure in Doing Things: Not at all  Feeling Down, Depressed, or Hopeless: Not at all  PHQ-9 Total Score: 0    Mini-Mental Exam  Mini Mental Full Exam:   Q1 Max Score - 5: Ask the following questions and record one point for each correct answer.   What is the year?: Yes  What is the season?: Yes  What is the date?: Yes  What is the day of the week?: Yes  What is the month?: Yes  Q2 Max Score - 5: Ask the following questions and record one point for each correct answer  Where are we now: state?: Yes  Where are we now: country?: Yes  Where are we now: town/city?: Yes  Where are we now: hospital/clinic?: Yes  Where are we now: floor?: Yes  Q3 Max Score - 3: Ask patient to name all three of the objects. Repeat each object until patient learns all three, if possible. Give a point for each correct answer. Examiner holds up 3 items  Number of trials: 2  Object 1: Yes  Object 2: Yes  Object 3: No  Q4 Max Score - 5: Ask patient to count backward from 100 by seven (93, 86, 79, 72, 65,). Stop after five answers. Alternative: Spell \"world\" backwards. The score is the number of letters in correct order.   Number of numbers/letters in correct order: 5  Q5 Max Score - 3: Ask for the three objects. Repeated above. Give one point for each correct answer.  Object 1: Yes  Object 2: Yes  Object 3: Yes  Q6 Max Score - 2: Show the patient two simple objects, such as a wristwatch and a pencil to name each  Object 1: " "Yes  Object 2: Yes  Q7 Max Score -1: Repetition: Ask the patient to repeat the sentence after you. Allow only one trial.   Q7 Enter Score: Yes  Q8 Max Score - 3: Ask the patient to follow your instructions, giving one point for each correct step. \"Take the paper with your right hand, fold it in half and set it on the floor\". Hand the patient a piece of paper.   Q8 Enter Score: 3  Q9 Max Score - 1: Please read this and do what it says. Written instruction is \"Close your eyes\"  Q9 Enter Score: Yes  Q10 Max Score - 1: Make up and write a sentence about anything. This sentence must contain a noun and a verb  Q10 Enter Score: Yes  Q11 Max Score - 1: The Examiner gives the patient the form and asks him/her to draw the symbol on the paper.  All 10 angles must be present and two must intersect.  Q11 Enter Score: 1  Max Score 30  Total Patient Score: 29    Fall Risk Assessment  STEADI Fall Risk  Have you fallen in the past year? : No  Do you feel unsteady when standing or walking?: No  Do you worry about falling? : No    Diet and Activity       Activities of Daily Living & Health Risk Assessment  Safety & ADLs  How would you rate your overall health?: good  Do you struggle with hearing? : No  Do you struggle with your memory?: No  Do you have safety features in your home to prevent falls? : No  Do you struggle to care for yourself or your home?: No    Durable Medical Equipment  Durable Medical Equipment  Durable Medical Equipment: CPAP / APAP / VPAP    Patient Care Team:  Becka Joy MD as PCP - General (Internal Medicine)         Educational handouts given and discussed with patient regarding healthy living, healthy eating, diet and exercise, fall prevention within the home, exercises to improve balance, flexibility, strength and staying power; as well as prevention of chronic disease and the importance of tobacco free living and cessation of tobacco products Yes        Review of Systems   Constitutional: Negative.  " Negative for activity change, appetite change, chills, diaphoresis, fatigue, fever and unexpected weight change.   HENT: Negative for congestion, ear pain, hearing loss, mouth sores, nosebleeds, postnasal drip, rhinorrhea, sinus pressure, sneezing, sore throat, tinnitus, trouble swallowing and voice change.    Eyes: Negative.  Negative for photophobia and visual disturbance.   Respiratory: Negative.  Negative for cough, chest tightness, shortness of breath and wheezing.    Cardiovascular: Negative.  Negative for chest pain, palpitations and leg swelling.   Gastrointestinal: Negative.  Negative for abdominal distention, abdominal pain, blood in stool, constipation, diarrhea, nausea and vomiting.   Endocrine: Negative.  Negative for cold intolerance, heat intolerance, polydipsia, polyphagia and polyuria.   Genitourinary: Negative.  Negative for difficulty urinating, dysuria, flank pain, frequency, hematuria and urgency.   Musculoskeletal: Positive for arthralgias. Negative for back pain, gait problem, joint swelling, myalgias, neck pain and neck stiffness.   Skin: Negative.  Negative for rash.   Neurological: Negative.  Negative for dizziness, tremors, seizures, syncope, speech difficulty, weakness, light-headedness, numbness and headaches.   Hematological: Negative.  Negative for adenopathy. Does not bruise/bleed easily.   Psychiatric/Behavioral: Negative.  Negative for dysphoric mood and sleep disturbance. The patient is not nervous/anxious.        Physical Exam  /70   GEN: pleasant, NAD  HEENT: PERRLA, TMs clear, posterior pharynx benign, sclera anicteric  NECK: no LAD or thyromegaly, no carotid bruits  LUNGS: CTAB, no wheezing rhonchi or rales  HEART: RRR faint murmur  BREAST: symmetric, no masses, no nipple discharge, no axillary adenopathy  ABD: Soft, non tender, non distended, normal bowel sounds , no hepatosplenomegaly  : deferred  RECTAL: deferred  Ext: Warm and well perfused, no edema  SKIN: no  rash  Neuro:  CN 2-12 grossly intact, non focal, moving all extremities        Assessment/Plan   Diagnoses and all orders for this visit:    Medicare annual wellness visit, initial    Obesity without serious comorbidity, unspecified classification, unspecified obesity type  -     Ambulatory Referral to Weight Management; Future  -     25-Hydroxyvitamin D2 and D3, serum Blood, Venous; Future    Hypothyroidism, unspecified type  -     Thyroid Stimulating Hormone, Ultrasensitive Blood, Venous; Future  -     Thyroid Stimulating Hormone, Ultrasensitive Blood, Venous; Future    Borderline hyperlipidemia  -     CBC w/auto differential Blood, Venous; Future  -     Lipid panel Blood, Venous; Future  -     CBC w/auto differential Blood, Venous; Future  -     Comprehensive metabolic panel Blood, Venous; Future  -     LDL cholesterol, direct Blood, Venous; Future    Microscopic hematuria  -     Urinalysis w/microscopic Urine, Clean Catch; Future  -     Urinalysis w/microscopic Urine, Clean Catch; Future    Gastroesophageal reflux disease, unspecified whether esophagitis present  -     Vitamin B12 Blood, Venous; Future    Impaired fasting glucose  -     Hemoglobin A1c (glycosylated) Blood, Venous; Future    Hypervitaminosis D  -     25-Hydroxyvitamin D2 and D3, serum Blood, Venous; Future  -     Vitamin D 25 hydroxy Blood, Venous; Future        1. Adult health examination.  No family history abdominal aortic aneurysm.  Bone density was done in 2020.  Due in 2025.  Mammogram was last summer.  Due this summer.  Has borderline hyperlipidemia, last checked in September 2021.  Last colonoscopy was in 2020, due for 5-year follow-up.  Had prediabetes on lab work in September 2021.  Declines Covid vaccine.  Declines flu shot.  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  Had a Pneumovax in 2020 prior to age 66.  Had Prevnar 13 in 2021.  Will be due for Pneumovax booster after the age of 66  after it has been 1 year since her Prevnar 13 vaccine.  Has had Shingrix x2 in 2018.  Has had a hysterectomy so no Pap smear today.  Follows regularly with her eye doctor Dr. Snowden.  Due for an eye exam this coming summer.  Declines HIV and hepatitis C screening.  Does not qualify for lung cancer screening or tobacco cessation counseling.  She has a healthcare power of  document.  She is considering putting her sister on the document so I gave her some forms today.  That also include a living will for her to look through.  2.  Chronic arthritis pains.  Doing well on meloxicam.  3.  Hypothyroidism.  We will check TSH today.  4.  Borderline hyperlipidemia.  We will see what her fasting lipids are before next visit.  5.  Microscopic hematuria.  Last 2 urinalyses have been back to normal.  Before that, had some microscopic hematuria.  Mother had bladder cancer.  Will check a UA today.  6.  Obesity.  Will refer back to weight management.      Total time spent was 60 min with 30 min spent regarding physical and 30 min spent regarding other medical issues with greater than 50% of total time spent in direct patient contact and coordination of care regarding reviewing medications, reviewing labs, symptoms .      CM MON MD

## 2022-02-10 ENCOUNTER — TELEPHONE (OUTPATIENT)
Dept: INTERNAL MEDICINE | Facility: CLINIC | Age: 67
End: 2022-02-10

## 2022-02-11 NOTE — TELEPHONE ENCOUNTER
Caller would like to discuss (a) clinical qs Writer has advised caller of a callback from within 24 hours.    Patient: Corrine Holden    Caller Name (Last and first, relation/role): self    Name of Facility: na    Callback Number: [phone]    Best Availability: anytime    Fax Number: na    Additional Info: pt is requesting a new handicap sticker. Can walk but not far is why she is calling.    Did you confirm the message with the caller: Yes    Is it okay that the nurse communicates your response through [employer]? No    
Form printed for  to sign  
Patient informed paper ready for   
Cataract, bilateral    H/O hemorrhoidectomy    Nephrolithiasis

## 2022-02-23 ENCOUNTER — TELEPHONE (OUTPATIENT)
Dept: INTERNAL MEDICINE | Facility: CLINIC | Age: 67
End: 2022-02-23

## 2022-02-23 NOTE — TELEPHONE ENCOUNTER
Caller would like to discuss (a) referral Writer has advised caller of a callback from within 24 hours.    Patient: Corrine Holden    Caller Name (Last and first, relation/role): self    Name of Facility: na    Callback Number: [phone]    Best Availability: anytime    Fax Number: na    Additional Info: Pt doesn't want to do the referral with [facility] anymore, wants the referral cancelled.     Did you confirm the message with the caller: Yes    Is it okay that the nurse communicates your response through MyChart? No

## 2022-02-24 ASSESSMENT — ENCOUNTER SYMPTOMS
FATIGUE: 0
CHILLS: 0
DIZZINESS: 0
EYE REDNESS: 0
ROS GI COMMENTS: GERD
NAUSEA: 0
VOMITING: 0
SINUS PRESSURE: 0
COUGH: 0
PALPITATIONS: 0
BACK PAIN: 1
EYE ITCHING: 0
SLEEP DISTURBANCE: 1
AGITATION: 0
APNEA: 1
SINUS PAIN: 0
SORE THROAT: 0
NECK PAIN: 0
WEAKNESS: 0
HEADACHES: 0
ARTHRALGIAS: 0
FEVER: 0
EYE PAIN: 0
SHORTNESS OF BREATH: 0

## 2022-02-24 NOTE — PROGRESS NOTES
"Sleep Progress Note    02/28/22  9:50 AM    Chief Complaint   Patient presents with   • Sleep Apnea     Gets supplies from [facility].       HPI  Sleep study date: 8/15/2014  Severity: Moderate   AHI: 13.4  Minimum O2 saturation: 82%  Machine type: CPAP   Mask: Nasal    Corrine Holden is a 67 y.o. female who returns to the clinic in follow-up for obstructive sleep apnea and annual CPAP follow-up. It is noted that the patient has a long-standing history of SHANA dating back to 2014. At that time she underwent sleep study secondary to complaints of excessive daytime fatigability and somnolence. She also had complaints of significant morning headaches which were debilitating for her and she found it hard to work. Sleep study was completed on 8/15/2014 and demonstrated moderate obstructive sleep apnea with 13.4 apneas and hypopneas per hour. Minimum oxygen saturation was noted to be 82%. Currently the patient is on CPAP with a set pressure of 8.6 cmH2O.     She does have a past medical history positive for GERD, hypothyroidism, history of supraventricular tachycardia, osteoarthritis, and obesity.     At today’s appointment the patient reports that she tolerates CPAP and pressure well. She was able to get a new machine since our last visit and reports that this is working well for her. She denies any complaints or concerns in regards to CPAP. Patient continues to report that CPAP is a \"life changer\". She sleeps well throughout the night and feels that she is getting restful sleep. She will awaken a few times a night to utilize the bathroom, but denies trouble getting back to sleep. She feels well rested upon awakening and denies daytime fatigue. Patient is currently utilizing a nasal mask without the use of a chin strap. She was previously using nasal pillows but has now switched to the nasal mask. She denies any concerns or complaints in regards to this and reports that current mask fit is very " "comfortable for her. Compliance report today demonstrates 100% usage over 4 hours. On average she utilize her machine 8 hours and 9 minutes a night. AHI is very well controlled at 0.7. Overall, from a sleep standpoint the patient is noted to be doing very well. Patient is pleasant to visit with. 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 However, she has been able to retired. She is enjoying correction and spending time with her grandchildren.      [place] Sleepiness Scale was completed in the office today with a total score of 0/24.       Histories:  Past Medical History:   Diagnosis Date   • GERD (gastroesophageal reflux disease)    • H. pylori infection 2009   • Hypothyroidism    • Injury of back     STATES, \"THIS IS REASON FOR MRI\"   • Microscopic hematuria    • SHANA (obstructive sleep apnea)     USES CPAP   • Osteoarthritis    • Palpitations    • Respiratory disease    • Supraventricular tachycardia (CMS/HCC) (HCC)     STATES, \"COFFEE RELATED\", VERY INTERMITTENT   • Tinnitus      Family History   Problem Relation Age of Onset   • Cancer Mother 84        Bladder   • Osteoporosis Mother    • Hypertension Mother    • Colon cancer Father 65   • Kidney cancer Father 77   • Other Father's Brother         sudden cardiac death, 60s   • Heart disease Maternal Grandfather    • Heart attack Maternal Grandfather 59   • Cancer Paternal Grandfather      Social History     Tobacco Use   • Smoking status: Never Smoker   • Smokeless tobacco: Never Used   Substance Use Topics   • Alcohol use: No   • Drug use: No        Allergies   Allergen Reactions   • Bee Venom Protein (Honey Bee)    • No Known Drug Allergies        Current Outpatient Medications   Medication Sig Dispense Refill   • omeprazole (PriLOSEC) 20 mg capsule TAKE ONE CAPSULE BY MOUTH ONE TIME DAILY 90 capsule 3   • meloxicam (MOBIC) 7.5 mg tablet Take 1 tablet (7.5 mg total) by mouth daily 90 tablet 3   • guaiFENesin (MUCINEX) " 600 mg 12 hr tablet Take 1,200 mg by mouth 2 (two) times a day     • ibuprofen (ADVIL,MOTRIN) 200 mg tablet Take 200 mg by mouth every 6 (six) hours as needed     • levothyroxine (SYNTHROID, LEVOTHROID) 125 mcg tablet Take 1 tablet (125 mcg total) by mouth daily 90 tablet 3   • vit C/E/Zn/coppr/lutein/zeaxan (OCUVITE LUTEIN AND ZEAXANTHIN ORAL) PreserVision AREDS-2     • zinc 50 mg tablet Take 50 mg by mouth daily     • diclofenac sodium (VOLTAREN) 1 % gel APPLY 2 GRAMS TO THE AFFECTED AREA(S) 4 TIMES PER DAY AS NEEDED 300 g 2   • fluticasone propionate (FLONASE) 50 mcg/actuation nasal spray USE 2 SPRAYS IN EACH NOSTRIL DAILY 48 g 2   • diazePAM (VALIUM) 5 mg tablet TAKE 1 TABLET BY MOUTH EVERY 12 HOURS AS NEEDED FOR MUSCLE SPASMS 30 tablet 0   • budesonide (Pulmicort Flexhaler) 90 mcg/actuation inhaler as needed     • vit B complex no.12/niacin,B3, (VITAMIN B COMPLEX NO.12-NIACIN ORAL) Take by mouth     • cholecalciferol, vitamin D3, 25 mcg (1,000 unit) capsule Take 5,000 Units by mouth Takes 5 times daily      • krill-om-3-dha-epa-phospho-ast 240-998-61-75 mg capsule Take 1 capsule by mouth daily     • amoxicillin (AMOXIL) 500 mg capsule 500 mg With dental appointments for total knee arthroplasty      • cyclobenzaprine (FLEXERIL) 10 mg tablet Take 1 tablet (10 mg total) by mouth 2 (two) times a day as needed for muscle spasms. 60 tablet 0   • calcium carbonate (OS-RONNIE) 500 mg calcium tablet Take 1 tablet by mouth daily       • multivitamin (THERAGRAN) tablet tablet 1 Every Day     • acetaminophen (TYLENOL EXTRA STRENGTH) 500 mg tablet Take 1,000 mg by mouth 2 (two) times a day       • albuterol HFA (VENTOLIN HFA) 90 mcg/actuation inhaler Inhale 2 puffs every 4 hours by inhalation route as needed. 3 1     No current facility-administered medications for this visit.       Review of Systems   Constitutional: Negative for chills, fatigue and fever.   HENT: Negative for congestion, ear pain, sinus pressure, sinus pain  "and sore throat.    Eyes: Negative for pain, redness and itching.   Respiratory: Positive for apnea (Known diagnosis of SHANA. On CPAP). Negative for cough and shortness of breath.         No snoring, unusual movements or abnormal dream activity, restless legs, or trouble sleeping (insomnia) and not excessively sleepy during daytime.   Cardiovascular: Negative for chest pain, palpitations and leg swelling.        History of supraventricular tachycardia   Gastrointestinal: Negative for nausea and vomiting.        GERD   Endocrine:        Hypothyriodism   Musculoskeletal: Positive for back pain (lower back). Negative for arthralgias and neck pain.        Osteoarthritis   Allergic/Immunologic: Negative for environmental allergies.   Neurological: Negative for dizziness, syncope, weakness and headaches.   Psychiatric/Behavioral: Positive for sleep disturbance. Negative for agitation and behavioral problems.       OBJECTIVE    VS/Weight:  /79 (BP Location: Left arm, Patient Position: Sitting, Cuff Size: Regular Adult)   Pulse 63   Ht 1.626 m (5' 4\")   BMI 46.35 kg/m²       Physical Exam:    Results of Compliance Report:  Pressure: 8.6 cmH2O    AHI: 0.7    % usage over 4 hours: 100%    Average hourly usage: 8:39    O2: No       Physical Exam  Vitals reviewed.   Constitutional:       General: She is not in acute distress.     Appearance: She is well-developed.   HENT:      Head: Normocephalic and atraumatic.   Eyes:      Conjunctiva/sclera: Conjunctivae normal.   Cardiovascular:      Rate and Rhythm: Normal rate and regular rhythm.   Pulmonary:      Effort: Pulmonary effort is normal.      Breath sounds: Normal breath sounds.   Musculoskeletal:      Cervical back: Neck supple.   Neurological:      Mental Status: She is alert.   Psychiatric:         Behavior: Behavior normal.         ASSESSMENT:     1. Patient underwent sleep study on 8/15/2014 and was found to have moderate obstructive sleep apnea with an AHI of " 13.4 and a minimum oxygen saturation of 82%. She is currently on CPAP with a set pressure of 8.6 cmH2O.   2.  ESS was completed in the office today with a total score of 0/24.  3.  GERD.  4.  Hypothyroidism.  5.  History of supraventricular tachycardia.  6.  Osteoarthritis.  7.  Obesity.      PLAN:    1. Patient is currently using and benefiting from current CPAP therapy. She tolerates CPAP well. There is no need to make any changes in regards to her settings as AHI is very well controlled at 0.7. She is noted to be compliant with 100% usage over 4 hours. Overall, from a sleep standpoint, she is noted to be stable.   2. The patient will be provided with a prescription to obtain new CPAP supplies today.  3. She will follow up in 1 year, sooner if needed. Her questions were addressed and answered.  4. She was counseled on what to look for if pressure is too high or too low.   5. She is to stay active both physically and mentally.   6. She will follow up with her PCP for general medical needs.  7. The diagnostic assessment has been reviewed. Patient has expressed a good understanding of the assessment and recommendation from today's visit. There are no apparent barriers to understanding this information. She has been advised to contact this office or the answering service with questions or concerns that may arise. On this date of service 20 minutes of total time was spent in evaluation and management on this encounter.      A voice recognition program was used to aid in documentation of the record.  Sometimes words are not printed exactly as they were spoken.  While efforts were made to carefully edit and correct any inaccuracies, some may be present; please take these into context.  Please contact the provider if areas are identified.        TOMAS Phan

## 2022-02-28 ENCOUNTER — OFFICE VISIT (OUTPATIENT)
Dept: NEUROLOGY | Facility: CLINIC | Age: 67
End: 2022-02-28
Payer: MEDICARE

## 2022-02-28 VITALS
DIASTOLIC BLOOD PRESSURE: 79 MMHG | HEART RATE: 63 BPM | BODY MASS INDEX: 46.35 KG/M2 | HEIGHT: 64 IN | SYSTOLIC BLOOD PRESSURE: 140 MMHG

## 2022-02-28 DIAGNOSIS — G47.33 OBSTRUCTIVE SLEEP APNEA SYNDROME: Primary | ICD-10-CM

## 2022-02-28 PROCEDURE — G0463 HOSPITAL OUTPT CLINIC VISIT: HCPCS | Performed by: PHYSICIAN ASSISTANT

## 2022-02-28 PROCEDURE — 99213 OFFICE O/P EST LOW 20 MIN: CPT | Performed by: PHYSICIAN ASSISTANT

## 2022-03-04 NOTE — TELEPHONE ENCOUNTER
Patient calling with several questions regarding nutrition programs. She was advised she could see Mary Barker RD or caryl Petty. She says she would like to start with Mary first and possibly Ruby after. When looking for appts for Mary there were no availabilities until 2/2022. She says she will think about it and call back./ER   Oxybutynin Counseling:  I discussed with the patient the risks of oxybutynin including but not limited to skin rash, drowsiness, dry mouth, difficulty urinating, and blurred vision.

## 2022-07-07 ENCOUNTER — TELEPHONE (OUTPATIENT)
Dept: INTERNAL MEDICINE | Facility: CLINIC | Age: 67
End: 2022-07-07
Payer: MEDICARE

## 2022-07-07 NOTE — TELEPHONE ENCOUNTER
Caller would like to discuss (a) appointment Writer has advised caller of a callback from within 24 hours.    Patient: Corrine Holden    Caller Name (Last and first, relation/role): self    Name of Facility: [facility]    Callback Number:     Best Availability: anytime    Fax Number: [email or web address]    Additional Info: would like a call back from the nurse about making a specific appt, declined me doing it for her    Did you confirm the message with the caller: Yes    Is it okay that the nurse communicates your response through [employer]? No

## 2022-07-08 ENCOUNTER — OFFICE VISIT (OUTPATIENT)
Dept: URGENT CARE | Facility: URGENT CARE | Age: 67
End: 2022-07-08
Payer: MEDICARE

## 2022-07-08 VITALS
HEART RATE: 73 BPM | OXYGEN SATURATION: 95 % | SYSTOLIC BLOOD PRESSURE: 138 MMHG | DIASTOLIC BLOOD PRESSURE: 85 MMHG | RESPIRATION RATE: 16 BRPM | TEMPERATURE: 97.4 F

## 2022-07-08 DIAGNOSIS — J02.9 PHARYNGITIS, UNSPECIFIED ETIOLOGY: ICD-10-CM

## 2022-07-08 DIAGNOSIS — H66.92 LEFT OTITIS MEDIA, UNSPECIFIED OTITIS MEDIA TYPE: Primary | ICD-10-CM

## 2022-07-08 DIAGNOSIS — J40 BRONCHITIS: ICD-10-CM

## 2022-07-08 LAB
GP B STREP AG SPEC QL: NEGATIVE
SARS-COV-2 RDRP RESP QL NAA+PROBE: NEGATIVE

## 2022-07-08 PROCEDURE — C9803 HOPD COVID-19 SPEC COLLECT: HCPCS | Mod: CS,PO | Performed by: NURSE PRACTITIONER

## 2022-07-08 PROCEDURE — 87070 CULTURE OTHR SPECIMN AEROBIC: CPT | Performed by: NURSE PRACTITIONER

## 2022-07-08 PROCEDURE — 87880 STREP A ASSAY W/OPTIC: CPT | Mod: QW,PO | Performed by: NURSE PRACTITIONER

## 2022-07-08 PROCEDURE — G0463 HOSPITAL OUTPT CLINIC VISIT: HCPCS | Mod: PO | Performed by: NURSE PRACTITIONER

## 2022-07-08 PROCEDURE — 99213 OFFICE O/P EST LOW 20 MIN: CPT | Performed by: NURSE PRACTITIONER

## 2022-07-08 PROCEDURE — 87635 SARS-COV-2 COVID-19 AMP PRB: CPT | Mod: PO | Performed by: NURSE PRACTITIONER

## 2022-07-08 RX ORDER — LANOLIN ALCOHOL/MO/W.PET/CERES
400 CREAM (GRAM) TOPICAL DAILY
COMMUNITY
End: 2024-03-12 | Stop reason: ALTCHOICE

## 2022-07-08 RX ORDER — ALBUTEROL SULFATE 90 UG/1
2 INHALANT RESPIRATORY (INHALATION) EVERY 6 HOURS PRN
Qty: 1 INHALER | Refills: 0 | Status: SHIPPED | OUTPATIENT
Start: 2022-07-08 | End: 2025-01-16 | Stop reason: ALTCHOICE

## 2022-07-08 RX ORDER — AMOXICILLIN AND CLAVULANATE POTASSIUM 875; 125 MG/1; MG/1
1 TABLET, FILM COATED ORAL 2 TIMES DAILY
Qty: 20 TABLET | Refills: 0 | Status: SHIPPED | OUTPATIENT
Start: 2022-07-08 | End: 2022-07-18

## 2022-07-08 RX ORDER — BENZONATATE 100 MG/1
100 CAPSULE ORAL 3 TIMES DAILY PRN
Qty: 20 CAPSULE | Refills: 0 | Status: SHIPPED | OUTPATIENT
Start: 2022-07-08 | End: 2022-07-15

## 2022-07-08 NOTE — PROGRESS NOTES
Subjective      Chief Complaint   Patient presents with   • Sore Throat     C/o sore throat for other three weeks, cough, mucus, tried mucinex and OTC pain meds, pain moved to left ear and can hardly swallow. Does have a pre op appointment tomorrow. Did 2 covid tests but they are negative.        SHANNON Holden is a 67 y.o. female who presents for evaluation of sore throat, cough, postnasal drainage, and left ear pain.  Patient states symptoms began approximately 3 weeks ago.  She states her throat continues to be extremely sore.  Patient denies any fever, chills, body aches, chest pain, shortness of breath, nausea, vomiting, diarrhea.  She states that she has been trying Mucinex and over-the-counter products with little relief of symptoms.    The following have been reviewed and updated as appropriate in this visit:            Allergies   Allergen Reactions   • Bee Venom Protein (Honey Bee)    • No Known Drug Allergies      Current Outpatient Medications   Medication Sig Dispense Refill   • magnesium oxide (MAG-OX) 400 mg (241.3 mg magnesium) tablet Take 400 mg by mouth daily     • omeprazole (PriLOSEC) 20 mg capsule TAKE ONE CAPSULE BY MOUTH ONE TIME DAILY 90 capsule 3   • meloxicam (MOBIC) 7.5 mg tablet Take 1 tablet (7.5 mg total) by mouth daily 90 tablet 3   • guaiFENesin (MUCINEX) 600 mg 12 hr tablet Take 1,200 mg by mouth 2 (two) times a day     • ibuprofen (ADVIL,MOTRIN) 200 mg tablet Take 200 mg by mouth every 6 (six) hours as needed     • levothyroxine (SYNTHROID, LEVOTHROID) 125 mcg tablet Take 1 tablet (125 mcg total) by mouth daily 90 tablet 3   • vit C/E/Zn/coppr/lutein/zeaxan (OCUVITE LUTEIN AND ZEAXANTHIN ORAL) PreserVision AREDS-2     • zinc 50 mg tablet Take 50 mg by mouth daily     • diclofenac sodium (VOLTAREN) 1 % gel APPLY 2 GRAMS TO THE AFFECTED AREA(S) 4 TIMES PER DAY AS NEEDED 300 g 2   • fluticasone propionate (FLONASE) 50 mcg/actuation nasal spray USE 2 SPRAYS IN EACH NOSTRIL DAILY  "48 g 2   • diazePAM (VALIUM) 5 mg tablet TAKE 1 TABLET BY MOUTH EVERY 12 HOURS AS NEEDED FOR MUSCLE SPASMS 30 tablet 0   • vit B complex no.12/niacin,B3, (VITAMIN B COMPLEX NO.12-NIACIN ORAL) Take by mouth     • cholecalciferol, vitamin D3, 25 mcg (1,000 unit) capsule Take 5,000 Units by mouth Takes 5 times daily      • krill-om-3-dha-epa-phospho-ast 036-787-40-75 mg capsule Take 1 capsule by mouth daily     • amoxicillin (AMOXIL) 500 mg capsule 500 mg With dental appointments for total knee arthroplasty      • cyclobenzaprine (FLEXERIL) 10 mg tablet Take 1 tablet (10 mg total) by mouth 2 (two) times a day as needed for muscle spasms. 60 tablet 0   • calcium carbonate (OS-RONNIE) 500 mg calcium tablet Take 1 tablet by mouth daily       • multivitamin (THERAGRAN) tablet tablet 1 Every Day     • acetaminophen (TYLENOL EXTRA STRENGTH) 500 mg tablet Take 1,000 mg by mouth 2 (two) times a day       • benzonatate (Tessalon Perles) 100 mg capsule Take 1 capsule (100 mg total) by mouth 3 (three) times a day as needed for cough for up to 7 days 20 capsule 0   • amoxicillin-pot clavulanate (AUGMENTIN) 875-125 mg per tablet Take 1 tablet by mouth 2 (two) times a day for 10 days 20 tablet 0   • albuterol HFA 90 mcg/actuation inhaler Inhale 2 puffs every 6 (six) hours as needed for wheezing 1 Inhaler 0   • budesonide (Pulmicort Flexhaler) 90 mcg/actuation inhaler as needed       No current facility-administered medications for this visit.     Past Medical History:   Diagnosis Date   • GERD (gastroesophageal reflux disease)    • H. pylori infection 2009   • Hypothyroidism    • Injury of back     STATES, \"THIS IS REASON FOR MRI\"   • Microscopic hematuria    • SHANA (obstructive sleep apnea)     USES CPAP   • Osteoarthritis    • Palpitations    • Respiratory disease    • Supraventricular tachycardia (CMS/HCC) (HCC)     STATES, \"COFFEE RELATED\", VERY INTERMITTENT   • Tinnitus      Past Surgical History:   Procedure Laterality Date   • " CARDIAC ELECTROPHYSIOLOGY STUDY AND ABLATION  01/01/2007    attempted ablation for SVT   • CARPAL TUNNEL RELEASE Bilateral 6/26/2020    Procedure: LEFT ENDOSCOPIC CARPAL TUNNEL RELEASE, RIGHT OPEN CARPAL TUNNEL;  Surgeon: Mendez Sun MD;  Location: [facility] OR;  Service: Orthopedics;  Laterality: Bilateral;   • COLONOSCOPY  01/21/2015    cecal polyp-tubular adenoma-recommend repeat 5 years   • COLONOSCOPY N/A 6/22/2020    repeat 5 years - adenomatous polyp   • DILATION AND CURETTAGE OF UTERUS  01/01/2006   • ENDOMETRIAL BIOPSY  01/01/2012    pt reports negative   • ESOPHAGOGASTRODUODENOSCOPY  01/01/2009    H pylori   • ESOPHAGOGASTRODUODENOSCOPY N/A 6/22/2020    Procedure: ESOPHAGOGASTRODUODENOSCOPY;  Surgeon: Julian Faulkner MD;  Location: [facility];  Service: Endoscopy;  Laterality: N/A;   • KNEE ARTHROPLASTY Right 04/01/2015    Dr Placido BRICE   • VAGINAL HYSTERECTOMY  12/16/2013    with BSO, fibroids     Family History   Problem Relation Age of Onset   • Cancer Mother 84        Bladder   • Osteoporosis Mother    • Hypertension Mother    • Colon cancer Father 65   • Kidney cancer Father 77   • Other Father's Brother         sudden cardiac death, 60s   • Heart disease Maternal Grandfather    • Heart attack Maternal Grandfather 59   • Cancer Paternal Grandfather      Social History     Socioeconomic History   • Marital status: Single   Tobacco Use   • Smoking status: Never Smoker   • Smokeless tobacco: Never Used   Substance and Sexual Activity   • Alcohol use: No   • Drug use: No     Social Determinants of Health     Tobacco Use: Low Risk    • Smoking Tobacco Use: Never Smoker   • Smokeless Tobacco Use: Never Used   Depression: Not at risk   • PHQ-2 Score: 0       Review of Systems  Please see HPI    Objective     Vitals:  /85 (BP Location: Left arm, Patient Position: Sitting, Cuff Size: Long Adult)   Pulse 73   Temp 36.3 °C (97.4 °F) (Temporal)   Resp 16   SpO2 95%     Physical Exam  Vitals and  nursing note reviewed.   Constitutional:       General: She is not in acute distress.     Appearance: Normal appearance. She is normal weight. She is ill-appearing. She is not toxic-appearing or diaphoretic.   HENT:      Head: Normocephalic and atraumatic.      Right Ear: Ear canal and external ear normal. A middle ear effusion is present. There is no impacted cerumen. Tympanic membrane is injected. Tympanic membrane is not erythematous, retracted or bulging.      Left Ear: Ear canal and external ear normal. A middle ear effusion is present. There is no impacted cerumen. Tympanic membrane is injected, erythematous and bulging. Tympanic membrane is not retracted.      Nose: Mucosal edema, congestion and rhinorrhea present. Rhinorrhea is clear.      Right Turbinates: Enlarged.      Left Turbinates: Enlarged.      Mouth/Throat:      Mouth: Mucous membranes are moist.      Pharynx: Pharyngeal swelling and posterior oropharyngeal erythema present. No oropharyngeal exudate or uvula swelling.      Tonsils: No tonsillar exudate or tonsillar abscesses.   Eyes:      Pupils: Pupils are equal, round, and reactive to light.   Cardiovascular:      Rate and Rhythm: Normal rate and regular rhythm.      Pulses: Normal pulses.      Heart sounds: Normal heart sounds.   Pulmonary:      Effort: Pulmonary effort is normal. No respiratory distress.      Breath sounds: Normal breath sounds. No stridor. No wheezing.   Musculoskeletal:      Cervical back: Normal range of motion and neck supple. No tenderness.   Lymphadenopathy:      Cervical: No cervical adenopathy.   Skin:     General: Skin is warm and dry.      Capillary Refill: Capillary refill takes less than 2 seconds.   Neurological:      General: No focal deficit present.      Mental Status: She is alert and oriented to person, place, and time. Mental status is at baseline.   Psychiatric:         Mood and Affect: Mood normal.         Behavior: Behavior normal.         Thought Content:  Thought content normal.         Recent Results (from the past 4 hour(s))   Rapid Strep Screen Swab    Collection Time: 07/08/22  9:48 AM   Result Value Ref Range    Strep A Screen Negative Negative   COVID-19 PCR    Collection Time: 07/08/22  9:48 AM    Specimen: Nasopharynx; Swab   Result Value Ref Range    COVID-19 PCR Negative Negative         Assessment/Plan   Diagnoses and all orders for this visit:    Left otitis media, unspecified otitis media type  -     amoxicillin-pot clavulanate (AUGMENTIN) 875-125 mg per tablet; Take 1 tablet by mouth 2 (two) times a day for 10 days  -     albuterol HFA 90 mcg/actuation inhaler; Inhale 2 puffs every 6 (six) hours as needed for wheezing    Bronchitis  -     benzonatate (Tessalon Perles) 100 mg capsule; Take 1 capsule (100 mg total) by mouth 3 (three) times a day as needed for cough for up to 7 days  -     albuterol HFA 90 mcg/actuation inhaler; Inhale 2 puffs every 6 (six) hours as needed for wheezing    Pharyngitis, unspecified etiology  -     COVID-19 PCR; Future  -     Rapid Strep Screen Swab  -     Throat culture        Discussion:  Patient's vital signs are stable at this time, she is afebrile, normotensive, no acute distress.  Her COVID-19 test is negative today.  Her rapid strep test is also negative, and we will send for throat culture and contact patient with results once available and interpreted.  Reviewed with patient that assessment findings and symptoms are consistent with acute bronchitis as well as an otitis media of the left ear.  For treatment of otitis media, will prescribe Augmentin accordingly.  Patient states that she has done well with albuterol inhaler in the past for bronchitis, I will prescribe this accordingly.  We will also prescribe some Tessalon Perles for her cough.  Recommend she continue with over-the-counter agents as directed.  She may also continue pushing fluids and closely monitor symptoms with follow-up for any new or worsening  concerns.   Patient states understanding information agrees with plan of care.      No follow-ups on file.  Haydee Duval, CNP

## 2022-07-08 NOTE — TELEPHONE ENCOUNTER
Returned call for nurse, tried nurse, got vm    Best time to reach you: anytime    Is it ok to leave a voicemail:yes     Is it ok to message you through [employer]:  no

## 2022-07-08 NOTE — TELEPHONE ENCOUNTER
Returned call for pre op, tried nurse, got vm    Best time to reach you: anytime     Is it ok to leave a voicemail:yes     Is it ok to message you through [employer]:  no

## 2022-07-10 LAB — BACTERIA THROAT CULT: NORMAL

## 2022-09-15 ENCOUNTER — OFFICE VISIT (OUTPATIENT)
Dept: INTERNAL MEDICINE | Facility: CLINIC | Age: 67
End: 2022-09-15
Payer: MEDICARE

## 2022-09-15 VITALS
TEMPERATURE: 98.5 F | DIASTOLIC BLOOD PRESSURE: 88 MMHG | RESPIRATION RATE: 18 BRPM | HEART RATE: 82 BPM | SYSTOLIC BLOOD PRESSURE: 126 MMHG | BODY MASS INDEX: 46.35 KG/M2 | HEIGHT: 64 IN | OXYGEN SATURATION: 94 %

## 2022-09-15 DIAGNOSIS — E67.3 HYPERVITAMINOSIS D: ICD-10-CM

## 2022-09-15 DIAGNOSIS — R31.29 MICROSCOPIC HEMATURIA: ICD-10-CM

## 2022-09-15 DIAGNOSIS — E78.5 BORDERLINE HYPERLIPIDEMIA: ICD-10-CM

## 2022-09-15 DIAGNOSIS — R73.01 IMPAIRED FASTING GLUCOSE: ICD-10-CM

## 2022-09-15 DIAGNOSIS — Z86.79 HISTORY OF SUPRAVENTRICULAR TACHYCARDIA: ICD-10-CM

## 2022-09-15 DIAGNOSIS — I35.8 AORTIC VALVE SCLEROSIS: ICD-10-CM

## 2022-09-15 DIAGNOSIS — K21.9 GASTROESOPHAGEAL REFLUX DISEASE WITHOUT ESOPHAGITIS: ICD-10-CM

## 2022-09-15 DIAGNOSIS — I51.7 LEFT VENTRICULAR HYPERTROPHY: ICD-10-CM

## 2022-09-15 DIAGNOSIS — Z01.818 PREOP EXAMINATION: Primary | ICD-10-CM

## 2022-09-15 DIAGNOSIS — G47.33 OBSTRUCTIVE SLEEP APNEA: ICD-10-CM

## 2022-09-15 DIAGNOSIS — E03.9 HYPOTHYROIDISM, UNSPECIFIED TYPE: ICD-10-CM

## 2022-09-15 PROCEDURE — 93005 ELECTROCARDIOGRAM TRACING: CPT | Mod: PO | Performed by: INTERNAL MEDICINE

## 2022-09-15 PROCEDURE — 93010 ELECTROCARDIOGRAM REPORT: CPT | Performed by: INTERNAL MEDICINE

## 2022-09-15 PROCEDURE — G0463 HOSPITAL OUTPT CLINIC VISIT: HCPCS | Mod: PO | Performed by: INTERNAL MEDICINE

## 2022-09-15 PROCEDURE — 99215 OFFICE O/P EST HI 40 MIN: CPT | Performed by: INTERNAL MEDICINE

## 2022-09-15 RX ORDER — OMEPRAZOLE 20 MG/1
20 CAPSULE, DELAYED RELEASE ORAL 2 TIMES DAILY PRN
Qty: 180 CAPSULE | Refills: 3 | Status: SHIPPED | OUTPATIENT
Start: 2022-09-15 | End: 2023-09-05

## 2022-09-15 ASSESSMENT — ENCOUNTER SYMPTOMS
POLYPHAGIA: 0
UNEXPECTED WEIGHT CHANGE: 0
FATIGUE: 0
HEMATOLOGIC/LYMPHATIC NEGATIVE: 1
HEMATURIA: 0
BACK PAIN: 0
APPETITE CHANGE: 0
NECK PAIN: 0
DIAPHORESIS: 0
DIFFICULTY URINATING: 0
SEIZURES: 0
VOMITING: 0
NUMBNESS: 0
EYES NEGATIVE: 1
CHEST TIGHTNESS: 0
PALPITATIONS: 0
ABDOMINAL DISTENTION: 0
PHOTOPHOBIA: 0
VOICE CHANGE: 0
FEVER: 0
BRUISES/BLEEDS EASILY: 0
FLANK PAIN: 0
DYSURIA: 0
JOINT SWELLING: 1
DIZZINESS: 0
TROUBLE SWALLOWING: 0
NECK STIFFNESS: 0
COUGH: 0
RHINORRHEA: 0
HEADACHES: 0
CONSTIPATION: 0
SINUS PRESSURE: 0
ENDOCRINE NEGATIVE: 1
DIARRHEA: 0
SORE THROAT: 0
MYALGIAS: 0
ARTHRALGIAS: 1
ABDOMINAL PAIN: 0
CHILLS: 0
ACTIVITY CHANGE: 0
BLOOD IN STOOL: 0
WHEEZING: 0
NAUSEA: 0
POLYDIPSIA: 0
NEUROLOGICAL NEGATIVE: 1
NERVOUS/ANXIOUS: 0
SHORTNESS OF BREATH: 0
TREMORS: 0
WEAKNESS: 0
CARDIOVASCULAR NEGATIVE: 1
ADENOPATHY: 0
RESPIRATORY NEGATIVE: 1
CONSTITUTIONAL NEGATIVE: 1
GASTROINTESTINAL NEGATIVE: 1
DYSPHORIC MOOD: 0
LIGHT-HEADEDNESS: 0
FREQUENCY: 0
PSYCHIATRIC NEGATIVE: 1
SPEECH DIFFICULTY: 0
SLEEP DISTURBANCE: 0

## 2022-09-15 ASSESSMENT — PAIN SCALES - GENERAL: PAINLEVEL: 0-NO PAIN

## 2022-09-15 NOTE — PROGRESS NOTES
"Subjective      Corrine Holden is a 67 y.o. female who presents for Pre-op Exam (Total left ankle on 10/3/22.)  .    SHANNON Castle is here for preop.    She will have a left total ankle by Dr. Ann on October 3.    She has no history of heart attack, stroke, chronic kidney disease, valvular heart disease, congestive heart failure, or diabetes mellitus.  She does have a history of SVT, aortic valve sclerosis without stenosis, prediabetes, left ventricular hypertrophy, and obstructive sleep apnea.  She has noticed less palpitations recently since starting magnesium supplementation.    Right now, she exercises for 30 minutes in the pool 2 to 3 days/week.  She also will ride her stationary bicycle for 15 to 25 minutes.  She has been working with physical therapy.  She does all of this without any chest pains or shortness of breath.    She has no personal or family history of problems with anesthesia.  No personal or family history of bleeding or clotting disorders.  No loose teeth, dentures or partials.    Past Medical History:   Diagnosis Date    GERD (gastroesophageal reflux disease)     H. pylori infection 2009    Hypothyroidism     Injury of back     STATES, \"THIS IS REASON FOR MRI\"    Microscopic hematuria     SHANA (obstructive sleep apnea)     USES CPAP    Osteoarthritis     Palpitations     Respiratory disease     Supraventricular tachycardia (CMS/HCC) ([facility])     STATES, \"COFFEE RELATED\", VERY INTERMITTENT    Tinnitus        Current Outpatient Medications   Medication Sig Dispense Refill    magnesium oxide (MAG-OX) 400 mg (241.3 mg magnesium) tablet Take 400 mg by mouth daily      albuterol HFA 90 mcg/actuation inhaler Inhale 2 puffs every 6 (six) hours as needed for wheezing 1 Inhaler 0    meloxicam (MOBIC) 7.5 mg tablet Take 1 tablet (7.5 mg total) by mouth daily 90 tablet 3    guaiFENesin (MUCINEX) 600 mg 12 hr tablet Take 1,200 mg by mouth 2 (two) times a day      ibuprofen (ADVIL,MOTRIN) 200 mg tablet Take " 200 mg by mouth every 6 (six) hours as needed      levothyroxine (SYNTHROID, LEVOTHROID) 125 mcg tablet Take 1 tablet (125 mcg total) by mouth daily 90 tablet 3    vit C/E/Zn/coppr/lutein/zeaxan (OCUVITE LUTEIN AND ZEAXANTHIN ORAL) PreserVision AREDS-2      zinc 50 mg tablet Take 50 mg by mouth daily      diclofenac sodium (VOLTAREN) 1 % gel APPLY 2 GRAMS TO THE AFFECTED AREA(S) 4 TIMES PER DAY AS NEEDED 300 g 2    fluticasone propionate (FLONASE) 50 mcg/actuation nasal spray USE 2 SPRAYS IN EACH NOSTRIL DAILY 48 g 2    diazePAM (VALIUM) 5 mg tablet TAKE 1 TABLET BY MOUTH EVERY 12 HOURS AS NEEDED FOR MUSCLE SPASMS 30 tablet 0    budesonide (Pulmicort Flexhaler) 90 mcg/actuation inhaler as needed      vit B complex no.12/niacin,B3, (VITAMIN B COMPLEX NO.12-NIACIN ORAL) Take by mouth      cholecalciferol, vitamin D3, 25 mcg (1,000 unit) capsule Take 5,000 Units by mouth Takes 5 times daily       krill-om-3-dha-epa-phospho-ast 997-775-71-75 mg capsule Take 1 capsule by mouth daily      amoxicillin (AMOXIL) 500 mg capsule 500 mg With dental appointments for total knee arthroplasty       cyclobenzaprine (FLEXERIL) 10 mg tablet Take 1 tablet (10 mg total) by mouth 2 (two) times a day as needed for muscle spasms. 60 tablet 0    calcium carbonate (OS-RONNIE) 500 mg calcium tablet Take 1 tablet by mouth daily        multivitamin (THERAGRAN) tablet tablet 1 Every Day      acetaminophen (TYLENOL EXTRA STRENGTH) 500 mg tablet Take 1,000 mg by mouth 2 (two) times a day        omeprazole (PriLOSEC) 20 mg capsule Take 1 capsule (20 mg total) by mouth 2 (two) times a day as needed (gerd) 180 capsule 3     No current facility-administered medications for this visit.      Allergies   Allergen Reactions    Bee Venom Protein (Honey Bee)     No Known Drug Allergies        Past Surgical History:   Procedure Laterality Date    CARDIAC ELECTROPHYSIOLOGY STUDY AND ABLATION  01/01/2007    attempted ablation for SVT    CARPAL TUNNEL RELEASE  Bilateral 6/26/2020    Procedure: LEFT ENDOSCOPIC CARPAL TUNNEL RELEASE, RIGHT OPEN CARPAL TUNNEL;  Surgeon: Mendez Sun MD;  Location: [facility] OR;  Service: Orthopedics;  Laterality: Bilateral;    COLONOSCOPY  01/21/2015    cecal polyp-tubular adenoma-recommend repeat 5 years    COLONOSCOPY N/A 6/22/2020    repeat 5 years - adenomatous polyp    DILATION AND CURETTAGE OF UTERUS  01/01/2006    ENDOMETRIAL BIOPSY  01/01/2012    pt reports negative    ESOPHAGOGASTRODUODENOSCOPY  01/01/2009    H pylori    ESOPHAGOGASTRODUODENOSCOPY N/A 6/22/2020    Procedure: ESOPHAGOGASTRODUODENOSCOPY;  Surgeon: Julian Faulkner MD;  Location: [facility];  Service: Endoscopy;  Laterality: N/A;    KNEE ARTHROPLASTY Right 04/01/2015    Dr Placido BRICE    VAGINAL HYSTERECTOMY  12/16/2013    with BSO, fibroids     Family History   Problem Relation Age of Onset    Cancer Mother 84        Bladder    Osteoporosis Mother     Hypertension Mother     Colon cancer Father 65    Kidney cancer Father 77    Other Father's Brother         sudden cardiac death, 60s    Heart disease Maternal Grandfather     Heart attack Maternal Grandfather 59    Cancer Paternal Grandfather      Social History     Tobacco Use    Smoking status: Never    Smokeless tobacco: Never   Substance Use Topics    Alcohol use: No       Review of Systems   Constitutional: Negative.  Negative for activity change, appetite change, chills, diaphoresis, fatigue, fever and unexpected weight change.   HENT:  Negative for congestion, ear pain, hearing loss, mouth sores, nosebleeds, postnasal drip, rhinorrhea, sinus pressure, sneezing, sore throat, tinnitus, trouble swallowing and voice change.    Eyes: Negative.  Negative for photophobia and visual disturbance.   Respiratory: Negative.  Negative for cough, chest tightness, shortness of breath and wheezing.    Cardiovascular: Negative.  Negative for chest pain, palpitations and leg swelling.   Gastrointestinal: Negative.  Negative for  "abdominal distention, abdominal pain, blood in stool, constipation, diarrhea, nausea and vomiting.   Endocrine: Negative.  Negative for cold intolerance, heat intolerance, polydipsia, polyphagia and polyuria.   Genitourinary: Negative.  Negative for difficulty urinating, dysuria, flank pain, frequency, hematuria and urgency.   Musculoskeletal:  Positive for arthralgias and joint swelling. Negative for back pain, gait problem, myalgias, neck pain and neck stiffness.   Skin: Negative.  Negative for rash.   Neurological: Negative.  Negative for dizziness, tremors, seizures, syncope, speech difficulty, weakness, light-headedness, numbness and headaches.   Hematological: Negative.  Negative for adenopathy. Does not bruise/bleed easily.   Psychiatric/Behavioral: Negative.  Negative for dysphoric mood and sleep disturbance. The patient is not nervous/anxious.          Objective     Vitals  /88 (BP Location: Right arm, Patient Position: Sitting, Cuff Size: Large Adult)   Pulse 82   Temp 36.9 °C (98.5 °F) (Temporal)   Resp 18   Ht 1.626 m (5' 4\")   SpO2 94%   BMI 46.35 kg/m²     Physical Exam   GEN: pleasant, NAD  HEENT: PERRLA, TMs clear, sclera anicteric  NECK: no LAD or thyromegaly, no carotid bruits  LUNGS: CTAB, no wheezing rhonchi or rales  HEART: RRR 2-6 systolic murmur, no  ABD: Soft, non tender, non distended, normal bowel sounds , no hepatosplenomegaly  Ext: Trace right lower extremity edema  SKIN: no rash  Neuro:  CN 2-12 grossly intact, non focal, moving all extremities      Assessment/Plan   Diagnoses and all orders for this visit:    Preop examination    Left ventricular hypertrophy    History of supraventricular tachycardia  -     ECG 12 lead    Aortic valve sclerosis    Obstructive sleep apnea    Gastroesophageal reflux disease without esophagitis  -     omeprazole (PriLOSEC) 20 mg capsule; Take 1 capsule (20 mg total) by mouth 2 (two) times a day as needed (gerd)    Hypothyroidism, unspecified " type  -     Thyroid Stimulating Hormone, Ultrasensitive Blood, Venous; Future    Borderline hyperlipidemia  -     CBC w/auto differential Blood, Venous; Future  -     Comprehensive metabolic panel Blood, Venous; Future  -     Lipid panel Blood, Venous; Future    Impaired fasting glucose  -     Hemoglobin A1c (glycosylated) Blood, Venous; Future    Hypervitaminosis D  -     25-Hydroxyvitamin D2 and D3, serum Blood, Venous; Future    Microscopic hematuria  -     Urinalysis w/microscopic Urine, Clean Catch; Future    1.  Preop.  Corrine is a pleasant 67-year-old lady here for preop prior to ankle surgery.  She has no history of heart attack, stroke, chronic kidney disease, valvular heart disease, congestive heart failure.  Does have a history of SVT and aortic valve sclerosis without stenosis.  Echocardiogram from 2019 showed mild left ventricular hypertrophy.  EKG in clinic today shows sinus rhythm rate of 69 with mild ST segment depressions in leads V3 through V6, similar to previous.  She is able to exercise at 4 METS of activity.  We will update her labs and as long as those are stable, she can proceed to surgery as planned.    On this date of service 40 minutes of total time was spent on this encounter.    ----------  Labs are stable. Proceed as planned.      CM MON MD

## 2022-09-18 ENCOUNTER — APPOINTMENT (OUTPATIENT)
Dept: LAB | Facility: URGENT CARE | Age: 67
End: 2022-09-18
Payer: MEDICARE

## 2022-09-18 DIAGNOSIS — Z20.822 CONTACT WITH AND (SUSPECTED) EXPOSURE TO COVID-19: ICD-10-CM

## 2022-09-18 LAB — SARS-COV-2 RNA RESP QL NAA+PROBE: NEGATIVE

## 2022-09-18 PROCEDURE — 87635 SARS-COV-2 COVID-19 AMP PRB: CPT

## 2022-09-18 PROCEDURE — C9803 HOPD COVID-19 SPEC COLLECT: HCPCS | Mod: CS,PO

## 2022-09-19 ENCOUNTER — LAB (OUTPATIENT)
Dept: LAB | Facility: CLINIC | Age: 67
End: 2022-09-19
Payer: MEDICARE

## 2022-09-19 DIAGNOSIS — E67.3 HYPERVITAMINOSIS D: ICD-10-CM

## 2022-09-19 DIAGNOSIS — E78.5 BORDERLINE HYPERLIPIDEMIA: ICD-10-CM

## 2022-09-19 DIAGNOSIS — E03.9 HYPOTHYROIDISM, UNSPECIFIED TYPE: ICD-10-CM

## 2022-09-19 DIAGNOSIS — R31.29 MICROSCOPIC HEMATURIA: ICD-10-CM

## 2022-09-19 DIAGNOSIS — R73.01 IMPAIRED FASTING GLUCOSE: ICD-10-CM

## 2022-09-19 LAB
ALBUMIN SERPL-MCNC: 4.4 G/DL (ref 3.5–5.3)
ALP SERPL-CCNC: 54 U/L (ref 55–142)
ALT SERPL-CCNC: 22 U/L (ref 7–52)
ANION GAP SERPL CALC-SCNC: 9 MMOL/L (ref 3–11)
AST SERPL-CCNC: 20 U/L
BACTERIA #/AREA URNS HPF: NORMAL /HPF
BASOPHILS # BLD AUTO: 0 10*3/UL
BASOPHILS NFR BLD AUTO: 0.6 % (ref 0–2)
BILIRUB SERPL-MCNC: 0.63 MG/DL (ref 0.2–1.4)
BILIRUB UR QL: NEGATIVE
BUN SERPL-MCNC: 19 MG/DL (ref 7–25)
CALCIUM ALBUM COR SERPL-MCNC: 8.9 MG/DL (ref 8.6–10.3)
CALCIUM SERPL-MCNC: 9.2 MG/DL (ref 8.6–10.3)
CHLORIDE SERPL-SCNC: 105 MMOL/L (ref 98–107)
CHOLEST SERPL-MCNC: 188 MG/DL (ref 0–199)
CLARITY UR: CLEAR
CO2 SERPL-SCNC: 28 MMOL/L (ref 21–32)
COLOR UR: YELLOW
CREAT SERPL-MCNC: 0.82 MG/DL (ref 0.6–1.1)
EOSINOPHIL # BLD AUTO: 0.2 10*3/UL
EOSINOPHIL NFR BLD AUTO: 2.9 % (ref 0–3)
ERYTHROCYTE [DISTWIDTH] IN BLOOD BY AUTOMATED COUNT: 13.3 % (ref 11.5–14)
EST. AVERAGE GLUCOSE BLD GHB EST-MCNC: 122.6 MG/DL
FASTING STATUS PATIENT QL REPORTED: YES
GFR SERPL CREATININE-BSD FRML MDRD: 79 ML/MIN/1.73M*2
GLUCOSE SERPL-MCNC: 114 MG/DL (ref 70–105)
GLUCOSE UR QL: NEGATIVE MG/DL
HBA1C MFR BLD: 5.9 % (ref 4–6)
HCT VFR BLD AUTO: 44.1 % (ref 34–45)
HDLC SERPL-MCNC: 56 MG/DL
HGB BLD-MCNC: 15.2 G/DL (ref 11.5–15.5)
HGB UR QL: NEGATIVE
KETONES UR-MCNC: NEGATIVE MG/DL
LDLC SERPL CALC-MCNC: 107 MG/DL (ref 20–99)
LEUKOCYTE ESTERASE UR QL STRIP: NEGATIVE
LYMPHOCYTES # BLD AUTO: 1.7 10*3/UL
LYMPHOCYTES NFR BLD AUTO: 29.3 % (ref 11–47)
MCH RBC QN AUTO: 32.5 PG (ref 28–33)
MCHC RBC AUTO-ENTMCNC: 34.5 G/DL (ref 32–36)
MCV RBC AUTO: 94.4 FL (ref 81–97)
MONOCYTES # BLD AUTO: 0.6 10*3/UL
MONOCYTES NFR BLD AUTO: 10.1 % (ref 3–11)
NEUTROPHILS # BLD AUTO: 3.3 10*3/UL
NEUTROPHILS NFR BLD AUTO: 57.1 % (ref 41–81)
NITRITE UR QL: NEGATIVE
PH UR: 6 PH
PLATELET # BLD AUTO: 255 10*3/UL (ref 140–350)
PMV BLD AUTO: 6.9 FL (ref 6.9–10.8)
POTASSIUM SERPL-SCNC: 4.6 MMOL/L (ref 3.5–5.1)
PROT SERPL-MCNC: 7.3 G/DL (ref 6–8.3)
PROT UR STRIP-MCNC: NEGATIVE MG/DL
RBC # BLD AUTO: 4.67 10*6/ΜL (ref 3.7–5.3)
RBC #/AREA URNS HPF: NORMAL /HPF
SODIUM SERPL-SCNC: 142 MMOL/L (ref 135–145)
SP GR UR: 1.01 (ref 1–1.03)
SQUAMOUS #/AREA URNS HPF: NORMAL /HPF
TRIGL SERPL-MCNC: 123 MG/DL
TSH SERPL DL<=0.05 MIU/L-ACNC: 1.91 UIU/ML (ref 0.34–4.82)
UROBILINOGEN UR-MCNC: 0.2 MG/DL
WBC # BLD AUTO: 5.8 10*3/UL (ref 4.5–10.5)
WBC #/AREA URNS HPF: NORMAL /HPF

## 2022-09-19 PROCEDURE — 85025 COMPLETE CBC W/AUTO DIFF WBC: CPT | Mod: PO

## 2022-09-19 PROCEDURE — 80053 COMPREHEN METABOLIC PANEL: CPT | Mod: PO

## 2022-09-19 PROCEDURE — 81001 URINALYSIS AUTO W/SCOPE: CPT | Mod: PO

## 2022-09-19 PROCEDURE — 84443 ASSAY THYROID STIM HORMONE: CPT | Mod: PO

## 2022-09-19 PROCEDURE — 83036 HEMOGLOBIN GLYCOSYLATED A1C: CPT | Mod: PO

## 2022-09-19 PROCEDURE — 36415 COLL VENOUS BLD VENIPUNCTURE: CPT | Mod: PO

## 2022-09-19 PROCEDURE — 82306 VITAMIN D 25 HYDROXY: CPT

## 2022-09-19 PROCEDURE — 80061 LIPID PANEL: CPT | Mod: NCP

## 2022-09-21 ENCOUNTER — TELEPHONE (OUTPATIENT)
Dept: INTERNAL MEDICINE | Facility: CLINIC | Age: 67
End: 2022-09-21

## 2022-09-21 ENCOUNTER — OFFICE VISIT (OUTPATIENT)
Dept: URGENT CARE | Facility: URGENT CARE | Age: 67
End: 2022-09-21
Payer: MEDICARE

## 2022-09-21 VITALS
RESPIRATION RATE: 16 BRPM | OXYGEN SATURATION: 94 % | TEMPERATURE: 97.2 F | HEART RATE: 64 BPM | BODY MASS INDEX: 46.35 KG/M2 | DIASTOLIC BLOOD PRESSURE: 72 MMHG | SYSTOLIC BLOOD PRESSURE: 140 MMHG | HEIGHT: 64 IN

## 2022-09-21 DIAGNOSIS — J01.90 ACUTE NON-RECURRENT SINUSITIS, UNSPECIFIED LOCATION: Primary | ICD-10-CM

## 2022-09-21 PROCEDURE — G0463 HOSPITAL OUTPT CLINIC VISIT: HCPCS | Mod: PO | Performed by: PHYSICIAN ASSISTANT

## 2022-09-21 PROCEDURE — 99213 OFFICE O/P EST LOW 20 MIN: CPT | Performed by: PHYSICIAN ASSISTANT

## 2022-09-21 RX ORDER — FLUCONAZOLE 150 MG/1
150 TABLET ORAL ONCE
Qty: 2 TABLET | Refills: 0 | Status: SHIPPED | OUTPATIENT
Start: 2022-09-21 | End: 2022-09-21

## 2022-09-21 RX ORDER — AMOXICILLIN AND CLAVULANATE POTASSIUM 875; 125 MG/1; MG/1
1 TABLET, FILM COATED ORAL 2 TIMES DAILY
Qty: 20 TABLET | Refills: 0 | Status: SHIPPED | OUTPATIENT
Start: 2022-09-21 | End: 2022-10-01

## 2022-09-21 ASSESSMENT — PAIN SCALES - GENERAL: PAINLEVEL: 0-NO PAIN

## 2022-09-21 NOTE — PROGRESS NOTES
"Urgent Care Visit Note    Subjective   Chief Complaint  Chief Complaint   Patient presents with    Nasal Congestion     Reports nasal congestion, pressure, and green mucus for the last 7-8 days. Did have a COVID test in clinic on 9/18/22, which was negative. Has an upcoming total ankle arthroplasty. Has used a humidifier and nettipot for her symptoms.       History of Present Illness  Corrine Holden is a 67 y.o. female presenting for gradually increasing sinus congestion and pressure onset 8 days ago.  Other associated symptoms include mild ear discomfort, cough, and headache.  She denies any fever, chills, sore throat, chest pain, difficulty breathing, vomiting, or diarrhea.  She has been using humidifier and [place] pot as well for her symptoms in addition to Flonase, Mucinex, and Sudafed.  She had a negative COVID-19 test on 1/18/2022.  The patient has upcoming ankle surgery on 10/3 at [facility].    Review of Systems  Pertinent positives and negatives as per the HPI    Past Medical History:   Diagnosis Date    GERD (gastroesophageal reflux disease)     H. pylori infection 2009    Hypothyroidism     Injury of back     STATES, \"THIS IS REASON FOR MRI\"    Microscopic hematuria     SHANA (obstructive sleep apnea)     USES CPAP    Osteoarthritis     Palpitations     Respiratory disease     Supraventricular tachycardia (CMS/HCC) ([facility])     STATES, \"COFFEE RELATED\", VERY INTERMITTENT    Tinnitus          Current Outpatient Medications:     omeprazole (PriLOSEC) 20 mg capsule, Take 1 capsule (20 mg total) by mouth 2 (two) times a day as needed (gerd), Disp: 180 capsule, Rfl: 3    magnesium oxide (MAG-OX) 400 mg (241.3 mg magnesium) tablet, Take 400 mg by mouth daily, Disp: , Rfl:     albuterol HFA 90 mcg/actuation inhaler, Inhale 2 puffs every 6 (six) hours as needed for wheezing, Disp: 1 Inhaler, Rfl: 0    meloxicam (MOBIC) 7.5 mg tablet, Take 1 tablet (7.5 mg total) by mouth daily, Disp: 90 tablet, Rfl: 3    " guaiFENesin (MUCINEX) 600 mg 12 hr tablet, Take 1,200 mg by mouth 2 (two) times a day, Disp: , Rfl:     ibuprofen (ADVIL,MOTRIN) 200 mg tablet, Take 200 mg by mouth every 6 (six) hours as needed, Disp: , Rfl:     levothyroxine (SYNTHROID, LEVOTHROID) 125 mcg tablet, Take 1 tablet (125 mcg total) by mouth daily, Disp: 90 tablet, Rfl: 3    vit C/E/Zn/coppr/lutein/zeaxan (OCUVITE LUTEIN AND ZEAXANTHIN ORAL), PreserVision AREDS-2, Disp: , Rfl:     zinc 50 mg tablet, Take 50 mg by mouth daily, Disp: , Rfl:     diclofenac sodium (VOLTAREN) 1 % gel, APPLY 2 GRAMS TO THE AFFECTED AREA(S) 4 TIMES PER DAY AS NEEDED, Disp: 300 g, Rfl: 2    fluticasone propionate (FLONASE) 50 mcg/actuation nasal spray, USE 2 SPRAYS IN EACH NOSTRIL DAILY, Disp: 48 g, Rfl: 2    diazePAM (VALIUM) 5 mg tablet, TAKE 1 TABLET BY MOUTH EVERY 12 HOURS AS NEEDED FOR MUSCLE SPASMS, Disp: 30 tablet, Rfl: 0    budesonide (Pulmicort Flexhaler) 90 mcg/actuation inhaler, as needed, Disp: , Rfl:     vit B complex no.12/niacin,B3, (VITAMIN B COMPLEX NO.12-NIACIN ORAL), Take by mouth, Disp: , Rfl:     cholecalciferol, vitamin D3, 25 mcg (1,000 unit) capsule, Take 5,000 Units by mouth Takes 5 times daily , Disp: , Rfl:     krill-om-3-dha-epa-phospho-ast [id number] mg capsule, Take 1 capsule by mouth daily, Disp: , Rfl:     cyclobenzaprine (FLEXERIL) 10 mg tablet, Take 1 tablet (10 mg total) by mouth 2 (two) times a day as needed for muscle spasms., Disp: 60 tablet, Rfl: 0    calcium carbonate (OS-RONNIE) 500 mg calcium tablet, Take 1 tablet by mouth daily  , Disp: , Rfl:     multivitamin (THERAGRAN) tablet tablet, 1 Every Day, Disp: , Rfl:     acetaminophen (TYLENOL EXTRA STRENGTH) 500 mg tablet, Take 1,000 mg by mouth 2 (two) times a day  , Disp: , Rfl:     amoxicillin-pot clavulanate (Augmentin) 875-125 mg per tablet, Take 1 tablet by mouth 2 (two) times a day for 10 days, Disp: 20 tablet, Rfl: 0    fluconazole (DIFLUCAN) 150 mg tablet, Take 1 tablet (150 mg  "total) by mouth once for 1 dose In the setting of persisting symptoms, take a second tablet 1 week later, Disp: 2 tablet, Rfl: 0    Objective   Vital Signs  /72 (BP Location: Left arm, Patient Position: Sitting, Cuff Size: Long Adult)   Pulse 64   Temp 36.2 °C (97.2 °F) (Temporal)   Resp 16   Ht 1.626 m (5' 4\")   SpO2 94%   BMI 46.35 kg/m²     Physical Exam  Vitals and nursing note reviewed.   Constitutional:       Appearance: Normal appearance.   HENT:      Head: Normocephalic and atraumatic.      Right Ear: A middle ear effusion is present.      Left Ear: A middle ear effusion is present.      Nose: Mucosal edema, congestion and rhinorrhea present.      Mouth/Throat:      Pharynx: Oropharynx is clear. No oropharyngeal exudate or posterior oropharyngeal erythema.   Cardiovascular:      Rate and Rhythm: Normal rate and regular rhythm.   Pulmonary:      Effort: Pulmonary effort is normal.      Breath sounds: Normal breath sounds.   Skin:     General: Skin is warm and dry.   Neurological:      General: No focal deficit present.      Mental Status: She is alert and oriented to person, place, and time. Mental status is at baseline.       Lab Review  No results found for this or any previous visit (from the past 12 hour(s)).    Radiology Review  No results found.      Assessment/Plan   History and physical exam meet AAO-HNS (2015) diagnostic criteria for acute bacterial rhinosinusitis (ABRS).  A watch and wait approach to therapy was presented to the patient, however, a mutual decision to proceed with empiric antibiotic therapy for ABRS was made.  Prescription for Augmentin and Diflucan were sent to the pharmacy.  Etiology, self-cares, expected illness course, and follow-up criteria were reviewed with the patient as outlined below:     Plan/Patient Education:   Diagnosis:  You are most-likely experiencing a “sinus infection” or acute rhinosinusitis.  Most sinus infectious are caused by viruses, are " self-limited, and do not require antibiotics; less than 2% are caused by bacteria.  Even these will generally resolve successfully without antibiotic therapy.  Signs of a bacterial sinus infection include purulent (yellow, green, thick) nasal discharge, facial pain/pressure, and/or sinus congestion without improvement after 10 days of symptoms.  People rarely develop severe complications from bacterial rhinosinusitis if they forego antibiotic therapy.  Antibiotics may shorten your illness, but they may also cause adverse events such as diarrhea, antibiotic resistance, and secondary infections.  Supportive Care/Treatment: Acetaminophen or ibuprofen may be used as needed for discomfort and fever relief.  Intranasal saline irrigation may also improve comfort.  Intranasal corticosteroids (such as Flonase or Nasacort) may decrease inflammation and improve sinus drainage. Please try these supportive cares for the next few weeks.  If symptoms do not improve in 3-5 days, an antibiotic may be started.  This has been sent to your preferred pharmacy.  Please take this as prescribed, and complete the entire course of treatment.  Taking this medication as prescribed should clear the infection and aids in preventing bacterial resistance.  Probiotics: If you start the antibiotics, please consider taking a probiotic.  Probiotics with scientific evidence for prevention of antibiotic associated diarrhea in adults include brands such as Bio-K+ CL 1285, BioGaia, Culturelle, Digest[employer] Travel Probiotic, and Florastor.   Follow-up:  Should symptoms worsen or not improve within 7 days, please follow-up with your primary care provider or Urgent Care.     References:   AAO-HNS, 2015   Jared [employer] Aydin [UpToDate], 2017   Lesly, 2017     Patient Education: Ready to learn, no apparent learning barriers were identified; learning preference includes listening.  Explained diagnosis and treatment plan; patient/caregiver expressed  understanding of the content.      Patient Education: Ready to learn, no apparent learning barriers were identified; learning preference includes listening.  Explained diagnosis and treatment plan; patient/caregiver expressed understanding of the content. All questions answered.     There are no Patient Instructions on file for this visit.    Sarah Mei PA-C  9/21/2022

## 2022-09-21 NOTE — TELEPHONE ENCOUNTER
Caller would like to discuss (a) return call Writer has advised caller of a callback from within 24 hours.    Patient: Corrine Holden    Caller Name (Last and first, relation/role): peggy     Name of Facility:  surgical     Callback Number:     Best Availability: anytime    Fax Number: na    Additional Info: Needing pre op notes signed by provider from 9/15 for patient surgery please and thanks    Did you confirm the message with the caller: Yes    Is it okay that the nurse communicates your response through [employer]indahart? No

## 2022-09-23 LAB
25(OH)D2 SERPL-MCNC: <4 NG/ML
25(OH)D3 SERPL-MCNC: 51 NG/ML
25(OH)D3+25(OH)D2 SERPL-MCNC: 51 NG/ML

## 2022-09-29 PROCEDURE — 93306 TTE W/DOPPLER COMPLETE: CPT | Mod: 26 | Performed by: INTERNAL MEDICINE

## 2022-09-30 DIAGNOSIS — M62.838 MUSCLE SPASM: ICD-10-CM

## 2022-09-30 NOTE — TELEPHONE ENCOUNTER
Medication refill request:  Medication(s):  valium not filled due to (route to Nurse Pool) Non-delegated medication - provider fill only

## 2022-09-30 NOTE — TELEPHONE ENCOUNTER
No new care gaps identified.  [facility] Embedded Care Gaps. Reference number: [id number]. 9/30/2022 12:27:00 PM JOSUÉ

## 2022-09-30 NOTE — TELEPHONE ENCOUNTER
Patient has contacted the pharmacy? No    Patient Advised:  72 hours for refill     Name of Medications (Have patient read from the bottle or snip from medication list):      Are you having any trouble with the medication:   No    How many doses do you have left: (Multiple med requests; Report the doses of the medication with the least amount left) 10    Is the Diagnosis (DX) active? not applicable    Additional Information:  Patient has 10 pills remaining, patient forgot to ask for a renewal when she was in for office visit      Patient's preferred pharmacy has been noted and populated.    Is it okay that the nurse communicates your response through [employer]? No      Caller has been advised: Your request does not guarantee an immediate refill. Please allow up to 72 hours for completion.

## 2022-10-03 RX ORDER — DIAZEPAM 5 MG/1
5 TABLET ORAL EVERY 12 HOURS PRN
Qty: 30 TABLET | Refills: 0 | Status: SHIPPED | OUTPATIENT
Start: 2022-10-03 | End: 2023-02-09 | Stop reason: SDUPTHER

## 2022-10-03 NOTE — TELEPHONE ENCOUNTER
Becka Joy MD --> [facility] Internal Med Staff (Hao) (Nurse) 1 hour ago (3:51 PM)    Ok for same

## 2022-10-06 DIAGNOSIS — E03.9 HYPOTHYROIDISM, UNSPECIFIED TYPE: ICD-10-CM

## 2022-10-06 RX ORDER — LEVOTHYROXINE SODIUM 125 UG/1
125 TABLET ORAL DAILY
Qty: 90 TABLET | Refills: 3 | Status: SHIPPED | OUTPATIENT
Start: 2022-10-06 | End: 2023-08-30

## 2022-10-06 NOTE — TELEPHONE ENCOUNTER
No new care gaps identified.  [facility] Embedded Care Gaps. Reference number: [id number]. 10/06/2022 3:01:51 AM JOSUÉ

## 2023-02-09 ENCOUNTER — LAB (OUTPATIENT)
Dept: LAB | Facility: CLINIC | Age: 68
End: 2023-02-09
Payer: MEDICARE

## 2023-02-09 ENCOUNTER — OFFICE VISIT (OUTPATIENT)
Dept: INTERNAL MEDICINE | Facility: CLINIC | Age: 68
End: 2023-02-09
Payer: MEDICARE

## 2023-02-09 VITALS
TEMPERATURE: 98.5 F | HEART RATE: 86 BPM | HEIGHT: 64 IN | SYSTOLIC BLOOD PRESSURE: 132 MMHG | RESPIRATION RATE: 16 BRPM | BODY MASS INDEX: 46.35 KG/M2 | OXYGEN SATURATION: 93 % | DIASTOLIC BLOOD PRESSURE: 94 MMHG

## 2023-02-09 DIAGNOSIS — E66.9 OBESITY WITHOUT SERIOUS COMORBIDITY, UNSPECIFIED CLASSIFICATION, UNSPECIFIED OBESITY TYPE: ICD-10-CM

## 2023-02-09 DIAGNOSIS — G47.33 OBSTRUCTIVE SLEEP APNEA: ICD-10-CM

## 2023-02-09 DIAGNOSIS — M62.838 MUSCLE SPASM: ICD-10-CM

## 2023-02-09 DIAGNOSIS — E78.5 BORDERLINE HYPERLIPIDEMIA: ICD-10-CM

## 2023-02-09 DIAGNOSIS — T81.89XA NON-HEALING SURGICAL WOUND, INITIAL ENCOUNTER: ICD-10-CM

## 2023-02-09 DIAGNOSIS — I51.7 LEFT VENTRICULAR HYPERTROPHY: ICD-10-CM

## 2023-02-09 DIAGNOSIS — R73.01 IMPAIRED FASTING GLUCOSE: ICD-10-CM

## 2023-02-09 DIAGNOSIS — R31.29 MICROSCOPIC HEMATURIA: ICD-10-CM

## 2023-02-09 DIAGNOSIS — E03.9 HYPOTHYROIDISM, UNSPECIFIED TYPE: ICD-10-CM

## 2023-02-09 DIAGNOSIS — M85.80 OSTEOPENIA, UNSPECIFIED LOCATION: ICD-10-CM

## 2023-02-09 DIAGNOSIS — Z96.662 HISTORY OF TOTAL REPLACEMENT OF LEFT ANKLE: ICD-10-CM

## 2023-02-09 DIAGNOSIS — G89.29 CHRONIC PAIN OF LEFT ANKLE: ICD-10-CM

## 2023-02-09 DIAGNOSIS — M25.572 CHRONIC PAIN OF LEFT ANKLE: ICD-10-CM

## 2023-02-09 DIAGNOSIS — E67.3 HYPERVITAMINOSIS D: ICD-10-CM

## 2023-02-09 DIAGNOSIS — I35.8 AORTIC VALVE SCLEROSIS: ICD-10-CM

## 2023-02-09 DIAGNOSIS — K21.9 GASTROESOPHAGEAL REFLUX DISEASE, UNSPECIFIED WHETHER ESOPHAGITIS PRESENT: ICD-10-CM

## 2023-02-09 DIAGNOSIS — Z00.00 MEDICARE ANNUAL WELLNESS VISIT, SUBSEQUENT: Primary | ICD-10-CM

## 2023-02-09 LAB
ALBUMIN SERPL-MCNC: 4.6 G/DL (ref 3.5–5.3)
ALP SERPL-CCNC: 80 U/L (ref 55–142)
ALT SERPL-CCNC: 14 U/L (ref 7–52)
ANION GAP SERPL CALC-SCNC: 8 MMOL/L (ref 3–11)
AST SERPL-CCNC: 16 U/L
BACTERIA #/AREA URNS HPF: ABNORMAL /HPF
BASOPHILS # BLD AUTO: 0.1 10*3/UL
BASOPHILS NFR BLD AUTO: 0.9 % (ref 0–2)
BILIRUB SERPL-MCNC: 0.34 MG/DL (ref 0.2–1.4)
BILIRUB UR QL: NEGATIVE
BUN SERPL-MCNC: 17 MG/DL (ref 7–25)
CALCIUM ALBUM COR SERPL-MCNC: 8.7 MG/DL (ref 8.6–10.3)
CALCIUM SERPL-MCNC: 9.2 MG/DL (ref 8.6–10.3)
CHLORIDE SERPL-SCNC: 106 MMOL/L (ref 98–107)
CLARITY UR: CLEAR
CO2 SERPL-SCNC: 28 MMOL/L (ref 21–32)
COLOR UR: YELLOW
CREAT SERPL-MCNC: 0.87 MG/DL (ref 0.6–1.1)
CRP SERPL-MCNC: 38.7 MG/L
EOSINOPHIL # BLD AUTO: 0.2 10*3/UL
EOSINOPHIL NFR BLD AUTO: 3.1 % (ref 0–3)
ERYTHROCYTE [DISTWIDTH] IN BLOOD BY AUTOMATED COUNT: 13.6 % (ref 11.5–14)
ERYTHROCYTE [SEDIMENTATION RATE] IN BLOOD: 30 MM/HR
EST. AVERAGE GLUCOSE BLD GHB EST-MCNC: 122.6 MG/DL
GFR SERPL CREATININE-BSD FRML MDRD: 73 ML/MIN/1.73M*2
GLUCOSE SERPL-MCNC: 116 MG/DL (ref 70–105)
GLUCOSE UR QL: NEGATIVE MG/DL
HBA1C MFR BLD: 5.9 % (ref 4–6)
HCT VFR BLD AUTO: 41.8 % (ref 34–45)
HGB BLD-MCNC: 14.3 G/DL (ref 11.5–15.5)
HGB UR QL: NEGATIVE
KETONES UR-MCNC: ABNORMAL MG/DL
LEUKOCYTE ESTERASE UR QL STRIP: NEGATIVE
LYMPHOCYTES # BLD AUTO: 1.8 10*3/UL
LYMPHOCYTES NFR BLD AUTO: 26.7 % (ref 11–47)
MCH RBC QN AUTO: 32 PG (ref 28–33)
MCHC RBC AUTO-ENTMCNC: 34.1 G/DL (ref 32–36)
MCV RBC AUTO: 93.7 FL (ref 81–97)
MONOCYTES # BLD AUTO: 0.5 10*3/UL
MONOCYTES NFR BLD AUTO: 7.5 % (ref 3–11)
MUCOUS THREADS #/AREA URNS HPF: PRESENT /[HPF]
NEUTROPHILS # BLD AUTO: 4 10*3/UL
NEUTROPHILS NFR BLD AUTO: 61.8 % (ref 41–81)
NITRITE UR QL: NEGATIVE
PH UR: 5 PH
PLATELET # BLD AUTO: 314 10*3/UL (ref 140–350)
PMV BLD AUTO: 6.9 FL (ref 6.9–10.8)
POTASSIUM SERPL-SCNC: 4.6 MMOL/L (ref 3.5–5.1)
PROT SERPL-MCNC: 7 G/DL (ref 6–8.3)
PROT UR STRIP-MCNC: NEGATIVE MG/DL
RBC # BLD AUTO: 4.46 10*6/ΜL (ref 3.7–5.3)
RBC #/AREA URNS HPF: ABNORMAL /HPF
SODIUM SERPL-SCNC: 142 MMOL/L (ref 135–145)
SP GR UR: >=1.03 (ref 1–1.03)
SQUAMOUS #/AREA URNS HPF: ABNORMAL /HPF
TSH SERPL DL<=0.05 MIU/L-ACNC: 1.34 UIU/ML (ref 0.34–4.82)
UROBILINOGEN UR-MCNC: 0.2 MG/DL
VIT B12 SERPL-MCNC: 545 PG/ML (ref 180–914)
WBC # BLD AUTO: 6.6 10*3/UL (ref 4.5–10.5)
WBC #/AREA URNS HPF: ABNORMAL /HPF

## 2023-02-09 PROCEDURE — G0463 HOSPITAL OUTPT CLINIC VISIT: HCPCS | Mod: PO | Performed by: INTERNAL MEDICINE

## 2023-02-09 PROCEDURE — 84443 ASSAY THYROID STIM HORMONE: CPT | Mod: PO

## 2023-02-09 PROCEDURE — G0439 PPPS, SUBSEQ VISIT: HCPCS | Performed by: INTERNAL MEDICINE

## 2023-02-09 PROCEDURE — 82607 VITAMIN B-12: CPT

## 2023-02-09 PROCEDURE — 36415 COLL VENOUS BLD VENIPUNCTURE: CPT | Mod: PO

## 2023-02-09 PROCEDURE — 81001 URINALYSIS AUTO W/SCOPE: CPT | Mod: PO

## 2023-02-09 PROCEDURE — 80053 COMPREHEN METABOLIC PANEL: CPT | Mod: PO

## 2023-02-09 PROCEDURE — 85025 COMPLETE CBC W/AUTO DIFF WBC: CPT | Mod: PO

## 2023-02-09 PROCEDURE — 83036 HEMOGLOBIN GLYCOSYLATED A1C: CPT | Mod: PO

## 2023-02-09 PROCEDURE — 85652 RBC SED RATE AUTOMATED: CPT

## 2023-02-09 PROCEDURE — 99214 OFFICE O/P EST MOD 30 MIN: CPT | Mod: 25 | Performed by: INTERNAL MEDICINE

## 2023-02-09 PROCEDURE — 82306 VITAMIN D 25 HYDROXY: CPT

## 2023-02-09 PROCEDURE — 86140 C-REACTIVE PROTEIN: CPT | Mod: PO

## 2023-02-09 RX ORDER — DIAZEPAM 5 MG/1
5 TABLET ORAL EVERY 12 HOURS PRN
Qty: 30 TABLET | Refills: 0 | Status: SHIPPED | OUTPATIENT
Start: 2023-02-09 | End: 2023-03-15

## 2023-02-09 ASSESSMENT — PAIN SCALES - GENERAL: PAINLEVEL: 5

## 2023-02-09 ASSESSMENT — PATIENT HEALTH QUESTIONNAIRE - PHQ9
1. LITTLE INTEREST OR PLEASURE IN DOING THINGS: NOT AT ALL
2. FEELING DOWN, DEPRESSED OR HOPELESS: NOT AT ALL
SUM OF ALL RESPONSES TO PHQ9 QUESTIONS 1 & 2: 0

## 2023-02-09 NOTE — PROGRESS NOTES
"Corrine Holden is a 67 y.o. female who presents for Annual Exam (MWV; pt would like to discuss refill on meds; pt would like a renewal on her handicap sticker; pt would like labs for her ortho dr; )        HPI:    Corrine is here for f/u and for medicare physical.    She had L total ankle replacement and talonavicular and subtalar joint fusion back in October.  She says she has not been progressing as planned.  She ventured to [employer] early this week and used crutches and later a cart and had some severe pain that night.  She said she could have taken a narcotic but didn't.  Her x rays are showing some lucencies or hardware failure.  She has lab orders today for CBC, ESR, CRP to look for infection.  She thinks she may need to have another surgery ASAP and is wondering if this could count as her preop visit.    She is asking for a refill of her valium as it has been helpful at times with her foot pain.  She also would like a parking permit renewal.    There have been no changes to her family history.  She has not tobacco, alcohol , or drug use.  We have a HCPOA on file for her, all of her daughters - her primary is Annabel, then Kinjal, then Angely.          Past Medical History:   Diagnosis Date    GERD (gastroesophageal reflux disease)     H. pylori infection 2009    Hypothyroidism     Injury of back     STATES, \"THIS IS REASON FOR MRI\"    Microscopic hematuria     SHANA (obstructive sleep apnea)     USES CPAP    Osteoarthritis     Palpitations     Respiratory disease     Supraventricular tachycardia (CMS/HCC) ([facility])     STATES, \"COFFEE RELATED\", VERY INTERMITTENT    Tinnitus        Current Outpatient Medications   Medication Sig Dispense Refill    levothyroxine (SYNTHROID, LEVOTHROID) 125 mcg tablet Take 1 tablet (125 mcg total) by mouth daily 90 tablet 3    omeprazole (PriLOSEC) 20 mg capsule Take 1 capsule (20 mg total) by mouth 2 (two) times a day as needed (gerd) 180 capsule 3    magnesium oxide (MAG-OX) 400 mg (241.3 mg " magnesium) tablet Take 400 mg by mouth daily      albuterol HFA 90 mcg/actuation inhaler Inhale 2 puffs every 6 (six) hours as needed for wheezing 1 Inhaler 0    meloxicam (MOBIC) 7.5 mg tablet Take 1 tablet (7.5 mg total) by mouth daily 90 tablet 3    guaiFENesin (MUCINEX) 600 mg 12 hr tablet Take 1,200 mg by mouth 2 (two) times a day      ibuprofen (ADVIL,MOTRIN) 200 mg tablet Take 200 mg by mouth every 6 (six) hours as needed      vit C/E/Zn/coppr/lutein/zeaxan (OCUVITE LUTEIN AND ZEAXANTHIN ORAL) PreserVision AREDS-2      zinc 50 mg tablet Take 50 mg by mouth daily      diclofenac sodium (VOLTAREN) 1 % gel APPLY 2 GRAMS TO THE AFFECTED AREA(S) 4 TIMES PER DAY AS NEEDED 300 g 2    fluticasone propionate (FLONASE) 50 mcg/actuation nasal spray USE 2 SPRAYS IN EACH NOSTRIL DAILY 48 g 2    budesonide (Pulmicort Flexhaler) 90 mcg/actuation inhaler as needed      vit B complex no.12/niacin,B3, (VITAMIN B COMPLEX NO.12-NIACIN ORAL) Take by mouth      cholecalciferol, vitamin D3, 25 mcg (1,000 unit) capsule Take 5,000 Units by mouth Takes 5 times daily       krill-om-3-dha-epa-phospho-ast 820-741-65-75 mg capsule Take 1 capsule by mouth daily      cyclobenzaprine (FLEXERIL) 10 mg tablet Take 1 tablet (10 mg total) by mouth 2 (two) times a day as needed for muscle spasms. 60 tablet 0    calcium carbonate (OS-RONNIE) 500 mg calcium tablet Take 1 tablet by mouth daily        multivitamin (THERAGRAN) tablet tablet 1 Every Day      acetaminophen (TYLENOL EXTRA STRENGTH) 500 mg tablet Take 1,000 mg by mouth 2 (two) times a day        diazePAM (VALIUM) 5 mg tablet Take 1 tablet (5 mg total) by mouth every 12 (twelve) hours as needed for anxiety or muscle spasms Max Daily Amount: 10 mg 30 tablet 0     No current facility-administered medications for this visit.        Allergies   Allergen Reactions    Bee Venom Protein (Honey Bee)     No Known Drug Allergies        Past Surgical History:   Procedure Laterality Date    CARDIAC  ELECTROPHYSIOLOGY STUDY AND ABLATION  01/01/2007    attempted ablation for SVT    CARPAL TUNNEL RELEASE Bilateral 06/26/2020    Procedure: LEFT ENDOSCOPIC CARPAL TUNNEL RELEASE, RIGHT OPEN CARPAL TUNNEL;  Surgeon: Mendez Sun MD;  Location: [facility] OR;  Service: Orthopedics;  Laterality: Bilateral;    COLONOSCOPY  01/21/2015    cecal polyp-tubular adenoma-recommend repeat 5 years    COLONOSCOPY N/A 06/22/2020    repeat 5 years - adenomatous polyp    DILATION AND CURETTAGE OF UTERUS  01/01/2006    ENDOMETRIAL BIOPSY  01/01/2012    pt reports negative    ESOPHAGOGASTRODUODENOSCOPY  01/01/2009    H pylori    ESOPHAGOGASTRODUODENOSCOPY N/A 06/22/2020    Procedure: ESOPHAGOGASTRODUODENOSCOPY;  Surgeon: Julian Faulkner MD;  Location: [facility];  Service: Endoscopy;  Laterality: N/A;    KNEE ARTHROPLASTY Right 04/01/2015    Dr Placido BRICE    TOTAL ANKLE ARTHROPLASTY Left 10/03/2022    Dr. Guerra, with talonavicular and subtalar fusion    VAGINAL HYSTERECTOMY  12/16/2013    with BSO, fibroids       Family History   Problem Relation Age of Onset    Cancer Mother 84        Bladder    Osteoporosis Mother     Hypertension Mother     Colon cancer Father 65    Kidney cancer Father 77    Other Father's Brother         sudden cardiac death, 60s    Heart disease Maternal Grandfather     Heart attack Maternal Grandfather 59    Cancer Paternal Grandfather          Social History     Tobacco Use    Smoking status: Never    Smokeless tobacco: Never   Substance Use Topics    Alcohol use: No       Screenings        Depression Screening       Mini-Mental Exam                                        Fall Risk Assessment  STEADI Fall Risk  Have you fallen in the past year? : No  Do you feel unsteady when standing or walking?: No  Do you worry about falling? : No    Diet and Activity  Diet and Exercise  Do you follow a special diet at home?: No  How many servings of fruits and vegetables do you get daily? :  (refused to comment)  Do you  get regular exercise?: Yes  What type of exercise do you get and how often?: walking    Activities of Daily Living & Health Risk Assessment  Safety & ADLs  How would you rate your overall health?: excellent  Do you struggle with hearing? : No  Do you struggle with your memory?: No  Do you have safety features in your home to prevent falls? : No  Do you struggle to care for yourself or your home?: No    Durable Medical Equipment  Durable Medical Equipment  Durable Medical Equipment: CPAP / APAP / VPAP  DME Vendor / Suppliers: [facility]    Patient Care Team:  Becka Joy MD as PCP - General (Internal Medicine)         Educational handouts given and discussed with patient regarding healthy living, healthy eating, diet and exercise, fall prevention within the home, exercises to improve balance, flexibility, strength and staying power; as well as prevention of chronic disease and the importance of tobacco free living and cessation of tobacco products Yes        Review of Systems   Constitutional: Negative.  Negative for activity change, appetite change, chills, diaphoresis, fatigue, fever and unexpected weight change.   HENT:  Negative for congestion, ear pain, hearing loss, mouth sores, nosebleeds, postnasal drip, rhinorrhea, sinus pressure, sneezing, sore throat, tinnitus, trouble swallowing and voice change.    Eyes: Negative.  Negative for photophobia and visual disturbance.   Respiratory: Negative.  Negative for cough, chest tightness, shortness of breath and wheezing.    Cardiovascular: Negative.  Negative for chest pain, palpitations and leg swelling.   Gastrointestinal: Negative.  Negative for abdominal distention, abdominal pain, blood in stool, constipation, diarrhea, nausea and vomiting.   Endocrine: Negative.  Negative for cold intolerance, heat intolerance, polydipsia, polyphagia and polyuria.   Genitourinary: Negative.  Negative for difficulty urinating, dysuria, flank pain, frequency, hematuria and urgency.  "  Musculoskeletal:  Positive for arthralgias. Negative for back pain, gait problem, joint swelling, myalgias, neck pain and neck stiffness.   Skin: Negative.  Negative for rash.   Neurological: Negative.  Negative for dizziness, tremors, seizures, syncope, speech difficulty, weakness, light-headedness, numbness and headaches.   Hematological: Negative.  Negative for adenopathy. Does not bruise/bleed easily.   Psychiatric/Behavioral: Negative.  Negative for dysphoric mood and sleep disturbance. The patient is not nervous/anxious.      Physical Exam  /94 (BP Location: Right arm, Patient Position: Sitting, Cuff Size: Regular Adult)   Pulse 86   Temp 36.9 °C (98.5 °F)   Resp 16   Ht 1.626 m (5' 4\")   SpO2 93%   BMI 46.35 kg/m²   GEN: pleasant, NAD  HEENT: PERRLA, TMs clear, posterior pharynx benign, sclera anicteric  NECK: no LAD or thyromegaly, no carotid bruits  LUNGS: CTAB, no wheezing rhonchi or rales  HEART: RRR faint systolic murmur, no rubs or gallops  BREAST: symmetric, no masses, no nipple discharge, no axillary adenopathy  ABD: Soft, non tender, non distended, normal bowel sounds , no hepatosplenomegaly  : deferred  RECTAL: deferred  Ext: edema LLE  SKIN: no rash  Neuro:  CN 2-12 grossly intact, non focal, moving all extremities        Assessment/Plan   Diagnoses and all orders for this visit:    Medicare annual wellness visit, subsequent    Osteopenia, unspecified location    Left ventricular hypertrophy    Aortic valve sclerosis    Obstructive sleep apnea    Borderline hyperlipidemia    Impaired fasting glucose    Microscopic hematuria    Chronic pain of left ankle  -     Sedimentation rate, automated Blood, Venous; Future  -     C-reactive protein (Inflammation) Blood, Venous; Future    History of total replacement of left ankle  -     Sedimentation rate, automated Blood, Venous; Future  -     C-reactive protein (Inflammation) Blood, Venous; Future        1. Adult health examination.  No " family history of AAA.  Last DEXA 2020 - normal so repeat in 2025.  Last mammo normal 7/2022.  Has borderline hyperlipidemia.  Last colonoscopy 2020, due in 2025.  Fasting sugar was in prediabetes range in 9/2022.  Declines influenza and covid vaccine.  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  Prevnar 13 2021.  Pneumovax was 2020.  Last tdap 2015.  Has had shingrix x2.  Does not need pelvic or pap.  Saw Dr Dietrich for eye exam last week.  Declines HIV and hep C screening.  Does not need lung cancer screening.  Has HCPOA on file.  2. S/p Ankle replacement with ongoing pain.  Cbc and inflammatory markers today.  She informs me she thinks she will need repeat surgery, possibly as soon as next week.  She has no history of heart attack, stroke, chronic kidney disease, valvular heart disease, congestive heart failure.  She does have a history of SVT and aortic valve sclerosis without stenosis and mild LVH.  EKG was completed in September so was not repeated.  Medical conditions are stable.  As long as labs are stable, she can proceed to surgery if needed.  Renewal for parking permit filled out today as well.  3.  Impaired fasting glucose. Repeat a1c.  4.  Hypothyroidism. Tsh today.  5.  Sleep apnea.  Controlled with CPAP.    6. Borderline hyperlipidemia.  Labs were improved last September.  Will repeat next September.      Total time spent was 55 min with 25 min spent regarding physical and 30 min spent regarding other medical issues with greater than 50% of total time spent in direct patient contact and coordination of care regarding reviewing medications, reviewing labs, symptoms .      CM MON MD

## 2023-02-11 LAB
25(OH)D2 SERPL-MCNC: <4 NG/ML
25(OH)D3 SERPL-MCNC: 53 NG/ML
25(OH)D3+25(OH)D2 SERPL-MCNC: 53 NG/ML

## 2023-02-13 ENCOUNTER — TELEPHONE (OUTPATIENT)
Dept: INTERNAL MEDICINE | Facility: CLINIC | Age: 68
End: 2023-02-13
Payer: MEDICARE

## 2023-02-13 NOTE — TELEPHONE ENCOUNTER
"Medication authorization received from [facility] pharmacy.      Authorization needed for Diazepam and dose needed/SARAH BETH 5mg.    Is Generic okay? yes    Quantity 30.    Day Supply 2 tabs.    Testosterone requests (need 2 free or total testosterone results/date drawn) NA.    Ordering provider:     Diagnosis code: [id number]    Medication original start date: 2/9/23.    Medication Currently taking relating to this diagnosis: No other medication being taken at this time    Medications that have \" Failed\" related to this diagnosis to include any OTC meds: No other medication is being taken    Would ALL Formulary medication be ineffective or cause adverse effect: Unsure of what the formulary is    If patient is elderly, does the provider believe the high risk medication prescribed out way the risks: NA    CoverMyMeds Key:     Reason for requesting this medication over formulary: unsure what the formulary is    Additional information relative to this request:NA    Please see media document for further information.     "

## 2023-02-14 ENCOUNTER — TELEPHONE (OUTPATIENT)
Dept: INTERNAL MEDICINE | Facility: CLINIC | Age: 68
End: 2023-02-14
Payer: MEDICARE

## 2023-02-14 NOTE — TELEPHONE ENCOUNTER
Caller would like to discuss (a)  H & P  Writer has advised caller of a callback from within 24 hours.    Patient: Corrine Holden    Caller Name (Last and first, relation/role): Radha    Name of Facility: [facility]    Callback Number:     Best Availability: any    Fax Number: na    Additional Info: Patient had Medicare Wellness on 2/9/23.  Dr Joy said she could convert her notes to H&P for pre op.  Please call Radha to confirm.    Did you confirm the message with the caller: Yes    Is it okay that the nurse communicates your response through [employer]? No

## 2023-02-15 NOTE — TELEPHONE ENCOUNTER
Attempted to reach Radha. Confidential voicemail set up. Detailed message left that Dr. Joy is working on finishing her note, this will include pre-op. Once completed we can fax.   Post-Care Instructions: I reviewed with the patient in detail post-care instructions. Patient is to wear sunprotection, and avoid picking at any of the treated lesions. Pt may apply Vaseline to crusted or scabbing areas. Duration Of Freeze Thaw-Cycle (Seconds): 0 Consent: The patient's consent was obtained including but not limited to risks of crusting, scabbing, blistering, scarring, darker or lighter pigmentary change, recurrence, incomplete removal and infection. Number Of Freeze-Thaw Cycles: 1 freeze-thaw cycle Render Post-Care Instructions In Note?: no Detail Level: Zone

## 2023-02-16 ENCOUNTER — TELEPHONE (OUTPATIENT)
Dept: INTERNAL MEDICINE | Facility: CLINIC | Age: 68
End: 2023-02-16
Payer: MEDICARE

## 2023-02-16 ASSESSMENT — ENCOUNTER SYMPTOMS
HEMATOLOGIC/LYMPHATIC NEGATIVE: 1
SLEEP DISTURBANCE: 0
NECK STIFFNESS: 0
NAUSEA: 0
MYALGIAS: 0
CONSTIPATION: 0
ABDOMINAL DISTENTION: 0
WHEEZING: 0
TREMORS: 0
GASTROINTESTINAL NEGATIVE: 1
CHILLS: 0
FEVER: 0
DYSURIA: 0
ACTIVITY CHANGE: 0
ADENOPATHY: 0
HEMATURIA: 0
POLYPHAGIA: 0
NEUROLOGICAL NEGATIVE: 1
VOMITING: 0
SORE THROAT: 0
BRUISES/BLEEDS EASILY: 0
UNEXPECTED WEIGHT CHANGE: 0
CHEST TIGHTNESS: 0
NUMBNESS: 0
CARDIOVASCULAR NEGATIVE: 1
COUGH: 0
FATIGUE: 0
SPEECH DIFFICULTY: 0
VOICE CHANGE: 0
HEADACHES: 0
EYES NEGATIVE: 1
ABDOMINAL PAIN: 0
JOINT SWELLING: 0
TROUBLE SWALLOWING: 0
FREQUENCY: 0
RESPIRATORY NEGATIVE: 1
ENDOCRINE NEGATIVE: 1
LIGHT-HEADEDNESS: 0
RHINORRHEA: 0
DIAPHORESIS: 0
DIFFICULTY URINATING: 0
DIARRHEA: 0
SEIZURES: 0
DIZZINESS: 0
SINUS PRESSURE: 0
BACK PAIN: 0
BLOOD IN STOOL: 0
NERVOUS/ANXIOUS: 0
NECK PAIN: 0
POLYDIPSIA: 0
DYSPHORIC MOOD: 0
APPETITE CHANGE: 0
PSYCHIATRIC NEGATIVE: 1
SHORTNESS OF BREATH: 0
CONSTITUTIONAL NEGATIVE: 1
WEAKNESS: 0
PALPITATIONS: 0
ARTHRALGIAS: 1
PHOTOPHOBIA: 0
FLANK PAIN: 0

## 2023-02-16 NOTE — TELEPHONE ENCOUNTER
Caller would like to discuss (a)  Appt Notes  Writer has advised caller of a callback from within 24 hours.    Patient: Corrine Holden    Caller Name (Last and first, relation/role): Susan    Name of Facility:     Callback Number: [phone]    Best Availability: anytime    Fax Number: [phone]    Additional Info: Would like the appt notes for 2/9/23 would need those today 2/16/23 ASAP     Did you confirm the message with the caller: Yes    Is it okay that the nurse communicates your response through MyChart? No

## 2023-02-16 NOTE — TELEPHONE ENCOUNTER
Caller would like to discuss (a)  Clinical ?  Writer has advised caller of a callback from within 24 hours.    Patient: Corrine Holden    Caller Name (Last and first, relation/role): self    Name of Facility: na    Callback Number: [phone]    Best Availability: anytime    Fax Number: na    Additional Info: pt wanting a call back from nurse to go over the surgery appt.     Did you confirm the message with the caller: Yes    Is it okay that the nurse communicates your response through [employer]? No

## 2023-02-17 NOTE — TELEPHONE ENCOUNTER
Returned call, SARAH on for[MD]'s voicemail. Needing a fax number as to where to send progress note.

## 2023-02-17 NOTE — TELEPHONE ENCOUNTER
Agustina CASTILLO RN to fax preop to [facility]. Left Radha message regarding fax. Asked Radha to call my cell to confirm receipt as pt has surgery Monday 2/20/23.

## 2023-02-17 NOTE — TELEPHONE ENCOUNTER
Spoke with patient, informed her we could fax preop to [facility] today for her surgery on 2/20/23.

## 2023-03-09 NOTE — TELEPHONE ENCOUNTER
[employer]ET[employer] medication Denial    Medication: DIAZEPAM Tablet  REASON: We denied this request under Medicare Part D because: The information provided by your prescriber did not meet the requirements for covering this medication (prior authorization).  Your plan does not allow coverage of this medication based on your prescriber answering No to the following  question(s):  Is the requested drug being prescribed for management of an anxiety disorder?        Denial scanned to

## 2023-03-14 DIAGNOSIS — M62.838 MUSCLE SPASM: ICD-10-CM

## 2023-03-14 NOTE — TELEPHONE ENCOUNTER
No new care gaps identified.  [facility] Embedded Care Gaps. Reference number: [id number]. 3/14/2023 12:42:19 PM JOSUÉ

## 2023-03-14 NOTE — TELEPHONE ENCOUNTER
Medication refill request:  Medication(s):  diazepam not filled due to (route to Nurse Pool) Non-delegated medication - provider fill only

## 2023-03-15 NOTE — TELEPHONE ENCOUNTER
Per PDMP,     Diazepam 5mg tablets  Last filled; 2/16/23  Qty; #30, 0 refill      Refill pended for provider to send with Albino

## 2023-03-16 RX ORDER — DIAZEPAM 5 MG/1
TABLET ORAL
Qty: 30 TABLET | Refills: 0 | Status: SHIPPED | OUTPATIENT
Start: 2023-03-16 | End: 2024-02-14 | Stop reason: SDUPTHER

## 2023-05-23 ASSESSMENT — ENCOUNTER SYMPTOMS
ROS GI COMMENTS: GERD
SINUS PAIN: 0
EYE REDNESS: 0
COUGH: 0
ARTHRALGIAS: 0
DIZZINESS: 0
WEAKNESS: 0
EYE PAIN: 0
PALPITATIONS: 0
FEVER: 0
AGITATION: 0
HEADACHES: 0
SINUS PRESSURE: 0
BACK PAIN: 1
VOMITING: 0
FATIGUE: 0
SLEEP DISTURBANCE: 1
SHORTNESS OF BREATH: 0
NAUSEA: 0
SORE THROAT: 0
NECK PAIN: 0
APNEA: 1
EYE ITCHING: 0
CHILLS: 0

## 2023-05-23 NOTE — PROGRESS NOTES
Sleep Progress Note    05/24/23  2:55 PM      Primary diagnosis:  No primary diagnosis found.    Chief Complaint   Patient presents with    Sleep Apnea     Gets supplies from [facility].       [facility]  Sleep study date: 8/15/2014  Severity: Moderate   AHI: 13.4  Minimum O2 saturation: 82%  Machine type: CPAP   Mask: Nasal    Corrine Holden is a 68 y.o. female who returns to the clinic in follow-up for obstructive sleep apnea and annual CPAP follow-up. It is noted that the patient has a long-standing history of SHANA dating back to 2014. At that time she underwent sleep study secondary to complaints of excessive daytime fatigability and somnolence. She also had complaints of significant morning headaches which were debilitating for her and she found it hard to work. Sleep study was completed on 8/15/2014 and demonstrated moderate obstructive sleep apnea with 13.4 apneas and hypopneas per hour. Minimum oxygen saturation was noted to be 82%. Currently the patient is on CPAP with a set pressure of 8.6 cmH2O.     She does have a past medical history positive for GERD, hypothyroidism, history of supraventricular tachycardia, osteoarthritis, and obesity.     At today’s appointment the patient reports that she tolerates CPAP and pressure well. She denies any complaints or concerns in regards to CPAP. She sleeps well throughout the night and feels that she is getting restful sleep. She will awaken 2-3 few times a night to utilize the bathroom, but denies trouble getting back to sleep. Overall, she feels well rested upon awakening and denies daytime fatigue. She has noticed some increased tiredness recently, but has related this to decreased stamina as she was unable to do much over the winter secondary to ankle surgery. Patient is currently utilizing a nasal mask without the use of a chin strap. She was previously using nasal pillows but has now switched to the nasal mask. She denies any concerns or  "complaints in regards to this and reports that current mask fit is very comfortable for her. Compliance report today demonstrates 100% usage over 4 hours. On average she utilize her machine 8 hours and 1 minute a night. AHI is very well controlled at 0.7. Overall, from a sleep standpoint the patient is noted to be doing very well. Patient is pleasant to visit with. 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 However, she has retired and is enjoying longterm and spending time with her grandchildren.      [place] Sleepiness Scale was completed in the office today with a total score of 0/24.       Histories:  Past Medical History:   Diagnosis Date    GERD (gastroesophageal reflux disease)     H. pylori infection 2009    Hypothyroidism     Injury of back     STATES, \"THIS IS REASON FOR MRI\"    Microscopic hematuria     SHANA (obstructive sleep apnea)     USES CPAP    Osteoarthritis     Palpitations     Respiratory disease     Supraventricular tachycardia (CMS/HCC)     STATES, \"COFFEE RELATED\", VERY INTERMITTENT    Tinnitus      Family History   Problem Relation Age of Onset    Cancer Mother 84        Bladder    Osteoporosis Mother     Hypertension Mother     Colon cancer Father 65    Kidney cancer Father 77    Other Father's Brother         sudden cardiac death, 60s    Heart disease Maternal Grandfather     Heart attack Maternal Grandfather 59    Cancer Paternal Grandfather      Social History     Tobacco Use    Smoking status: Never    Smokeless tobacco: Never   Substance Use Topics    Alcohol use: No    Drug use: No        Allergies   Allergen Reactions    Bee Venom Protein (Honey Bee)     No Known Drug Allergies        Current Outpatient Medications   Medication Sig Dispense Refill    diazePAM (VALIUM) 5 mg tablet Take 1 tablet by mouth every 12  hours as needed for anxiety or muscle spasms Max Daily Amount: 10 mg 30 tablet 0    levothyroxine (SYNTHROID, LEVOTHROID) 125 mcg " tablet Take 1 tablet (125 mcg total) by mouth daily 90 tablet 3    omeprazole (PriLOSEC) 20 mg capsule Take 1 capsule (20 mg total) by mouth 2 (two) times a day as needed (gerd) 180 capsule 3    magnesium oxide (MAG-OX) 400 mg (241.3 mg magnesium) tablet Take 1 tablet (400 mg total) by mouth daily      albuterol HFA 90 mcg/actuation inhaler Inhale 2 puffs every 6 (six) hours as needed for wheezing 1 Inhaler 0    meloxicam (MOBIC) 7.5 mg tablet Take 1 tablet (7.5 mg total) by mouth daily 90 tablet 3    guaiFENesin (MUCINEX) 600 mg 12 hr tablet Take 2 tablets (1,200 mg total) by mouth 2 (two) times a day      ibuprofen (ADVIL,MOTRIN) 200 mg tablet Take 1 tablet (200 mg total) by mouth every 6 (six) hours as needed      vit C/E/Zn/coppr/lutein/zeaxan (OCUVITE LUTEIN AND ZEAXANTHIN ORAL) PreserVision AREDS-2      zinc 50 mg tablet Take 50 mg by mouth daily      diclofenac sodium (VOLTAREN) 1 % gel APPLY 2 GRAMS TO THE AFFECTED AREA(S) 4 TIMES PER DAY AS NEEDED 300 g 2    fluticasone propionate (FLONASE) 50 mcg/actuation nasal spray USE 2 SPRAYS IN EACH NOSTRIL DAILY 48 g 2    budesonide (Pulmicort Flexhaler) 90 mcg/actuation inhaler as needed      vit B complex no.12/niacin,B3, (VITAMIN B COMPLEX NO.12-NIACIN ORAL) Take by mouth      cholecalciferol, vitamin D3, 25 mcg (1,000 unit) capsule Take 5 capsules (5,000 Units total) by mouth Takes 5 times daily      krill-om-3-dha-epa-phospho-ast [id number] mg capsule Take 1 capsule by mouth daily      cyclobenzaprine (FLEXERIL) 10 mg tablet Take 1 tablet (10 mg total) by mouth 2 (two) times a day as needed for muscle spasms. 60 tablet 0    calcium carbonate (OS-RONNIE) 500 mg calcium tablet Take 1 tablet (500 mg total) by mouth daily      multivitamin (THERAGRAN) tablet tablet 1 Every Day      acetaminophen (TYLENOL EXTRA STRENGTH) 500 mg tablet Take 2 tablets (1,000 mg total) by mouth 2 (two) times a day       No current facility-administered medications for this visit.  "      Review of Systems   Constitutional:  Negative for chills, fatigue and fever.   HENT:  Negative for congestion, ear pain, sinus pressure, sinus pain and sore throat.    Eyes:  Negative for pain, redness and itching.   Respiratory:  Positive for apnea (Known diagnosis of SHANA. On CPAP). Negative for cough and shortness of breath.         No snoring, unusual movements or abnormal dream activity, restless legs, or trouble sleeping (insomnia) and not excessively sleepy during daytime.   Cardiovascular:  Negative for chest pain, palpitations and leg swelling.        History of supraventricular tachycardia   Gastrointestinal:  Negative for nausea and vomiting.        GERD   Endocrine:        Hypothyriodism   Musculoskeletal:  Positive for back pain (lower back). Negative for arthralgias and neck pain.        Osteoarthritis   Allergic/Immunologic: Negative for environmental allergies.   Neurological:  Negative for dizziness, syncope, weakness and headaches.   Psychiatric/Behavioral:  Positive for sleep disturbance. Negative for agitation and behavioral problems.        OBJECTIVE    VS/Weight:  /88 (BP Location: Left arm, Patient Position: Sitting, Cuff Size: Regular Adult)   Ht 1.626 m (5' 4\")   BMI 46.35 kg/m²       Physical Exam:    Results of Compliance Report:  Pressure: 8.6 cmH2O    AHI: 0.7    % usage over 4 hours: 100%    Average hourly usage: 8:01    O2: No       Physical Exam  Vitals reviewed.   Constitutional:       General: She is not in acute distress.     Appearance: She is well-developed.   HENT:      Head: Normocephalic and atraumatic.   Eyes:      Conjunctiva/sclera: Conjunctivae normal.   Cardiovascular:      Rate and Rhythm: Normal rate and regular rhythm.   Pulmonary:      Effort: Pulmonary effort is normal.      Breath sounds: Normal breath sounds.   Musculoskeletal:      Cervical back: Neck supple.   Neurological:      Mental Status: She is alert.   Psychiatric:         Behavior: Behavior " normal.       ASSESSMENT:     1. Patient underwent sleep study on 8/15/2014 and was found to have moderate obstructive sleep apnea with an AHI of 13.4 and a minimum oxygen saturation of 82%. She is currently on CPAP with a set pressure of 8.6 cmH2O.   2.  ESS was completed in the office today with a total score of 0/24.  3.  GERD.  4.  Hypothyroidism.  5.  History of supraventricular tachycardia.  6.  Osteoarthritis.  7.  Obesity.      PLAN:    1. Patient is currently using and benefiting from current CPAP therapy. She tolerates CPAP well. There is no need to make any changes in regards to her settings as AHI is very well controlled at 0.7. She is noted to be compliant with 100% usage over 4 hours. Overall, from a sleep standpoint, she is noted to be stable.   2. The patient will be provided with a prescription to obtain new CPAP supplies today.  3. She will follow up in 1 year, sooner if needed. Her questions were addressed and answered.  4. She was counseled on what to look for if pressure is too high or too low.   5. She is to stay active both physically and mentally.   6. She will follow up with her PCP for general medical needs.  7. The diagnostic assessment has been reviewed. Patient has expressed a good understanding of the assessment and recommendation from today's visit. There are no apparent barriers to understanding this information. She has been advised to contact this office or the answering service with questions or concerns that may arise. On this date of service 25 minutes of total time was spent in evaluation and management on this encounter.      A voice recognition program was used to aid in documentation of the record.  Sometimes words are not printed exactly as they were spoken.  While efforts were made to carefully edit and correct any inaccuracies, some may be present; please take these into context.  Please contact the provider if areas are identified.        TOMAS Phan

## 2023-05-24 ENCOUNTER — OFFICE VISIT (OUTPATIENT)
Dept: NEUROLOGY | Facility: CLINIC | Age: 68
End: 2023-05-24
Payer: MEDICARE

## 2023-05-24 VITALS — BODY MASS INDEX: 46.35 KG/M2 | HEIGHT: 64 IN | SYSTOLIC BLOOD PRESSURE: 136 MMHG | DIASTOLIC BLOOD PRESSURE: 88 MMHG

## 2023-05-24 DIAGNOSIS — G47.33 OBSTRUCTIVE SLEEP APNEA SYNDROME: Primary | ICD-10-CM

## 2023-05-24 PROCEDURE — 99213 OFFICE O/P EST LOW 20 MIN: CPT | Performed by: PHYSICIAN ASSISTANT

## 2023-05-24 PROCEDURE — G0463 HOSPITAL OUTPT CLINIC VISIT: HCPCS | Performed by: PHYSICIAN ASSISTANT

## 2023-06-06 DIAGNOSIS — M54.40 BACK PAIN OF LUMBAR REGION WITH SCIATICA: Primary | ICD-10-CM

## 2023-06-09 ENCOUNTER — OFFICE VISIT (OUTPATIENT)
Dept: PAIN MEDICINE | Facility: HOSPITAL | Age: 68
End: 2023-06-09
Payer: MEDICARE

## 2023-06-09 VITALS — SYSTOLIC BLOOD PRESSURE: 141 MMHG | HEART RATE: 92 BPM | OXYGEN SATURATION: 91 % | DIASTOLIC BLOOD PRESSURE: 88 MMHG

## 2023-06-09 DIAGNOSIS — M54.16 LUMBAR RADICULAR PAIN: ICD-10-CM

## 2023-06-09 DIAGNOSIS — M54.40 BACK PAIN OF LUMBAR REGION WITH SCIATICA: ICD-10-CM

## 2023-06-09 DIAGNOSIS — M47.26 OSTEOARTHRITIS OF SPINE WITH RADICULOPATHY, LUMBAR REGION: Primary | ICD-10-CM

## 2023-06-09 PROCEDURE — G0463 HOSPITAL OUTPT CLINIC VISIT: HCPCS

## 2023-06-09 RX ORDER — METHOCARBAMOL 500 MG/1
500 TABLET, FILM COATED ORAL 3 TIMES DAILY PRN
Qty: 90 TABLET | Refills: 0 | Status: SHIPPED | OUTPATIENT
Start: 2023-06-09 | End: 2023-06-19

## 2023-06-09 RX ORDER — KETOROLAC TROMETHAMINE 10 MG/1
10 TABLET, FILM COATED ORAL EVERY 6 HOURS PRN
Qty: 20 TABLET | Refills: 0 | Status: SHIPPED | OUTPATIENT
Start: 2023-06-09 | End: 2024-09-03 | Stop reason: SDUPTHER

## 2023-06-09 RX ORDER — METHYLPREDNISOLONE 4 MG/1
TABLET ORAL
Qty: 21 TABLET | Refills: 0 | Status: SHIPPED | OUTPATIENT
Start: 2023-06-09

## 2023-06-09 ASSESSMENT — ENCOUNTER SYMPTOMS
ABDOMINAL PAIN: 0
NUMBNESS: 1
PALPITATIONS: 0
BACK PAIN: 1
SHORTNESS OF BREATH: 0
WEAKNESS: 0
ARTHRALGIAS: 1
MYALGIAS: 1
DIFFICULTY URINATING: 0

## 2023-06-09 ASSESSMENT — PAIN - FUNCTIONAL ASSESSMENT: PAIN_FUNCTIONAL_ASSESSMENT: 0-10

## 2023-06-09 NOTE — PROGRESS NOTES
HISTORY AND PHYSICAL     Referring Provider:   Prem Reese MD    Reason For Referral:   Low back and radicular pain     Subjective     Chief complaint:   Back pain    Patient ID: Corrine Holden is a 68 y.o. 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 She has had issues with back pain for many years.  In 2019 she was lifting a heavy object with her sister and started having more pain and saw immediate treatment.  She was seen by a provider at [facility] orthopedics and it started to resolve with physical therapy and other modalities.  She now has had left ankle joint replacement and a fusion surgery in her left foot which altered her gait.   4 weeks ago she started having right-sided radicular pain.  Her leg would go numb all the way down to the bottom of her foot, but no weakness and its intermittent. She still has her usual back pain.  She is hoping she can discuss interventional management.    As above she has continued with low back pain for many years and is aware that she has spondylolisthesis on her last MRI but has not had radicular pain until recently.  She otherwise has no other changes to her back or sacroiliac pain.  She is still limping somewhat on her left foot and pursuing aggressive physical therapy and pool therapy.  She takes some Valium at night to help with muscle pain as Flexeril is too sedating.  She otherwise not taking other other pain medications.  She thinks she has tried gabapentin in the past but is not interested in it now.  She takes some ibuprofen off-and-on.    According to her intake sheet she is being seen for low back pain starting 3 years ago and she thinks it is due to her anatomy.  It is described as sharp, dull and radiating sometimes.  It goes to the right leg to the bottom of her foot.  At best her pain is a 0 at worst is a 10 right notes of 2.  Her pain is made better with sitting,  "rest, ice, NSAIDs, stretches and swimming.  It is worse with walking.  Doctors have told her the cause of her pain is as per her MRI subluxation of the lumbar spine.  Less activity, ice, medications and PT help her pain but she has not tried biofeedback injections or surgery.  Pain Assessment: 0-10  Pain Score: 2 (best 0; worst 10)  Pain Type: Chronic pain  Pain Location: Back  Pain Radiating Towards: to rt side and down to foot  Pain Descriptors: Dull, Sharp, Aching, Nagging  Pain Frequency: Constant/continuous (any time that is moving)  Interference on Function: ADL's  Pain Onset: Awakened from sleep (sometimes, not routinely)  Date Pain First Started:  (three years ago)  Clinical Progression: Gradually worsening  Aggravating Factors: Walking, Standing, Bending, Lifting  Result of Injury: Yes  Work-Related Injury: No  Pain Interventions: Rest, Relaxation technique, Cold applied, Stretching/Range of motion, Medication (See MAR) (swimming)  Response to Interventions: combination     Intensity: present/10  Onset: 3 years, radicular 4 weeks  Location: Lumbar spine  Duration: Back is constant radicular occasional  Character/Quality: Sharp and dull  Alleviating factors: Rest  Aggravating factors: Walking  Radiation: Right lower extremity    Attemped Treatments:  Icing: Reports  Heat: Reports  Bracing/Orthoses:Denies  Medications: Reports  Injections: Denies  Surgery: Denies  Complimentary/Alternative Therapy: Denies  Other: Denies        Past Medical History  Past Medical History:   Diagnosis Date    GERD (gastroesophageal reflux disease)     H. pylori infection 2009    Hypothyroidism     Injury of back     STATES, \"THIS IS REASON FOR MRI\"    Microscopic hematuria     SHANA (obstructive sleep apnea)     USES CPAP    Osteoarthritis     Palpitations     Respiratory disease     Supraventricular tachycardia (CMS/HCC)     STATES, \"COFFEE RELATED\", VERY INTERMITTENT    Tinnitus        Past Surgical History   Past Surgical " History:   Procedure Laterality Date    CARDIAC ELECTROPHYSIOLOGY STUDY AND ABLATION  01/01/2007    attempted ablation for SVT    CARPAL TUNNEL RELEASE Bilateral 06/26/2020    Procedure: LEFT ENDOSCOPIC CARPAL TUNNEL RELEASE, RIGHT OPEN CARPAL TUNNEL;  Surgeon: Mendez Sun MD;  Location: [facility] OR;  Service: Orthopedics;  Laterality: Bilateral;    COLONOSCOPY  01/21/2015    cecal polyp-tubular adenoma-recommend repeat 5 years    COLONOSCOPY N/A 06/22/2020    repeat 5 years - adenomatous polyp    DILATION AND CURETTAGE OF UTERUS  01/01/2006    ENDOMETRIAL BIOPSY  01/01/2012    pt reports negative    ESOPHAGOGASTRODUODENOSCOPY  01/01/2009    H pylori    ESOPHAGOGASTRODUODENOSCOPY N/A 06/22/2020    Procedure: ESOPHAGOGASTRODUODENOSCOPY;  Surgeon: Julian Faulkner MD;  Location: [facility];  Service: Endoscopy;  Laterality: N/A;    KNEE ARTHROPLASTY Right 04/01/2015    Dr Placido BRICE    TOTAL ANKLE ARTHROPLASTY Left 10/03/2022    Dr. Guerra, with talonavicular and subtalar fusion    TOTAL ANKLE ARTHROPLASTY Left 02/20/2023    total revision of previous surgery, fusion of non-unions    VAGINAL HYSTERECTOMY  12/16/2013    with BSO, fibroids       Social History    Corrine Holden  reports that she has never smoked. She has never used smokeless tobacco. She reports that she does not drink alcohol and does not use drugs.    Family History:  family history includes Cancer in her paternal grandfather; Cancer (age of onset: 84) in her mother; Colon cancer (age of onset: 65) in her father; Heart attack (age of onset: 59) in her maternal grandfather; Heart disease in her maternal grandfather; Hypertension in her mother; Kidney cancer (age of onset: 77) in her father; Osteoporosis in her mother; Other in her father's brother.    Allergies:  Allergies   Allergen Reactions    Bee Venom Protein (Honey Bee) Swelling    No Known Drug Allergies        Medications:     Current Outpatient Medications:     diazePAM (VALIUM) 5 mg  tablet, Take 1 tablet by mouth every 12  hours as needed for anxiety or muscle spasms Max Daily Amount: 10 mg, Disp: 30 tablet, Rfl: 0    levothyroxine (SYNTHROID, LEVOTHROID) 125 mcg tablet, Take 1 tablet (125 mcg total) by mouth daily, Disp: 90 tablet, Rfl: 3    omeprazole (PriLOSEC) 20 mg capsule, Take 1 capsule (20 mg total) by mouth 2 (two) times a day as needed (gerd), Disp: 180 capsule, Rfl: 3    magnesium oxide (MAG-OX) 400 mg (241.3 mg magnesium) tablet, Take 1 tablet (400 mg total) by mouth daily, Disp: , Rfl:     albuterol HFA 90 mcg/actuation inhaler, Inhale 2 puffs every 6 (six) hours as needed for wheezing, Disp: 1 Inhaler, Rfl: 0    guaiFENesin (MUCINEX) 600 mg 12 hr tablet, Take 2 tablets (1,200 mg total) by mouth 2 (two) times a day, Disp: , Rfl:     ibuprofen (ADVIL,MOTRIN) 200 mg tablet, Take 1 tablet (200 mg total) by mouth every 6 (six) hours as needed, Disp: , Rfl:     vit C/E/Zn/coppr/lutein/zeaxan (OCUVITE LUTEIN AND ZEAXANTHIN ORAL), PreserVision AREDS-2, Disp: , Rfl:     zinc 50 mg tablet, Take 50 mg by mouth daily, Disp: , Rfl:     diclofenac sodium (VOLTAREN) 1 % gel, APPLY 2 GRAMS TO THE AFFECTED AREA(S) 4 TIMES PER DAY AS NEEDED, Disp: 300 g, Rfl: 2    fluticasone propionate (FLONASE) 50 mcg/actuation nasal spray, USE 2 SPRAYS IN EACH NOSTRIL DAILY, Disp: 48 g, Rfl: 2    budesonide (Pulmicort Flexhaler) 90 mcg/actuation inhaler, as needed, Disp: , Rfl:     vit B complex no.12/niacin,B3, (VITAMIN B COMPLEX NO.12-NIACIN ORAL), Take by mouth, Disp: , Rfl:     cholecalciferol, vitamin D3, 25 mcg (1,000 unit) capsule, Take 5 capsules (5,000 Units total) by mouth Takes 5 times daily, Disp: , Rfl:     krill-om-3-dha-epa-phospho-ast [id number] mg capsule, Take 1 capsule by mouth daily, Disp: , Rfl:     cyclobenzaprine (FLEXERIL) 10 mg tablet, Take 1 tablet (10 mg total) by mouth 2 (two) times a day as needed for muscle spasms., Disp: 60 tablet, Rfl: 0    calcium carbonate (OS-RONNIE) 500 mg  calcium tablet, Take 1 tablet (500 mg total) by mouth daily, Disp: , Rfl:     multivitamin (THERAGRAN) tablet tablet, 1 Every Day, Disp: , Rfl:     acetaminophen (TYLENOL EXTRA STRENGTH) 500 mg tablet, Take 2 tablets (1,000 mg total) by mouth 2 (two) times a day, Disp: , Rfl:     methylPREDNISolone (Medrol, Maxi,) 4 mg tablet, follow package directions, Disp: 21 tablet, Rfl: 0    ketorolac (TORADOL) 10 mg tablet, Take 1 tablet (10 mg total) by mouth every 6 (six) hours as needed for pain scale 8-10/10 for up to 5 days, Disp: 20 tablet, Rfl: 0    methocarbamoL (ROBAXIN) 500 mg tablet, Take 1 tablet (500 mg total) by mouth 3 (three) times a day as needed for muscle spasms for up to 10 days, Disp: 90 tablet, Rfl: 0    meloxicam (MOBIC) 7.5 mg tablet, Take 1 tablet (7.5 mg total) by mouth daily (Patient not taking: Reported on 6/9/2023), Disp: 90 tablet, Rfl: 3    Review of Systems:  Review of Systems   Eyes:  Negative for visual disturbance.   Respiratory:  Negative for shortness of breath.    Cardiovascular:  Negative for palpitations.   Gastrointestinal:  Negative for abdominal pain.   Endocrine:        Positive for thyroid problems   Genitourinary:  Negative for difficulty urinating.   Musculoskeletal:  Positive for arthralgias, back pain, gait problem and myalgias.   Neurological:  Positive for numbness. Negative for weakness.       Objective     Vital signs:  Heart Rate:  [92] 92  SpO2:  [91 %] 91 %  BP: (141)/(88) 141/88      Imaging:   Edgard Stiles MD  [phone] 9/30/2019      Narrative & Impression  Exam:   MRI of the lumbosacral spine without contrast from 09/30/2019     Clinical History:  Low back pain;      Comparison(s):  CT 5/25/2018     Technique:   Sagittal and axial T2 and T1 and sagittal STIR sequences were obtained through the lumbosacral spine.     Findings:   5 lumbar vertebral bodies are presumed for purpose of this dictation.  The lumbar vertebral bodies are normal in height. Unchanged grade  1 anterolisthesis of L5 over S1 where there is severe loss of disc space and chronic appearing endplate degenerative signal. Likely hemangioma of L1. The conus medullaris terminates normally at the L1 level and the cauda equina is unremarkable.     At L1-2, no significant spinal canal or neuroforaminal narrowing. Mild facet arthropathy     At L2-3, no significant spinal canal or neuroforaminal narrowing. Mild facet arthropathy     At L3-4, mild broad-based disc bulge. No spinal canal narrowing. Prominent epidural fat. No neuroforaminal narrowing. Bilateral facet arthropathy with fluid in facet joints     At L4-5, small broad-based disc bulge. No spinal canal narrowing. No significant neuroforaminal narrowing. Bilateral facet arthropathy with fluid within facet joints     At L5-S1, broad-based disc bulge and ligamentum flavum hypertrophy without significant spinal canal narrowing. Bilateral facet arthropathy. Bilateral mild to moderate neuroforaminal narrowing.        IMPRESSION:  Unchanged grade 1 anterolisthesis of L5 over S1 with associated bilateral chronic pars defect.     Multilevel degenerative changes most pronounced in lower lumbar bilateral facets joints.                 Exam Ended: 09/30/19 08:08 Last Resulted: 09/30/19 08             Exam:  Physical Exam  Vitals and nursing note reviewed.   Constitutional:       General: She is not in acute distress.     Appearance: She is well-developed.   HENT:      Head: Normocephalic.   Eyes:      Conjunctiva/sclera: Conjunctivae normal.   Cardiovascular:      Rate and Rhythm: Normal rate and regular rhythm.      Heart sounds: No murmur heard.  Pulmonary:      Effort: Pulmonary effort is normal. No respiratory distress.      Breath sounds: Normal breath sounds.   Abdominal:      General: There is no distension.   Musculoskeletal:         General: Tenderness present. No deformity. Normal range of motion.      Cervical back: Normal range of motion and neck supple.       Lumbar back: Tenderness and bony tenderness present. Normal range of motion. Negative right straight leg raise test.        Back:       Comments: Pain with facet loading and no radicular today and gait altered due to left ankle and foot surgery    Walks with a cane and limps slightly on the left leg    No SI pain, some right knee pain with some activities    Adequate sensation to LE and LE 5+     Skin:     General: Skin is warm and dry.      Findings: No rash.   Neurological:      Mental Status: She is alert and oriented to person, place, and time.   Psychiatric:         Behavior: Behavior normal.         Thought Content: Thought content normal.         Judgment: Judgment normal.         Assessment:  1. Osteoarthritis of spine with radiculopathy, lumbar region    2. Back pain of lumbar region with sciatica    3. Lumbar radicular pain        Plan:  LEPSI  Steroid pack as needed  Toradol as needed  Robaxin 500 mg every 8 as needed    We discussed her imaging and she is aware of facet mediated pain along with the radicular pain and the disc bulges on prior MRI along with the spondylolisthesis.  She wants a lumbar epidural and I do feel this would help and if not we could look at further imaging.  She is claustrophobic and would have to be knocked out for further MRI but we could get a CAT scan lumbar spine if we are worried about other changes in her back.  For now her radicular pain is intermittent so I think this may just be due to her altered gait and recovery from her ankle and prior disc bulges on imaging so the LEPSI will help with back pain and radicular pain.    In the meantime I briefly talked about the multimodal approach to pain and she is interested in having other modalities in case the pain flares like a steroid pack or some Toradol.  She does not like Flexeril so we will try some Robaxin for muscle pain as needed.  She likes Valium at night.  She is done enough physical therapy recently but we could  maybe send her back if needed for more intensive lumbar spine treatment.    She may want to look into the lumbar facets and radiofrequency in the long run but we will follow-up after the epidural and make sure everything is calm down.  She is very invested in her health and is looking into other modalities such as weight loss for her pain issues and joint issues.    DISCUSSION  Today's plan was a combined effort between the patient and myself. The patient understood the plan, verbally consented, and agreed to participate. An After Visit Summary regarding today's office visit was given to the patient.     The diagnostic assessment has been reviewed. Patient and/or patient's surrogate has expressed a good understanding of the assessment and recommendations from today's visit. There are no apparent barriers to understanding this information. Patient’s questions were addressed.    Patient has been advised to contact this office or the answering service with questions or concerns that may arise. Patient has been advised to follow up with Primary Care Provider for general medical needs.     A total of 45 minutes was required for this visit. Greater than 50% of this time was spent in direct face to face communication with the patient and/or surrogate in order to provide counseling regarding her history, imaging, options and briefly medications.    A voice recognition program was used to aid in documentation of this record. Sometimes words are not printed exactly as they were spoken.  While efforts were made to carefully edit and correct any inaccuracies, some areas may be present; please take these into context.  Please contact the provider if areas are identified.    Thank you for this referral. I will continue to follow this patient's progress with you.    Dragon voice recognition program was used to aid in this documentation which might generate inaccuracies despite efforts made to avoid them.  Please take it into  context and contact the provider with any questions.    Roly Samson, CNP

## 2023-06-09 NOTE — PATIENT INSTRUCTIONS
Take steroids as directed    Take toradol as discussed    Take robaxin for muscle pain as needed    Think about facet injections for arthritis and think about radiofrequency    Think about genicular nerve blocks for the knee     Facet Syndrome       Facet syndrome is a condition in which joints (facet joints) that connect the bones of the spine (vertebrae) become damaged. Facet joints help the spine move, and they wear down (degenerate) or become inflamed as a person gets older. This can cause pain and stiffness in the neck (cervical facet syndrome) or in the lower back (lumbar facet syndrome).  When a facet joint becomes damaged, a vertebra may slip forward, out of its normal place in the spine. Damage to a facet joint can also damage nerves near the spine, which can cause tingling or weakness in the arms or legs. Facet syndrome can make it difficult to turn the head or bend backward without pain. This condition usually gets worse over time.  What are the causes?  Common causes of this condition include:  Age-related inflammation of the facet joints (arthritis) that may create extra bone on the joint surface (bone spurs).  Age-related decrease in space between the vertebrae (disk degeneration and cartilage degeneration).  Repetitive stress on the spine, such as repetitive twisting of the back.  Injury to the back or neck.  Poor posture.  Being overweight or obese.  What increases the risk?  The following factors may make you more likely to develop this condition:  Playing contact sports.  Doing activities or sports that involve repetitive twisting motions or repetitive heavy lifting.  Having poor back strength and flexibility.  Having another back or spine condition, such as scoliosis.  What are the signs or symptoms?  Symptoms of facet syndrome may include:  An ache in the neck or lower back. This may get worse when you twist or arch your back, or when you look up.  Stiffness in the neck or lower back.  Numbness,  tingling, or weakness in the arms or legs.  Symptoms of cervical facet syndrome may include:  Headache.  Pain in the back of the head.  Pain in the shoulder blades.  Symptoms of lumbar facet syndrome may include pain in any of the following areas:  Groin.  Thighs.  Lower back.  Buttocks.  Hips.  How is this diagnosed?  This condition may be diagnosed based on:  Your symptoms.  Your medical history.  A physical exam.  Imaging tests, such as:  X-rays.  MRI.  A procedure in which medicines to numb the area (local anesthetic) and medicines to reduce inflammation (steroids) are injected into your affected joint (facet joint block). This helps to diagnose and may relieve pain.  How is this treated?  This condition may be treated by:  Stopping or modifying activities that make your condition worse.  Taking medicines that help reduce pain and inflammation.  Having steroid injections to help reduce severe pain.  Doing physical therapy.  Following a weight-control plan.  Having a procedure called radiofrequency ablation. This is a surgical procedure that uses high-frequency radio waves to block signals from affected nerves.  Having surgery to stabilize your spine or to take pressure off your nerves. This is rare.  Follow these instructions at home:  Medicines  Take over-the-counter and prescription medicines only as told by your health care provider.  Ask your health care provider if the medicine prescribed to you:  Requires you to avoid driving or using heavy machinery.  Can cause constipation. You may need to take actions to prevent or treat constipation, such as:  Drink enough fluid to keep your urine pale yellow.  Take over-the-counter or prescription medicines.  Eat foods that are high in fiber, such as beans, whole grains, and fresh fruits and vegetables.  Limit foods that are high in fat and processed sugars, such as fried or sweet foods.  Activity  Rest your neck and back as told by your health care provider.  Return  to your normal activities as told by your health care provider. Ask your health care provider what activities are safe for you.  If physical therapy was prescribed, do exercises as told by your health care provider.  General instructions       Do not use any products that contain nicotine or tobacco, such as cigarettes, e-cigarettes, and chewing tobacco. These can delay healing. If you need help quitting, ask your health care provider.  Use good posture throughout your daily activities. Good posture means that your spine is in its natural S-curve position (your spine is neutral), your shoulders are pulled back slightly, and your head is not forward.  Maintain a healthy weight. Follow instructions from your health care provider for weight control. These may include dietary restrictions.  Keep all follow-up visits as told by your health care provider. This is important.  Contact a health care provider if you have:  Symptoms that get worse or do not improve in 2-4 weeks of treatment.  New symptoms.  Get help right away if you:  Have numbness or weakness in any part of your body.  Lose control over your bladder or bowel functions.  Summary  Facet syndrome is a condition in which joints (facet joints) that connect the bones of the spine (vertebrae) become damaged. This can cause pain and stiffness in the neck (cervical facet syndrome) or in the lower back (lumbar facet syndrome).  Rest your neck and back as told by your health care provider.  If physical therapy was prescribed, do exercises as told by your health care provider.  Take over-the-counter and prescription medicines only as told by your health care provider.  Contact a health care provider if you have symptoms that get worse or do not improve in 2-4 weeks of treatment, or if you have new symptoms.  This information is not intended to replace advice given to you by your health care provider. Make sure you discuss any questions you have with your health care  provider.  Document Released: 12/18/2006 Document Revised: 11/26/2019 Document Reviewed: 12/01/2019  Elsevier Patient Education © 2020 [employer]    Facet Joint Block  The facet joints connect the bones of the spine (vertebrae). They make it possible for you to bend, twist, and make other movements with your spine. They also keep you from bending too far, twisting too far, and making other extreme movements.  A facet joint block is a procedure in which a numbing medicine (anesthetic) is injected into a facet joint. In many cases, an anti-inflammatory medicine (steroid) is also injected. A facet joint block may be done:  To diagnose neck or back pain. If the pain gets better after a facet joint block, it means the pain is probably coming from the facet joint. If the pain does not get better, it means the pain is probably not coming from the facet joint.  To relieve neck or back pain that is caused by an inflamed facet joint.  A facet joint block is only done to relieve pain if the pain does not improve with other methods, such as medicine, exercise programs, and physical therapy.  Tell a health care provider about:  Any allergies you have.  All medicines you are taking, including vitamins, herbs, eye drops, creams, and over-the-counter medicines.  Any problems you or family members have had with anesthetic medicines.  Any blood disorders you have.  Any surgeries you have had.  Any medical conditions you have or have had.  Whether you are pregnant or may be pregnant.  What are the risks?  Generally, this is a safe procedure. However, problems may occur, including:  Bleeding.  Injury to a nerve near the injection site.  Pain at the injection site.  Weakness or numbness in areas controlled by nerves near the injection site.  Infection.  Temporary fluid retention.  Allergic reactions to medicines or dyes.  Injury to other structures or organs near the injection site.  What happens before the procedure?  Medicines  Ask  your health care provider about:  Changing or stopping your regular medicines. This is especially important if you are taking diabetes medicines or blood thinners.  Taking medicines such as aspirin and ibuprofen. These medicines can thin your blood. Do not take these medicines unless your health care provider tells you to take them.  Taking over-the-counter medicines, vitamins, herbs, and supplements.  Eating and drinking  Follow instructions from your health care provider about eating and drinking, which may include:  8 hours before the procedure - stop eating heavy meals or foods, such as meat, fried foods, or fatty foods.  6 hours before the procedure - stop eating light meals or foods, such as toast or cereal.  6 hours before the procedure - stop drinking milk or drinks that contain milk.  2 hours before the procedure - stop drinking clear liquids.  Staying hydrated  Follow instructions from your health care provider about hydration, which may include:  Up to 2 hours before the procedure - you may continue to drink clear liquids, such as water, clear fruit juice, black coffee, and plain tea.  General instructions  Do not use any products that contain nicotine or tobacco for at least 4-6 weeks before the procedure. These products include cigarettes, e-cigarettes, and chewing tobacco. If you need help quitting, ask your health care provider.  Plan to have someone take you home from the hospital or clinic.  Ask your health care provider:  How your surgery site will be marked.  What steps will be taken to help prevent infection. These may include:  Removing hair at the surgery site.  Washing skin with a germ-killing soap.  Receiving antibiotic medicine.  What happens during the procedure?       You will put on a hospital gown.  You will lie on your stomach on an X-ray table. You may be asked to lie in a different position if an injection will be made in your neck.  Machines will be used to monitor your oxygen  levels, heart rate, and blood pressure.  Your skin will be cleaned.  If an injection will be made in your neck, an IV will be inserted into one of your veins. Fluids and medicine will flow directly into your body through the IV.  A numbing medicine (local anesthetic) will be applied to your skin. Your skin may sting or burn for a moment.  A video X-ray machine (fluoroscopy) will be used to find the joint. In some cases, a CT scan may be used.  A contrast dye may be injected into the facet joint area to help find the joint.  When the joint is located, an anesthetic will be injected into the joint through the needle.  Your health care provider will ask you whether you feel pain relief.  If you feel relief, a steroid may be injected to provide pain relief for a longer period of time.  If you do not feel relief or feel only partial relief, additional injections of an anesthetic may be made in other facet joints.  The needle will be removed.  Your skin will be cleaned.  A bandage (dressing) will be applied over each injection site.  The procedure may vary among health care providers and hospitals.  What happens after the procedure?  Your blood pressure, heart rate, breathing rate, and blood oxygen level will be monitored until you leave the hospital or clinic.  You will lie down and rest for a period of time.  Summary  A facet joint block is a procedure in which a numbing medicine (anesthetic) is injected into a facet joint. An anti-inflammatory medicine (stereoid) may also be injected.  Follow instructions from your health care provider about medicines and eating and drinking before the procedure.  Do not use any products that contain nicotine or tobacco for at least 4-6 weeks before the procedure.  You will lie on your stomach for the procedure, but you may be asked to lie in a different position if an injection will be made in your neck.  When the joint is located, an anesthetic will be injected into the joint through  the needle.  This information is not intended to replace advice given to you by your health care provider. Make sure you discuss any questions you have with your health care provider.  Document Released: 05/08/2008 Document Revised: 04/09/2020 Document Reviewed: 11/22/2019  Elsevier Patient Education © 2020 [employer]    Radiofrequency Lesioning  Radiofrequency lesioning is a procedure that is performed to relieve pain. The procedure is often used for back, neck, or arm pain. Radiofrequency lesioning involves the use of a machine that creates radio waves to make heat. During the procedure, the heat is applied to the nerve that carries the pain signal. The heat damages the nerve and interferes with the pain signal. Pain relief usually starts about 2 weeks after the procedure and lasts for 6 months to 1 year.  You will be awake during the procedure. You will need to be able to talk with the health care provider during the procedure.  Tell a health care provider about:  Any allergies you have.  All medicines you are taking, including vitamins, herbs, eye drops, creams, and over-the-counter medicines.  Any problems you or family members have had with anesthetic medicines.  Any blood disorders you have.  Any surgeries you have had.  Any medical conditions you have or have had.  Whether you are pregnant or may be pregnant.  What are the risks?  Generally, this is a safe procedure. However, problems may occur, including:  Pain or soreness at the injection site.  Allergic reaction to medicines given during the procedure.  Bleeding.  Infection at the injection site.  Damage to nerves or blood vessels.  What happens before the procedure?  Staying hydrated  Follow instructions from your health care provider about hydration, which may include:  Up to 2 hours before the procedure - you may continue to drink clear liquids, such as water, clear fruit juice, black coffee, and plain tea.  Eating and drinking  Follow instructions  from your health care provider about eating and drinking, which may include:  8 hours before the procedure - stop eating heavy meals or foods, such as meat, fried foods, or fatty foods.  6 hours before the procedure - stop eating light meals or foods, such as toast or cereal.  6 hours before the procedure - stop drinking milk or drinks that contain milk.  2 hours before the procedure - stop drinking clear liquids.  Medicines  Ask your health care provider about:  Changing or stopping your regular medicines. This is especially important if you are taking diabetes medicines or blood thinners.  Taking medicines such as aspirin and ibuprofen. These medicines can thin your blood. Do not take these medicines unless your health care provider tells you to take them.  Taking over-the-counter medicines, vitamins, herbs, and supplements.  General instructions  Plan to have someone take you home from the hospital or clinic.  If you will be going home right after the procedure, plan to have someone with you for 24 hours.  Ask your health care provider what steps will be taken to help prevent infection. These may include:  Removing hair at the procedure site.  Washing skin with a germ-killing soap.  Taking antibiotic medicine.  What happens during the procedure?       An IV will be inserted into one of your veins.  You will be given one or more of the following:  A medicine to help you relax (sedative).  A medicine to numb the area (local anesthetic).  Your health care provider will insert a radiofrequency needle into the area to be treated. This is done with the help of a type of X-ray (fluoroscopy).  A wire that carries the radio waves (electrode) will be put through the radiofrequency needle.  An electrical pulse will be sent through the electrode to verify the correct nerve that is causing your pain. You will feel a tingling sensation, and you may have muscle twitching.  The tissue around the needle tip will be heated by an  electric current that comes from the radiofrequency machine. This will numb the nerves.  The needle will be removed.  A bandage (dressing) will be put on the insertion area.  The procedure may vary among health care providers and hospitals.  What happens after the procedure?  Your blood pressure, heart rate, breathing rate, and blood oxygen level will be monitored until you leave the hospital or clinic.  Return to your normal activities as told by your health care provider. Ask your health care provider what activities are safe for you.  Do not drive for 24 hours if you were given a sedative during your procedure.  Summary  Radiofrequency lesioning is a procedure that is performed to relieve pain. The procedure is often used for back, neck, or arm pain.  Radiofrequency lesioning involves the use of a machine that creates radio waves to make heat.  Plan to have someone take you home from the hospital or clinic. Do not drive for 24 hours if you were given a sedative during your procedure.  Return to your normal activities as told by your health care provider. Ask your health care provider what activities are safe for you.  This information is not intended to replace advice given to you by your health care provider. Make sure you discuss any questions you have with your health care provider.  Document Released: 08/15/2012 Document Revised: 09/05/2019 Document Reviewed: 09/05/2019  Elsevier Patient Education © 2020 [employer]

## 2023-06-27 ENCOUNTER — HOSPITAL ENCOUNTER (OUTPATIENT)
Dept: FLUOROSCOPY | Facility: HOSPITAL | Age: 68
Discharge: STILL ADMITTED OR ADMINISTRATIVE | End: 2023-06-27
Payer: MEDICARE

## 2023-06-27 ENCOUNTER — PROCEDURE VISIT (OUTPATIENT)
Dept: PAIN MEDICINE | Facility: HOSPITAL | Age: 68
End: 2023-06-27
Payer: MEDICARE

## 2023-06-27 VITALS
DIASTOLIC BLOOD PRESSURE: 89 MMHG | OXYGEN SATURATION: 95 % | RESPIRATION RATE: 16 BRPM | HEART RATE: 75 BPM | SYSTOLIC BLOOD PRESSURE: 172 MMHG

## 2023-06-27 DIAGNOSIS — M47.816 LUMBAR SPONDYLOSIS: Primary | ICD-10-CM

## 2023-06-27 DIAGNOSIS — M54.16 LUMBAR RADICULAR PAIN: ICD-10-CM

## 2023-06-27 PROCEDURE — 77002 NEEDLE LOCALIZATION BY XRAY: CPT | Mod: NC

## 2023-06-27 PROCEDURE — 6360000200 HC RX 636 W HCPCS (ALT 250 FOR IP)

## 2023-06-27 PROCEDURE — 2550000100 HC RX 255

## 2023-06-27 PROCEDURE — 62323 NJX INTERLAMINAR LMBR/SAC: CPT

## 2023-06-27 RX ORDER — TRIAMCINOLONE ACETONIDE 40 MG/ML
40 INJECTION, SUSPENSION INTRA-ARTICULAR; INTRAMUSCULAR ONCE
Status: COMPLETED | OUTPATIENT
Start: 2023-06-27 | End: 2023-06-27

## 2023-06-27 RX ADMIN — TRIAMCINOLONE ACETONIDE 40 MG: 40 INJECTION, SUSPENSION INTRA-ARTICULAR; INTRAMUSCULAR at 15:29

## 2023-06-27 ASSESSMENT — PAIN SCALES - GENERAL: PAINLEVEL: 7

## 2023-06-27 ASSESSMENT — PAIN - FUNCTIONAL ASSESSMENT: PAIN_FUNCTIONAL_ASSESSMENT: 0-10

## 2023-06-27 NOTE — PROGRESS NOTES
Procedures  Date of service:6/27/2023    Procedure: Lumbar Epidural Steroid Injection    Preprocedure Diagnosis:   1.  Lumbar radiculitis  2.  Lumbar neuroforaminal stenosis    Postprocedure Diagnosis:   1.  Lumbar radiculitis  2.  Lumbar neuroforaminal stenosis    Anesthesia: Local     Complications: None    Findings: Successful Block    History: 68-year-old female presents with severe pain that radiates from her back down to the bottom of her foot.  On physical examination strength is normal.  She states this started about 2 months ago.  She was nonweightbearing because of the left ankle issue.  When she started to walk again this pain presented itself.  She feels its been very debilitating for her and limits her function throughout the day.  It is both the back and the leg.  She recently was seen by Jessica who set her up for a lumbar epidural steroid injection.  She presents today for this intervention.  On review of imaging this demonstrates neuroforaminal stenosis at L5-S1 along with grade 1 anterolisthesis of L5 over S1 with an associated bilateral chronic pars defect.    Procedure:   Consent obtained. Patient brought to fluoroscopy suite and positioned in a prone position. Time out procedure completed. Back betadyne prepped and draped. Subcutaneous tissues localized with 1% Lidocaine. Utilizing fluoroscopy the interlaminar space was identified at L5-S1. Utilizing an 18 G 15 cm epidural needle the epidural space was entered on the right side with clear loss of resistance to saline. Aspiration negative for CSF or blood. No paresthesias. Lateral image confirmed proper depth.  Contrast demonstrated epidural spread. A therapeutic solution consisting of 40mg Kenalog and 2ml normal saline was easily injected. Patient tolerated procedure well and was discharged home in stable condition.     Plan:   Will have patient follow up right L3, 4, 5 medial branch nerve blocks

## 2023-06-27 NOTE — PATIENT INSTRUCTIONS
Epidural Steroid Injection    An epidural steroid injection is a shot of steroid medicine and numbing medicine that is given into the space between the spinal cord and the bones of the back (epidural space). The shot helps relieve pain caused by an irritated or swollen nerve root.  The amount of pain relief you get from the injection depends on what is causing the nerve to be swollen and irritated, and how long your pain lasts. You are more likely to benefit from this injection if your pain is strong and comes on suddenly rather than if you have had long-term (chronic) pain.  Tell a health care provider about:  Any allergies you have.  All medicines you are taking, including vitamins, herbs, eye drops, creams, and over-the-counter medicines.  Any problems you or family members have had with anesthetic medicines.  Any blood disorders you have.  Any surgeries you have had.  Any medical conditions you have.  Whether you are pregnant or may be pregnant.  What are the risks?  Generally, this is a safe procedure. However, problems may occur, including:  Headache.  Bleeding.  Infection.  Allergic reaction to medicines.  Nerve damage.  What happens before the procedure?  Staying hydrated  Follow instructions from your health care provider about hydration, which may include:  Up to 2 hours before the procedure - you may continue to drink clear liquids, such as water, clear fruit juice, black coffee, and plain tea.  Eating and drinking restrictions  Follow instructions from your health care provider about eating and drinking, which may include:  8 hours before the procedure - stop eating heavy meals or foods, such as meat, fried foods, or fatty foods.  6 hours before the procedure - stop eating light meals or foods, such as toast or cereal.  6 hours before the procedure - stop drinking milk or drinks that contain milk.  2 hours before the procedure - stop drinking clear liquids.  Medicines  You may be given medicines to lower  anxiety.  Ask your health care provider about:  Changing or stopping your regular medicines. This is especially important if you are taking diabetes medicines or blood thinners.  Taking medicines such as aspirin and ibuprofen. These medicines can thin your blood. Do not take these medicines unless your health care provider tells you to take them.  Taking over-the-counter medicines, vitamins, herbs, and supplements.  Ask your health care provider what steps will be taken to prevent infection.  General instructions  Plan to have someone take you home from the hospital or clinic.  If you will be going home right after the procedure, plan to have someone with you for 24 hours.  What happens during the procedure?  An IV will be inserted into one of your veins.  You will be given one or more of the following:  A medicine to help you relax (sedative).  A medicine to numb the area (local anesthetic).  You will be asked to lie on your abdomen or sit.  The injection site will be cleaned.  A needle will be inserted through your skin into the epidural space. This may cause you some discomfort. An X-ray machine will be used to guide the needle as close as possible to the affected nerve.  A steroid medicine and a local anesthetic will be injected into the epidural space.  The needle and IV will be removed.  A bandage (dressing) will be put over the injection site.  The procedure may vary among health care providers and hospitals.  What can I expect after the procedure?  Follow these instructions at home:  Injection site care  You may remove the bandage (dressing) after 24 hours.  Check your injection site every day for signs of infection. Check for:  Redness, swelling, or pain.  Fluid or blood.  Warmth.  Pus or a bad smell.  Managing pain, stiffness, and swelling  For 24 hours after the procedure:  Avoid using heat on the injection site.  Do not take baths, swim, or use a hot tub until your health care provider approves. Ask your  health care provider if you may take a shower. You may only be allowed to take sponge baths.  If directed, put ice on the injection site. To do this:  Put ice in a plastic bag.  Place a towel between your skin and the bag.  Leave the ice on for 20 minutes, 2-3 times a day.    Activity  Do not drive for 24 hours if you were given a sedative during your procedure.  Return to your normal activities as told by your health care provider. Ask your health care provider what activities are safe for you.  General instructions  Your blood pressure, heart rate, breathing rate, and blood oxygen level will be monitored until you leave the hospital or clinic.  Your arm or leg may feel weak or numb for a few hours.  The injection site may feel sore.  Take over-the-counter and prescription medicines only as told by your health care provider.  Drink enough fluid to keep your urine pale yellow.  Keep all follow-up visits as told by your health care provider. This is important.  Contact a health care provider if:  You have any of these signs of infection:  Redness, swelling, or pain around your injection site.  Fluid or blood coming from your injection site.  Warmth coming from your injection site.  Pus or a bad smell coming from your injection site.  A fever.  You continue to have pain and soreness around the injection site, even after taking over-the-counter pain medicine.  You have severe, sudden, or lasting nausea or vomiting.  Get help right away if:  You have severe pain at the injection site that is not relieved by medicines.  You develop a severe headache or a stiff neck.  You become sensitive to light.  You have any new numbness or weakness in your legs or arms.  You lose control of your bladder or bowel movements.  You have trouble breathing.  Summary  An epidural steroid injection is a shot of steroid medicine and numbing medicine that is given into the epidural space.  The shot helps relieve pain caused by an irritated or  swollen nerve root.  You are more likely to benefit from this injection if your pain is strong and comes on suddenly rather than if you have had chronic pain.  This information is not intended to replace advice given to you by your health care provider. Make sure you discuss any questions you have with your health care provider.  Document Revised: 06/29/2020 Document Reviewed: 06/29/2020  Elsevier Patient Education © 2021 [employer]

## 2023-07-10 NOTE — PROGRESS NOTES
Patient ID: Corrine Holden is a 68 y.o. female.    Pt presents today to evaluate  injections .  6/27/2023 LEPSI-95%  CHIEF COMPLAINT:  Patient reports pain is right leg to foot .     HPI:  She was a new patient to myself on 6/9/2023 due to ongoing back and radicular pain.  In 2019 she had lifted something heavy and sought treatment with physical therapy but then had a left ankle replacement and a fusion surgery in her foot and due to the gait disturbances started having some right-sided radicular pain.  Her leg would go numb all the way down the bottom of her foot.  She is well versed in pain verbage and thinks some of her pain is due to subluxation.    She thinks the LEPSI helped her back pain but right lower buttock and leg pain remains.  It goes to the sole of the foot.  She is sleeping better now with no back pain.  She has lower right buttock and worse with sitting. Her foot gets worse with standing and the sole of her foot gets numb.  No falls or weakness but she is still using a cane from her other ortho issues.     She didn't use the steroid pack yet and tried a few toradol and the robaxin gives her a little of a hangover.  She wants volt gel. She also likes valium for some of her pain at night.     PAST PAIN HISTORY  Medications previously tried -Valium as needed, Flexeril too sedating, ibuprofen off-and-on, gabapentin.  Other modalities completed -PT, rest, ice, stretches, swimming.  Past Injection List   6/27/2023 LEPSI L5-S1-95%            Pain Assessment: 0-10  Pain Score: 2  Pain Type: Chronic pain  Pain Location: Back  Pain Radiating Towards: toward right leg to the right foot  Pain Descriptors: Numbness, Stabbing (numbness in the bottom of the right foot, just hurts)  Pain Frequency: Constant/continuous  Interference on Function: adl's  Pain Onset: Ongoing  Clinical Progression: Gradually improving (for low back with the injection)  Aggravating Factors: Walking, Other (Comment) (daily living)  Pain  Interventions: Injections, Home medication, Heat applied, Cold applied, Topicals (tylenol and ibuphrofen)  Response to Interventions: combination injections the most   /86 (BP Location: Right arm, Patient Position: Sitting, Cuff Size: Regular Adult)   Pulse 60   SpO2 93%       Current Outpatient Medications:     methylPREDNISolone (Medrol, Maxi,) 4 mg tablet, follow package directions, Disp: 21 tablet, Rfl: 0    diazePAM (VALIUM) 5 mg tablet, Take 1 tablet by mouth every 12  hours as needed for anxiety or muscle spasms Max Daily Amount: 10 mg, Disp: 30 tablet, Rfl: 0    levothyroxine (SYNTHROID, LEVOTHROID) 125 mcg tablet, Take 1 tablet (125 mcg total) by mouth daily, Disp: 90 tablet, Rfl: 3    omeprazole (PriLOSEC) 20 mg capsule, Take 1 capsule (20 mg total) by mouth 2 (two) times a day as needed (gerd), Disp: 180 capsule, Rfl: 3    magnesium oxide (MAG-OX) 400 mg (241.3 mg magnesium) tablet, Take 1 tablet (400 mg total) by mouth daily, Disp: , Rfl:     albuterol HFA 90 mcg/actuation inhaler, Inhale 2 puffs every 6 (six) hours as needed for wheezing, Disp: 1 Inhaler, Rfl: 0    meloxicam (MOBIC) 7.5 mg tablet, Take 1 tablet (7.5 mg total) by mouth daily, Disp: 90 tablet, Rfl: 3    guaiFENesin (MUCINEX) 600 mg 12 hr tablet, Take 2 tablets (1,200 mg total) by mouth 2 (two) times a day, Disp: , Rfl:     ibuprofen (ADVIL,MOTRIN) 200 mg tablet, Take 1 tablet (200 mg total) by mouth every 6 (six) hours as needed, Disp: , Rfl:     vit C/E/Zn/coppr/lutein/zeaxan (OCUVITE LUTEIN AND ZEAXANTHIN ORAL), PreserVision AREDS-2, Disp: , Rfl:     zinc 50 mg tablet, Take 50 mg by mouth daily, Disp: , Rfl:     diclofenac sodium (VOLTAREN) 1 % gel, APPLY 2 GRAMS TO THE AFFECTED AREA(S) 4 TIMES PER DAY AS NEEDED, Disp: 300 g, Rfl: 2    fluticasone propionate (FLONASE) 50 mcg/actuation nasal spray, USE 2 SPRAYS IN EACH NOSTRIL DAILY, Disp: 48 g, Rfl: 2    budesonide (Pulmicort Flexhaler) 90 mcg/actuation inhaler, as needed, Disp:  ", Rfl:     vit B complex no.12/niacin,B3, (VITAMIN B COMPLEX NO.12-NIACIN ORAL), Take by mouth, Disp: , Rfl:     cholecalciferol, vitamin D3, 25 mcg (1,000 unit) capsule, Take 5 capsules (5,000 Units total) by mouth Takes 5 times daily, Disp: , Rfl:     krill-om-3-dha-epa-phospho-ast [id number] mg capsule, Take 1 capsule by mouth daily, Disp: , Rfl:     cyclobenzaprine (FLEXERIL) 10 mg tablet, Take 1 tablet (10 mg total) by mouth 2 (two) times a day as needed for muscle spasms., Disp: 60 tablet, Rfl: 0    calcium carbonate (OS-RONNIE) 500 mg calcium tablet, Take 1 tablet (500 mg total) by mouth daily, Disp: , Rfl:     multivitamin (THERAGRAN) tablet tablet, 1 Every Day, Disp: , Rfl:     acetaminophen (TYLENOL EXTRA STRENGTH) 500 mg tablet, Take 2 tablets (1,000 mg total) by mouth 2 (two) times a day, Disp: , Rfl:     diclofenac sodium (VOLTAREN) 1 % gel, Apply 4 g topically 4 (four) times a day, Disp: 450 g, Rfl: 2    ketorolac (TORADOL) 10 mg tablet, Take 1 tablet (10 mg total) by mouth every 6 (six) hours as needed for pain scale 8-10/10 for up to 5 days, Disp: 20 tablet, Rfl: 0  Past Medical History:   Diagnosis Date    GERD (gastroesophageal reflux disease)     H. pylori infection 2009    Hypothyroidism     Injury of back     STATES, \"THIS IS REASON FOR MRI\"    Microscopic hematuria     SHANA (obstructive sleep apnea)     USES CPAP    Osteoarthritis     Palpitations     Respiratory disease     Supraventricular tachycardia (CMS/HCC)     STATES, \"COFFEE RELATED\", VERY INTERMITTENT    Tinnitus       Past Surgical History:   Procedure Laterality Date    CARDIAC ELECTROPHYSIOLOGY STUDY AND ABLATION  01/01/2007    attempted ablation for SVT    CARPAL TUNNEL RELEASE Bilateral 06/26/2020    Procedure: LEFT ENDOSCOPIC CARPAL TUNNEL RELEASE, RIGHT OPEN CARPAL TUNNEL;  Surgeon: Mendez Sun MD;  Location: [facility] OR;  Service: Orthopedics;  Laterality: Bilateral;    COLONOSCOPY  01/21/2015    cecal polyp-tubular " adenoma-recommend repeat 5 years    COLONOSCOPY N/A 06/22/2020    repeat 5 years - adenomatous polyp    DILATION AND CURETTAGE OF UTERUS  01/01/2006    ENDOMETRIAL BIOPSY  01/01/2012    pt reports negative    ESOPHAGOGASTRODUODENOSCOPY  01/01/2009    H pylori    ESOPHAGOGASTRODUODENOSCOPY N/A 06/22/2020    Procedure: ESOPHAGOGASTRODUODENOSCOPY;  Surgeon: Julian Faulkner MD;  Location: [facility];  Service: Endoscopy;  Laterality: N/A;    KNEE ARTHROPLASTY Right 04/01/2015    Dr Placido BRICE    TOTAL ANKLE ARTHROPLASTY Left 10/03/2022    Dr. Guerra, with talonavicular and subtalar fusion    TOTAL ANKLE ARTHROPLASTY Left 02/20/2023    total revision of previous surgery, fusion of non-unions    VAGINAL HYSTERECTOMY  12/16/2013    with BSO, fibroids      Review of Systems   Musculoskeletal:  Positive for arthralgias, gait problem and myalgias.       Imaging:  FL pain injection no charge    Result Date: 6/27/2023  Narrative: NOTE: This study was stored without submission for formal interpretation.      Physical Exam  Vitals and nursing note reviewed.   Constitutional:       General: She is not in acute distress.     Appearance: She is well-developed.   HENT:      Head: Normocephalic.   Eyes:      Conjunctiva/sclera: Conjunctivae normal.   Cardiovascular:      Rate and Rhythm: Normal rate.   Pulmonary:      Effort: Pulmonary effort is normal. No respiratory distress.   Abdominal:      General: There is no distension.   Musculoskeletal:         General: Tenderness present. No deformity. Normal range of motion.      Cervical back: Normal range of motion and neck supple.      Lumbar back: Tenderness and bony tenderness present. Normal range of motion. Negative right straight leg raise test.        Legs:       Comments: Pain with facet loading and no radicular today and gait altered due to left ankle and foot surgery    Walks with a cane and limps slightly on the left leg    No SI pain    Adequate sensation to LE and LE  5+     Skin:     General: Skin is warm and dry.      Findings: No rash.   Neurological:      Mental Status: She is alert and oriented to person, place, and time.   Psychiatric:         Behavior: Behavior normal.         Thought Content: Thought content normal.         Judgment: Judgment normal.         ASSESSMENT  1. Lumbar radicular pain  Select Nerve Root Trans Epidural Lumbar Sacral, diclofenac sodium (VOLTAREN) 1 % gel      2. Osteoarthritis of spine with radiculopathy, lumbar region  diclofenac sodium (VOLTAREN) 1 % gel             PLAN    Medication Therapy: send voltaren gel to pharmacy and she still has meds at home.  Injections: will not do facets since no back pain and do SNR on right , MRI shows anterolisthesis on right L5-S1 , wonder if she has some right ischial bursa too  Referrals placed: none for now   Consider for future treatments: she might need facets in the future but has only leg pain today and will see what the next injections does for her and she can call with any changes    DISCUSSION  Today's plan was a combined effort between the patient and myself. The patient understood the plan, verbally consented, and agreed to participate. An After Visit Summary with special instructions/information regarding today's office visit was given to the patient (see patient instructions).     The diagnostic assessment has been reviewed. Patient and/or patient's surrogate has expressed a good understanding of the assessment and recommendations from today's visit. There are no apparent barriers to understanding this information. Patient’s questions were addressed.  I have reviewed the patients current medications using all immediate resources available.     Patient has been advised to contact this office or the answering service with questions or concerns that may arise. Patient has been advised to follow up with Primary Care Provider for general medical needs.     A total of 15 minutes was required for this  visit. Greater than 50% of this time was spent in direct face to face communication with the patient and/or surrogate in order to provide counseling regardingpost injection and future plans.    Dragon voice recognition program was used to aid in this documentation which might generate inaccuracies despite efforts made to avoid them.  Please take it into context and contact the provider with any questions.    Roly Samson, CNP

## 2023-07-11 ENCOUNTER — OFFICE VISIT (OUTPATIENT)
Dept: PAIN MEDICINE | Facility: HOSPITAL | Age: 68
End: 2023-07-11
Payer: MEDICARE

## 2023-07-11 VITALS — DIASTOLIC BLOOD PRESSURE: 86 MMHG | SYSTOLIC BLOOD PRESSURE: 138 MMHG | OXYGEN SATURATION: 93 % | HEART RATE: 60 BPM

## 2023-07-11 DIAGNOSIS — M54.16 LUMBAR RADICULAR PAIN: Primary | ICD-10-CM

## 2023-07-11 DIAGNOSIS — M47.26 OSTEOARTHRITIS OF SPINE WITH RADICULOPATHY, LUMBAR REGION: ICD-10-CM

## 2023-07-11 PROCEDURE — G0463 HOSPITAL OUTPT CLINIC VISIT: HCPCS

## 2023-07-11 RX ORDER — DICLOFENAC SODIUM 10 MG/G
4 GEL TOPICAL 4 TIMES DAILY
Qty: 450 G | Refills: 2 | Status: SHIPPED | OUTPATIENT
Start: 2023-07-11 | End: 2024-03-12 | Stop reason: ALTCHOICE

## 2023-07-11 ASSESSMENT — PAIN DESCRIPTION - DESCRIPTORS: DESCRIPTORS: NUMBNESS;STABBING

## 2023-07-11 ASSESSMENT — PAIN - FUNCTIONAL ASSESSMENT: PAIN_FUNCTIONAL_ASSESSMENT: 0-10

## 2023-07-11 ASSESSMENT — ENCOUNTER SYMPTOMS
ARTHRALGIAS: 1
MYALGIAS: 1

## 2023-07-25 ENCOUNTER — HOSPITAL ENCOUNTER (OUTPATIENT)
Dept: FLUOROSCOPY | Facility: HOSPITAL | Age: 68
Discharge: STILL ADMITTED OR ADMINISTRATIVE | End: 2023-07-25
Payer: MEDICARE

## 2023-07-25 ENCOUNTER — PROCEDURE VISIT (OUTPATIENT)
Dept: PAIN MEDICINE | Facility: HOSPITAL | Age: 68
End: 2023-07-25
Payer: MEDICARE

## 2023-07-25 VITALS — DIASTOLIC BLOOD PRESSURE: 94 MMHG | HEART RATE: 73 BPM | OXYGEN SATURATION: 94 % | SYSTOLIC BLOOD PRESSURE: 165 MMHG

## 2023-07-25 DIAGNOSIS — M54.16 LUMBAR RADICULAR PAIN: ICD-10-CM

## 2023-07-25 PROCEDURE — 2500000200 HC RX 250 WO HCPCS

## 2023-07-25 PROCEDURE — 64483 NJX AA&/STRD TFRM EPI L/S 1: CPT | Mod: RT

## 2023-07-25 PROCEDURE — 6360000200 HC RX 636 W HCPCS (ALT 250 FOR IP): Mod: JZ

## 2023-07-25 PROCEDURE — 2550000100 HC RX 255: Mod: JZ

## 2023-07-25 PROCEDURE — 77002 NEEDLE LOCALIZATION BY XRAY: CPT | Mod: NC

## 2023-07-25 RX ORDER — TRIAMCINOLONE ACETONIDE 40 MG/ML
40 INJECTION, SUSPENSION INTRA-ARTICULAR; INTRAMUSCULAR ONCE
Status: COMPLETED | OUTPATIENT
Start: 2023-07-25 | End: 2023-07-25

## 2023-07-25 RX ORDER — BUPIVACAINE HYDROCHLORIDE 5 MG/ML
30 INJECTION, SOLUTION EPIDURAL; INTRACAUDAL ONCE
Status: DISCONTINUED | OUTPATIENT
Start: 2023-07-25 | End: 2023-07-25

## 2023-07-25 RX ORDER — LIDOCAINE HYDROCHLORIDE 10 MG/ML
30 INJECTION, SOLUTION EPIDURAL; INFILTRATION; INTRACAUDAL; PERINEURAL ONCE
Status: COMPLETED | OUTPATIENT
Start: 2023-07-25 | End: 2023-07-25

## 2023-07-25 RX ADMIN — TRIAMCINOLONE ACETONIDE 40 MG: 40 INJECTION, SUSPENSION INTRA-ARTICULAR; INTRAMUSCULAR at 10:22

## 2023-07-25 RX ADMIN — LIDOCAINE HYDROCHLORIDE 30 ML: 10 INJECTION, SOLUTION EPIDURAL; INFILTRATION; INTRACAUDAL; PERINEURAL at 10:22

## 2023-07-25 NOTE — PROGRESS NOTES
Procedures  Date of service: 7/25/2023    Procedure: Right lumbar 5 transforaminal epidural steroid injection under fluoroscopic guidance    Preprocedure diagnosis:  1.  Lumbar radiculitis  2.  Lumbar neuroforaminal stenosis    Postprocedure diagnosis:  1.  Lumbar radiculitis  2.  Lumbar neuroforaminal stenosis    Complications: None    Findings: Successful block    Anesthesia: Local    History: 68-year-old female presents with severe pain that radiates down her right leg to the bottom of her foot.  She did undergo lumbar epidural steroid injection from the interlaminar approach on June 27, 2023 with greater than 80% relief of her pain however she does have residual pain that radiates down her right leg to the bottom of her foot.  She recently was seen by Jessica who felt she may benefit from a different approach.  We will trial a right L5 transforaminal epidural for her today.  On physical examination strength is normal.  Risks and benefits of the procedure were discussed with the patient and she would like to proceed with this intervention today.    Procedure: Consent obtained.  The risks and benefits of the procedure  were discussed with the patient and the patient would like to proceed.  Patient brought to the fluoroscopy suite and positioned in a prone position.  Timeout procedure completed.  Utilizing fluoroscopy and an oblique view the target zone for the right lumbar 5 transforaminal epidural was identified.  Back ChloroPrep prepped and draped. Utilizing a 22-gauge 4-3/4 inch needle with a slight curve on the tip the epidural space was entered.  This was verified and an AP oblique and lateral view.  Contrast demonstrated epidural spread along with a neurogram.  Patient had no paresthesias.  The therapeutic solution consisting of 40 mg of Kenalog and 1 mL of normal saline was easily injected.  Patient tolerated procedure very well.  After short observational stay patient was discharged home in stable  condition.    Plan: We will have the patient follow-up with a pain nurse practitioner in 2-3 weeks.

## 2023-08-08 NOTE — PROGRESS NOTES
Patient ID: Corrine Holden is a 68 y.o. female.    Pt presents today to evaluate  injections .  7/25/2023 right L5 SNR-85%  CHIEF COMPLAINT:  Patient reports pain is left foot .     HPI:  She is here for follow-up on the right L5 SNR and this helped her right foot pain and outs resolved.  She only has some pain along the right lower buttock into the back of the thigh once in a while.  She is still denying any SI pain.  At the last visit she had great relief of her back pain from a lumbar epidural.    She is sleeping better now with no back pain.  She slipped getting out of the shower and tweaked her left foot and ankle and now with some ligament issues.  She saw her foot surgeon.  She is still using a cane and is disappointed about this.      She didn't use the steroid pack yet and tried a few toradol and the robaxin gives her a little of a hangover.  She wants volt gel. She also likes valium for some of her pain at night.     PAST PAIN HISTORY  Medications previously tried -Valium as needed, Flexeril too sedating, ibuprofen off-and-on, gabapentin.  Other modalities completed -PT, rest, ice, stretches, swimming.  Past Injection List   6/27/2023 LEPSI L5-S1-95%  7/25/2023 right L5 SNR            Pain Assessment: 0-10  Pain Score: 1 (sitting is 0)  Pain Type: Chronic pain  Pain Location: Foot  Pain Radiating Towards: right foot is localized  Pain Descriptors: Tingling, Numbness  Pain Frequency: Constant/continuous  Pain Onset: Ongoing  Clinical Progression: Gradually improving  Aggravating Factors: Standing  Pain Interventions: Injections, Home medication (tylenol and ibuphrofen)  Response to Interventions: injections       /89 (BP Location: Right arm, Patient Position: Sitting, Cuff Size: Regular Adult)   Pulse 71   SpO2 91%       Current Outpatient Medications:     diclofenac sodium (VOLTAREN) 1 % gel, Apply 4 g topically 4 (four) times a day, Disp: 450 g, Rfl: 2    methylPREDNISolone (Medrol, Maxi,) 4 mg  tablet, follow package directions, Disp: 21 tablet, Rfl: 0    diazePAM (VALIUM) 5 mg tablet, Take 1 tablet by mouth every 12  hours as needed for anxiety or muscle spasms Max Daily Amount: 10 mg, Disp: 30 tablet, Rfl: 0    levothyroxine (SYNTHROID, LEVOTHROID) 125 mcg tablet, Take 1 tablet (125 mcg total) by mouth daily, Disp: 90 tablet, Rfl: 3    omeprazole (PriLOSEC) 20 mg capsule, Take 1 capsule (20 mg total) by mouth 2 (two) times a day as needed (gerd), Disp: 180 capsule, Rfl: 3    magnesium oxide (MAG-OX) 400 mg (241.3 mg magnesium) tablet, Take 1 tablet (400 mg total) by mouth daily, Disp: , Rfl:     albuterol HFA 90 mcg/actuation inhaler, Inhale 2 puffs every 6 (six) hours as needed for wheezing, Disp: 1 Inhaler, Rfl: 0    meloxicam (MOBIC) 7.5 mg tablet, Take 1 tablet (7.5 mg total) by mouth daily, Disp: 90 tablet, Rfl: 3    guaiFENesin (MUCINEX) 600 mg 12 hr tablet, Take 2 tablets (1,200 mg total) by mouth 2 (two) times a day, Disp: , Rfl:     ibuprofen (ADVIL,MOTRIN) 200 mg tablet, Take 1 tablet (200 mg total) by mouth every 6 (six) hours as needed, Disp: , Rfl:     vit C/E/Zn/coppr/lutein/zeaxan (OCUVITE LUTEIN AND ZEAXANTHIN ORAL), PreserVision AREDS-2, Disp: , Rfl:     zinc 50 mg tablet, Take 50 mg by mouth daily, Disp: , Rfl:     diclofenac sodium (VOLTAREN) 1 % gel, APPLY 2 GRAMS TO THE AFFECTED AREA(S) 4 TIMES PER DAY AS NEEDED, Disp: 300 g, Rfl: 2    fluticasone propionate (FLONASE) 50 mcg/actuation nasal spray, USE 2 SPRAYS IN EACH NOSTRIL DAILY, Disp: 48 g, Rfl: 2    budesonide (Pulmicort Flexhaler) 90 mcg/actuation inhaler, as needed, Disp: , Rfl:     vit B complex no.12/niacin,B3, (VITAMIN B COMPLEX NO.12-NIACIN ORAL), Take by mouth, Disp: , Rfl:     cholecalciferol, vitamin D3, 25 mcg (1,000 unit) capsule, Take 5 capsules (5,000 Units total) by mouth Takes 5 times daily, Disp: , Rfl:     krill-om-3-dha-epa-phospho-ast [id number] mg capsule, Take 1 capsule by mouth daily, Disp: , Rfl:      "cyclobenzaprine (FLEXERIL) 10 mg tablet, Take 1 tablet (10 mg total) by mouth 2 (two) times a day as needed for muscle spasms., Disp: 60 tablet, Rfl: 0    calcium carbonate (OS-RONNIE) 500 mg calcium tablet, Take 1 tablet (500 mg total) by mouth daily, Disp: , Rfl:     multivitamin (THERAGRAN) tablet tablet, 1 Every Day, Disp: , Rfl:     acetaminophen (TYLENOL EXTRA STRENGTH) 500 mg tablet, Take 2 tablets (1,000 mg total) by mouth 2 (two) times a day, Disp: , Rfl:     ketorolac (TORADOL) 10 mg tablet, Take 1 tablet (10 mg total) by mouth every 6 (six) hours as needed for pain scale 8-10/10 for up to 5 days, Disp: 20 tablet, Rfl: 0  Past Medical History:   Diagnosis Date    GERD (gastroesophageal reflux disease)     H. pylori infection 2009    Hypothyroidism     Injury of back     STATES, \"THIS IS REASON FOR MRI\"    Microscopic hematuria     SHANA (obstructive sleep apnea)     USES CPAP    Osteoarthritis     Palpitations     Respiratory disease     Supraventricular tachycardia (CMS/HCC)     STATES, \"COFFEE RELATED\", VERY INTERMITTENT    Tinnitus       Past Surgical History:   Procedure Laterality Date    CARDIAC ELECTROPHYSIOLOGY STUDY AND ABLATION  01/01/2007    attempted ablation for SVT    CARPAL TUNNEL RELEASE Bilateral 06/26/2020    Procedure: LEFT ENDOSCOPIC CARPAL TUNNEL RELEASE, RIGHT OPEN CARPAL TUNNEL;  Surgeon: Mendez Sun MD;  Location: [facility] OR;  Service: Orthopedics;  Laterality: Bilateral;    COLONOSCOPY  01/21/2015    cecal polyp-tubular adenoma-recommend repeat 5 years    COLONOSCOPY N/A 06/22/2020    repeat 5 years - adenomatous polyp    DILATION AND CURETTAGE OF UTERUS  01/01/2006    ENDOMETRIAL BIOPSY  01/01/2012    pt reports negative    ESOPHAGOGASTRODUODENOSCOPY  01/01/2009    H pylori    ESOPHAGOGASTRODUODENOSCOPY N/A 06/22/2020    Procedure: ESOPHAGOGASTRODUODENOSCOPY;  Surgeon: Julian Faulkner MD;  Location: [facility];  Service: Endoscopy;  Laterality: N/A;    KNEE ARTHROPLASTY Right " 04/01/2015    Dr Placido BRICE    TOTAL ANKLE ARTHROPLASTY Left 10/03/2022    Dr. Guerra, with talonavicular and subtalar fusion    TOTAL ANKLE ARTHROPLASTY Left 02/20/2023    total revision of previous surgery, fusion of non-unions    VAGINAL HYSTERECTOMY  12/16/2013    with BSO, fibroids      Review of Systems   Musculoskeletal:  Positive for arthralgias, gait problem and myalgias.       Imaging:  FL pain injection no charge    Result Date: 7/25/2023  Narrative: NOTE: This study was stored without submission for formal interpretation.      Physical Exam  Vitals and nursing note reviewed.   Constitutional:       General: She is not in acute distress.     Appearance: She is well-developed.   HENT:      Head: Normocephalic.   Eyes:      Conjunctiva/sclera: Conjunctivae normal.   Cardiovascular:      Rate and Rhythm: Normal rate.   Pulmonary:      Effort: Pulmonary effort is normal. No respiratory distress.   Abdominal:      General: There is no distension.   Musculoskeletal:         General: Tenderness present. No deformity. Normal range of motion.      Cervical back: Normal range of motion and neck supple.      Lumbar back: Tenderness and bony tenderness present. Normal range of motion. Negative right straight leg raise test.        Legs:       Comments: Pain with facet loading and no radicular today and gait altered due to left ankle and foot surgery    Walks with a cane and limps slightly on the left leg    No SI pain    Adequate sensation to LE and LE 5+     Skin:     General: Skin is warm and dry.      Findings: No rash.   Neurological:      Mental Status: She is alert and oriented to person, place, and time.   Psychiatric:         Behavior: Behavior normal.         Thought Content: Thought content normal.         Judgment: Judgment normal.         ASSESSMENT  1. Lumbar radicular pain        2. Osteoarthritis of spine with radiculopathy, lumbar region               PLAN    Medication Therapy: none.  Injections:  wait for now   Referrals placed-she might do another consult on her left ankle   Consider for future treatments: she can call for repeat LEPSI or SNR and can only have 4 per year so will wait and see     DISCUSSION  Today's plan was a combined effort between the patient and myself. The patient understood the plan, verbally consented, and agreed to participate. An After Visit Summary with special instructions/information regarding today's office visit was given to the patient (see patient instructions).     The diagnostic assessment has been reviewed. Patient and/or patient's surrogate has expressed a good understanding of the assessment and recommendations from today's visit. There are no apparent barriers to understanding this information. Patient’s questions were addressed.  I have reviewed the patients current medications using all immediate resources available.     Patient has been advised to contact this office or the answering service with questions or concerns that may arise. Patient has been advised to follow up with Primary Care Provider for general medical needs.     A total of 15 minutes was required for this visit. Greater than 50% of this time was spent in direct face to face communication with the patient and/or surrogate in order to provide counseling regardingfuture injections and her ankle .    Dragon voice recognition program was used to aid in this documentation which might generate inaccuracies despite efforts made to avoid them.  Please take it into context and contact the provider with any questions.    Roly Samson, CNP

## 2023-08-09 ENCOUNTER — OFFICE VISIT (OUTPATIENT)
Dept: PAIN MEDICINE | Facility: HOSPITAL | Age: 68
End: 2023-08-09
Payer: MEDICARE

## 2023-08-09 VITALS — DIASTOLIC BLOOD PRESSURE: 89 MMHG | OXYGEN SATURATION: 91 % | SYSTOLIC BLOOD PRESSURE: 132 MMHG | HEART RATE: 71 BPM

## 2023-08-09 DIAGNOSIS — M47.26 OSTEOARTHRITIS OF SPINE WITH RADICULOPATHY, LUMBAR REGION: ICD-10-CM

## 2023-08-09 DIAGNOSIS — M54.16 LUMBAR RADICULAR PAIN: Primary | ICD-10-CM

## 2023-08-09 PROCEDURE — G0463 HOSPITAL OUTPT CLINIC VISIT: HCPCS

## 2023-08-09 ASSESSMENT — PAIN DESCRIPTION - DESCRIPTORS: DESCRIPTORS: TINGLING;NUMBNESS

## 2023-08-09 ASSESSMENT — ENCOUNTER SYMPTOMS
MYALGIAS: 1
ARTHRALGIAS: 1

## 2023-08-09 ASSESSMENT — PAIN - FUNCTIONAL ASSESSMENT: PAIN_FUNCTIONAL_ASSESSMENT: 0-10

## 2023-08-30 DIAGNOSIS — E03.9 HYPOTHYROIDISM, UNSPECIFIED TYPE: ICD-10-CM

## 2023-08-30 RX ORDER — LEVOTHYROXINE SODIUM 125 UG/1
125 TABLET ORAL DAILY
Qty: 90 TABLET | Refills: 2 | Status: SHIPPED | OUTPATIENT
Start: 2023-08-30 | End: 2024-02-15 | Stop reason: SDUPTHER

## 2023-08-30 NOTE — TELEPHONE ENCOUNTER
Care Due:                  Date            Visit Type   Department     Provider  --------------------------------------------------------------------------------                              MEDICARE                              [facility]     [facility] INTERNAL  Last Visit: 02-      VISIT        RITA MON  Next Visit: None Scheduled  None         None Found                                                            Last  Test          Frequency    Reason                     Performed    Due Date  --------------------------------------------------------------------------------  Office Visit  6 months...  diazePAM,                  02- 08-                             methylPREDNISolone.......    Glucose       6 months...  methylPREDNISolone.......  02- 08-  (serum).....    [employer] Embedded Care Due Messages. Reference number: [id number]. 8/30/2023 1:32:59 AM JOSUÉ

## 2023-09-05 DIAGNOSIS — K21.9 GASTROESOPHAGEAL REFLUX DISEASE WITHOUT ESOPHAGITIS: ICD-10-CM

## 2023-09-05 RX ORDER — OMEPRAZOLE 20 MG/1
CAPSULE, DELAYED RELEASE ORAL
Qty: 180 CAPSULE | Refills: 1 | Status: SHIPPED | OUTPATIENT
Start: 2023-09-05 | End: 2024-02-15 | Stop reason: SDUPTHER

## 2023-09-05 NOTE — TELEPHONE ENCOUNTER
No care due was identified.  [facility] Embedded Care Due Messages. Reference number: [id number]. 9/05/2023 1:32:16 AM JOSUÉ

## 2023-09-06 ENCOUNTER — LAB REQUISITION (OUTPATIENT)
Dept: LAB | Facility: CLINIC | Age: 68
End: 2023-09-06
Payer: MEDICARE

## 2023-09-06 DIAGNOSIS — M25.579 PAIN IN UNSPECIFIED ANKLE AND JOINTS OF UNSPECIFIED FOOT: ICD-10-CM

## 2023-09-06 DIAGNOSIS — K21.9 GASTROESOPHAGEAL REFLUX DISEASE WITHOUT ESOPHAGITIS: ICD-10-CM

## 2023-09-06 PROCEDURE — 87075 CULTR BACTERIA EXCEPT BLOOD: CPT | Mod: ORL | Performed by: ORTHOPAEDIC SURGERY

## 2023-09-06 PROCEDURE — 87070 CULTURE OTHR SPECIMN AEROBIC: CPT | Mod: ORL | Performed by: ORTHOPAEDIC SURGERY

## 2023-09-06 RX ORDER — OMEPRAZOLE 20 MG/1
CAPSULE, DELAYED RELEASE ORAL
OUTPATIENT
Start: 2023-09-06

## 2023-09-06 NOTE — TELEPHONE ENCOUNTER
No care due was identified.  [facility] Embedded Care Due Messages. Reference number: [id number]. 9/06/2023 10:09:20 AM JOSUÉ

## 2023-09-06 NOTE — TELEPHONE ENCOUNTER
Medication(s) omeprazole 20mg refill refused due to DUPLICATE   Recently filled on 9/5/23; same pharmacy, receipt confirmed.  Dispense amount:  180, Refill: 1.  Refills or Rx should be available at preferred pharmacy.

## 2023-09-07 ENCOUNTER — HOSPITAL ENCOUNTER (OUTPATIENT)
Dept: NUCLEAR MEDICINE | Facility: CLINIC | Age: 68
Setting detail: NUCLEAR MEDICINE
Discharge: HOME | End: 2023-09-07
Attending: ORTHOPAEDIC SURGERY
Payer: MEDICARE

## 2023-09-07 DIAGNOSIS — T84.498A: ICD-10-CM

## 2023-09-07 DIAGNOSIS — M25.579 ANKLE PAIN: ICD-10-CM

## 2023-09-07 PROCEDURE — 78315 BONE IMAGING 3 PHASE: CPT

## 2023-09-07 PROCEDURE — A9503 TC99M MEDRONATE: HCPCS | Performed by: ORTHOPAEDIC SURGERY

## 2023-09-07 PROCEDURE — 343N000001 HC RX 343: Performed by: ORTHOPAEDIC SURGERY

## 2023-09-07 RX ORDER — TC 99M MEDRONATE 20 MG/10ML
25 INJECTION, POWDER, LYOPHILIZED, FOR SOLUTION INTRAVENOUS ONCE
Status: COMPLETED | OUTPATIENT
Start: 2023-09-07 | End: 2023-09-07

## 2023-09-07 RX ADMIN — TC 99M MEDRONATE 26.6 MILLICURIE: 20 INJECTION, POWDER, LYOPHILIZED, FOR SOLUTION INTRAVENOUS at 10:00

## 2023-09-11 LAB — BACTERIA SNV CULT: NO GROWTH

## 2023-09-20 LAB — BACTERIA SNV CULT: NORMAL

## 2023-09-28 ENCOUNTER — TELEPHONE (OUTPATIENT)
Dept: INTERNAL MEDICINE | Facility: CLINIC | Age: 68
End: 2023-09-28

## 2023-09-28 NOTE — TELEPHONE ENCOUNTER
Caller would like to discuss (a)  pro op  Writer has advised caller of a callback from within 24 hours.    Patient: Corrine Holden    Caller Name (Last and first, relation/role): self    Name of Facility: na    Callback Number: [phone]    Best Availability: anytime    Fax Number: na    Additional Info: want to set up pre op appt and wants to talk to nurse needs get in right away    Did you confirm the message with the caller: Yes    Is it okay that the nurse communicates your response through [employer]? No

## 2023-09-28 NOTE — TELEPHONE ENCOUNTER
"Patient states she has an upcoming surgery for her left ankle she states a \"hardware is loose per Ct scan\" but is unsure of the exact surgery date as she is waiting to here back from the surgeon. She states this will most likely be in the middle of October she ask I inform  of this information as she knows Dr. Joy likes to see her own patients for pre-ops and patient would rather see her primary doctor but if need be she will see a CNP to get this pre-op done. Patient has been scheduled with Dr. Joy on 10/16/23 but was also informed that if her appointment is sooner than this date she would need to be rescheduled to see someone else for this pre-op as  will be out of the office the first 2 weeks of October. Patient voiced understanding and would call back if she is needing to be rescheduled.   "

## 2023-10-06 ENCOUNTER — OFFICE VISIT (OUTPATIENT)
Dept: URGENT CARE | Facility: URGENT CARE | Age: 68
End: 2023-10-06
Payer: MEDICARE

## 2023-10-06 VITALS
BODY MASS INDEX: 46.35 KG/M2 | HEIGHT: 64 IN | DIASTOLIC BLOOD PRESSURE: 93 MMHG | RESPIRATION RATE: 18 BRPM | TEMPERATURE: 98 F | OXYGEN SATURATION: 94 % | SYSTOLIC BLOOD PRESSURE: 162 MMHG | HEART RATE: 85 BPM

## 2023-10-06 DIAGNOSIS — R39.9 URINARY SYMPTOM OR SIGN: Primary | ICD-10-CM

## 2023-10-06 DIAGNOSIS — N30.01 ACUTE CYSTITIS WITH HEMATURIA: ICD-10-CM

## 2023-10-06 LAB
BILIRUB UR QL: ABNORMAL
CLARITY UR: ABNORMAL
COLOR UR: ABNORMAL
GLUCOSE UR QL: NEGATIVE MG/DL
HGB UR QL: ABNORMAL
KETONES UR-MCNC: ABNORMAL MG/DL
LEUKOCYTE ESTERASE UR QL STRIP: ABNORMAL
NITRITE UR QL: POSITIVE
PH UR: 6 PH
PROT UR STRIP-MCNC: >=300 MG/DL
SP GR UR: >=1.03 (ref 1–1.03)
UROBILINOGEN UR-MCNC: 1 MG/DL

## 2023-10-06 PROCEDURE — G0463 HOSPITAL OUTPT CLINIC VISIT: HCPCS | Mod: PO | Performed by: PHYSICIAN ASSISTANT

## 2023-10-06 PROCEDURE — 81003 URINALYSIS AUTO W/O SCOPE: CPT | Mod: PO | Performed by: PHYSICIAN ASSISTANT

## 2023-10-06 PROCEDURE — 99213 OFFICE O/P EST LOW 20 MIN: CPT | Performed by: PHYSICIAN ASSISTANT

## 2023-10-06 PROCEDURE — 87088 URINE BACTERIA CULTURE: CPT | Performed by: PHYSICIAN ASSISTANT

## 2023-10-06 RX ORDER — ASPIRIN 325 MG
325 TABLET ORAL EVERY 6 HOURS PRN
COMMUNITY
Start: 2023-02-23 | End: 2024-03-12 | Stop reason: ALTCHOICE

## 2023-10-06 RX ORDER — CEFUROXIME AXETIL 500 MG/1
500 TABLET ORAL 2 TIMES DAILY
Qty: 14 TABLET | Refills: 0 | Status: SHIPPED | OUTPATIENT
Start: 2023-10-06 | End: 2023-10-13

## 2023-10-06 RX ORDER — FLUCONAZOLE 150 MG/1
150 TABLET ORAL ONCE
Qty: 2 TABLET | Refills: 0 | Status: SHIPPED | OUTPATIENT
Start: 2023-10-06 | End: 2023-10-06

## 2023-10-06 RX ORDER — TIZANIDINE 2 MG/1
2 TABLET ORAL EVERY 6 HOURS PRN
COMMUNITY
Start: 2023-02-23 | End: 2025-01-13 | Stop reason: SINTOL

## 2023-10-06 NOTE — PROGRESS NOTES
"Urgent Care Visit Note    Subjective   Chief Complaint  Chief Complaint   Patient presents with    Urinary Symptom     Patient states she has a bladder infection. States sx started 2 hours ago and that includes burning, frequency and urgency. Denies back and abdominal pain.       History of Present Illness  Corrine Holden is a 68 y.o. female presenting for dysuria, hematuria, urinary urgency and frequency onset about 2 hours ago.  The patient denies abdominal pain, flank pain, fever, chills, nausea, vomiting, diarrhea, abnormal vaginal bleeding, discharge or itching.  The patient denies previous history of urinary tract infection.  She does report that her hydration has not been the best this past week.      Review of Systems  Pertinent positives and negatives as per the HPI    Past Medical History:   Diagnosis Date    GERD (gastroesophageal reflux disease)     H. pylori infection 2009    Hypothyroidism     Injury of back     STATES, \"THIS IS REASON FOR MRI\"    Microscopic hematuria     SHANA (obstructive sleep apnea)     USES CPAP    Osteoarthritis     Palpitations     Respiratory disease     Supraventricular tachycardia (CMS/HCC)     STATES, \"COFFEE RELATED\", VERY INTERMITTENT    Tinnitus          Current Outpatient Medications:     aspirin 325 mg tablet, , Disp: , Rfl:     omeprazole (PriLOSEC) 20 mg capsule, TAKE ONE CAPSULE BY MOUTH TWICE DAILY AS NEEDED for GERD, Disp: 180 capsule, Rfl: 1    levothyroxine (SYNTHROID, LEVOTHROID) 125 mcg tablet, Take 1 tablet (125 mcg total) by mouth daily, Disp: 90 tablet, Rfl: 2    diclofenac sodium (VOLTAREN) 1 % gel, Apply 4 g topically 4 (four) times a day, Disp: 450 g, Rfl: 2    ketorolac (TORADOL) 10 mg tablet, Take 1 tablet (10 mg total) by mouth every 6 (six) hours as needed for pain scale 8-10/10 for up to 5 days, Disp: 20 tablet, Rfl: 0    diazePAM (VALIUM) 5 mg tablet, Take 1 tablet by mouth every 12  hours as needed for anxiety or muscle spasms Max Daily Amount: 10 " mg, Disp: 30 tablet, Rfl: 0    albuterol HFA 90 mcg/actuation inhaler, Inhale 2 puffs every 6 (six) hours as needed for wheezing, Disp: 1 Inhaler, Rfl: 0    meloxicam (MOBIC) 7.5 mg tablet, Take 1 tablet (7.5 mg total) by mouth daily, Disp: 90 tablet, Rfl: 3    guaiFENesin (MUCINEX) 600 mg 12 hr tablet, Take 2 tablets (1,200 mg total) by mouth 2 (two) times a day, Disp: , Rfl:     ibuprofen (ADVIL,MOTRIN) 200 mg tablet, Take 1 tablet (200 mg total) by mouth every 6 (six) hours as needed, Disp: , Rfl:     vit C/E/Zn/coppr/lutein/zeaxan (OCUVITE LUTEIN AND ZEAXANTHIN ORAL), PreserVision AREDS-2, Disp: , Rfl:     zinc 50 mg tablet, Take 50 mg by mouth daily, Disp: , Rfl:     diclofenac sodium (VOLTAREN) 1 % gel, APPLY 2 GRAMS TO THE AFFECTED AREA(S) 4 TIMES PER DAY AS NEEDED, Disp: 300 g, Rfl: 2    fluticasone propionate (FLONASE) 50 mcg/actuation nasal spray, USE 2 SPRAYS IN EACH NOSTRIL DAILY, Disp: 48 g, Rfl: 2    budesonide (Pulmicort Flexhaler) 90 mcg/actuation inhaler, as needed, Disp: , Rfl:     vit B complex no.12/niacin,B3, (VITAMIN B COMPLEX NO.12-NIACIN ORAL), Take by mouth, Disp: , Rfl:     cholecalciferol, vitamin D3, 25 mcg (1,000 unit) capsule, Take 5 capsules (5,000 Units total) by mouth Takes 5 times daily, Disp: , Rfl:     krill-om-3-dha-epa-phospho-ast [id number] mg capsule, Take 1 capsule by mouth daily, Disp: , Rfl:     calcium carbonate (OS-RONNIE) 500 mg calcium tablet, Take 1 tablet (500 mg total) by mouth daily, Disp: , Rfl:     multivitamin (THERAGRAN) tablet tablet, 1 Every Day, Disp: , Rfl:     acetaminophen (TYLENOL EXTRA STRENGTH) 500 mg tablet, Take 2 tablets (1,000 mg total) by mouth 2 (two) times a day, Disp: , Rfl:     tiZANidine (ZANAFLEX) 2 mg tablet, , Disp: , Rfl:     methylPREDNISolone (Medrol, Maxi,) 4 mg tablet, follow package directions (Patient not taking: Reported on 10/6/2023), Disp: 21 tablet, Rfl: 0    magnesium oxide (MAG-OX) 400 mg (241.3 mg magnesium) tablet, Take 1  "tablet (400 mg total) by mouth daily, Disp: , Rfl:     cyclobenzaprine (FLEXERIL) 10 mg tablet, Take 1 tablet (10 mg total) by mouth 2 (two) times a day as needed for muscle spasms. (Patient not taking: Reported on 10/6/2023), Disp: 60 tablet, Rfl: 0    Objective   Vital Signs  /93 (BP Location: Left arm, Patient Position: Sitting, Cuff Size: Long Adult)   Pulse 85   Temp 36.7 °C (98 °F) (Temporal)   Resp 18   Ht 1.626 m (5' 4\")   SpO2 94%   BMI 46.35 kg/m²     Physical Exam  Vitals and nursing note reviewed.   Constitutional:       General: She is not in acute distress.     Appearance: She is not ill-appearing or toxic-appearing.   HENT:      Head: Normocephalic and atraumatic.   Pulmonary:      Effort: Pulmonary effort is normal. No respiratory distress.   Neurological:      General: No focal deficit present.      Mental Status: She is alert and oriented to person, place, and time. Mental status is at baseline.         Lab Review  Recent Results (from the past 12 hour(s))   Urinalysis, dipstick Urine, Clean Catch    Collection Time: 10/06/23 10:24 AM   Result Value Ref Range    Color, Urine Pink (A) Yellow    Clarity, Urine Cloudy (A) Clear    Specific Gravity, Urine >=1.030 1.003 - 1.030    Leukocytes, Urine Trace (A) Negative    Nitrite, Urine Positive (A) Negative    Protein, Urine >=300 (A) Negative MG/DL    Ketones, Urine Trace (A) Negative MG/DL    Urobilinogen, Urine 1.0 <2.0 mg/dL    Bilirubin, Urine Small (A) Negative    Blood, Urine Large (A) Negative    Glucose, Urine Negative Negative MG/DL    pH, Urine 6.0 5.0 - 8.0 PH       Radiology Review  No results found.      Assessment/Plan      Diagnosis Plan   1. Urinary symptom or sign  Urinalysis, dipstick Urine, Clean Catch      2. Acute cystitis with hematuria  Urine culture        Patient history and exam consistent with symptomatic UTI. See urinalysis results as above.   Urine was sent for culture/sensitivity testing.   At present, the " patient demonstrated no symptoms suggestive of pyelonephritis.  Ceftin twice daily x7 days sent to the patient's pharmacy in addition to Diflucan for secondary yeast infection.  Etiology, self-care, and need for follow-up/emergent care were reviewed with the patient.  The patient understands and agrees with the plan above.  Okay for outpatient management at this time.  Is currently afebrile and hemodynamically stable.  All questions answered.    Patient Education: Ready to learn, no apparent learning barriers were identified; learning preference includes listening.  Explained diagnosis and treatment plan; patient/caregiver expressed understanding of the content. All questions answered.     There are no Patient Instructions on file for this visit.    Sarah Mei PA-C  10/6/2023

## 2023-10-08 LAB
BACTERIA UR CULT: ABNORMAL
BACTERIA UR CULT: ABNORMAL

## 2023-10-16 ENCOUNTER — TELEPHONE (OUTPATIENT)
Dept: INTERNAL MEDICINE | Facility: CLINIC | Age: 68
End: 2023-10-16

## 2023-10-16 DIAGNOSIS — I47.10 SVT (SUPRAVENTRICULAR TACHYCARDIA) (CMS/HCC): Primary | ICD-10-CM

## 2023-10-16 NOTE — TELEPHONE ENCOUNTER
Caller would like to discuss (a) appointment Writer has advised caller of a callback from within 24 hours.    Patient: Corrine Holden    Caller Name (Last and first, relation/role): Self    Name of Facility: N/A    Callback Number: [phone]    Best Availability: anytime    Fax Number: N/A    Additional Info: pt would like a call at her request to try to get squeezed in for a pre-op ASAP. Surgery 11/6/23     Did you confirm the message with the caller: Yes    Is it okay that the nurse communicates your response through [email or web address]? No

## 2023-10-16 NOTE — TELEPHONE ENCOUNTER
Patient states she has a confirmed surgery date of 11/6/23 for a left total ankle revision.  Patient would like to get in to see if she can get squeezed in to see Dr. Joy. Informed patient I dont see that Dr. Joy has any openings if she would like to come in to see a different provider. Patient declined and wishes to get in with Dr. Joy. Will forward to Dr. Joy for review.

## 2023-10-17 ENCOUNTER — TELEPHONE (OUTPATIENT)
Dept: NEUROLOGY | Facility: CLINIC | Age: 68
End: 2023-10-17
Payer: MEDICARE

## 2023-10-17 DIAGNOSIS — G47.33 OBSTRUCTIVE SLEEP APNEA SYNDROME: Primary | ICD-10-CM

## 2023-10-17 NOTE — TELEPHONE ENCOUNTER
Patient has been contacted. We have discussed the difference between 3% scoring criteria and 4% scoring criteria for Medicare. Patient states understanding and is willing to have repeat diagnostic study completed.  We have also discussed the importance of discontinuing PAP therapy for at least 3 nights prior to repeat study.  Patient states understanding may not be able to discontinue usage secondary to being unable to sleep on it.  In addition, she would like to have study completed prior to upcoming surgery on 11/6/2023.  This will be noted sleep study ordered. Thank you!

## 2023-10-17 NOTE — TELEPHONE ENCOUNTER
Patient called and states per her vendor she needs to have a sleep study  done for medicare to continue to pay for her supplies.  She also had questions on how the sleep study is scored to meet those requirements. Please advise

## 2023-10-18 NOTE — TELEPHONE ENCOUNTER
"Per  \"350 on Thursday if the the 330 moves to 310 \"  Patient has been scheduled and 3:30 pt has been moved to 3:10   "

## 2023-10-19 ENCOUNTER — OFFICE VISIT (OUTPATIENT)
Dept: INTERNAL MEDICINE | Facility: CLINIC | Age: 68
End: 2023-10-19
Payer: MEDICARE

## 2023-10-19 ENCOUNTER — LAB (OUTPATIENT)
Dept: LAB | Facility: CLINIC | Age: 68
End: 2023-10-19
Payer: MEDICARE

## 2023-10-19 VITALS
DIASTOLIC BLOOD PRESSURE: 92 MMHG | TEMPERATURE: 97 F | HEART RATE: 78 BPM | SYSTOLIC BLOOD PRESSURE: 130 MMHG | OXYGEN SATURATION: 96 %

## 2023-10-19 DIAGNOSIS — Z86.79 HISTORY OF SUPRAVENTRICULAR TACHYCARDIA: ICD-10-CM

## 2023-10-19 DIAGNOSIS — Z96.662 HISTORY OF TOTAL REPLACEMENT OF LEFT ANKLE: ICD-10-CM

## 2023-10-19 DIAGNOSIS — R03.0 ELEVATED BLOOD PRESSURE READING: ICD-10-CM

## 2023-10-19 DIAGNOSIS — G47.33 OBSTRUCTIVE SLEEP APNEA SYNDROME: ICD-10-CM

## 2023-10-19 DIAGNOSIS — I35.8 AORTIC VALVE SCLEROSIS: ICD-10-CM

## 2023-10-19 DIAGNOSIS — I47.10 SVT (SUPRAVENTRICULAR TACHYCARDIA) (CMS/HCC): ICD-10-CM

## 2023-10-19 DIAGNOSIS — I51.7 LEFT VENTRICULAR HYPERTROPHY: ICD-10-CM

## 2023-10-19 DIAGNOSIS — Z01.818 PREOP EXAMINATION: Primary | ICD-10-CM

## 2023-10-19 DIAGNOSIS — R73.01 IMPAIRED FASTING GLUCOSE: ICD-10-CM

## 2023-10-19 LAB
ALBUMIN SERPL-MCNC: 4.5 G/DL (ref 3.5–5.3)
ALP SERPL-CCNC: 76 U/L (ref 55–142)
ALT SERPL-CCNC: 19 U/L (ref 7–52)
ANION GAP SERPL CALC-SCNC: 10 MMOL/L (ref 3–11)
AST SERPL-CCNC: 17 U/L
BASOPHILS # BLD AUTO: 0 10*3/UL
BASOPHILS NFR BLD AUTO: 0.4 % (ref 0–2)
BILIRUB SERPL-MCNC: 0.49 MG/DL (ref 0.2–1.4)
BUN SERPL-MCNC: 18 MG/DL (ref 7–25)
CALCIUM ALBUM COR SERPL-MCNC: 9.2 MG/DL (ref 8.6–10.3)
CALCIUM SERPL-MCNC: 9.6 MG/DL (ref 8.6–10.3)
CHLORIDE SERPL-SCNC: 107 MMOL/L (ref 98–107)
CO2 SERPL-SCNC: 24 MMOL/L (ref 21–32)
CREAT SERPL-MCNC: 0.7 MG/DL (ref 0.6–1.1)
EGFRCR SERPLBLD CKD-EPI 2021: 95 ML/MIN/1.73M*2
EOSINOPHIL # BLD AUTO: 0.2 10*3/UL
EOSINOPHIL NFR BLD AUTO: 2.8 % (ref 0–3)
ERYTHROCYTE [DISTWIDTH] IN BLOOD BY AUTOMATED COUNT: 13.7 % (ref 11.5–14)
GLUCOSE SERPL-MCNC: 120 MG/DL (ref 70–105)
HCT VFR BLD AUTO: 41.3 % (ref 34–45)
HGB BLD-MCNC: 13.9 G/DL (ref 11.5–15.5)
LYMPHOCYTES # BLD AUTO: 1.6 10*3/UL
LYMPHOCYTES NFR BLD AUTO: 24.9 % (ref 11–47)
MCH RBC QN AUTO: 31.5 PG (ref 28–33)
MCHC RBC AUTO-ENTMCNC: 33.7 G/DL (ref 32–36)
MCV RBC AUTO: 93.4 FL (ref 81–97)
MONOCYTES # BLD AUTO: 0.5 10*3/UL
MONOCYTES NFR BLD AUTO: 7.3 % (ref 3–11)
NEUTROPHILS # BLD AUTO: 4.1 10*3/UL
NEUTROPHILS NFR BLD AUTO: 64.6 % (ref 41–81)
PLATELET # BLD AUTO: 308 10*3/UL (ref 140–350)
PMV BLD AUTO: 7 FL (ref 6.9–10.8)
POTASSIUM SERPL-SCNC: 4.2 MMOL/L (ref 3.5–5.1)
PROT SERPL-MCNC: 7.1 G/DL (ref 6–8.3)
RBC # BLD AUTO: 4.43 10*6/ΜL (ref 3.7–5.3)
SODIUM SERPL-SCNC: 141 MMOL/L (ref 135–145)
WBC # BLD AUTO: 6.3 10*3/UL (ref 4.5–10.5)

## 2023-10-19 PROCEDURE — 80053 COMPREHEN METABOLIC PANEL: CPT | Mod: PO

## 2023-10-19 PROCEDURE — 93010 ELECTROCARDIOGRAM REPORT: CPT | Performed by: INTERNAL MEDICINE

## 2023-10-19 PROCEDURE — 36415 COLL VENOUS BLD VENIPUNCTURE: CPT | Mod: PO

## 2023-10-19 PROCEDURE — G0463 HOSPITAL OUTPT CLINIC VISIT: HCPCS | Mod: PO | Performed by: INTERNAL MEDICINE

## 2023-10-19 PROCEDURE — 93005 ELECTROCARDIOGRAM TRACING: CPT | Mod: PO | Performed by: INTERNAL MEDICINE

## 2023-10-19 PROCEDURE — 99214 OFFICE O/P EST MOD 30 MIN: CPT | Performed by: INTERNAL MEDICINE

## 2023-10-19 PROCEDURE — 85025 COMPLETE CBC W/AUTO DIFF WBC: CPT | Mod: PO

## 2023-10-19 PROCEDURE — G0463 HOSPITAL OUTPT CLINIC VISIT: HCPCS | Mod: PO,25 | Performed by: INTERNAL MEDICINE

## 2023-10-19 ASSESSMENT — PAIN SCALES - GENERAL: PAINLEVEL: 7

## 2023-10-19 NOTE — PROGRESS NOTES
"Subjective      Corrine Holden is a 68 y.o. female who presents for Pre-op Exam (Patient is having surgery 11/6/23 for a Left total ankle revision. Patients last EKG was 9/5/22)  .    HPI    Corrine is here for a preop prior to L total ankle revision scheduled for 11/6.    Corrine has no history of MI, CVA, CKD, DM, CHF, valvular heart disease.  She has had hx of SVT but has not had problems recently.  Occ will feel some palpitations after drinking coffee.  Echo in 2019 showed aortic valve sclerosis without stenosis and mild LVH.  Blood sugars have risen in the last year and are in prediabetic range.  She does have SHANA.    She is currently unable to do much exercise because of her ankle problem.  However, she was goint to the pool a few times/week and riding a stationary bike for 10-20 min a few times/week in August.  She had no exertional chest discomfort with this.    No personal or family hx of problems with anesthesia.  No personal or family history of bleeding disorders.  No dental issues.    She recently 10/6 was treated for UTI but symptoms resolved.    We briefly discussed today whether or not she would be interested in Wegov[employer] for weight loss.  With the last year of inactivity from her ankle, weight has gone up and sugars are in prediabetic range.  She will think about it and also she has been considering the weight loss clinic with Dr. Richards in [place].        Past Medical History:   Diagnosis Date    GERD (gastroesophageal reflux disease)     H. pylori infection 2009    Hypothyroidism     Injury of back     STATES, \"THIS IS REASON FOR MRI\"    Microscopic hematuria     SHANA (obstructive sleep apnea)     USES CPAP    Osteoarthritis     Palpitations     Respiratory disease     Supraventricular tachycardia     STATES, \"COFFEE RELATED\", VERY INTERMITTENT    Tinnitus        Current Outpatient Medications   Medication Sig Dispense Refill    nitrofurantoin (Macrodantin) 50 mg capsule Take 1 capsule (50 mg total) by " mouth nightly 30 capsule 2    ciprofloxacin (Cipro) 250 mg tablet Take 1 tablet (250 mg total) by mouth 2 (two) times a day for 3 days 6 tablet 0    fluconazole (Diflucan) 150 mg tablet Take one tab now, and repeat in 2 days for prevention of yeast infection 2 tablet 0    tiZANidine (ZANAFLEX) 2 mg tablet       aspirin 325 mg tablet       omeprazole (PriLOSEC) 20 mg capsule TAKE ONE CAPSULE BY MOUTH TWICE DAILY AS NEEDED for GERD 180 capsule 1    levothyroxine (SYNTHROID, LEVOTHROID) 125 mcg tablet Take 1 tablet (125 mcg total) by mouth daily 90 tablet 2    diclofenac sodium (VOLTAREN) 1 % gel Apply 4 g topically 4 (four) times a day 450 g 2    methylPREDNISolone (Medrol, Maxi,) 4 mg tablet follow package directions 21 tablet 0    ketorolac (TORADOL) 10 mg tablet Take 1 tablet (10 mg total) by mouth every 6 (six) hours as needed for pain scale 8-10/10 for up to 5 days 20 tablet 0    diazePAM (VALIUM) 5 mg tablet Take 1 tablet by mouth every 12  hours as needed for anxiety or muscle spasms Max Daily Amount: 10 mg 30 tablet 0    magnesium oxide (MAG-OX) 400 mg (241.3 mg magnesium) tablet Take 1 tablet (400 mg total) by mouth daily      albuterol HFA 90 mcg/actuation inhaler Inhale 2 puffs every 6 (six) hours as needed for wheezing 1 Inhaler 0    meloxicam (MOBIC) 7.5 mg tablet Take 1 tablet (7.5 mg total) by mouth daily 90 tablet 3    guaiFENesin (MUCINEX) 600 mg 12 hr tablet Take 2 tablets (1,200 mg total) by mouth 2 (two) times a day      ibuprofen (ADVIL,MOTRIN) 200 mg tablet Take 1 tablet (200 mg total) by mouth every 6 (six) hours as needed      vit C/E/Zn/coppr/lutein/zeaxan (OCUVITE LUTEIN AND ZEAXANTHIN ORAL) PreserVision AREDS-2      zinc 50 mg tablet Take 50 mg by mouth daily      diclofenac sodium (VOLTAREN) 1 % gel APPLY 2 GRAMS TO THE AFFECTED AREA(S) 4 TIMES PER DAY AS NEEDED 300 g 2    fluticasone propionate (FLONASE) 50 mcg/actuation nasal spray USE 2 SPRAYS IN EACH NOSTRIL DAILY 48 g 2    budesonide  (Pulmicort Flexhaler) 90 mcg/actuation inhaler as needed      vit B complex no.12/niacin,B3, (VITAMIN B COMPLEX NO.12-NIACIN ORAL) Take by mouth      cholecalciferol, vitamin D3, 25 mcg (1,000 unit) capsule Take 5 capsules (5,000 Units total) by mouth Takes 5 times daily      krill-om-3-dha-epa-phospho-ast 492-143-34-75 mg capsule Take 1 capsule by mouth daily      cyclobenzaprine (FLEXERIL) 10 mg tablet Take 1 tablet (10 mg total) by mouth 2 (two) times a day as needed for muscle spasms. 60 tablet 0    calcium carbonate (OS-RONNIE) 500 mg calcium tablet Take 1 tablet (500 mg total) by mouth daily      multivitamin (THERAGRAN) tablet tablet 1 Every Day      acetaminophen (TYLENOL EXTRA STRENGTH) 500 mg tablet Take 2 tablets (1,000 mg total) by mouth 2 (two) times a day       No current facility-administered medications for this visit.      Allergies   Allergen Reactions    Bee Venom Protein (Honey Bee) Swelling    No Known Drug Allergies        Past Surgical History:   Procedure Laterality Date    CARDIAC ELECTROPHYSIOLOGY STUDY AND ABLATION  01/01/2007    attempted ablation for SVT    CARPAL TUNNEL RELEASE Bilateral 06/26/2020    Procedure: LEFT ENDOSCOPIC CARPAL TUNNEL RELEASE, RIGHT OPEN CARPAL TUNNEL;  Surgeon: Mendez Sun MD;  Location: [facility] OR;  Service: Orthopedics;  Laterality: Bilateral;    COLONOSCOPY  01/21/2015    cecal polyp-tubular adenoma-recommend repeat 5 years    COLONOSCOPY N/A 06/22/2020    repeat 5 years - adenomatous polyp    DILATION AND CURETTAGE OF UTERUS  01/01/2006    ENDOMETRIAL BIOPSY  01/01/2012    pt reports negative    ESOPHAGOGASTRODUODENOSCOPY  01/01/2009    H pylori    ESOPHAGOGASTRODUODENOSCOPY N/A 06/22/2020    Procedure: ESOPHAGOGASTRODUODENOSCOPY;  Surgeon: Julian Faulkner MD;  Location: [facility];  Service: Endoscopy;  Laterality: N/A;    KNEE ARTHROPLASTY Right 04/01/2015    Dr Placido BRICE    TOTAL ANKLE ARTHROPLASTY Left 10/03/2022    Dr. Guerra, with talonavicular  and subtalar fusion    TOTAL ANKLE ARTHROPLASTY Left 02/20/2023    total revision of previous surgery, fusion of non-unions    VAGINAL HYSTERECTOMY  12/16/2013    with BSO, fibroids     Family History   Problem Relation Age of Onset    Cancer Mother 84        Bladder    Osteoporosis Mother     Hypertension Mother     Colon cancer Father 65    Kidney cancer Father 77    Other Father's Brother         sudden cardiac death, 60s    Heart disease Maternal Grandfather     Heart attack Maternal Grandfather 59    Cancer Paternal Grandfather      Social History     Tobacco Use    Smoking status: Never    Smokeless tobacco: Never   Substance Use Topics    Alcohol use: No       Review of Systems   Constitutional: Negative.  Negative for activity change, appetite change, chills, diaphoresis, fatigue, fever and unexpected weight change.   HENT:  Negative for congestion, ear pain, hearing loss, mouth sores, nosebleeds, postnasal drip, rhinorrhea, sinus pressure, sneezing, sore throat, tinnitus, trouble swallowing and voice change.    Eyes: Negative.  Negative for photophobia and visual disturbance.   Respiratory: Negative.  Negative for cough, chest tightness, shortness of breath and wheezing.    Cardiovascular: Negative.  Negative for chest pain, palpitations and leg swelling.   Gastrointestinal: Negative.  Negative for abdominal distention, abdominal pain, blood in stool, constipation, diarrhea, nausea and vomiting.   Endocrine: Negative.  Negative for cold intolerance, heat intolerance, polydipsia, polyphagia and polyuria.   Genitourinary: Negative.  Negative for difficulty urinating, dysuria, flank pain, frequency, hematuria and urgency.   Musculoskeletal:  Positive for arthralgias. Negative for back pain, gait problem, joint swelling, myalgias, neck pain and neck stiffness.   Skin: Negative.  Negative for rash.   Neurological: Negative.  Negative for dizziness, tremors, seizures, syncope, speech difficulty, weakness,  light-headedness, numbness and headaches.   Hematological: Negative.  Negative for adenopathy. Does not bruise/bleed easily.   Psychiatric/Behavioral: Negative.  Negative for dysphoric mood and sleep disturbance. The patient is not nervous/anxious.            Objective     Vitals  /92 (BP Location: Left arm, Patient Position: Sitting, Cuff Size: Regular Adult)   Pulse 78   Temp 36.1 °C (97 °F) (Temporal)   SpO2 96%     Physical Exam   GEN: pleasant, NAD  HEENT: PERRLA, sclera anicteric  NECK: no LAD or thyromegaly, no carotid bruits  LUNGS: CTAB, no wheezing rhonchi or rales  HEART: RRR faint systolic murmur, no rubs or gallops  ABD: Soft, non tender, non distended, normal bowel sounds   Ext: Warm and well perfused, trace edema  SKIN: no rash  Neuro:  CN 2-12 grossly intact, non focal, moving all extremities      Assessment/Plan   Diagnoses and all orders for this visit:    Preop examination  -     ECG 12 lead -Normal, Today    Obstructive sleep apnea syndrome    History of supraventricular tachycardia    Left ventricular hypertrophy    Aortic valve sclerosis    Impaired fasting glucose    History of total replacement of left ankle    Elevated blood pressure reading    Preop.  Corrine is a pleasant 67 yo lady here for preop prior to total L ankle revision. She has no active cardiac conditions and was as recently as August able to perform 4 METS of activity without SOB or chest pain.  EKG shows sinus rhythm rate of 66, borderline LAD , diffuse T wave changes similar to prior.  Labs today are stable.  Medical conditions are stable and she can proceed to surgery as planned.     CM MON MD

## 2023-10-23 ENCOUNTER — OFFICE VISIT (OUTPATIENT)
Dept: URGENT CARE | Facility: URGENT CARE | Age: 68
End: 2023-10-23
Payer: MEDICARE

## 2023-10-23 VITALS
OXYGEN SATURATION: 94 % | DIASTOLIC BLOOD PRESSURE: 101 MMHG | TEMPERATURE: 96.6 F | HEART RATE: 99 BPM | RESPIRATION RATE: 16 BRPM | SYSTOLIC BLOOD PRESSURE: 151 MMHG | BODY MASS INDEX: 46.95 KG/M2 | WEIGHT: 275 LBS | HEIGHT: 64 IN

## 2023-10-23 DIAGNOSIS — R30.0 DYSURIA: ICD-10-CM

## 2023-10-23 DIAGNOSIS — N39.0 ACUTE UTI: Primary | ICD-10-CM

## 2023-10-23 LAB
BILIRUB UR QL: ABNORMAL
CLARITY UR: ABNORMAL
COLOR UR: ABNORMAL
GLUCOSE UR QL: NEGATIVE MG/DL
HGB UR QL: ABNORMAL
KETONES UR-MCNC: 15 MG/DL
LEUKOCYTE ESTERASE UR QL STRIP: ABNORMAL
NITRITE UR QL: POSITIVE
PH UR: 5.5 PH
PROT UR STRIP-MCNC: >=300 MG/DL
SP GR UR: >=1.03 (ref 1–1.03)
UROBILINOGEN UR-MCNC: 1 MG/DL

## 2023-10-23 PROCEDURE — 87088 URINE BACTERIA CULTURE: CPT | Performed by: PHYSICIAN ASSISTANT

## 2023-10-23 PROCEDURE — G0463 HOSPITAL OUTPT CLINIC VISIT: HCPCS | Mod: PO | Performed by: PHYSICIAN ASSISTANT

## 2023-10-23 PROCEDURE — 99213 OFFICE O/P EST LOW 20 MIN: CPT | Performed by: PHYSICIAN ASSISTANT

## 2023-10-23 PROCEDURE — 81003 URINALYSIS AUTO W/O SCOPE: CPT | Mod: PO | Performed by: PHYSICIAN ASSISTANT

## 2023-10-23 RX ORDER — CEPHALEXIN 500 MG/1
500 CAPSULE ORAL 3 TIMES DAILY
Qty: 30 CAPSULE | Refills: 0 | Status: SHIPPED | OUTPATIENT
Start: 2023-10-23 | End: 2023-10-24 | Stop reason: ALTCHOICE

## 2023-10-23 RX ORDER — FLUCONAZOLE 150 MG/1
TABLET ORAL
Qty: 2 TABLET | Refills: 0 | Status: SHIPPED | OUTPATIENT
Start: 2023-10-23 | End: 2024-03-12 | Stop reason: ALTCHOICE

## 2023-10-23 ASSESSMENT — PAIN SCALES - GENERAL: PAINLEVEL: 3

## 2023-10-23 NOTE — PATIENT INSTRUCTIONS
Urinary Tract Infection, Adult  A urinary tract infection (UTI) is an infection of any part of the urinary tract. The urinary tract includes the:  Kidneys.  Ureters.  Bladder.  Urethra.       These organs make, store, and get rid of pee (urine) in the body.  Follow these instructions at home:  Take over-the-counter and prescription medicines only as told by your doctor.  If you were prescribed an antibiotic medicine, take it as told by your doctor. Do not stop taking the antibiotic even if you start to feel better.  Avoid the following drinks:  Alcohol.  Caffeine.  Tea.  Carbonated drinks.  Drink enough fluid to keep your pee clear or pale yellow.  Keep all follow-up visits as told by your doctor. This is important.    Make sure to:  Empty your bladder often and completely. Do not to hold pee for long periods of time.  Empty your bladder before and after sex.  Wipe from front to back after a bowel movement if you are female. Use each tissue one time when you wipe.  Contact a doctor if:  You have back pain.  You have a fever.  You feel sick to your stomach (nauseous).  You throw up (vomit).  Your symptoms do not get better after 3 days.  Your symptoms go away and then come back.  Get help right away if:  You have very bad back pain.  You have very bad lower belly (abdominal) pain.  You are throwing up and cannot keep down any medicines or water.  This information is not intended to replace advice given to you by your health care provider. Make sure you discuss any questions you have with your health care provider.  Document Released: 06/05/2009 Document Revised: 06/12/2018 Document Reviewed: 11/07/2016  [employer] Patient Education © 2019 [employer]

## 2023-10-23 NOTE — PROGRESS NOTES
Subjective     Chief Complaint   Patient presents with    Urinary Symptom     C/O urinary pressure and frequency starting yesterday.  She hasn't taken anything for symptom management.   Patient requests that note be forwarded to Becka Joy MD.         Patient ID: Corrine Holden is a 68 y.o. female.    HPI    Corrine is here today with urinary pressure and frequency that started yesterday.  She denies any fevers or back pain.  She has not taken anything for symptom management but did have an infection 2 weeks ago.  At that point she had been on Ceftin.  She felt like her symptoms had gone mostly away but she gets very scared with UTIs because her mother  of bladder cancer and her father  of kidney cancer.  She would like this note to be sent to Dr. Jaramillo, her PCP.    The following have been reviewed and updated as appropriate in this visit:   Allergies  Problems  Med Hx  Surg Hx  Fam Hx       Review of Systems  As noted in HPI.    Objective   Vitals:    10/23/23 1728   BP: (!) 151/101   Pulse: 99   Resp: 16   Temp: (!) 35.9 °C (96.6 °F)   SpO2: 94%       Physical Exam  Vitals and nursing note reviewed.   Constitutional:       General: She is not in acute distress.     Appearance: Normal appearance. She is obese. She is not ill-appearing, toxic-appearing or diaphoretic.   Neurological:      Mental Status: She is alert and oriented to person, place, and time.   Psychiatric:         Mood and Affect: Mood normal.         Behavior: Behavior normal.         Thought Content: Thought content normal.         Judgment: Judgment normal.       Labs  Recent Results (from the past 24 hour(s))   Urinalysis, dipstick Urine, Clean Catch    Collection Time: 10/23/23  5:31 PM   Result Value Ref Range    Color, Urine Brown (A) Yellow    Clarity, Urine Cloudy (A) Clear    Specific Gravity, Urine >=1.030 1.003 - 1.030    Leukocytes, Urine Small (A) Negative    Nitrite, Urine Positive (A) Negative    Protein, Urine >=300  (A) Negative MG/DL    Ketones, Urine 15 (A) Negative MG/DL    Urobilinogen, Urine 1.0 <2.0 mg/dL    Bilirubin, Urine Moderate (A) Negative    Blood, Urine Large (A) Negative    Glucose, Urine Negative Negative MG/DL    pH, Urine 5.5 5.0 - 8.0 PH     Imaging  No results found.    Assessment/Plan   Problem List Items Addressed This Visit    None  Visit Diagnoses       Acute UTI    -  Primary    Relevant Medications    cephalexin (Keflex) 500 mg capsule    fluconazole (Diflucan) 150 mg tablet    Other Relevant Orders    Urine culture    Dysuria        Relevant Orders    Urinalysis, dipstick Urine, Clean Catch (Completed)          1.  ACUTE UTI: Treatment with cephalexin 3 times daily x10 days along with fluconazole to prevent yeast infection.  Urine culture is pending and we will call and change meds if necessary.    She is in agreement with this plan.      A voice recognition program was used to aid in documentation of this record.  Sometimes words are not printed exactly as they were spoken.  While efforts were made to carefully edit and correct any inaccuracies, some areas may be present; please take these into context.  Please contact the provider if areas are identified

## 2023-10-23 NOTE — LETTER
[facility]  [address] [facility] [place] [id number] [phone]  Dept: [phone]  10/23/23      Becka Joy MD  [address] [place] [address]        To: Becka Joy MD      Thank you for referring your patient, Corrine Holden, to receive care in my office. I have enclosed a summary of the care provided to Corrine on 10/23/23.    Please contact me with any questions you may have regarding the visit.    Sincerely,         TOMAS Heath  [address] [facility] [place] [address]  [phone]    CC: No Recipients

## 2023-10-24 ENCOUNTER — TELEPHONE (OUTPATIENT)
Dept: UROLOGY | Facility: CLINIC | Age: 68
End: 2023-10-24
Payer: MEDICARE

## 2023-10-24 ENCOUNTER — TELEPHONE (OUTPATIENT)
Dept: INTERNAL MEDICINE | Facility: CLINIC | Age: 68
End: 2023-10-24
Payer: MEDICARE

## 2023-10-24 DIAGNOSIS — N39.0 ACUTE UTI: Primary | ICD-10-CM

## 2023-10-24 RX ORDER — CIPROFLOXACIN 250 MG/1
250 TABLET, FILM COATED ORAL 2 TIMES DAILY
Qty: 6 TABLET | Refills: 0 | Status: SHIPPED | OUTPATIENT
Start: 2023-10-24 | End: 2023-10-27

## 2023-10-24 NOTE — TELEPHONE ENCOUNTER
"Per Roya \"Reviewed urgent care note from October 6 and see that patient was a started on Ceftin, twice daily for 7 days.  Also reviewed progress note from care yesterday which shows that her UTI has definitely worsened.  In looking over her urine culture, the Ceftin was appropriately prescribed and the urine culture from yesterday is not quite back.  She does continue to be on Keflex.  However, due to her UA worsening, we may consider switching her over to Cipro.  Can switch to Cipro to 250 mg twice daily for 3 days. \"    Patient has been informed of this information patient voiced understanding and had no further questions at this time. Medication has been sent into patient preferred pharmacy   "

## 2023-10-24 NOTE — TELEPHONE ENCOUNTER
Caller would like to discuss  urinary issues       Patient: Corrine Holden    Callback Number: [phone]     Best Availability: anytime    Additional Info: Patient is calling asking for Dr. Lowe's nurse regarding urinary issues.     I advised caller of a callback by 48 hours.

## 2023-10-24 NOTE — TELEPHONE ENCOUNTER
"Patient states she was seen at urgent care yesterday due to a recurrent bladder infection, patient states this infection was noted to be \"worse\" than before. Patient states PA at urgent care told her UA was worse than October 6th UA as a comparison.    Patient is concerned as she is going to have surgery on 11/6/23 for a left total ankle revision she was wondering if additional lab needs to be drawn (bun and Creatinine) to see what her kidneys are doing. Patient is concerned that she is not emptying her bladder as this infection seems to be recurrent. Patient also states that she scheduled an appointment with Dr Aleksandra Lowe with Urology to be seen on thursday    Patient also mentioned about a friend who is a \"Urologist in [place]\" who told patient that \"Ceftin and keflex\" is within the same \"family\" so why is she taking \"keflex\" if its the same as \"ceftin?\" And \"ceftin\" did not help her bladder infection. Patient states they will cancel her surgery if she is on ceftin.  Patient would like to know if you have an alternative such as Bactrim or you have a different alternative in medication?    Patient would like me to inform Dr. Joy of this information. I told patient that I would inform Dr Joy however she is not in the office so I would forward these concerns to the on-call provider who is Effie Rush, patient voiced understanding and was wondering if Effie could perhaps give her a call I told  patient that effie is in between patients that I would forward these concerns to her and when she gets back to us we would give her a call back with recommendations/ next steps.          "

## 2023-10-24 NOTE — TELEPHONE ENCOUNTER
Caller would like to discuss (a) medication Writer has advised caller of a callback from within 24 hours.    Patient: Corrine Holden    Caller Name (Last and first, relation/role): self    Name of Facility: na    Callback Number: [phone]    Best Availability: anytime    Fax Number: na    Additional Info: pt wants to know about stopping one med and starting the other one    Did you confirm the message with the caller: Yes    Is it okay that the nurse communicates your response through [employer]? No

## 2023-10-24 NOTE — TELEPHONE ENCOUNTER
Caller would like to discuss (a) appointment Writer has advised caller of a callback from within 24 hours.    Patient: Corrine Holden    Caller Name (Last and first, relation/role): self    Name of Facility: na    Callback Number: [phone]    Best Availability: anytime    Fax Number: na    Additional Info: pt needs a phone call back has a surgery pending and its in 13 days. Pt is stating she needs labs done for the infection she has. So she doesn't loose her surgery date and have to cancel    Did you confirm the message with the caller: Yes    Is it okay that the nurse communicates your response through "[employer]hart? No

## 2023-10-24 NOTE — TELEPHONE ENCOUNTER
"Per Roya \"Yes, she will go ahead and stop the flex and start the Cipro. \"    Patient has been informed of this information. Patient voiced understanding patient was also made aware to not take tizanidine while taking cipro patient states she does not take this medication. Patient was also informed to not take fluconazole unless absolutely necessary patient voiced understanding and states she will not take that.    Patient mentioned that her surgery got moved from November 6th to November 13th as the person who is providing the \" product hardware \" could not make it on 11/6     Will forward to Dr. Joy just as an FYI       "

## 2023-10-25 LAB — BACTERIA UR CULT: ABNORMAL

## 2023-10-26 ENCOUNTER — LAB (OUTPATIENT)
Dept: LAB | Facility: CLINIC | Age: 68
End: 2023-10-26
Payer: MEDICARE

## 2023-10-26 ENCOUNTER — CONSULT (OUTPATIENT)
Dept: UROLOGY | Facility: CLINIC | Age: 68
End: 2023-10-26
Payer: MEDICARE

## 2023-10-26 VITALS
OXYGEN SATURATION: 94 % | HEIGHT: 64 IN | BODY MASS INDEX: 46.95 KG/M2 | TEMPERATURE: 98.6 F | HEART RATE: 86 BPM | SYSTOLIC BLOOD PRESSURE: 176 MMHG | DIASTOLIC BLOOD PRESSURE: 87 MMHG | WEIGHT: 275 LBS

## 2023-10-26 DIAGNOSIS — R31.29 MICROHEMATURIA: Primary | ICD-10-CM

## 2023-10-26 DIAGNOSIS — N30.20 BLADDER INFECTION, CHRONIC: ICD-10-CM

## 2023-10-26 LAB — SPECIMEN VOL ?TM UR: 0 ML

## 2023-10-26 PROCEDURE — 99204 OFFICE O/P NEW MOD 45 MIN: CPT | Performed by: UROLOGY

## 2023-10-26 PROCEDURE — 51798 US URINE CAPACITY MEASURE: CPT | Performed by: UROLOGY

## 2023-10-26 PROCEDURE — G0463 HOSPITAL OUTPT CLINIC VISIT: HCPCS | Performed by: UROLOGY

## 2023-10-26 RX ORDER — NITROFURANTOIN MACROCRYSTALS 50 MG/1
50 CAPSULE ORAL NIGHTLY
Qty: 30 CAPSULE | Refills: 2 | Status: SHIPPED | OUTPATIENT
Start: 2023-10-26 | End: 2024-01-24

## 2023-10-26 RX ORDER — FUROSEMIDE 10 MG/ML
10 INJECTION INTRAMUSCULAR; INTRAVENOUS ONCE
Status: CANCELLED | OUTPATIENT
Start: 2023-10-26 | End: 2023-10-26

## 2023-10-26 ASSESSMENT — PAIN SCALES - GENERAL: PAINLEVEL: 0-NO PAIN

## 2023-10-26 NOTE — TELEPHONE ENCOUNTER
Caller would like to discuss (a) medical records Writer has advised caller of a callback from within 24 hours.    Patient: Corrine Holden    Caller Name (Last and first, relation/role): peggy    Name of Facility: [facility]    Callback Number: [phone]    Best Availability: any    Fax Number: [phone]    Additional Info: Needing H & P from Pre Op     Did you confirm the message with the caller: Yes    Is it okay that the nurse communicates your response through MyChart? No

## 2023-10-26 NOTE — PROGRESS NOTES
Subjective     Patient ID: Corrine Holden is a 68 y.o. female.    HPI    The patient is a 68-year-old female sent for evaluation of microscopic hematuria, UTI and urinary frequency.  She reports previous left surgery requiring revision in early 2023.  On 10/6/2023 she noted UTI symptoms with urine culture Klebsiella and Aerococcus treated with 7-day course of Ceftin.  On 10/23/2023 she noted recurrence of UTI symptoms with final urine culture Klebsiella initially prescribed with Keflex with change to Cipro.  She currently denies fever, chills, flank pain.  She reports voiding approximately 150 to 200 mL per void, nocturia x2.  She reports family history of father  age 79 years old secondary to kidney cancer in mother  age 86 years old with bladder cancer.    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    Review of Systems    Objective     Physical Exam    Vitals:    10/26/23 0938   BP:    Pulse:    Temp:    SpO2: 94%     Radiology  - Ultrasound PVR 10/26/2023: 0 mL    Assessment/Plan     Diagnoses and all orders for this visit:    Microhematuria  -     CT abdomen pelvis urogram with and without IV contrast; Future  -     nitrofurantoin (Macrodantin) 50 mg capsule; Take 1 capsule (50 mg total) by mouth nightly    Bladder infection, chronic  -     POCT Bladder Scan  -     CT abdomen pelvis urogram with and without IV contrast; Future  -     nitrofurantoin (Macrodantin) 50 mg capsule; Take 1 capsule (50 mg total) by mouth nightly    Other orders  -     furosemide (LASIX) injection 10 mg    1.  Microscopic hematuria, uncertain prognosis  2.  UTI, uncertain prognosis  3.  Upcoming left ankle surgery revision  4.  No evidence of incomplete bladder emptying  - Continue previously prescribed Cipro  -After Cipro completed we will start Macrodantin 50 mg p.o. daily for 1 to 2 months then patient may benefit from methenamine 1 g p.o.  twice daily  -Prescription for Macrodantin sent to pharmacy  - Dietary recommendations for UTI prevention reviewed  - CT urogram and cystoscopy to evaluate UTI and microscopic hematuria  - CT urogram in next 1 week  - We will contact orthopedic surgeon regarding timing of her cystoscopy either before or after her left ankle revision  - Patient in agreement the above plan, questions answered

## 2023-10-26 NOTE — PATIENT INSTRUCTIONS
It was a pleasure meeting you today  A CT urogram order has been placed for [facility] and they will contact you directly to schedule  We will talk to your surgeon regarding timing of your cystoscopy  After you finish your Cipro then start Macrodantin 50 mg once daily  We will continue you on Macrodantin for 1 to 2 months  Continue drinking 80 ounces of water daily, empty your bladder every 2 hours, take vitamin C 500 to 1000 mg daily, eat 4 to 6 ounces of yogurt and add 1/2 to 1 cup of All-Bran buds cereal and continue drinking cranberry juice  If you have any questions regarding the above please do not hesitate to contact us  Please except my apology for running late for your appointment today

## 2023-10-27 DIAGNOSIS — R31.29 MICROHEMATURIA: Primary | ICD-10-CM

## 2023-10-27 ASSESSMENT — ENCOUNTER SYMPTOMS
DIARRHEA: 0
FEVER: 0
WHEEZING: 0
PALPITATIONS: 0
PSYCHIATRIC NEGATIVE: 1
TREMORS: 0
SORE THROAT: 0
HEMATOLOGIC/LYMPHATIC NEGATIVE: 1
SINUS PRESSURE: 0
MYALGIAS: 0
CHEST TIGHTNESS: 0
BACK PAIN: 0
ENDOCRINE NEGATIVE: 1
ADENOPATHY: 0
RESPIRATORY NEGATIVE: 1
SLEEP DISTURBANCE: 0
FATIGUE: 0
APPETITE CHANGE: 0
GASTROINTESTINAL NEGATIVE: 1
DYSPHORIC MOOD: 0
VOICE CHANGE: 0
SEIZURES: 0
NUMBNESS: 0
LIGHT-HEADEDNESS: 0
NEUROLOGICAL NEGATIVE: 1
FREQUENCY: 0
CONSTIPATION: 0
ABDOMINAL DISTENTION: 0
COUGH: 0
DIZZINESS: 0
VOMITING: 0
ARTHRALGIAS: 1
ABDOMINAL PAIN: 0
TROUBLE SWALLOWING: 0
EYES NEGATIVE: 1
FLANK PAIN: 0
WEAKNESS: 0
POLYPHAGIA: 0
PHOTOPHOBIA: 0
DIAPHORESIS: 0
DIFFICULTY URINATING: 0
CARDIOVASCULAR NEGATIVE: 1
RHINORRHEA: 0
DYSURIA: 0
HEADACHES: 0
POLYDIPSIA: 0
NAUSEA: 0
BRUISES/BLEEDS EASILY: 0
CHILLS: 0
ACTIVITY CHANGE: 0
NECK PAIN: 0
CONSTITUTIONAL NEGATIVE: 1
NECK STIFFNESS: 0
JOINT SWELLING: 0
NERVOUS/ANXIOUS: 0
SHORTNESS OF BREATH: 0
HEMATURIA: 0
BLOOD IN STOOL: 0
SPEECH DIFFICULTY: 0
UNEXPECTED WEIGHT CHANGE: 0

## 2023-11-01 ENCOUNTER — TELEPHONE (OUTPATIENT)
Dept: UROLOGY | Facility: CLINIC | Age: 68
End: 2023-11-01

## 2023-11-01 NOTE — TELEPHONE ENCOUNTER
This is a documented call only.    Patient: Corrine Holden    Additional Info: Calling to reschedule 11/2 CYSTO     Transferred to: ASHA     Appt Type: CYSTO

## 2023-11-01 NOTE — TELEPHONE ENCOUNTER
Caller would like to discuss  cysto       Patient: Corrine Holden    Callback Number: [phone]     Best Availability: anytime    Additional Info: Patient is calling asking for Nakita regarding her upcoming appt.     I advised caller of a callback by 48 hours.

## 2023-11-02 ENCOUNTER — PROCEDURE VISIT (OUTPATIENT)
Dept: UROLOGY | Facility: CLINIC | Age: 68
End: 2023-11-02
Payer: MEDICARE

## 2023-11-02 ENCOUNTER — LAB (OUTPATIENT)
Dept: LAB | Facility: CLINIC | Age: 68
End: 2023-11-02
Payer: MEDICARE

## 2023-11-02 VITALS
HEART RATE: 84 BPM | WEIGHT: 275 LBS | OXYGEN SATURATION: 93 % | HEIGHT: 64 IN | SYSTOLIC BLOOD PRESSURE: 149 MMHG | DIASTOLIC BLOOD PRESSURE: 78 MMHG | BODY MASS INDEX: 46.95 KG/M2 | TEMPERATURE: 97.2 F

## 2023-11-02 DIAGNOSIS — R31.29 MICROHEMATURIA: ICD-10-CM

## 2023-11-02 DIAGNOSIS — N30.20 BLADDER INFECTION, CHRONIC: ICD-10-CM

## 2023-11-02 LAB
BACTERIA #/AREA URNS HPF: NORMAL /HPF
BILIRUB UR QL: NEGATIVE
CLARITY UR: CLEAR
COLOR UR: YELLOW
GLUCOSE UR QL: NEGATIVE MG/DL
HGB UR QL: NEGATIVE
KETONES UR-MCNC: NEGATIVE MG/DL
LEUKOCYTE ESTERASE UR QL STRIP: NEGATIVE
NITRITE UR QL: NEGATIVE
PH UR: 5.5 PH
PROT UR STRIP-MCNC: NEGATIVE MG/DL
RBC #/AREA URNS HPF: NORMAL /HPF
SP GR UR: 1.02 (ref 1–1.03)
SQUAMOUS #/AREA URNS HPF: NORMAL /HPF
UROBILINOGEN UR-MCNC: 0.2 MG/DL
WBC #/AREA URNS HPF: NORMAL /HPF

## 2023-11-02 PROCEDURE — 87088 URINE BACTERIA CULTURE: CPT | Performed by: UROLOGY

## 2023-11-02 PROCEDURE — 52000 CYSTOURETHROSCOPY: CPT | Performed by: UROLOGY

## 2023-11-02 PROCEDURE — G0463 HOSPITAL OUTPT CLINIC VISIT: HCPCS | Mod: NC | Performed by: UROLOGY

## 2023-11-02 PROCEDURE — 81001 URINALYSIS AUTO W/SCOPE: CPT

## 2023-11-02 ASSESSMENT — PAIN SCALES - GENERAL: PAINLEVEL: 0-NO PAIN

## 2023-11-02 NOTE — PATIENT INSTRUCTIONS
It was a pleasure seeing you again today  Your bladder is completely normal with clear urine coming from both kidneys  We will schedule a nurse only appointment for a straight cath urine culture prior to your upcoming surgery  If you have any questions regarding the above please do not hesitate to contact us

## 2023-11-02 NOTE — PROGRESS NOTES
Pre-procedure diagnosis:  micro hematuria  Post-procedure diagnosis:  same, urethral narrowing  Procedure:  cystoscopy with urethral calibration  Surgeon: Mynor      Procedure:  The procedure, usual risks and benefits were explained and the patient wishes to proceed.  The patient was transferred to dorsal lithotomy position and prepped in the usual sterile fashion.  Cystoscopy was attempted without success due to urethral narrowing.  The urethra was calibrated using Boyle sounds from 16 [place] to 22 [place] without difficulty.  Cystourethroscopy was performed using the flexible cystoscope.  Inspection of the urethra demonstrated no abnormalities.  Inspection of the bladder demonstrated no abnormalities.  Clear efflux was noted from both ureteral orifices.  The cystoscope was removed.  The patient tolerated the procedure well.      Nurse only appointment for straight cath urine culture prior to her upcoming orthopedic surgery  Follow-up 3 to 4 months with UA dip, UA micro

## 2023-11-03 LAB — BACTERIA UR CULT: NORMAL

## 2023-11-07 ENCOUNTER — CLINICAL SUPPORT (OUTPATIENT)
Dept: UROLOGY | Facility: CLINIC | Age: 68
End: 2023-11-07
Payer: MEDICARE

## 2023-11-07 ENCOUNTER — LAB (OUTPATIENT)
Dept: LAB | Facility: CLINIC | Age: 68
End: 2023-11-07
Payer: MEDICARE

## 2023-11-07 VITALS
BODY MASS INDEX: 47.2 KG/M2 | DIASTOLIC BLOOD PRESSURE: 78 MMHG | OXYGEN SATURATION: 90 % | SYSTOLIC BLOOD PRESSURE: 138 MMHG | WEIGHT: 275 LBS | HEART RATE: 80 BPM | TEMPERATURE: 97.4 F | RESPIRATION RATE: 16 BRPM

## 2023-11-07 DIAGNOSIS — N30.20 BLADDER INFECTION, CHRONIC: Primary | ICD-10-CM

## 2023-11-07 DIAGNOSIS — N30.20 BLADDER INFECTION, CHRONIC: ICD-10-CM

## 2023-11-07 PROCEDURE — 51701 INSERT BLADDER CATHETER: CPT | Performed by: UROLOGY

## 2023-11-07 PROCEDURE — 87088 URINE BACTERIA CULTURE: CPT

## 2023-11-07 PROCEDURE — 51701 INSERT BLADDER CATHETER: CPT

## 2023-11-07 ASSESSMENT — PAIN SCALES - GENERAL: PAINLEVEL: 0-NO PAIN

## 2023-11-09 LAB — BACTERIA UR CULT: NORMAL

## 2023-11-16 ENCOUNTER — HOME HEALTH ADMISSION (OUTPATIENT)
Dept: HOME HEALTH SERVICES | Facility: HOME HEALTH | Age: 68
End: 2023-11-16
Payer: MEDICARE

## 2023-11-19 ENCOUNTER — HOME CARE VISIT (OUTPATIENT)
Dept: HOME HEALTH SERVICES | Facility: HOSPICE | Age: 68
End: 2023-11-19
Payer: MEDICARE

## 2023-11-19 ENCOUNTER — HOME CARE VISIT (OUTPATIENT)
Dept: HOME HEALTH SERVICES | Facility: HOME HEALTH | Age: 68
End: 2023-11-19

## 2023-11-19 VITALS
HEIGHT: 64 IN | DIASTOLIC BLOOD PRESSURE: 68 MMHG | SYSTOLIC BLOOD PRESSURE: 117 MMHG | RESPIRATION RATE: 16 BRPM | OXYGEN SATURATION: 93 % | HEART RATE: 68 BPM | BODY MASS INDEX: 46.95 KG/M2 | WEIGHT: 275 LBS | TEMPERATURE: 97.2 F

## 2023-11-19 PROCEDURE — 10901201 HH PPS REVENUE DEBIT

## 2023-11-19 PROCEDURE — G0299 HHS/HOSPICE OF RN EA 15 MIN: HCPCS | Mod: PPS

## 2023-11-19 PROCEDURE — 10901101 HH PPS REVENUE CREDIT

## 2023-11-19 PROCEDURE — 02300001 HH START OF CARE

## 2023-11-19 PROCEDURE — (BLANK) HH NO-PAY RAP

## 2023-11-19 ASSESSMENT — ENCOUNTER SYMPTOMS
PAIN LOCATION - PAIN SEVERITY: 5/10
PAIN LOCATION: LEFT LEG
PAIN LOCATION - RELIEVING FACTORS: REST, MEDS
PAIN LOCATION - EXACERBATING FACTORS: ACTIVITY
LOWEST PAIN SEVERITY IN PAST 24 HOURS: 2/10
PAIN LOCATION - PAIN FREQUENCY: INTERMITTENT
PAIN: 1
MUSCLE WEAKNESS: 1
HIGHEST PAIN SEVERITY IN PAST 24 HOURS: 5/10
PAIN LOCATION - PAIN QUALITY: ACHE
PERSON REPORTING PAIN: PATIENT
PAIN SEVERITY GOAL: 0/10
LAST BOWEL MOVEMENT: 66796

## 2023-11-19 ASSESSMENT — ACTIVITIES OF DAILY LIVING (ADL)
AMBULATION ASSISTANCE: ONE PERSON
TRANSPORTATION_METHOD: FAMILY
TOILETING_REQUIRES_ASSISTANCE: 1
EATING_REQUIRES_ASSISTANCE: 1
CURRENT_FUNCTION: ONE PERSON
DRESSING_REQUIRES_ASSISTANCE: 1
BATHING_REQUIRES_ASSISTANCE: 1
GROOMING_REQUIRES_ASSISTANCE: 1
TRANSPORTATION_METHOD: REGULAR CAR
OASIS_M1830: 05
ENTERING_EXITING_HOME: MAXIMUM ASSIST

## 2023-11-19 NOTE — Clinical Note
Patient was admitted for PT/OT s/p anlke replacement.  Co-morbidities include obesity,  SHANA, GERD, asthma  Patient was discharged from  Surgical  on  11/16.      Procedures/infections during admission: l total guille replcement/revision    Patient accepts the following disciplines in the home: Physical Therapy, Occupational Therapy and Patient Care Technician.  Patient declined the following disciplines in the home:Nursing.    Skilled needs include: Physical Therapy: strengthening and balance, equipment needs & education, endurance, fall safety education and transfer training and Occupational Therapy: therapeutic exercise, energy conservation education and ADL/IADL training   Subjective:  Patient is pleasant 68yoF recently undergone revision of left toal ankle. Patient has limited weight bearing status and is using a scooter for mobility in the home. She is feeling competent with her medication and nursing needs at this time but is motivated to start her rehab in order to maximize her recovery. Vitls WNL. AOx4  Response to care: tolerated well  Progress toward previous goals: care/treatment is still necessary and reinforcement/education needed  Potential barriers: co-morbidities, environmental barriers and obesity  Patient strengths: high level of prior function, motivated and strong support system/involved caregiver      Education/instructions provided on Fall/injury prevention related to removal fall/trip hazards, use of assistive device, importance of well-lit environment, ambulating with assistance and how medications can affect fall risk    Education provided to patient, family and caregiver through explanation.  patient demonstrated acceptance and verbalizes understanding.    Plan:  Care plan reviewed and appropriate.  Plan for next visit to include provide exercise recommendations for LLE post op recovery.

## 2023-11-20 ENCOUNTER — HOME CARE VISIT (OUTPATIENT)
Dept: HOME HEALTH SERVICES | Facility: HOME HEALTH | Age: 68
End: 2023-11-20
Payer: MEDICARE

## 2023-11-20 ENCOUNTER — HOME CARE VISIT (OUTPATIENT)
Dept: HOME HEALTH SERVICES | Facility: HOSPICE | Age: 68
End: 2023-11-20
Payer: MEDICARE

## 2023-11-20 VITALS — SYSTOLIC BLOOD PRESSURE: 165 MMHG | DIASTOLIC BLOOD PRESSURE: 100 MMHG

## 2023-11-20 PROCEDURE — G0152 HHCP-SERV OF OT,EA 15 MIN: HCPCS | Mod: PPS | Performed by: OCCUPATIONAL THERAPIST

## 2023-11-20 PROCEDURE — 10901101 HH PPS REVENUE CREDIT

## 2023-11-20 PROCEDURE — 10901201 HH PPS REVENUE DEBIT

## 2023-11-20 ASSESSMENT — ACTIVITIES OF DAILY LIVING (ADL)
DRESSING_LB_CURRENT_FUNCTION: INDEPENDENT
SHOPPING ASSESSED: 1
ORAL_CARE_ASSESSED: 1
TRANSPORTATION_METHOD: REGULAR CAR
ORAL_CARE_CURRENT_FUNCTION: INDEPENDENT
ENTERING_EXITING_HOME: MINIMUM ASSIST
FEEDING: INDEPENDENT
TRANSPORTATION_METHOD: FAMILY
BATHING ASSESSED: 1
DRESSING_UB_CURRENT_FUNCTION: INDEPENDENT
TELEPHONE USE ASSESSED: 1
TRANSPORTATION ASSESSED: 1
GROOMING ASSESSED: 1
PREPARING MEALS: NEEDS ASSISTANCE
BATHING_CURRENT_FUNCTION: SUPERVISION
TOILETING: INDEPENDENT
HOUSEKEEPING ASSESSED: 1
SHOPPING: DEPENDENT
GROOMING_CURRENT_FUNCTION: INDEPENDENT
FEEDING ASSESSED: 1
LAUNDRY ASSESSED: 1
USING THE TELPHONE: INDEPENDENT
TRANSPORTATION: DEPENDENT
LAUNDRY: INDEPENDENT
TOILETING: 1
LIGHT HOUSEKEEPING: DEPENDENT

## 2023-11-20 ASSESSMENT — ENCOUNTER SYMPTOMS
PERSON REPORTING PAIN: PATIENT
PAIN: 1
PAIN LOCATION - PAIN SEVERITY: 2/10
PAIN LOCATION - PAIN FREQUENCY: CONSTANT
NERVOUS/ANXIOUS: 1
PAIN LOCATION: LEFT ANKLE
PAIN LOCATION - PAIN QUALITY: ACHY, SHARP
PAIN LOCATION - PAIN DURATION: CONSTANT

## 2023-11-20 NOTE — HOME HEALTH
Subjective:  Pt seen in her home for HH OT evaluation found supine in bed resting    Objective clinical findings related to patient's need for care:  Pt referred to  after  (L) ankle revision surgery. Pt fell June 9th, 2023 getting out of the shower which led to additional loosening of prior hardware and pain.  Pt had nurmerous follow up appointments with PCP to decide course of action leading to this surgery. Pt remains NWB on (L) ankle for up to 3 months and is in a cast from foot to below the knee.  Pt was discharged from  surgery Center on 11/16/2023 to home.   Pt lives one level home with ramp entrance from garage. This entrance is used when pt has to exit and return home on her knee scooter.   Pt lives with her cousin who stays with her each night and additional family members to assist with tasks incluing meals. Pt has a walk in shower but is not currenlty using due to cast on her (L) LE she intends on sponge bathing for right now until 4-5 more weeks when out of cast.   Pt has made several adaptations to accomodate NWB of (L) LE has scooter which she uses to go to and from kitchen area. She is not using scooter in bathroom area due to small space thus has a bedside commode and transfers independently to and from area. Pt has walk in shower with approximately 5 inch lip with extended and standard bathing benches available. Grab bar placement discussed with the patient for around toilet area and or use of toilet safety frame for transfers.  Pt not willing to attempt transfers from main toilet area at this time wanting to get grab bars in place first. Overall pt is moving well using knee scooter in the home with remote SBA. She denies further need for HH OT at this time and would like a  PT evaluation to see if additional exercises for L LE would be beneficial. Plan to dc HH OT at this time due to no further skilled need.     PLOF:  Pt using crutches and walking stick prior to this recent surgery  ambulating with increased effort due to pain in (L) ankle/foot area. Pt was indep with all ADL's and IADLs per report would dirve short distances to store.     CLOF: Pt is fairly indep with ADLs does dress, groom and toilet independently. She does need slight assist for sponge bathing for hard to reach areas and washing her hair per family, and she requested bathing aide for follow up assist. She declined to attempt simulated shower transfers and will continue to sponge bath until out of her cast on (L) LE.  Pt able to complete simple laundry tasks while on her scooter independently and will continue to have her cousin assist with meals. She is able to load  standing on (R) LE without loss of balance and can retrieve items from refridgerator independently as needed.       Assessment:  Response to care: Good pt is functioning well in the home currenlty not significant need for further HH OT visits. Pt maintains NWB on (L) lower leg well with all transfers observed in the home. Pt would benefit from additional grab bar placement in the bathroom and further bathing training once out of cast as of now will continue to sponge bathe.    Goals: NA no follow up HH OT visits recommended at this time   Potential barriers: co-morbidities, obesity and unstable medical condition  Patient strengths: motivated and strong support system/involved caregivers    Education/instructions provided on Home/environmental safety related to fall hazards and Fall/injury prevention related to removal fall/trip hazards, use of assistive device, importance of well-lit environment and how medications can affect fall risk  Education provided to patient, family and caregiver through explanation. Learners displayed acceptance and verbalizes understanding, demonstrated understanding and needs reinforcement.    Plan:[facility] skilled HH OT visits post evaluation only 1 time visit for evaluation    Plan of Care Updates: NONE plan no further OT visits

## 2023-11-21 ENCOUNTER — HOME CARE VISIT (OUTPATIENT)
Dept: HOME HEALTH SERVICES | Facility: HOSPICE | Age: 68
End: 2023-11-21
Payer: MEDICARE

## 2023-11-21 PROCEDURE — 10901101 HH PPS REVENUE CREDIT

## 2023-11-21 PROCEDURE — 10901201 HH PPS REVENUE DEBIT

## 2023-11-21 PROCEDURE — G0151 HHCP-SERV OF PT,EA 15 MIN: HCPCS | Mod: PPS | Performed by: PHYSICAL THERAPIST

## 2023-11-22 ENCOUNTER — HOME CARE VISIT (OUTPATIENT)
Dept: HOME HEALTH SERVICES | Facility: HOSPICE | Age: 68
End: 2023-11-22
Payer: MEDICARE

## 2023-11-22 VITALS
TEMPERATURE: 96.3 F | OXYGEN SATURATION: 92 % | DIASTOLIC BLOOD PRESSURE: 86 MMHG | SYSTOLIC BLOOD PRESSURE: 142 MMHG | HEART RATE: 80 BPM

## 2023-11-22 PROCEDURE — 10901201 HH PPS REVENUE DEBIT

## 2023-11-22 PROCEDURE — 10901101 HH PPS REVENUE CREDIT

## 2023-11-22 ASSESSMENT — ENCOUNTER SYMPTOMS
PAIN LOCATION - PAIN FREQUENCY: CONSTANT
PAIN SEVERITY GOAL: 0/10
HIGHEST PAIN SEVERITY IN PAST 24 HOURS: 8/10
PAIN LOCATION: LEFT LEG
SUBJECTIVE PAIN PROGRESSION: WAXING AND WANING
LOWEST PAIN SEVERITY IN PAST 24 HOURS: 1/10
PAIN LOCATION - PAIN QUALITY: ORTHO
PAIN LOCATION - PAIN SEVERITY: 3/10
PAIN LOCATION - RELIEVING FACTORS: REST, MEDS, POSITIONING
PAIN: 1
PERSON REPORTING PAIN: PATIENT

## 2023-11-22 ASSESSMENT — ACTIVITIES OF DAILY LIVING (ADL)
TRANSPORTATION_METHOD: FAMILY
ENTERING_EXITING_HOME: MINIMUM ASSIST
TRANSPORTATION_METHOD: REGULAR CAR

## 2023-11-22 NOTE — HOME HEALTH
"Subjective:  meet with Corrine in home , she is agreeable to HHPT evaluation. pt was admitted s/p L total ankle replacement.pt is 1wk post op yesterday;  co-morbidities include obesity, SHANA, GERD, asthma. pt reports this is \"third go\" at this ankle, states ankle/foot surgery with attempted fusion, did not take, revision attempted, but then fell, reinjuring. Now to full total ankle. pt states specialist to [place] to assist with surgery due to type of prosthesis, etc. she has follow up appt tomorrow. pt to cont NWB status per MD orders, approx 2months. pt has seated manual knee scooter, fww, bedside commode for mobility, was evaluated per SN with no further need/visits planned. 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 pt was evaluated by OT with no further visits planned at this time. pt is indep sponge bathing and use of bedside commode. does have family to assist with emptying, etc.     Objective clinical findings related to patient's need for care:  see interventions, assessments. pt only able to take a few NWB steps/hops for pivot transfers this date, with fww at bedside. stairs not formally tested this date, pt has ramp to exit/enter in garage but recommending handrail here, pt has stairs x 3 to front entrance but not using. pt in splint with ACE, noted edema post op ,unable to observe incision site per MD orders.  teaching on elevation tech. pt demo transfer from supine to sit EOB, stand to fww, pivot to/from bedside commode with NWB seq. pt able to move to end of bed and on/off seated scooter SBA-CGA X 1, indep propulsion around home on level surface, including corners, to/from exit, reach to fridge etc. x approx 200'x 1, no LOB, good tech and pace. pt req assist to exit, ramp to garage; teaching on initial supine , seated, standing ther ex for LE strength including : supine SLR, HS, hip abd/add, QS, GS, pt demo x 10 B, seated hip flexion, knee ext x 10, standing L only hip flexion, abd, ext " with fww; issue written and picture handouts and instructions.     PLOF:  pt had very limited walking just prior to surgery , being in signif pain again. pt was able to progress to cane/walker around June, liked some bike and pool exercises.     CLOF: pt is essentially non ambulatory at this time due to pain, past MHX with wrists, R TKA. she does very well with bed mobility, seated scooter mobility. she is very motivated. does have recent hx of UTI, does fatigue quickly and req freq supine rests.       Assessment:  Response to care: motivated, fatigues , leg pain, limited with NWB status  Goals: increase/maintain strength and progress L ankle per MD orders  Potential barriers: co-morbidities and obesity  Patient strengths: motivated and strong support system/involved caregiver  Potential to improve with skilled physical therapy is good    Education/instructions provided on Post-op care including symptom management, surgical precautions, weight bearing status and follow-up instructions  Education provided to patient, family and caregiver through explanation, demonstration and handout. Learners displayed acceptance and verbalizes understanding and demonstrated understanding.    Plan:  Need for skilled services: Physical Therapy: strengthening and balance, endurance, fall safety education and transfer training 1 times a week for 2 more weeks.  Care plan reviewed and appropriate. Plan for next visit : review and progress current HEP as able, follow up with pt following ortho visit tomorrow.     Plan of Care Updates:   Plan of Care in development verbally reviewed the plan of care with the patient, current plan of care verbally reviewed included visit schedule, visit frequency and treatments to be administered by agency therapy and a copy of the plan of care was given to the patient.

## 2023-11-23 PROCEDURE — 10901101 HH PPS REVENUE CREDIT

## 2023-11-23 PROCEDURE — 10901201 HH PPS REVENUE DEBIT

## 2023-11-24 PROCEDURE — 10901201 HH PPS REVENUE DEBIT

## 2023-11-24 PROCEDURE — 10901101 HH PPS REVENUE CREDIT

## 2023-11-25 PROCEDURE — 10901101 HH PPS REVENUE CREDIT

## 2023-11-25 PROCEDURE — 10901201 HH PPS REVENUE DEBIT

## 2023-11-26 PROCEDURE — 10901101 HH PPS REVENUE CREDIT

## 2023-11-26 PROCEDURE — 10901201 HH PPS REVENUE DEBIT

## 2023-11-27 PROCEDURE — 10901101 HH PPS REVENUE CREDIT

## 2023-11-27 PROCEDURE — 10901201 HH PPS REVENUE DEBIT

## 2023-11-28 ENCOUNTER — HOME CARE VISIT (OUTPATIENT)
Dept: HOME HEALTH SERVICES | Facility: HOME HEALTH | Age: 68
End: 2023-11-28
Payer: MEDICARE

## 2023-11-28 ENCOUNTER — HOME CARE VISIT (OUTPATIENT)
Dept: HOME HEALTH SERVICES | Facility: HOSPICE | Age: 68
End: 2023-11-28
Payer: MEDICARE

## 2023-11-28 PROCEDURE — 10901101 HH PPS REVENUE CREDIT

## 2023-11-28 PROCEDURE — G0151 HHCP-SERV OF PT,EA 15 MIN: HCPCS | Mod: PPS | Performed by: PHYSICAL THERAPIST

## 2023-11-28 PROCEDURE — 10901201 HH PPS REVENUE DEBIT

## 2023-11-28 PROCEDURE — 10901301 HH PPS REVENUE ADJUSTMENT

## 2023-11-28 ASSESSMENT — ENCOUNTER SYMPTOMS
PAIN LOCATION: LEFT LEG
PERSON REPORTING PAIN: PATIENT
HIGHEST PAIN SEVERITY IN PAST 24 HOURS: 7/10
PAIN LOCATION - PAIN QUALITY: ORTHO
PAIN LOCATION - PAIN FREQUENCY: CONSTANT
SUBJECTIVE PAIN PROGRESSION: WAXING AND WANING
LOWEST PAIN SEVERITY IN PAST 24 HOURS: 2/10
PAIN LOCATION - EXACERBATING FACTORS: MOVEMENT, EX, POSITIONING
PAIN: 1
PAIN SEVERITY GOAL: 0/10
PAIN LOCATION - PAIN SEVERITY: 2/10
PAIN LOCATION - RELIEVING FACTORS: REST, MEDS, ELEVATION

## 2023-11-28 ASSESSMENT — ACTIVITIES OF DAILY LIVING (ADL)
TRANSPORTATION_METHOD: FAMILY
HOME_HEALTH_OASIS: 01
TRANSPORTATION_METHOD: REGULAR CAR
ENTERING_EXITING_HOME: MODERATE ASSIST

## 2023-11-29 NOTE — HOME HEALTH
"Subjective:  meet with pt in home, she requests d/c from  services at this time. she was to follow up ortho appt and was told by MD that she \"needs to stay down with leg elevated\" due to \"too much swelling\" in foot. pt states she feels knowledgable with current HEP  as allowed, NWB LLE, current med list. pt reports pain currently is a 2/10 with medications and rest.     Objective clinical findings related to patient's need for care:  see interventions, assessments. pt completed bedside commode transfer and hygiene indep at time of PT arrival, pt indep mobility in bed with supine, sidelying, to /from sit EOB; pt knowledgable and able to demo leg elevation tech , review CP tech and placement. pt has written pictures and handouts for HEP including supine, seated ther ex for UE and LE ;     Assessment:  Response to care: pt is positive and motivated but feels she needs to back off ther ex and mobility per MD recommendations. pt is very knowledgable re: cares and operation, this is third significant foot/ankle surgery on LLE and wants to progress slowly/safely. pt requests d/c at this time.     Progress toward previous goals: patient is progressing with current care/treatment    Potential barriers: co-morbidities, obesity and hx of multiple surgeries and complications, currently NWB for next 6-8wks and non ambulatory    Patient strengths: motivated and strong support system/involved caregiver    Education/instructions provided on Post-op care including symptom management, surgical precautions, weight bearing status and follow-up instructions    Education provided to patient, family and caregiver through explanation, demonstration and handout.  pt demonstrated acceptance and pt verbalizes understanding.    Plan:  pt is discharged this dater per pt request.  pt is to continue with supine, seated ther ex as able/tolerated , pt is NWB L and to focus on elevation and edema and pain reduction per MD instructions."

## 2023-11-30 PROCEDURE — G0180 MD CERTIFICATION HHA PATIENT: HCPCS | Performed by: INTERNAL MEDICINE

## 2023-12-01 ENCOUNTER — HOME CARE VISIT (OUTPATIENT)
Dept: HOME HEALTH SERVICES | Facility: HOME HEALTH | Age: 68
End: 2023-12-01
Payer: MEDICARE

## 2023-12-01 ASSESSMENT — ACTIVITIES OF DAILY LIVING (ADL): OASIS_M1830: 04

## 2024-01-23 ENCOUNTER — TELEPHONE (OUTPATIENT)
Dept: SURGERY | Facility: CLINIC | Age: 69
End: 2024-01-23
Payer: MEDICARE

## 2024-01-23 NOTE — TELEPHONE ENCOUNTER
Patient was contacted and a weight management consult appointment was scheduled on 3/12/24 with Angela Pace at the [facility]

## 2024-02-08 ENCOUNTER — TELEPHONE (OUTPATIENT)
Dept: INTERNAL MEDICINE | Facility: CLINIC | Age: 69
End: 2024-02-08

## 2024-02-08 DIAGNOSIS — E67.3 HYPERVITAMINOSIS D: ICD-10-CM

## 2024-02-08 DIAGNOSIS — E03.9 HYPOTHYROIDISM, UNSPECIFIED TYPE: ICD-10-CM

## 2024-02-08 DIAGNOSIS — E78.5 BORDERLINE HYPERLIPIDEMIA: ICD-10-CM

## 2024-02-08 DIAGNOSIS — R73.01 IMPAIRED FASTING GLUCOSE: Primary | ICD-10-CM

## 2024-02-08 NOTE — TELEPHONE ENCOUNTER
Dr. Joy ordered labs. Contacted patient and advised that orders have been placed and scheduled her for 02/09/24 @7:30am.

## 2024-02-08 NOTE — TELEPHONE ENCOUNTER
Caller would like to discuss (a)  labs  Writer has advised caller of a callback from within 24 hours.    Patient: Corrine Holden    Caller Name (Last and first, relation/role): self    Name of Facility: na    Callback Number: [phone]     Best Availability: any    Fax Number: [phone]    Additional Info: Please call patient regarding labs prior to Wellness appointment on 2/12/24.      Did you confirm the message with the caller: Yes    Is it okay that the nurse communicates your response through [employer]? No

## 2024-02-09 ENCOUNTER — LAB (OUTPATIENT)
Dept: LAB | Facility: CLINIC | Age: 69
End: 2024-02-09
Payer: MEDICARE

## 2024-02-09 DIAGNOSIS — E67.3 HYPERVITAMINOSIS D: ICD-10-CM

## 2024-02-09 DIAGNOSIS — E03.9 HYPOTHYROIDISM, UNSPECIFIED TYPE: ICD-10-CM

## 2024-02-09 DIAGNOSIS — E78.5 BORDERLINE HYPERLIPIDEMIA: ICD-10-CM

## 2024-02-09 DIAGNOSIS — R73.01 IMPAIRED FASTING GLUCOSE: ICD-10-CM

## 2024-02-09 LAB
ALBUMIN SERPL-MCNC: 4.5 G/DL (ref 3.5–5.3)
ALP SERPL-CCNC: 73 U/L (ref 55–142)
ALT SERPL-CCNC: 31 U/L (ref 7–52)
ANION GAP SERPL CALC-SCNC: 15 MMOL/L (ref 3–11)
AST SERPL-CCNC: 29 U/L
BASOPHILS # BLD AUTO: 0.1 10*3/UL
BASOPHILS NFR BLD AUTO: 0.9 % (ref 0–2)
BILIRUB SERPL-MCNC: 0.38 MG/DL (ref 0.2–1.4)
BUN SERPL-MCNC: 13 MG/DL (ref 7–25)
CALCIUM ALBUM COR SERPL-MCNC: 9.1 MG/DL (ref 8.6–10.3)
CALCIUM SERPL-MCNC: 9.5 MG/DL (ref 8.6–10.3)
CHLORIDE SERPL-SCNC: 105 MMOL/L (ref 98–107)
CHOLEST SERPL-MCNC: 218 MG/DL (ref 0–199)
CO2 SERPL-SCNC: 22 MMOL/L (ref 21–32)
CREAT SERPL-MCNC: 0.82 MG/DL (ref 0.6–1.1)
EGFRCR SERPLBLD CKD-EPI 2021: 78 ML/MIN/1.73M*2
EOSINOPHIL # BLD AUTO: 0.2 10*3/UL
EOSINOPHIL NFR BLD AUTO: 2.8 % (ref 0–3)
ERYTHROCYTE [DISTWIDTH] IN BLOOD BY AUTOMATED COUNT: 16.5 % (ref 11.5–14)
EST. AVERAGE GLUCOSE BLD GHB EST-MCNC: 137 MG/DL
FASTING STATUS PATIENT QL REPORTED: YES
GLUCOSE SERPL-MCNC: 124 MG/DL (ref 70–105)
HBA1C MFR BLD: 6.4 % (ref 4–6)
HCT VFR BLD AUTO: 39.2 % (ref 34–45)
HDLC SERPL-MCNC: 67 MG/DL
HGB BLD-MCNC: 12.8 G/DL (ref 11.5–15.5)
HYPOCHROMIA PRESENCE IN BLOOD, ANALYZER: ABNORMAL
LDLC SERPL CALC-MCNC: 129 MG/DL (ref 20–99)
LYMPHOCYTES # BLD AUTO: 1.5 10*3/UL
LYMPHOCYTES NFR BLD AUTO: 24.1 % (ref 11–47)
MCH RBC QN AUTO: 26.9 PG (ref 28–33)
MCHC RBC AUTO-ENTMCNC: 32.5 G/DL (ref 32–36)
MCV RBC AUTO: 82.7 FL (ref 81–97)
MONOCYTES # BLD AUTO: 0.5 10*3/UL
MONOCYTES NFR BLD AUTO: 7.7 % (ref 3–11)
NEUTROPHILS # BLD AUTO: 4 10*3/UL
NEUTROPHILS NFR BLD AUTO: 64.5 % (ref 41–81)
PLATELET # BLD AUTO: 323 10*3/UL (ref 140–350)
PMV BLD AUTO: 6.9 FL (ref 6.9–10.8)
POTASSIUM SERPL-SCNC: 4.3 MMOL/L (ref 3.5–5.1)
PROT SERPL-MCNC: 7.3 G/DL (ref 6–8.3)
RBC # BLD AUTO: 4.75 10*6/ΜL (ref 3.7–5.3)
SODIUM SERPL-SCNC: 142 MMOL/L (ref 135–145)
TRIGL SERPL-MCNC: 112 MG/DL
TSH SERPL DL<=0.05 MIU/L-ACNC: 1.51 UIU/ML (ref 0.34–4.82)
VIT B12 SERPL-MCNC: >1500 PG/ML (ref 180–914)
WBC # BLD AUTO: 6.1 10*3/UL (ref 4.5–10.5)

## 2024-02-09 PROCEDURE — 82607 VITAMIN B-12: CPT

## 2024-02-09 PROCEDURE — 83036 HEMOGLOBIN GLYCOSYLATED A1C: CPT | Mod: PO

## 2024-02-09 PROCEDURE — 36415 COLL VENOUS BLD VENIPUNCTURE: CPT | Mod: PO

## 2024-02-09 PROCEDURE — 80061 LIPID PANEL: CPT | Mod: PO

## 2024-02-09 PROCEDURE — 80053 COMPREHEN METABOLIC PANEL: CPT | Mod: PO

## 2024-02-09 PROCEDURE — 85025 COMPLETE CBC W/AUTO DIFF WBC: CPT | Mod: PO

## 2024-02-09 PROCEDURE — 84443 ASSAY THYROID STIM HORMONE: CPT | Mod: PO

## 2024-02-09 PROCEDURE — 82306 VITAMIN D 25 HYDROXY: CPT

## 2024-02-12 ENCOUNTER — OFFICE VISIT (OUTPATIENT)
Dept: INTERNAL MEDICINE | Facility: CLINIC | Age: 69
End: 2024-02-12
Payer: MEDICARE

## 2024-02-12 VITALS
HEIGHT: 64 IN | HEART RATE: 75 BPM | SYSTOLIC BLOOD PRESSURE: 138 MMHG | DIASTOLIC BLOOD PRESSURE: 76 MMHG | RESPIRATION RATE: 16 BRPM | OXYGEN SATURATION: 94 % | TEMPERATURE: 98.3 F | BODY MASS INDEX: 47.2 KG/M2

## 2024-02-12 DIAGNOSIS — E67.3 HYPERVITAMINOSIS D: ICD-10-CM

## 2024-02-12 DIAGNOSIS — E78.5 BORDERLINE HYPERLIPIDEMIA: ICD-10-CM

## 2024-02-12 DIAGNOSIS — Z00.00 MEDICARE ANNUAL WELLNESS VISIT, SUBSEQUENT: Primary | ICD-10-CM

## 2024-02-12 DIAGNOSIS — R73.01 IMPAIRED FASTING GLUCOSE: ICD-10-CM

## 2024-02-12 DIAGNOSIS — E03.9 HYPOTHYROIDISM, UNSPECIFIED TYPE: ICD-10-CM

## 2024-02-12 PROCEDURE — G0463 HOSPITAL OUTPT CLINIC VISIT: HCPCS | Mod: PO | Performed by: INTERNAL MEDICINE

## 2024-02-12 PROCEDURE — 99214 OFFICE O/P EST MOD 30 MIN: CPT | Mod: 25 | Performed by: INTERNAL MEDICINE

## 2024-02-12 PROCEDURE — G0439 PPPS, SUBSEQ VISIT: HCPCS | Performed by: INTERNAL MEDICINE

## 2024-02-12 RX ORDER — ASCORBIC ACID 500 MG
1000 TABLET ORAL DAILY
COMMUNITY

## 2024-02-12 ASSESSMENT — ENCOUNTER SYMPTOMS
ENDOCRINE NEGATIVE: 1
DIFFICULTY URINATING: 0
VOMITING: 0
ACTIVITY CHANGE: 0
WHEEZING: 0
CARDIOVASCULAR NEGATIVE: 1
POLYPHAGIA: 0
SLEEP DISTURBANCE: 0
LIGHT-HEADEDNESS: 0
ABDOMINAL PAIN: 0
DIARRHEA: 0
EYES NEGATIVE: 1
NUMBNESS: 0
GASTROINTESTINAL NEGATIVE: 1
SINUS PRESSURE: 0
CONSTITUTIONAL NEGATIVE: 1
DYSURIA: 0
TROUBLE SWALLOWING: 0
ARTHRALGIAS: 1
HEMATURIA: 0
NEUROLOGICAL NEGATIVE: 1
BACK PAIN: 0
FREQUENCY: 0
PSYCHIATRIC NEGATIVE: 1
PHOTOPHOBIA: 0
BRUISES/BLEEDS EASILY: 0
APPETITE CHANGE: 0
NECK PAIN: 0
TREMORS: 0
FLANK PAIN: 0
SORE THROAT: 0
WEAKNESS: 0
CONSTIPATION: 0
COUGH: 0
VOICE CHANGE: 0
DIAPHORESIS: 0
CHILLS: 0
MYALGIAS: 0
JOINT SWELLING: 1
SHORTNESS OF BREATH: 0
SEIZURES: 0
HEADACHES: 0
UNEXPECTED WEIGHT CHANGE: 0
PALPITATIONS: 0
POLYDIPSIA: 0
BLOOD IN STOOL: 0
ADENOPATHY: 0
DIZZINESS: 0
DYSPHORIC MOOD: 0
FATIGUE: 0
FEVER: 0
HEMATOLOGIC/LYMPHATIC NEGATIVE: 1
ABDOMINAL DISTENTION: 0
RESPIRATORY NEGATIVE: 1
NECK STIFFNESS: 0
NERVOUS/ANXIOUS: 0
RHINORRHEA: 0
SPEECH DIFFICULTY: 0
CHEST TIGHTNESS: 0
NAUSEA: 0

## 2024-02-12 ASSESSMENT — PATIENT HEALTH QUESTIONNAIRE - PHQ9
5. POOR APPETITE OR OVEREATING: NOT AT ALL
1. LITTLE INTEREST OR PLEASURE IN DOING THINGS: NOT AT ALL
SUM OF ALL RESPONSES TO PHQ9 QUESTIONS 1 & 2: 0
SUM OF ALL RESPONSES TO PHQ QUESTIONS 1-9: 0
7. TROUBLE CONCENTRATING ON THINGS, SUCH AS READING THE NEWSPAPER OR WATCHING TELEVISION: NOT AT ALL
3. TROUBLE FALLING OR STAYING ASLEEP: NOT AT ALL
2. FEELING DOWN, DEPRESSED OR HOPELESS: NOT AT ALL
6. FEELING BAD ABOUT YOURSELF - OR THAT YOU ARE A FAILURE OR HAVE LET YOURSELF OR YOUR FAMILY DOWN: NOT AT ALL
4. FEELING TIRED OR HAVING LITTLE ENERGY: NOT AT ALL
9. THOUGHTS THAT YOU WOULD BE BETTER OFF DEAD, OR OF HURTING YOURSELF: NOT AT ALL

## 2024-02-12 ASSESSMENT — PAIN SCALES - GENERAL: PAINLEVEL: 0-NO PAIN

## 2024-02-12 NOTE — PROGRESS NOTES
Subjective     Corrine Holden is a 68 y.o. female who presents for Subsequent Medicare Wellness Exam (S-MWE)    History of Present Illness            Corrine is here for follow-up and for Medicare wellness visit.    She underwent her third ankle surgery November 13.  She was just given permission to start walking with crutches about 2 weeks ago.  She says she is finally walking without pain.  She did lose quite a bit of blood, 1200 cc with the surgery.  However, her hemoglobin did not dip below 7 so she did not receive a blood transfusion.  Her hemoglobin now looks back to normal.  She did not start any iron supplements.  She does still feel fatigued.    To try to help with bone growth, she did increase her calcium intake to about 1300 mg for the last few months and she is also taking 6000 IUs daily of vitamin D.  The CT scan she had done most recently with orthopedics had mentioned osteopenia of her right leg.  She was concerned about this but she has been mostly nonweightbearing for over a year.  She has another CT coming up this week.    She has an appointment with the weight loss clinic in [place] for next month.    Her labs show she is near diabetes.  She is hesitant to start anything like Mounjaro or metformin at this time.  She is anticipating she is going to be able to increase her ambulation soon.    She is wondering if she would be a candidate for a calcium score.  Calculating her 10-year cardiac risk with her lipids today, she does have a 7.7% 10-year risk which would qualify her for cholesterol therapy.    She has a rough patch on her left cheek.  She does already have a dermatologist.    She had a UTI in early October.  It was initially treated with a cephalosporin but she had recurrent symptoms and was then given Cipro.  She grew Klebsiella on both of the urine cultures.  She then also got into see urology.  She had a CT urogram that was negative and a cystoscopy.  She was placed on 2 months of  Macrobid.  She will follow-up with Dr. Mynor oswald.    No changes in her family history.  No tobacco, alcohol or drug use.      Screenings    Medicare Health Status Self-Assessment  General Health  Have you been hospitalized since we last saw you?: Yes (surgery on 11/13/2023)  Have you seen a health care provider outside our clinic?: No  In general, how would you say your health is?: Fair  In general, how satisfied are you with your life?: Good  How would you describe the condition of your mouth and teeth?: Good  Do you go to the dentist regularly?: Yes  Do you feel tired during the daytime?: Yes (occasionaly)  Do you have trouble staying or falling asleep?: No  Do you or friends or family have any concerns about memory?: No  Do you have any problems with your hearing?: No  Are you on opioids for pain?: No  Do you have an addiction problem?: Unwilling or unable to answer  Do you have trouble managing your anger?: No    Safety and ADLs  Safety and ADLs  Do you always fasten your seat belt when you are in the car?: Unwilling or unable to answer  Do you know where to locate and properly use a first aid kit and fire extinguisher in case of an emergency?: Unwilling or unable to answer  Does your home have rugs in the walkways?: Unwilling or unable to answer  Does your home have grab bars in the bathroom?: Unwilling or unable to answer  Does your home have handrails on the stairs?: Unwilling or unable to answer  Does your home have good lighting?: Unwilling or unable to answer  Do you struggle to care for yourself or your home?: Unwilling or unable to answer       Diet and Exercise  Diet and Exercise  Do you follow a special diet at home?: Unwilling or unable to answer    Advance Care Planning  Advance Care Planning  Would you like information on advance care planning such as living will?: Already in place    Depression Screening  Over the past 2 weeks, how often have you been bothered by any of the following  "problems?  Little interest or pleasure in doing things: Not at all  Feeling down, depressed, or hopeless: Not at all  Patient Health Questionnaire-2 Score: 0  Over the past 2 weeks, how often have you been bothered by any of the following problems?  Trouble falling or staying asleep, or sleeping too much: Not at all  Feeling tired or having little energy: Not at all  Poor appetite or overeating: Not at all  Feeling bad about yourself - or that you are a failure or have let yourself or your family down: Not at all  Trouble concentrating on things, such as reading the newspaper or watching television: Not at all  Moving or speaking so slowly that other people could have noticed? Or the opposite - being so fidgety or restless that you have been moving around a lot more than usual.: Not at all  Thoughts that you would be better off dead or hurting yourself in some way: Not at all  Patient Health Questionnaire-9 Score: 0    Fall Risk Assessment  Get Up and Go  Get Up and Go Score: Mild: Slow (greater than 11 sec with good balance)    Durable Medical Equipment  Durable Medical Equipment  Durable Medical Equipment: CPAP / APAP / VPAP, Other (Comment), Walker - Standard (crutches)    Patient Care Team:  Becka Joy MD as PCP - General (Internal Medicine)        [facility] (if blank, screening not completed)                            Mini-Mental Exam (if blank, screening not completed)                                 Comprehensive Medical and Social History  Past Medical History:   Diagnosis Date    GERD (gastroesophageal reflux disease)     H. pylori infection 2009    Hypothyroidism     Injury of back     STATES, \"THIS IS REASON FOR MRI\"    Microscopic hematuria     SHANA (obstructive sleep apnea)     USES CPAP    Osteoarthritis     Palpitations     Respiratory disease     Supraventricular tachycardia     STATES, \"COFFEE RELATED\", VERY INTERMITTENT    Tinnitus      Past Surgical History:   Procedure Laterality Date    CARDIAC " ELECTROPHYSIOLOGY STUDY AND ABLATION  01/01/2007    attempted ablation for SVT    CARPAL TUNNEL RELEASE Bilateral 06/26/2020    Procedure: LEFT ENDOSCOPIC CARPAL TUNNEL RELEASE, RIGHT OPEN CARPAL TUNNEL;  Surgeon: Mendez Sun MD;  Location: [facility] OR;  Service: Orthopedics;  Laterality: Bilateral;    COLONOSCOPY  01/21/2015    cecal polyp-tubular adenoma-recommend repeat 5 years    COLONOSCOPY N/A 06/22/2020    repeat 5 years - adenomatous polyp    DILATION AND CURETTAGE OF UTERUS  01/01/2006    ENDOMETRIAL BIOPSY  01/01/2012    pt reports negative    ESOPHAGOGASTRODUODENOSCOPY  01/01/2009    H pylori    ESOPHAGOGASTRODUODENOSCOPY N/A 06/22/2020    Procedure: ESOPHAGOGASTRODUODENOSCOPY;  Surgeon: Julian Faulkner MD;  Location: [facility];  Service: Endoscopy;  Laterality: N/A;    KNEE ARTHROPLASTY Right 04/01/2015    Dr Placido BRICE    TOTAL ANKLE ARTHROPLASTY Left 10/03/2022    Dr. Guerra, with talonavicular and subtalar fusion    TOTAL ANKLE ARTHROPLASTY Left 02/20/2023    total revision of previous surgery, fusion of non-unions    TOTAL ANKLE ARTHROPLASTY Left 11/13/2023    w/ fusion to foot joints    VAGINAL HYSTERECTOMY  12/16/2013    with BSO, fibroids     Social History     Socioeconomic History    Marital status: Single   Tobacco Use    Smoking status: Never    Smokeless tobacco: Never   Substance and Sexual Activity    Alcohol use: No    Drug use: No     Social Determinants of Health     Tobacco Use: Low Risk  (2/12/2024)    Patient History     Smoking Tobacco Use: Never     Smokeless Tobacco Use: Never   Transportation Needs: No Transportation Needs (11/28/2023)    OASIS : Transportation     Lack of Transportation (Medical): No     Lack of Transportation (Non-Medical): No     Patient Unable or Declines to Respond: No   Social Connections: Feeling Socially Integrated (11/28/2023)    OASIS : Social Isolation     Frequency of experiencing loneliness or isolation: Never   Depression: Not at  risk (2/7/2022)    PHQ-2     PHQ-2 Score: 0     Family History   Problem Relation Age of Onset    Cancer Mother 84        Bladder    Osteoporosis Mother     Hypertension Mother     Colon cancer Father 65    Kidney cancer Father 77    Other Father's Brother         sudden cardiac death, 60s    Heart disease Maternal Grandfather     Heart attack Maternal Grandfather 59    Cancer Paternal Grandfather      Allergies   Allergen Reactions    Bee Venom Protein (Honey Bee) Swelling    No Known Drug Allergies      Current Outpatient Medications   Medication Sig Dispense Refill    calcium citrate-vitamin D3 500 mg-12.5 mcg /5 gram powder Take by mouth      ascorbic acid, vitamin C, (ascorbic acid with vane hips) 500 mg tablet Take 2 tablets (1,000 mg total) by mouth daily      aspirin 325 mg tablet Take 1 tablet (325 mg total) by mouth every 6 (six) hours as needed for pain scale 8-10/10 for pain      omeprazole (PriLOSEC) 20 mg capsule TAKE ONE CAPSULE BY MOUTH TWICE DAILY AS NEEDED for GERD 180 capsule 1    levothyroxine (SYNTHROID, LEVOTHROID) 125 mcg tablet Take 1 tablet (125 mcg total) by mouth daily 90 tablet 2    diazePAM (VALIUM) 5 mg tablet Take 1 tablet by mouth every 12  hours as needed for anxiety or muscle spasms Max Daily Amount: 10 mg 30 tablet 0    albuterol HFA 90 mcg/actuation inhaler Inhale 2 puffs every 6 (six) hours as needed for wheezing 1 Inhaler 0    guaiFENesin (MUCINEX) 600 mg 12 hr tablet Take 2 tablets (1,200 mg total) by mouth 2 (two) times a day As needed      vit C/E/Zn/coppr/lutein/zeaxan (OCUVITE LUTEIN AND ZEAXANTHIN ORAL) PreserVision AREDS-2      diclofenac sodium (VOLTAREN) 1 % gel APPLY 2 GRAMS TO THE AFFECTED AREA(S) 4 TIMES PER DAY AS NEEDED 300 g 2    fluticasone propionate (FLONASE) 50 mcg/actuation nasal spray USE 2 SPRAYS IN EACH NOSTRIL DAILY (Patient taking differently: As needed) 48 g 2    budesonide (Pulmicort Flexhaler) 90 mcg/actuation inhaler Inhale 1 puff as needed (for  shortness of breath)      vit B complex no.12/niacin,B3, (VITAMIN B COMPLEX NO.12-NIACIN ORAL) Take by mouth      cholecalciferol, vitamin D3, 25 mcg (1,000 unit) capsule Take 6 capsules (6,000 Units total) by mouth Takes 5 times daily      krill-om-3-dha-epa-phospho-ast 577-525-63-75 mg capsule Take 1 capsule by mouth daily      cyclobenzaprine (FLEXERIL) 10 mg tablet Take 1 tablet (10 mg total) by mouth 2 (two) times a day as needed for muscle spasms. 60 tablet 0    multivitamin (THERAGRAN) tablet tablet 1 Every Day      acetaminophen (TYLENOL EXTRA STRENGTH) 500 mg tablet Take 2 tablets (1,000 mg total) by mouth 2 (two) times a day      HYDROmorphone (DILAUDID) 2 mg tablet Take 1-2 tablets (2-4 mg total) by mouth every 4 (four) hours as needed for pain scale 8-10/10      fluconazole (Diflucan) 150 mg tablet Take one tab now, and repeat in 2 days for prevention of yeast infection (Patient not taking: Reported on 2/12/2024) 2 tablet 0    tiZANidine (ZANAFLEX) 2 mg tablet Take 1 tablet (2 mg total) by mouth every 6 (six) hours as needed for muscle spasms      diclofenac sodium (VOLTAREN) 1 % gel Apply 4 g topically 4 (four) times a day (Patient not taking: Reported on 2/12/2024) 450 g 2    methylPREDNISolone (Medrol, Maxi,) 4 mg tablet follow package directions (Patient not taking: Reported on 2/12/2024) 21 tablet 0    ketorolac (TORADOL) 10 mg tablet Take 1 tablet (10 mg total) by mouth every 6 (six) hours as needed for pain scale 8-10/10 for up to 5 days (Patient not taking: Reported on 2/12/2024) 20 tablet 0    magnesium oxide (MAG-OX) 400 mg (241.3 mg magnesium) tablet Take 1 tablet (400 mg total) by mouth daily      meloxicam (MOBIC) 7.5 mg tablet Take 1 tablet (7.5 mg total) by mouth daily (Patient not taking: Reported on 2/12/2024) 90 tablet 3    ibuprofen (ADVIL,MOTRIN) 200 mg tablet Take 1 tablet (200 mg total) by mouth every 6 (six) hours as needed      zinc 50 mg tablet Take 50 mg by mouth daily       calcium carbonate (OS-RONNIE) 500 mg calcium tablet Take 1 tablet (500 mg total) by mouth daily       No current facility-administered medications for this visit.       The following have been reviewed and updated as appropriate in this visit:          Review of Systems  Review of Systems   Constitutional: Negative.  Negative for activity change, appetite change, chills, diaphoresis, fatigue, fever and unexpected weight change.   HENT:  Negative for congestion, ear pain, hearing loss, mouth sores, nosebleeds, postnasal drip, rhinorrhea, sinus pressure, sneezing, sore throat, tinnitus, trouble swallowing and voice change.    Eyes: Negative.  Negative for photophobia and visual disturbance.   Respiratory: Negative.  Negative for cough, chest tightness, shortness of breath and wheezing.    Cardiovascular: Negative.  Negative for chest pain, palpitations and leg swelling.   Gastrointestinal: Negative.  Negative for abdominal distention, abdominal pain, blood in stool, constipation, diarrhea, nausea and vomiting.   Endocrine: Negative.  Negative for cold intolerance, heat intolerance, polydipsia, polyphagia and polyuria.   Genitourinary: Negative.  Negative for difficulty urinating, dysuria, flank pain, frequency, hematuria and urgency.   Musculoskeletal:  Positive for arthralgias and joint swelling. Negative for back pain, gait problem, myalgias, neck pain and neck stiffness.   Skin: Negative.  Negative for rash.   Neurological: Negative.  Negative for dizziness, tremors, seizures, syncope, speech difficulty, weakness, light-headedness, numbness and headaches.   Hematological: Negative.  Negative for adenopathy. Does not bruise/bleed easily.   Psychiatric/Behavioral: Negative.  Negative for dysphoric mood and sleep disturbance. The patient is not nervous/anxious.        Objective   Physical Exam              Vitals:    02/12/24 1050   BP: 138/76   Temp: 36.8 °C (98.3 °F)   TempSrc: Temporal   Pulse: 75   Resp: 16   SpO2:  "94%   Height: 1.626 m (5' 4\")   Weight: Comment: declined   PainSc: 0-No pain   Patient Position: Sitting     Body mass index is 47.2 kg/m².    Physical Exam  Constitutional:       General: She is not in acute distress.     Appearance: She is not diaphoretic.   HENT:      Head: Normocephalic and atraumatic.      Right Ear: Tympanic membrane, ear canal and external ear normal.      Left Ear: Tympanic membrane, ear canal and external ear normal.      Nose: Nose normal.      Mouth/Throat:      Mouth: No oral lesions.      Pharynx: Uvula midline.      Tonsils: No tonsillar exudate.   Eyes:      General: No scleral icterus.     Conjunctiva/sclera: Conjunctivae normal.      Right eye: Right conjunctiva is not injected.      Left eye: Left conjunctiva is not injected.      Pupils: Pupils are equal, round, and reactive to light.   Neck:      Thyroid: No thyroid mass or thyromegaly.      Vascular: Normal carotid pulses. No carotid bruit.   Cardiovascular:      Rate and Rhythm: Normal rate and regular rhythm.      Heart sounds: Normal heart sounds, S1 normal and S2 normal. No murmur heard.     No friction rub. No gallop.   Pulmonary:      Effort: Pulmonary effort is normal. No respiratory distress.      Breath sounds: Normal breath sounds. No decreased breath sounds, wheezing, rhonchi or rales.   Chest:      Chest wall: No tenderness.   Breasts:     Right: No inverted nipple, mass or nipple discharge.      Left: No inverted nipple, mass or nipple discharge.   Abdominal:      General: Bowel sounds are normal. There is no distension.      Palpations: Abdomen is soft. There is no mass.      Tenderness: There is no abdominal tenderness. There is no guarding or rebound.   Musculoskeletal:      Cervical back: Neck supple.      Right lower le+ Edema present.   Feet:      Comments: Walking boot LLE  Lymphadenopathy:      Cervical: No cervical adenopathy.   Skin:     General: Skin is warm and dry.      Findings: Rash present. No " erythema.      Comments: chaffing R mid axillary line at bra strap level, chaffing L axilla, erythematous papules under R breast   Neurological:      Cranial Nerves: No cranial nerve deficit.      Motor: No tremor.      Coordination: Coordination normal.         Assessment/Plan   Assessment/Plan               Diagnoses and all orders for this visit:    Medicare annual wellness visit, subsequent    Impaired fasting glucose  -     Hemoglobin A1c (glycosylated) Blood, Venous; Future  -     Basic metabolic panel Blood, Venous; Future    Borderline hyperlipidemia  -     Comprehensive metabolic panel Blood, Venous; Future  -     Lipid panel Blood, Venous; Future  -     Vitamin B12 Blood, Venous; Future    Hypothyroidism, unspecified type  -     Thyroid Stimulating Hormone, Ultrasensitive Blood, Venous; Future    Hypervitaminosis D  -     25-Hydroxyvitamin D2 and D3, serum Blood, Venous; Future      1.  Adult health examination.  No family history of abdominal aortic aneurysm.  Bone density is due next year.  Mammogram was July 2023, due in July.  Lipids have been borderline.  Last colonoscopy was 2020, follow-up in 5 years.  Fasting blood sugar is abnormal and nearly in the diabetic range.  She declines flu shot and RSV vaccine.  Last pneumonia shot was in 2021, will give a booster in 5 years.  Tdap was 2015, due next year.  Has had Shingrix x 2 in 2018 and 2019.  No other needs Pap smears.  She believes she is due for an eye exam about now.  Declines HIV and hep C screening.  Does not need lung cancer screening.  2.  Postoperative bleeding.  Her hemoglobin is back up to normal.  However, she likely used up her deep iron storage.  If her GI system can handle it, she can start a little bit of iron now to replete her deep storage.  However, I do not think she needs it for long, maybe only a month.  Can take ferrous gluconate or sulfate 324 mg every other day with vitamin C.  3.  B12 deficiency.  B12 level is actually high.   Will have her hold her B vitamins.  4.  Hypovitaminosis D.  Current vitamin D level is pending.  She has been taking 6000 IUs for the last few months.  5.  Prediabetes.  We discussed starting metformin or maybe even Mounjaro or Zenpep.  She would like to hold off on that for now.  She is hoping to be more active as she is finally healing from her ankle surgery.  6.  Hyperlipidemia.  Her 10-year cardiac risk is 7.7% which is elevated.  She would qualify for cholesterol therapy.  Therefore I do think getting a calcium score could be appropriate as she does not really want to start a statin but if her calcium score was abnormal, may be willing to do it.  However, she wants to wait at least 6 months because she feels like she is metabolically not at her baseline given that she has been nonweightbearing from her surgery.  7.  Scaliness to her left cheek.  I could not really see this because her face was covered with make-up but suspect a seborrheic keratosis.  She will schedule with her dermatologist.  8.  Rash under her breasts.  I think this is actually chafing from aggressive exfoliation.  Under the left breast there may be some yeast starting but she is not having any itching at this point.  She keep the area dry.      During the course of the visit the patient was educated and counseled about appropriate screening and preventive services including:   Advanced Directives and End of Life Planning: Reviewed and discussed with patient.  Breast Cancer Screening: Reviewed and discussed with patient.  Calcium 1200mg and Vitamin D 1000-2000iu recommended daily.  Cholesterol Screening: Reviewed and discussed with patient.  Colorectal Cancer Screening: Reviewed and discussed with patient.  Depression screening performed, results documented in note.  Diabetes Screening: Reviewed and discussed with patient.  Glaucoma Screening: Reviewed and discussed with patient.  Hepatitis C Screening (patient born between [birth year]2302 or at  high risk): Reviewed and discussed with patient.  Indicated labs ordered as above  Influenza vaccine:  Reviewed need for annual immunization.  Obesity screen performed.  Osteoporosis Screening:  Reviewed and discussed with patient.  Pneumococcal Vaccine:  Reviewed and discussed with patient.  Shingles Vaccine:  Reviewed and discussed with patient.  Tdap:  Reviewed and discussed with patient.    Written screening schedule individualized for the patient based on current USPSTF, age-appropriate medicare services and ACIP as described above was given and viewable in Patient Instructions.    Mental health conditions and/or risk factors are as listed (if any) in the ROS above as specific to this patient.  Direct cognitive impairment was assessed.      Patient's lifestyle was evaluated to promote wellness in terms of but not limited to: fall prevention, nutrition, physical activity, tobacco/alcohol if present and weight management.  These were addressed specifically with this patient as listed within the social history, discussion notes, and above.      Total time spent was 60 min with 30 min spent regarding physical and 30 min spent regarding other medical issues with greater than 50% of total time spent in direct patient contact and coordination of care regarding reviewing medications, reviewing labs, symptoms.

## 2024-02-12 NOTE — PATIENT INSTRUCTIONS
Ferrous sulfate or gluconatee 324mg with vitamin c every other day for about 1 mo      Stop vit B 12          Preventative Care for Women    Patient’s Personalized Health Advice and 5-10 Year Plan:      Physical Activity:   Physical activity can help you maintain a healthy weight, prevent or control illness, reduce stress, and sleep better. It can also help you improve your balance to avoid falls. Try to build up to and maintain a total of 30 minutes of activity each day. If you are able, try walking, doing yard or housework, and taking the stairs more often. You can also strengthen your muscles with exercises done while sitting or lying down.   Emotional Health:   Feeling “down in the dumps” or anxious every now and then is a natural part of life. If this feeling lasts for a few weeks or more, talk with me as soon as possible. It could be a sign of a problem that needs treatment. There are many types of treatment available.   Falls:   You can reduce your risk of falling by making changes in your home. Remove items that may cause tripping, improve lighting, and consider installing grab bars.   Talk with me if you have problems with balance and walking. To prevent falls, you may need your vision, hearing, or blood pressure checked. Exercises to improve your strength and balance, or using a cane or walker, may help.   Review your medicines with me at every visit because some can affect balance. Please be sure to let me know if you fall or are fearful you may fall.   Pain:   We all have aches and pains at times, but chronic pain can change how you feel and live every day. Please talk with me about any symptoms of chronic pain so that we can determine how best to treat.   Sleep:   Getting a good night’s sleep is vital to your health and well-being and can help prevent or manage health problems. Often, sleep can be improved by changing behaviors, including when you go to bed and what you do before bed. Sleep apnea can  cause problems such as struggling to stay awake during the day. Please let me know if you would like to learn more about improving your sleep and/or think you may have sleep apnea.   Home Safety:   You may benefit from a safety check to identify hazards in your home.   Seat Belt:   Please remember to wear a seat belt when driving or riding in a vehicle. It is one of the most important things you can do to stay safe in a car.   Nutrition:   Remember to eat plenty of fruits, vegetables, whole grains, and dairy. Drink at least 64 ounces (8 full glasses) of water a day unless you have been advised to limit fluids.   Alcohol:   Alcohol can have a greater effect on older people, who may feel its effects at a lower amount. Older people should limit alcoholic drinks (no more than one a day for women and no more than two a day for men). Please let me know if alcohol use becomes a problem.   Tobacco:   Not smoking or using other forms of tobacco is one of the most important things you can do for your health.      Additional Support:   Sometimes it can be challenging to manage all aspects of daily life. Finding the right support can help you maintain or improve your health and independence. Please let me know if you would like to talk further about finding resources to assist you.   Advance Directives:   There may come a time when medical decisions need to be made on your behalf. Please talk with your family and with me about your wishes. It is important to provide information about your decisions and any formal advance directives for your medical record.    SCREENINGS:  What Coverage & Frequency Why Previously Done   EKG One-time covered benefit during Welcome To Medicare Physical (IPPE) Check heart rate and rhythm for baseline. Date of Last ECG Order    Last order of ECG 12-LEAD was found on 10/19/2023 from Office Visit on 10/19/2023            Lung Cancer Screening via Low Dos Chest CT Medicare covers annually if history of  smoking with no signs or symptoms of lung cancer.  Screening for lung cancer.  Provider will need to advise of the importance of quitting/avoiding smoking and provide smoking cessation services. Date of Last Chest CT Order      No order of CT CHEST LUNG CANCER LOW DOSE SCREENING is found.             AAA Screen Ultrasound for Abdominal Aortic Aneurysm One-time benefit with Welcome to Medicare Visit for men age 65-75 with history of smoking or positive family history of AAA or has smoked 100 or more cigarettes in his lifetime.   For early detection of abdominal aortic aneurysm. Date of Last AAA US Ordered      No order of US ABDOMINAL AORTA ANEURYSM SCREENING is found.             Osteoporosis Screening Every two years after the age of 65 for those at risk. Women who are estrogen deficient, have vertebral abnormalities on x- ray, or have received daily steroid for more than 3 months have increased risk for bone loss. Date of Last Dexa Ordered      Last order of DXA BONE DENSITY was found on 2/21/2020 from Hospital Encounter on 2/21/2020             Heart Disease Screening  Covers blood tests for all Medicare beneficiaries every 5 years to test for: Cholesterol Levels To check for high cholesterol, which can increase your risk of heart attack, stroke and other problems. Most Recent Cholesterol Results    Lab Results   Component Value Date    CHOL 218 (H) 02/09/2024      Lab Results   Component Value Date    HDL 67 02/09/2024        Lab Results   Component Value Date    LDLCALC 129 (H) 02/09/2024        Lab Results   Component Value Date    TRIG 112 02/09/2024      Colon cancer screening Recommend initiating colon cancer screening at age 45. Medicare will reimburse colonoscopy every 10 years and more frequently if at increased risk for developing colon cancer.? Medicare will cover FIT or 3 send home FOBTs annually (age 45 or greater) or Cologuard every 3 years (between the ages of 45-85).  Ordered as a screening to  "detect colon cancer before there are symptoms, so it can be more effectively treated. Last Colon Screening(s) Ordered     No order of COLOGUARD CANCER SCREENING KIT is found.          Last Referral To General Surgery    Last order of AMB REFERRAL TO GENERAL SURGERY was found on 5/29/2019 from Consult on 5/29/2019         Last Referral to Gastroenterology    No order of AMB REFERRAL TO GASTROENTEROLOGY is found.        Diabetes screening Once yearly for patients previously tested but not diagnosed with pre-diabetes or patients who were never tested. Twice yearly for patients with pre-diabetes.  To identify diabetes that may not have symptoms. Last Fasting Glucose and A1C    No results found for: \"GLUF\"    Lab Results   Component Value Date    HGBA1C 6.4 (H) 02/09/2024          Breast Cancer Screening Ages 35-39 one baseline screen. Ages 40+ annual screening. To detect breast cancer, for early treatment. Date Last Mammogram Ordered    Last order of BI SCREENING MAMMOGRAM BILATERAL was found on 5/11/2017 from Conversion Encounter on 5/11/2017         Last order of BI SCREENING MAMMOGRAM BILATERAL W TRISTAN was found on 7/7/2023 from Hospital Encounter on 7/7/2023                Cervical Cancer Screening At age 65+, not recommended if previous PAP screenings were negative.  Once every 5 years for females ages 30-65 without symptoms.. To detect cervical early, so that it can be more effectively treated. Date of Last Pap Smear Order        Sexually Transmitted Disease Screening Consider if sexually active and at risk for STI. Chlamydia, gonorrhea and syphilis annually for woman. Syphilis annually for men. To detect infections that may not have symptoms, to prevent complications.     Testing for Hepatitis C Covered Medicare benefit once per lifetime - based on risk factors. If born between [birth year], had a blood transfusion before 1992, or considered high risk due to history of drug abuse. To detect hepatitis C infection that " "may have no symptoms, to prevent complications. Last Hepatitis C Screening Result    No results found for: \"HEPCAB\"   Glaucoma Screening  Covered benefit annually if diabetic, family history of glaucoma,  Americans age 50+,  Americans age 65+. Screen for glaucoma and prevent complications.  Date of Last Referral Placed to Ophthalmology    No order of AMB REFERRAL TO OPHTHALMOLOGY is found.         Date of Last Referral Placed to Optometry    No order of AMB REFERRAL TO OPTOMETRY is found.                   Health Maintenance:    Health Maintenance Due   Topic Date Due    Hepatitis C Screening  Never done    COVID-19 Vaccine (1) Never done    RSV 60 Years and Older or Preganant (1 - 1-dose 60+ series) Never done    Influenza Vaccine (1) 09/01/2023    Depression Screening  02/09/2024    Medicare Annual Wellness Visit (AWV)  02/09/2024             IMMUNIZATIONS:   Immunizations you have received:   Immunization History   Administered Date(s) Administered    Fluarix/Flulaval/Fluzone Quad 11/13/2019, 10/30/2020    Flulaval/Fluzone/Afluria Quad 10/01/2016    Pneumococcal Conjugate 13-Valent 10/25/2021    Pneumococcal Polysaccharide - PPSV23 01/27/2020    Shingrix IM 10/19/2018, 12/20/2018, 01/09/2019    Tdap 08/17/2015    Zoster SQ 01/01/2014       See grid below for the immunization recommendations.    What Age How Often Why   Flu Vaccine   Age 18 and older Every year Protects against flu virus and its complications.   Pneumonia Vaccine Age 65+  < 65 based on risk factors. Medicare covers an initial pneumonia vaccine and a booster pneumonia vaccine at least one year after the first vaccine. Protects against common types of pneumonia.   Shingles (Zoster) Vaccine Age 50 Not a covered benefit under Medicare.  Once in your lifetime Protects against shingles.   Tetanus Vaccine Age 18 and older Not a covered benefit under Medicare.  Every 10 years (at least one-time Tdap) Protects against tetanus infection. "   Hepatitis B Vaccine Age 18 and older based on risk factors Covered if you are at high risk for hepatitis.  Your risk increases if you have End Stage Renal Disease (ESRD), diabetes, living with someone who has Hep B, or if health care worker. Protects against hepatitis, which can cause illness and complications.

## 2024-02-13 ENCOUNTER — TELEPHONE (OUTPATIENT)
Dept: INTERNAL MEDICINE | Facility: CLINIC | Age: 69
End: 2024-02-13
Payer: MEDICARE

## 2024-02-13 DIAGNOSIS — M62.838 MUSCLE SPASM: ICD-10-CM

## 2024-02-13 LAB
25(OH)D2 SERPL-MCNC: <4 NG/ML
25(OH)D3 SERPL-MCNC: 51 NG/ML
25(OH)D3+25(OH)D2 SERPL-MCNC: 51 NG/ML

## 2024-02-13 NOTE — TELEPHONE ENCOUNTER
Caller would like to discuss (a)  Prescription Questions  Writer has advised caller of a callback from within 24 hours.    Patient: Corrine Holden    Caller Name (Last and first, relation/role): Self    Name of Facility: N/A    Callback Number: [phone]    Best Availability: anytime    Fax Number: N/A    Additional Info: pt would like a call to discuss getting some meds refilled. That was all the info given    Did you confirm the message with the caller: Yes    Is it okay that the nurse communicates your response through [facility]? No

## 2024-02-14 ENCOUNTER — ANCILLARY PROCEDURE (OUTPATIENT)
Dept: RADIOLOGY | Facility: HOSPITAL | Age: 69
End: 2024-02-14
Payer: MEDICARE

## 2024-02-14 RX ORDER — DIAZEPAM 5 MG/1
5 TABLET ORAL EVERY 12 HOURS PRN
Qty: 30 TABLET | Refills: 0 | Status: SHIPPED | OUTPATIENT
Start: 2024-02-14

## 2024-02-14 NOTE — TELEPHONE ENCOUNTER
Spoke to Corrine, she is requesting refills on Levothyroxine, Omeprazole, and DiazePAM (she prefers this over flexeril). Is it okay to refill medications?    She would also like to know her vitamin D level results, she stated they were still pending at her appointment. They have been final resulted but I didn't want to give her the results before you reviewed them.

## 2024-02-15 DIAGNOSIS — E03.9 HYPOTHYROIDISM, UNSPECIFIED TYPE: ICD-10-CM

## 2024-02-15 DIAGNOSIS — K21.9 GASTROESOPHAGEAL REFLUX DISEASE WITHOUT ESOPHAGITIS: ICD-10-CM

## 2024-02-15 RX ORDER — OMEPRAZOLE 20 MG/1
20 CAPSULE, DELAYED RELEASE ORAL AS NEEDED
Qty: 180 CAPSULE | Refills: 0 | Status: SHIPPED | OUTPATIENT
Start: 2024-02-15 | End: 2024-03-12 | Stop reason: SDUPTHER

## 2024-02-15 RX ORDER — LEVOTHYROXINE SODIUM 125 UG/1
125 TABLET ORAL DAILY
Qty: 90 TABLET | Refills: 2 | Status: SHIPPED | OUTPATIENT
Start: 2024-02-15 | End: 2024-10-19

## 2024-02-16 ENCOUNTER — TELEPHONE (OUTPATIENT)
Dept: INTERNAL MEDICINE | Facility: CLINIC | Age: 69
End: 2024-02-16
Payer: MEDICARE

## 2024-02-16 DIAGNOSIS — K21.9 GASTROESOPHAGEAL REFLUX DISEASE WITHOUT ESOPHAGITIS: Primary | ICD-10-CM

## 2024-02-16 NOTE — TELEPHONE ENCOUNTER
Caller would like to discuss (a) medication Writer has advised caller of a callback from within 24 hours.    Patient: Corrine Holden    Caller Name (Last and first, relation/role): self    Name of Facility: na    Callback Number: [phone]    Best Availability: any    Fax Number: na    Additional Info: pt states medications were not sent in correctly she should have refills on the wants a call to discuss. She also states no refills on the Levothyroxine but I did advise there are 2 there for that and patient said she would call pharmacy    Did you confirm the message with the caller: Yes    Is it okay that the nurse communicates your response through MyChart?

## 2024-02-20 DIAGNOSIS — K21.9 GASTROESOPHAGEAL REFLUX DISEASE WITHOUT ESOPHAGITIS: ICD-10-CM

## 2024-02-20 RX ORDER — OMEPRAZOLE 20 MG/1
20 CAPSULE, DELAYED RELEASE ORAL AS NEEDED
OUTPATIENT
Start: 2024-02-20

## 2024-02-20 RX ORDER — OMEPRAZOLE 20 MG/1
20 CAPSULE, DELAYED RELEASE ORAL DAILY
Qty: 30 CAPSULE | Refills: 11 | Status: SHIPPED | OUTPATIENT
Start: 2024-04-01 | End: 2024-08-15 | Stop reason: SDUPTHER

## 2024-02-20 NOTE — TELEPHONE ENCOUNTER
No care due was identified.  [employer] Embedded Care Due Messages. Reference number: [id number]. 2/20/2024 4:04:00 PM MST

## 2024-02-20 NOTE — TELEPHONE ENCOUNTER
Spoke to Corrine, she advised that the pharmacy only dispensed 90 tablets for her prescription. I stated it was sent over for 180 tablet, but I'll write a long term prescription for the rest of the year (as it was written before). Placed a future prescription order for 04/01/2024 with 4 refills.

## 2024-02-26 ENCOUNTER — LAB (OUTPATIENT)
Dept: LAB | Facility: CLINIC | Age: 69
End: 2024-02-26
Payer: MEDICARE

## 2024-02-26 ENCOUNTER — OFFICE VISIT (OUTPATIENT)
Dept: UROLOGY | Facility: CLINIC | Age: 69
End: 2024-02-26
Payer: MEDICARE

## 2024-02-26 ENCOUNTER — TELEPHONE (OUTPATIENT)
Dept: UROLOGY | Facility: CLINIC | Age: 69
End: 2024-02-26

## 2024-02-26 DIAGNOSIS — R35.1 NOCTURIA: ICD-10-CM

## 2024-02-26 DIAGNOSIS — R31.29 MICROHEMATURIA: ICD-10-CM

## 2024-02-26 DIAGNOSIS — N30.20 BLADDER INFECTION, CHRONIC: Primary | ICD-10-CM

## 2024-02-26 LAB
BACTERIA #/AREA URNS HPF: ABNORMAL /HPF
BILIRUB UR QL: NEGATIVE
CLARITY UR: CLEAR
COLOR UR: YELLOW
GLUCOSE UR QL: NEGATIVE MG/DL
HGB UR QL: NEGATIVE
KETONES UR-MCNC: NEGATIVE MG/DL
LEUKOCYTE ESTERASE UR QL STRIP: NEGATIVE
NITRITE UR QL: NEGATIVE
PH UR: 6 PH
PROT UR STRIP-MCNC: NEGATIVE MG/DL
RBC #/AREA URNS HPF: ABNORMAL /HPF
SP GR UR: 1.02 (ref 1–1.03)
SQUAMOUS #/AREA URNS HPF: ABNORMAL /HPF
UROBILINOGEN UR-MCNC: 0.2 MG/DL
WBC #/AREA URNS HPF: ABNORMAL /HPF

## 2024-02-26 PROCEDURE — 51701 INSERT BLADDER CATHETER: CPT

## 2024-02-26 PROCEDURE — 51701 INSERT BLADDER CATHETER: CPT | Performed by: UROLOGY

## 2024-02-26 PROCEDURE — 99213 OFFICE O/P EST LOW 20 MIN: CPT | Mod: 25 | Performed by: UROLOGY

## 2024-02-26 PROCEDURE — 81001 URINALYSIS AUTO W/SCOPE: CPT

## 2024-02-26 PROCEDURE — 87088 URINE BACTERIA CULTURE: CPT

## 2024-02-26 PROCEDURE — G0463 HOSPITAL OUTPT CLINIC VISIT: HCPCS | Performed by: UROLOGY

## 2024-02-26 NOTE — PATIENT INSTRUCTIONS
It was a pleasure seeing you again today  I would have you continue drinking 60 to 80 ounces of water daily, empty your bladder every 2 hours during the daytime, continue taking vitamin C 500 to 1000 mg daily and eat 1 cup of yogurt daily (you can consider adding 1 cup of All-Bran buds cereal to your yogurt daily)  If UTI symptoms worsen we can consider methenamine 1 g twice daily  I will leave the follow-up at 3 to 4 months but if you need anything before then please do not hesitate to contact

## 2024-02-26 NOTE — TELEPHONE ENCOUNTER
Caller would like to discuss  Results       Patient: Corrine Holden    Callback Number: [phone]     Best Availability: anytime    Additional Info: asking to speak with a nurse, regarding her U/A results.    I advised caller of a callback by 48 hours.

## 2024-02-26 NOTE — PROGRESS NOTES
Subjective     Patient ID: Corrine Holden is a 69 y.o. female.    HPI  The patient is a 69-year-old female previously evaluated for microscopic hematuria, frequent UTI here for follow-up on the above.  On 10/6/2023 she noted UTI symptoms with urine culture Klebsiella and Aerococcus treated with a 7-day course of Ceftin.  On 10/23/2023 she noted recurrence of symptoms with urine culture Klebsiella initially treated with Keflex with change to Cipro.  Cystoscopy 2023 requiring urethral calibration with otherwise normal bladder.  CTU 10/27/2023 negative  In early 2024 she completed her low-dose antibiotic course.  She currently denies UTI symptoms.  She continues drinking 60 ounces of water daily.  She reports family history of father with kidney cancer  79 years old and mother with bladder cancer  86 years old.    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  Frequent UTI improved after low-dose antibiotics  2.  Microscopic hematuria with normal CT urogram and cystoscopy  3.  Recent left ankle surgery  -Dietary recommendations for UTI prevention reviewed  - Patient to consider methenamine 1 g p.o. twice daily  - Follow-up 3 to 4 months for reevaluation  - If UTI symptoms occurred the patient may contact the urology clinic for nurse only appointment for straight cath urine culture  - Patient in agreement the above plan, questions answered    25 minutes spent on today's encounter

## 2024-02-26 NOTE — PROGRESS NOTES
Bladder Catheterization    Performed by: Nakita Nunez RN  Authorized by: Aleksandra Lowe MD    Consent    Verbal consent was obtained from the patient. Written consent was not obtained from the patient. Risks, benefits, and alternatives were discussed. Consent given by patient. The patient states understanding of the procedure being performed. Patient ID confirmed verbally with the patient.     Indications:    Indications: urine specimen collection    Procedure Details:     Preparation: Patient was prepped and draped in usual sterile fashion      Procedure: catheter insertion    Catheter insertion:  Non-indwelling           Catheter type: Hinton    Catheter size:  12 Fr        Irrigation:no    Complicated insertion: No      Altered anatomy: No      Number of attempts:  1    Volume of urine drained (mL):  50    Urine characteristics:  Clear and yellow  Post-procedure:     Patient tolerance:  Patient tolerated the procedure well with no immediate complications

## 2024-02-27 ENCOUNTER — TELEPHONE (OUTPATIENT)
Dept: PAIN MEDICINE | Facility: HOSPITAL | Age: 69
End: 2024-02-27
Payer: MEDICARE

## 2024-02-28 LAB — BACTERIA UR CULT: NORMAL

## 2024-03-05 ENCOUNTER — OFFICE VISIT (OUTPATIENT)
Dept: PAIN MEDICINE | Facility: HOSPITAL | Age: 69
End: 2024-03-05
Payer: MEDICARE

## 2024-03-05 ENCOUNTER — TELEPHONE (OUTPATIENT)
Dept: PAIN MEDICINE | Facility: HOSPITAL | Age: 69
End: 2024-03-05
Payer: MEDICARE

## 2024-03-05 VITALS
SYSTOLIC BLOOD PRESSURE: 132 MMHG | RESPIRATION RATE: 16 BRPM | HEART RATE: 80 BPM | DIASTOLIC BLOOD PRESSURE: 92 MMHG | OXYGEN SATURATION: 96 %

## 2024-03-05 DIAGNOSIS — M46.1 SACROILIITIS (CMS/HCC): ICD-10-CM

## 2024-03-05 DIAGNOSIS — M62.838 MUSCLE SPASM: ICD-10-CM

## 2024-03-05 DIAGNOSIS — M54.16 LUMBAR RADICULOPATHY: Primary | ICD-10-CM

## 2024-03-05 DIAGNOSIS — M47.816 LUMBAR SPONDYLOSIS: ICD-10-CM

## 2024-03-05 DIAGNOSIS — M79.18 MYOFASCIAL PAIN: ICD-10-CM

## 2024-03-05 PROCEDURE — G0463 HOSPITAL OUTPT CLINIC VISIT: HCPCS

## 2024-03-05 RX ORDER — LIDOCAINE 50 MG/G
1 PATCH TOPICAL DAILY PRN
Qty: 30 PATCH | Refills: 2 | Status: SHIPPED | OUTPATIENT
Start: 2024-03-05 | End: 2024-06-03

## 2024-03-05 ASSESSMENT — ENCOUNTER SYMPTOMS
BACK PAIN: 1
MYALGIAS: 1
ACTIVITY CHANGE: 1
ARTHRALGIAS: 1

## 2024-03-05 ASSESSMENT — PAIN - FUNCTIONAL ASSESSMENT: PAIN_FUNCTIONAL_ASSESSMENT: 0-10

## 2024-03-05 ASSESSMENT — PAIN DESCRIPTION - DESCRIPTORS: DESCRIPTORS: SHARP

## 2024-03-05 NOTE — PROGRESS NOTES
Patient ID: Corrine Holden is a 69 y.o. female    Subjective     Pain Assessment: 0-10  Pain Score: 2 (best: 0, worst: 10)  Pain Type: Chronic pain  Pain Location: Back  Pain Radiating Towards: denies  Pain Descriptors: Sharp (buckling, intense)  Pain Frequency: Constant/continuous (except when sitting)  Interference on Function: ADLs  Pain Onset: Other (Comment) (pain with activity)  Clinical Progression: Not changed  Aggravating Factors: Standing, Walking, Bending  Pain Interventions: Repositioned, Rest, Home medication  Response to Interventions: rest helps    HPI/Chief Complaint:  Chief complaint: Chronic low back pain    Patient is a previous Jessica Dellee patient.     Patient is using crutches/walking boot for podiatry issues. Patient had surgery with Dr. Guerra on her foot 3+ months ago. Patient has had an several surgeries in her foot over the last few years- this inflames it. She does have pain in her low back- can go to her legs at times, again she is compensating due to there crutches/boot so this making her pain worse.     Patient is here today to discuss repeating a LEPSI. Previous LEPSI was on 6/27/24. Patient reports greater than 80% for 6+ months. She was able to walk more and be active. She would like to repeat the LEPSI if possible.  Patient does tell me that the pain is getting worse especially over the last few months.  She does tell me that she would be willing to update her MRI but she does require full anesthesia to update the lumbar MRI.  The last one was done 4 years ago or more.    Medications:  Tyenol as needed  ASA daily as needed   Ibuprofen as needed   Diazapam- patient uses for a muscle relaxer    Pain history:  Medications previously tried -Valium as needed, Flexeril too sedating, ibuprofen off-and-on, gabapentin.    Other modalities completed -PT in the past, rest, ice    Patient denies any new weakness denies any loss of bowel or bladder denies any saddle  "anesthesia    Injections:   6/27/23 - LEPSI L5-S1 -95%  7/25/23 - SNR R) L5 -50%     Imaging Reviewed:  MRI of the lumbosacral spine without contrast from 09/30/2019     Clinical History:  Low back pain;      Comparison(s):  CT 5/25/2018     Technique:   Sagittal and axial T2 and T1 and sagittal STIR sequences were obtained through the lumbosacral spine.     Findings:   5 lumbar vertebral bodies are presumed for purpose of this dictation.  The lumbar vertebral bodies are normal in height. Unchanged grade 1 anterolisthesis of L5 over S1 where there is severe loss of disc space and chronic appearing endplate degenerative signal. Likely hemangioma of L1. The conus medullaris terminates normally at the L1 level and the cauda equina is unremarkable.     At L1-2, no significant spinal canal or neuroforaminal narrowing. Mild facet arthropathy  At L2-3, no significant spinal canal or neuroforaminal narrowing. Mild facet arthropathy  At L3-4, mild broad-based disc bulge. No spinal canal narrowing. Prominent epidural fat. No neuroforaminal narrowing. Bilateral facet arthropathy with fluid in facet joints  At L4-5, small broad-based disc bulge. No spinal canal narrowing. No significant neuroforaminal narrowing. Bilateral facet arthropathy with fluid within facet joints  At L5-S1, broad-based disc bulge and ligamentum flavum hypertrophy without significant spinal canal narrowing. Bilateral facet arthropathy. Bilateral mild to moderate neuroforaminal narrowing.     IMPRESSION:  Unchanged grade 1 anterolisthesis of L5 over S1 with associated bilateral chronic pars defect.  Multilevel degenerative changes most pronounced in lower lumbar bilateral facets joints.      Past Medical History  Past Medical History:   Diagnosis Date    GERD (gastroesophageal reflux disease)     H. pylori infection 2009    Hypothyroidism     Injury of back     STATES, \"THIS IS REASON FOR MRI\"    Microscopic hematuria     SHANA (obstructive sleep apnea)     " "USES CPAP    Osteoarthritis     Palpitations     Respiratory disease     Supraventricular tachycardia     STATES, \"COFFEE RELATED\", VERY INTERMITTENT    Tinnitus      Past Surgical History   Past Surgical History:   Procedure Laterality Date    CARDIAC ELECTROPHYSIOLOGY STUDY AND ABLATION  01/01/2007    attempted ablation for SVT    CARPAL TUNNEL RELEASE Bilateral 06/26/2020    Procedure: LEFT ENDOSCOPIC CARPAL TUNNEL RELEASE, RIGHT OPEN CARPAL TUNNEL;  Surgeon: Mendez Sun MD;  Location: [facility] OR;  Service: Orthopedics;  Laterality: Bilateral;    COLONOSCOPY  01/21/2015    cecal polyp-tubular adenoma-recommend repeat 5 years    COLONOSCOPY N/A 06/22/2020    repeat 5 years - adenomatous polyp    DILATION AND CURETTAGE OF UTERUS  01/01/2006    ENDOMETRIAL BIOPSY  01/01/2012    pt reports negative    ESOPHAGOGASTRODUODENOSCOPY  01/01/2009    H pylori    ESOPHAGOGASTRODUODENOSCOPY N/A 06/22/2020    Procedure: ESOPHAGOGASTRODUODENOSCOPY;  Surgeon: Julian Faulkner MD;  Location: [facility] End[facility];  Service: Endoscopy;  Laterality: N/A;    KNEE ARTHROPLASTY Right 04/01/2015    Dr Placido BRICE    TOTAL ANKLE ARTHROPLASTY Left 10/03/2022    Dr. Guerra, with talonavicular and subtalar fusion    TOTAL ANKLE ARTHROPLASTY Left 02/20/2023    total revision of previous surgery, fusion of non-unions    TOTAL ANKLE ARTHROPLASTY Left 11/13/2023    w/ fusion to foot joints    VAGINAL HYSTERECTOMY  12/16/2013    with BSO, fibroids     Social History    Corrine Holden  reports that she has never smoked. She has never used smokeless tobacco. She reports that she does not drink alcohol and does not use drugs.  Family History:  family history includes Cancer in her paternal grandfather; Cancer (age of onset: 84) in her mother; Colon cancer (age of onset: 65) in her father; Heart attack (age of onset: 59) in her maternal grandfather; Heart disease in her maternal grandfather; Hypertension in her mother; Kidney cancer (age of onset: 77) in " her father; Osteoporosis in her mother; Other in her father's brother.  Allergies:  Allergies   Allergen Reactions    Bee Venom Protein (Honey Bee) Swelling    No Known Drug Allergies      Medications:     Current Outpatient Medications:     [START ON 4/1/2024] omeprazole (PriLOSEC) 20 mg capsule, Take 1 capsule (20 mg total) by mouth daily, Disp: 30 capsule, Rfl: 11    omeprazole (PriLOSEC) 20 mg capsule, Take 1 capsule (20 mg total) by mouth as needed (for GERD), Disp: 180 capsule, Rfl: 0    levothyroxine (SYNTHROID, LEVOTHROID) 125 mcg tablet, Take 1 tablet (125 mcg total) by mouth daily, Disp: 90 tablet, Rfl: 2    diazePAM (VALIUM) 5 mg tablet, Take 1 tablet (5 mg total) by mouth every 12 (twelve) hours as needed for anxiety or muscle spasms Max Daily Amount: 10 mg, Disp: 30 tablet, Rfl: 0    calcium citrate-vitamin D3 500 mg-12.5 mcg /5 gram powder, Take by mouth, Disp: , Rfl:     ascorbic acid, vitamin C, (ascorbic acid with vane hips) 500 mg tablet, Take 2 tablets (1,000 mg total) by mouth daily, Disp: , Rfl:     HYDROmorphone (DILAUDID) 2 mg tablet, Take 1-2 tablets (2-4 mg total) by mouth every 4 (four) hours as needed for pain scale 8-10/10, Disp: , Rfl:     fluconazole (Diflucan) 150 mg tablet, Take one tab now, and repeat in 2 days for prevention of yeast infection, Disp: 2 tablet, Rfl: 0    tiZANidine (ZANAFLEX) 2 mg tablet, Take 1 tablet (2 mg total) by mouth every 6 (six) hours as needed for muscle spasms, Disp: , Rfl:     aspirin 325 mg tablet, Take 1 tablet (325 mg total) by mouth every 6 (six) hours as needed for pain scale 8-10/10 for pain, Disp: , Rfl:     diclofenac sodium (VOLTAREN) 1 % gel, Apply 4 g topically 4 (four) times a day, Disp: 450 g, Rfl: 2    methylPREDNISolone (Medrol, Maxi,) 4 mg tablet, follow package directions, Disp: 21 tablet, Rfl: 0    magnesium oxide (MAG-OX) 400 mg (241.3 mg magnesium) tablet, Take 1 tablet (400 mg total) by mouth daily, Disp: , Rfl:     albuterol HFA 90  mcg/actuation inhaler, Inhale 2 puffs every 6 (six) hours as needed for wheezing, Disp: 1 Inhaler, Rfl: 0    meloxicam (MOBIC) 7.5 mg tablet, Take 1 tablet (7.5 mg total) by mouth daily, Disp: 90 tablet, Rfl: 3    guaiFENesin (MUCINEX) 600 mg 12 hr tablet, Take 2 tablets (1,200 mg total) by mouth 2 (two) times a day As needed, Disp: , Rfl:     ibuprofen (ADVIL,MOTRIN) 200 mg tablet, Take 1 tablet (200 mg total) by mouth every 6 (six) hours as needed, Disp: , Rfl:     vit C/E/Zn/coppr/lutein/zeaxan (OCUVITE LUTEIN AND ZEAXANTHIN ORAL), PreserVision AREDS-2, Disp: , Rfl:     zinc 50 mg tablet, Take 50 mg by mouth daily, Disp: , Rfl:     diclofenac sodium (VOLTAREN) 1 % gel, APPLY 2 GRAMS TO THE AFFECTED AREA(S) 4 TIMES PER DAY AS NEEDED, Disp: 300 g, Rfl: 2    fluticasone propionate (FLONASE) 50 mcg/actuation nasal spray, USE 2 SPRAYS IN EACH NOSTRIL DAILY (Patient taking differently: As needed), Disp: 48 g, Rfl: 2    budesonide (Pulmicort Flexhaler) 90 mcg/actuation inhaler, Inhale 1 puff as needed (for shortness of breath), Disp: , Rfl:     vit B complex no.12/niacin,B3, (VITAMIN B COMPLEX NO.12-NIACIN ORAL), Take by mouth, Disp: , Rfl:     cholecalciferol, vitamin D3, 25 mcg (1,000 unit) capsule, Take 6 capsules (6,000 Units total) by mouth Takes 5 times daily, Disp: , Rfl:     krill-om-3-dha-epa-phospho-ast [id number] mg capsule, Take 1 capsule by mouth daily, Disp: , Rfl:     cyclobenzaprine (FLEXERIL) 10 mg tablet, Take 1 tablet (10 mg total) by mouth 2 (two) times a day as needed for muscle spasms., Disp: 60 tablet, Rfl: 0    calcium carbonate (OS-RONNIE) 500 mg calcium tablet, Take 1 tablet (500 mg total) by mouth daily, Disp: , Rfl:     multivitamin (THERAGRAN) tablet tablet, 1 Every Day, Disp: , Rfl:     acetaminophen (TYLENOL EXTRA STRENGTH) 500 mg tablet, Take 2 tablets (1,000 mg total) by mouth 2 (two) times a day, Disp: , Rfl:     lidocaine (LIDODERM) 5 % patch, Apply 1 patch topically daily as needed  for pain scale 1-3/10 Apply to low back as needed. Remove & discard patch within 12 hours or as directed by MD., Disp: 30 patch, Rfl: 2    ketorolac (TORADOL) 10 mg tablet, Take 1 tablet (10 mg total) by mouth every 6 (six) hours as needed for pain scale 8-10/10 for up to 5 days (Patient not taking: Reported on 2/12/2024), Disp: 20 tablet, Rfl: 0    Review of Systems   Constitutional:  Positive for activity change.   Musculoskeletal:  Positive for arthralgias, back pain, gait problem and myalgias.       Objective     Vital signs:  Vitals:    03/05/24 0655   BP: 132/92   BP Location: Left arm   Patient Position: Sitting   Pulse: 80   Resp: 16   SpO2: 96%        Exam:  Physical Exam  Vitals and nursing note reviewed.   Constitutional:       General: She is not in acute distress.     Appearance: Normal appearance. She is well-developed. She is obese. She is not ill-appearing, toxic-appearing or diaphoretic.   HENT:      Head: Normocephalic and atraumatic.      Right Ear: External ear normal.      Left Ear: External ear normal.      Nose: Nose normal.      Mouth/Throat:      Pharynx: Oropharynx is clear.   Eyes:      Conjunctiva/sclera: Conjunctivae normal.   Pulmonary:      Effort: Pulmonary effort is normal. No respiratory distress.   Abdominal:      General: There is no distension.   Musculoskeletal:         General: Tenderness present. No deformity.      Cervical back: Normal range of motion and neck supple.      Lumbar back: Tenderness and bony tenderness present. Decreased range of motion. Negative right straight leg raise test.        Back:         Legs:       Comments: Patient is slow from sit to stand.  She does have tenderness over her lumbosacral region and into her right SI.  Lower extremity strength is equal and bilateral, distal sensation intact.   Skin:     General: Skin is warm and dry.      Capillary Refill: Capillary refill takes less than 2 seconds.      Findings: No rash.   Neurological:      Mental  Status: She is alert and oriented to person, place, and time.      Sensory: No sensory deficit.      Motor: No weakness.      Gait: Gait abnormal.      Comments: Ambulating with crutches, walking boot on left foot.   Psychiatric:         Behavior: Behavior normal.         Thought Content: Thought content normal.         Judgment: Judgment normal.       Assessment:  1. Lumbar radiculopathy  Epidural Lumbar Caudal, lidocaine (LIDODERM) 5 % patch, MR lumbar spine without contrast      2. Myofascial pain  lidocaine (LIDODERM) 5 % patch      3. Lumbar spondylosis  lidocaine (LIDODERM) 5 % patch      4. Muscle spasm        5. Sacroiliitis (CMS/HCC)           Plan/Discussion:  Patient I had a nice discussion regarding her chronic low back pain.  The previous lumbar epidural did give her greater than 80% relief for 6+ months, I do think it is appropriate to repeat this as the pain is the same and is radiating into the right buttock and leg.  The order was placed education and information regarding this was provided.  Patient is not on a blood thinner at this time.    Patient is seeing her orthopedic surgeon later this week I do want her to discuss this with the provider and make sure that is okay for us to move forward with the lumbar epidural.  She will let us know or have the provider send us a note as discussed.    Patient has had worsening low back pain I do think would be appropriate to update the lumbar MRI at this point.  Again it has been greater than 4+ years and her pain in her low back has continued.  I do suspect facet arthropathy as well as some nerve impingement and possible disc bulging.  Patient does require full sedation due to the claustrophobia.  I did send a order with full sedation requested.  Depending on results we can adjust lumbar epidurals if needed in the future.    I do think that weight loss and some physical therapy for her low back would be beneficial however she does need to focus on healing  her ankle at this time.  May consider in the future.    I will plan to follow the patient post lumbar epidural.    Today's plan was a combined effort between the patient and myself. The patient understood the plan, verbally consented, and agreed to participate. An After Visit Summary regarding today's office visit was given to the patient.     I have reviewed the patient's current medications using all immediate resources available.    Greater than 50% of this time was spent in direct face to face communication with the patient and/or surrogate in order to provide counseling regarding her pain and treatment plans. Office information provided and they were agreeable with today's plan of care.     Dragon voice recognition program was used to aid in this documentation which might generate inaccuracies despite efforts made to avoid them.  Please take it into context and contact the provider with any questions.    Lola Pizarro CNP

## 2024-03-05 NOTE — TELEPHONE ENCOUNTER
Ansley from [facility] called stating per Dr. Guerra pt is ok to have steroid injections at pain management.

## 2024-03-05 NOTE — PATIENT INSTRUCTIONS
Low Back Sprain or Strain Rehab  Please do the exercises that you are able to do safely- you do not HAVE to do all of them.    Ask your health care provider which exercises are safe for you. Do exercises exactly as told by your health care provider and adjust them as directed. It is normal to feel mild stretching, pulling, tightness, or discomfort as you do these exercises. Stop right away if you feel sudden pain or your pain gets worse. Do not begin these exercises until told by your health care provider.  Stretching and range-of-motion exercises  These exercises warm up your muscles and joints and improve the movement and flexibility of your back. These exercises also help to relieve pain, numbness, and tingling.  Lumbar rotation       Lie on your back on a firm surface and bend your knees.  Straighten your arms out to your sides so each arm forms a 90-degree angle (right angle) with a side of your body.  Slowly move (rotate) both of your knees to one side of your body until you feel a stretch in your lower back (lumbar). Try not to let your shoulders lift off the floor.  Hold this position for ____10______ seconds.  Tense your abdominal muscles and slowly move your knees back to the starting position.  Repeat this exercise on the other side of your body.  Repeat _____3_____ times. Complete this exercise _____2_____ times a day.  Single knee to chest       Lie on your back on a firm surface with both legs straight.  Bend one of your knees. Use your hands to move your knee up toward your chest until you feel a gentle stretch in your lower back and buttock.  Hold your leg in this position by holding on to the front of your knee.  Keep your other leg as straight as possible.  Hold this position for ____10______ seconds.  Slowly return to the starting position.  Repeat with your other leg.  Repeat ______3____ times. Complete this exercise ______2____ times a day.  Prone extension on elbows       Lie on your abdomen on  a firm surface (prone position).  Prop yourself up on your elbows.  Use your arms to help lift your chest up until you feel a gentle stretch in your abdomen and your lower back.  This will place some of your body weight on your elbows. If this is uncomfortable, try stacking pillows under your chest.  Your hips should stay down, against the surface that you are lying on. Keep your hip and back muscles relaxed.  Hold this position for ______10____ seconds.  Slowly relax your upper body and return to the starting position.  Repeat ______3____ times. Complete this exercise ______2____ times a day.  Strengthening exercises  These exercises build strength and endurance in your back. Endurance is the ability to use your muscles for a long time, even after they get tired.  Pelvic tilt  This exercise strengthens the muscles that lie deep in the abdomen.  Lie on your back on a firm surface. Bend your knees and keep your feet flat on the floor.  Tense your abdominal muscles. Tip your pelvis up toward the ceiling and flatten your lower back into the floor.  To help with this exercise, you may place a small towel under your lower back and try to push your back into the towel.  Hold this position for _____10_____ seconds.  Let your muscles relax completely before you repeat this exercise.  Repeat _____3_____ times. Complete this exercise _____2_____ times a day.  Alternating arm and leg raises       Get on your hands and knees on a firm surface. If you are on a hard floor, you may want to use padding, such as an exercise mat, to cushion your knees.  Line up your arms and legs. Your hands should be directly below your shoulders, and your knees should be directly below your hips.  Lift your left leg behind you. At the same time, raise your right arm and straighten it in front of you.  Do not lift your leg higher than your hip.  Do not lift your arm higher than your shoulder.  Keep your abdominal and back muscles tight.  Keep your  hips facing the ground.  Do not arch your back.  Keep your balance carefully, and do not hold your breath.  Hold this position for _____10_____ seconds.  Slowly return to the starting position.  Repeat with your right leg and your left arm.  Repeat ____3______ times. Complete this exercise ____2______ times a day.  Abdominal set with straight leg raise       Lie on your back on a firm surface.  Bend one of your knees and keep your other leg straight.  Tense your abdominal muscles and lift your straight leg up, 4-6 inches (10-15 cm) off the ground.  Keep your abdominal muscles tight and hold this position for _____10_____ seconds.  Do not hold your breath.  Do not arch your back. Keep it flat against the ground.  Keep your abdominal muscles tense as you slowly lower your leg back to the starting position.  Repeat with your other leg.  Repeat ____3______ times. Complete this exercise ______2____ times a day.  Single leg lower with bent knees  Lie on your back on a firm surface.  Tense your abdominal muscles and lift your feet off the floor, one foot at a time, so your knees and hips are bent in 90-degree angles (right angles).  Your knees should be over your hips and your lower legs should be parallel to the floor.  Keeping your abdominal muscles tense and your knee bent, slowly lower one of your legs so your toe touches the ground.  Lift your leg back up to return to the starting position.  Do not hold your breath.  Do not let your back arch. Keep your back flat against the ground.  Repeat with your other leg.  Repeat _____3_____ times. Complete this exercise _____2_____ times a day.  Posture and body mechanics  Good posture and healthy body mechanics can help to relieve stress in your body's tissues and joints. Body mechanics refers to the movements and positions of your body while you do your daily activities. Posture is part of body mechanics. Good posture means:  Your spine is in its natural S-curve position  (neutral).  Your shoulders are pulled back slightly.  Your head is not tipped forward.  Follow these guidelines to improve your posture and body mechanics in your everyday activities.  Standing       When standing, keep your spine neutral and your feet about hip width apart. Keep a slight bend in your knees. Your ears, shoulders, and hips should line up.  When you do a task in which you  one place for a long time, place one foot up on a stable object that is 2-4 inches (5-10 cm) high, such as a footstool. This helps keep your spine neutral.  Sitting       When sitting, keep your spine neutral and keep your feet flat on the floor. Use a footrest, if necessary, and keep your thighs parallel to the floor. Avoid rounding your shoulders, and avoid tilting your head forward.  When working at a desk or a computer, keep your desk at a height where your hands are slightly lower than your elbows. Slide your chair under your desk so you are close enough to maintain good posture.  When working at a computer, place your monitor at a height where you are looking straight ahead and you do not have to tilt your head forward or downward to look at the screen.  Resting  When lying down and resting, avoid positions that are most painful for you.  If you have pain with activities such as sitting, bending, stooping, or squatting, lie in a position in which your body does not bend very much. For example, avoid curling up on your side with your arms and knees near your chest (fetal position).  If you have pain with activities such as standing for a long time or reaching with your arms, lie with your spine in a neutral position and bend your knees slightly. Try the following positions:  Lying on your side with a pillow between your knees.  Lying on your back with a pillow under your knees.  Lifting       When lifting objects, keep your feet at least shoulder width apart and tighten your abdominal muscles.  Bend your knees and hips  and keep your spine neutral. It is important to lift using the strength of your legs, not your back. Do not lock your knees straight out.  Always ask for help to lift heavy or awkward objects.  This information is not intended to replace advice given to you by your health care provider. Make sure you discuss any questions you have with your health care provider.  Document Released: 12/18/2006 Document Revised: 04/10/2020 Document Reviewed: 01/09/2020  Elsevier Patient Education © 2020 [employer]

## 2024-03-08 ENCOUNTER — TELEPHONE (OUTPATIENT)
Dept: PAIN MEDICINE | Facility: HOSPITAL | Age: 69
End: 2024-03-08
Payer: MEDICARE

## 2024-03-08 NOTE — TELEPHONE ENCOUNTER
----- Message from Lola Pizarro CNP sent at 3/8/2024  6:43 AM MST -----  Patient to use over the counter.     Thanks    ----- Message -----  From: Anika Orr RN  Sent: 3/8/2024   6:18 AM [facility] [[email or web address]]  To: Lola Pizarro CNP    Patient's lidocaine patches were denied by insurance.  How do you wish to proceed?  Do you want her to use OTC patches (4%), or do you want to appeal?    Anika Negron, RN

## 2024-03-11 ENCOUNTER — TELEPHONE (OUTPATIENT)
Dept: INTERNAL MEDICINE | Facility: CLINIC | Age: 69
End: 2024-03-11

## 2024-03-11 NOTE — PROGRESS NOTES
GENERAL SURGERY     Provider:  TOMAS Love    Patient:  Corrine Holden  Age:  69 y.o.  MRN:  [id number]  Primary Care Provider: Becka Joy MD  Date of Service:  3/11/2024    SUBJECTIVE:  No chief complaint on file.      HPI   Corrine Holden is a 69 y.o. female who presents to the weight management and bariatric surgery clinic today to discuss weight loss options.  She is most interested in medical weight loss.  Her consult weight was 292 lbs, BMI is 50 kg/m².  She reports that her highest adult weight was 292 lbs, and lowest adult weight 140 lbs.  She has been overweight for approximately 40 years and attributes initial weight gain, recently 5-8 years due to to multiple ortho surgeries-sedentary. She has a longstanding history of obesity with multiple failed attempts at weight loss in the past including WW, high protein with most successful attempt being WW with a loss of 80 lbs.     Sleeve 6/2020?    Occupation retired nurse  What are your goals?50 lbs  Happy weight  What do you feel keeps you from achieving these goals?to be healthier,lose 50 lbs, portion control, about to turn 71 years-wants to come back from surgery    Do you find it takes more food than normal to feel full?yes  Do you feel hungry soon after eating?yes, bored  Do you eat to feel better or distract yourself?yes  Do you find you barely eat and still gain weight?no  Disordered eating patterns or behavior?multiple ankle surgeries, very decondition, quality-junk and eat too much. Loves to eat    Nutrition:lower carbs when behaving, 2 meals day and snacking, lives alone  Eating window:8 am-6pm  -Breakfast: waits till hungry 3/4 c cottage cheese-with 3 TBS ground flax, or 3 eggs with flax, yogurt with fruit and spinach smoothie.  -Lunch:salmon with sweet potato, or stew, soup  -Supper:salmon and sweet potato, pizza, hamburger with cabbage  -Snacks:fruit, cheese stick, chips  -Beverages: ginger ale 1 time week, rare juice, no soda or  alcohol  -Water:> >60 oz day  -Likes:  -Dislikes:  -Cravings:salty  -Triggers:  Intolerances:none  Nightitme eating?no  -Times of day that patient struggles with:6-8 pm  -Frequency of eating out or fast foods:1 time week  What area would you like to work on with nutrition?when gets in group will eat too much  Which area of nutrition do you find you needs the most help with? timing, quantity, quality?  -Barriers to healthy eating:      Physical activity:nothing for about 1 year due to surgery. Starting PT 1 time week- Biking at home  What would you like to be able to do?swim and bike  -Enjoys:swimming, walking  Home equipment bike  -Barriers:left ankle multiple surgery      She does not use nicotine products. Denies drug use. Alcohol: no.    She reports current sleep habits with 8 hours per night. 8 pm-5 am, CPAP    Comorbid conditions:  Pre Diabetes HA1C 6.4  SHANA  Chronic low back pain-pain mngt note from 3//24 reviewed-surgery with Dr. Guerra on her foot 3+ months ago. Patient has had an several surgeries in her foot over the last few years- this inflames it. She does have pain in her low back- can go to her legs at times, again she is compensating due to there crutches/boot so this making her pain worse.    grade 1 anterolisthesis of L5 over S1 with associated bilateral chronic pars defect.  Multilevel degenerative changes most pronounced in lower lumbar bilateral facets joints.-Lumbar epidural?       Mammogram:UTD  Colonoscopy:UTD  Abdominal surgeries:Hysterectomy    Labs:  Recent Results (from the past 2016 hour(s))   Hemoglobin A1c (glycosylated) Blood, Venous    Collection Time: 02/09/24  7:59 AM   Result Value Ref Range    Hemoglobin A1C 6.4 (H) 4.0 - 6.0 %    Estimated Average Glucose 137.0 mg/dL   Thyroid Stimulating Hormone, Ultrasensitive Blood, Venous    Collection Time: 02/09/24  7:59 AM   Result Value Ref Range    TSH 1.510 0.340 - 4.820 uIU/ml   Vitamin B12 Blood, Venous    Collection Time: 02/09/24   7:59 AM   Result Value Ref Range    Vitamin B-12 >1,500 (H) 180 - 914 pg/mL   Lipid panel Blood, Venous    Collection Time: 02/09/24  7:59 AM   Result Value Ref Range    Triglycerides 112 <=149 mg/dL    Cholesterol 218 (H) 0 - 199 mg/dL    HDL 67 >=40 mg/dL    LDL Calculated 129 (H) 20 - 99 mg/dL    Fasting? Yes    CBC w/auto differential Blood, Venous    Collection Time: 02/09/24  7:59 AM   Result Value Ref Range    WBC 6.1 4.5 - 10.5 10*3/uL    RBC 4.75 3.70 - 5.30 10*6/µL    Hemoglobin 12.8 11.5 - 15.5 g/dL    Hematocrit 39.2 34.0 - 45.0 %    MCV 82.7 81.0 - 97.0 fL    MCH 26.9 (L) 28.0 - 33.0 pg    MCHC 32.5 32.0 - 36.0 g/dL    RDW 16.5 (H) 11.5 - 14.0 %    Platelets 323 140 - 350 10*3/uL    MPV 6.9 6.9 - 10.8 fL    Neutrophils% 64.5 41.0 - 81.0 %    Lymphocytes% 24.1 11.0 - 47.0 %    Monocytes% 7.7 3.0 - 11.0 %    Eosinophils% 2.8 0.0 - 3.0 %    Basophils% 0.9 0.0 - 2.0 %    ANC (auto diff) 4.00 10*3/UL    Lymphocytes Absolute 1.50 10*3/uL    Monocytes Absolute 0.50 10*3/uL    Eosinophils Absolute 0.20 10*3/uL    Basophils Absolute 0.10 10*3/uL    Hypochromia 1+    Comprehensive metabolic panel Blood, Venous    Collection Time: 02/09/24  7:59 AM   Result Value Ref Range    Sodium 142 135 - 145 mmol/L    Potassium 4.3 3.5 - 5.1 MMOL/L    Chloride 105 98 - 107 mmol/L    CO2 22 21 - 32 mmol/L    Anion Gap 15 (H) 3 - 11 mmol/L    BUN 13 7 - 25 mg/dL    Creatinine 0.82 0.60 - 1.10 mg/dL    Glucose 124 (H) 70 - 105 mg/dL    Calcium 9.5 8.6 - 10.3 mg/dL    AST 29 <40 U/L    ALT (SGPT) 31 7 - 52 U/L    Alkaline Phosphatase 73 55 - 142 U/L    Total Protein 7.3 6.0 - 8.3 g/dL    Albumin 4.5 3.5 - 5.3 g/dL    Total Bilirubin 0.38 0.20 - 1.40 mg/dL    Corrected Calcium 9.1 8.6 - 10.3 mg/dL    eGFR 78 >60 mL/min/1.73m*2   25-Hydroxyvitamin D2 and D3, serum Blood, Venous    Collection Time: 02/09/24  7:59 AM   Result Value Ref Range    25-Hydroxy D2 <4.0 ng/mL    25-Hydroxy D3 51 ng/mL    25-Hydroxy D Total 51 ng/mL   Urine  "culture    Collection Time: 02/26/24 10:39 AM    Specimen: Urine, Straight Catheter   Result Value Ref Range    Urine Culture No growth at 48 hours    Urinalysis, microscopic Urine, Straight Catheter    Collection Time: 02/26/24 10:39 AM   Result Value Ref Range    RBC, Urine 3-5 (A) None seen, 0-2, Negative /HPF    WBC, Urine 0-4 0 - 4 /HPF    Squamous Epithelial, Urine 0-4 None Seen-9 /HPF    Bacteria, Urine Few None seen, Few /HPF   Urinalysis, dipstick Urine, Straight Catheter    Collection Time: 02/26/24 10:39 AM   Result Value Ref Range    Color, Urine Yellow Yellow    Clarity, Urine Clear Clear    Specific Gravity, Urine 1.025 1.003 - 1.030    Leukocytes, Urine Negative Negative    Nitrite, Urine Negative Negative    Protein, Urine Negative Negative MG/DL    Ketones, Urine Negative Negative MG/DL    Urobilinogen, Urine 0.2 <2.0 mg/dL    Bilirubin, Urine Negative Negative    Blood, Urine Negative Negative    Glucose, Urine Negative Negative MG/DL    pH, Urine 6.0 5.0 - 8.0 PH          Past Medical History:   Diagnosis Date    GERD (gastroesophageal reflux disease)     H. pylori infection 2009    Hypothyroidism     Injury of back     STATES, \"THIS IS REASON FOR MRI\"    Microscopic hematuria     SHANA (obstructive sleep apnea)     USES CPAP    Osteoarthritis     Palpitations     Respiratory disease     Supraventricular tachycardia     STATES, \"COFFEE RELATED\", VERY INTERMITTENT    Tinnitus         Past Surgical History:   Procedure Laterality Date    CARDIAC ELECTROPHYSIOLOGY STUDY AND ABLATION  01/01/2007    attempted ablation for SVT    CARPAL TUNNEL RELEASE Bilateral 06/26/2020    Procedure: LEFT ENDOSCOPIC CARPAL TUNNEL RELEASE, RIGHT OPEN CARPAL TUNNEL;  Surgeon: Mendez Sun MD;  Location: [facility];  Service: Orthopedics;  Laterality: Bilateral;    COLONOSCOPY  01/21/2015    cecal polyp-tubular adenoma-recommend repeat 5 years    COLONOSCOPY N/A 06/22/2020    repeat 5 years - adenomatous polyp    DILATION " AND CURETTAGE OF UTERUS  01/01/2006    ENDOMETRIAL BIOPSY  01/01/2012    pt reports negative    ESOPHAGOGASTRODUODENOSCOPY  01/01/2009    H pylori    ESOPHAGOGASTRODUODENOSCOPY N/A 06/22/2020    Procedure: ESOPHAGOGASTRODUODENOSCOPY;  Surgeon: Julian Faulkner MD;  Location: [facility];  Service: Endoscopy;  Laterality: N/A;    KNEE ARTHROPLASTY Right 04/01/2015    Dr Placido BRICE    TOTAL ANKLE ARTHROPLASTY Left 10/03/2022    Dr. Guerra, with talonavicular and subtalar fusion    TOTAL ANKLE ARTHROPLASTY Left 02/20/2023    total revision of previous surgery, fusion of non-unions    TOTAL ANKLE ARTHROPLASTY Left 11/13/2023    w/ fusion to foot joints    VAGINAL HYSTERECTOMY  12/16/2013    with BSO, fibroids         Current Outpatient Medications:     lidocaine (LIDODERM) 5 % patch, Apply 1 patch topically daily as needed for pain scale 1-3/10 Apply to low back as needed. Remove & discard patch within 12 hours or as directed by MD., Disp: 30 patch, Rfl: 2    [START ON 4/1/2024] omeprazole (PriLOSEC) 20 mg capsule, Take 1 capsule (20 mg total) by mouth daily, Disp: 30 capsule, Rfl: 11    omeprazole (PriLOSEC) 20 mg capsule, Take 1 capsule (20 mg total) by mouth as needed (for GERD), Disp: 180 capsule, Rfl: 0    levothyroxine (SYNTHROID, LEVOTHROID) 125 mcg tablet, Take 1 tablet (125 mcg total) by mouth daily, Disp: 90 tablet, Rfl: 2    diazePAM (VALIUM) 5 mg tablet, Take 1 tablet (5 mg total) by mouth every 12 (twelve) hours as needed for anxiety or muscle spasms Max Daily Amount: 10 mg, Disp: 30 tablet, Rfl: 0    calcium citrate-vitamin D3 500 mg-12.5 mcg /5 gram powder, Take by mouth, Disp: , Rfl:     ascorbic acid, vitamin C, (ascorbic acid with vane hips) 500 mg tablet, Take 2 tablets (1,000 mg total) by mouth daily, Disp: , Rfl:     HYDROmorphone (DILAUDID) 2 mg tablet, Take 1-2 tablets (2-4 mg total) by mouth every 4 (four) hours as needed for pain scale 8-10/10, Disp: , Rfl:     fluconazole (Diflucan) 150 mg  tablet, Take one tab now, and repeat in 2 days for prevention of yeast infection, Disp: 2 tablet, Rfl: 0    tiZANidine (ZANAFLEX) 2 mg tablet, Take 1 tablet (2 mg total) by mouth every 6 (six) hours as needed for muscle spasms, Disp: , Rfl:     aspirin 325 mg tablet, Take 1 tablet (325 mg total) by mouth every 6 (six) hours as needed for pain scale 8-10/10 for pain, Disp: , Rfl:     diclofenac sodium (VOLTAREN) 1 % gel, Apply 4 g topically 4 (four) times a day, Disp: 450 g, Rfl: 2    methylPREDNISolone (Medrol, Maxi,) 4 mg tablet, follow package directions, Disp: 21 tablet, Rfl: 0    ketorolac (TORADOL) 10 mg tablet, Take 1 tablet (10 mg total) by mouth every 6 (six) hours as needed for pain scale 8-10/10 for up to 5 days (Patient not taking: Reported on 2/12/2024), Disp: 20 tablet, Rfl: 0    magnesium oxide (MAG-OX) 400 mg (241.3 mg magnesium) tablet, Take 1 tablet (400 mg total) by mouth daily, Disp: , Rfl:     albuterol HFA 90 mcg/actuation inhaler, Inhale 2 puffs every 6 (six) hours as needed for wheezing, Disp: 1 Inhaler, Rfl: 0    meloxicam (MOBIC) 7.5 mg tablet, Take 1 tablet (7.5 mg total) by mouth daily, Disp: 90 tablet, Rfl: 3    guaiFENesin (MUCINEX) 600 mg 12 hr tablet, Take 2 tablets (1,200 mg total) by mouth 2 (two) times a day As needed, Disp: , Rfl:     ibuprofen (ADVIL,MOTRIN) 200 mg tablet, Take 1 tablet (200 mg total) by mouth every 6 (six) hours as needed, Disp: , Rfl:     vit C/E/Zn/coppr/lutein/zeaxan (OCUVITE LUTEIN AND ZEAXANTHIN ORAL), PreserVision AREDS-2, Disp: , Rfl:     zinc 50 mg tablet, Take 50 mg by mouth daily, Disp: , Rfl:     diclofenac sodium (VOLTAREN) 1 % gel, APPLY 2 GRAMS TO THE AFFECTED AREA(S) 4 TIMES PER DAY AS NEEDED, Disp: 300 g, Rfl: 2    fluticasone propionate (FLONASE) 50 mcg/actuation nasal spray, USE 2 SPRAYS IN EACH NOSTRIL DAILY (Patient taking differently: As needed), Disp: 48 g, Rfl: 2    budesonide (Pulmicort Flexhaler) 90 mcg/actuation inhaler, Inhale 1 puff as  needed (for shortness of breath), Disp: , Rfl:     vit B complex no.12/niacin,B3, (VITAMIN B COMPLEX NO.12-NIACIN ORAL), Take by mouth, Disp: , Rfl:     cholecalciferol, vitamin D3, 25 mcg (1,000 unit) capsule, Take 6 capsules (6,000 Units total) by mouth Takes 5 times daily, Disp: , Rfl:     krill-om-3-dha-epa-phospho-ast [id number] mg capsule, Take 1 capsule by mouth daily, Disp: , Rfl:     cyclobenzaprine (FLEXERIL) 10 mg tablet, Take 1 tablet (10 mg total) by mouth 2 (two) times a day as needed for muscle spasms., Disp: 60 tablet, Rfl: 0    calcium carbonate (OS-RONNIE) 500 mg calcium tablet, Take 1 tablet (500 mg total) by mouth daily, Disp: , Rfl:     multivitamin (THERAGRAN) tablet tablet, 1 Every Day, Disp: , Rfl:     acetaminophen (TYLENOL EXTRA STRENGTH) 500 mg tablet, Take 2 tablets (1,000 mg total) by mouth 2 (two) times a day, Disp: , Rfl:     Review of Systems:  Review of Systems   Constitutional:  Negative for activity change.   Respiratory:  Negative for shortness of breath.    Cardiovascular:  Negative for chest pain.   Gastrointestinal:  Negative for abdominal pain, constipation and diarrhea.   Endocrine: Negative for cold intolerance and heat intolerance.   Musculoskeletal:  Negative for arthralgias.   Psychiatric/Behavioral:  Negative for dysphoric mood and sleep disturbance. The patient is not nervous/anxious.    All other systems reviewed and are negative.       OBJECTIVE:  Vital Signs  There were no vitals taken for this visit.    Physical Exam:  Physical Exam  Constitutional:       Appearance: Normal appearance. She is obese.   Cardiovascular:      Rate and Rhythm: Normal rate.   Pulmonary:      Effort: Pulmonary effort is normal.   Musculoskeletal:         General: Normal range of motion.      Cervical back: Normal range of motion.   Skin:     General: Skin is warm and dry.   Neurological:      General: No focal deficit present.      Mental Status: She is alert and oriented to person, place,  and time.   Psychiatric:         Mood and Affect: Mood normal.         Behavior: Behavior normal.            BIOMETRIC TESTING ON 3/11/2024 REVEALED:refused today  Weight: 292 pounds   Fat percent: %   Fat Mass pounds  Fat free mass  pounds   Visceral fat rating:      Desirable range fat percent:  to %    Anthropometrics' on 3/11/2024 revealed:  Neck circumference: 15.5 inches   Waist: 51.5 inches   Hip: 59.5 inches   Waist circumference is a strong predictor of insulin resistance, is an independent measure of CVD risk.  Female<35  Male <40    ASSESSMENT & PLAN:    1. Class 3 severe obesity due to excess calories with serious comorbidity and body mass index (BMI) of 45.0 to 49.9 in adult (CMS/[facility])      2. Obstructive sleep apnea syndrome      3. Osteoarthritis of spine with radiculopathy, lumbar region     Corrine Holden is a pleasant patient who's consult weight was 292 lbs, BMI is 50 kg/m², and has multiple comorbid conditions that are noted in the HPI.  Patient presents today to discuss weight loss options in order to improve her overall health and quality of life.  Patient has had multiple failed attempts at weight loss in the past that are noted in the HPI.  We reviewed foundational components of weight loss including nutrition, physical activity, and behavioral health as well as metabolic adaptation with weight loss and the effects of dieting.  We discussed importance of protein-rich diet, resistance training to combat metabolic adaptation, as well as to prevent loss of lean muscle and bone mass and importance of good quality sleep to aid in body recomposition.    The Weight Management [facility] Welcome Packet with supplemental materials was provided and is aware of the bariatric support group.     Foundational Nutrition Plan:    Discussed protein sparing weight loss plan,   First meal: 30-45 gm protein in first 30-60 minutes. May use whey protein powder as meal replacement.  Each meal contains 30-45 gm quality  protein and 15-30 gm carbohydrates from 1-2 servings of fruit or vegetables, 1 serving oemga 3 rich fat.     1-Quality of food-whole foods-vegetables, fruits, legumes, whole grains, unsalted nuts and seeds  2-Quantity of food-use smaller plate, portion control  3-Timing-be consistent when eat am/pm meal, fast, and bedtime/wakeup, done eating after supper. Do not eat while watching electronics.   4-Save money: limit eating out, instead buy quality food at grocery store.  5- replace time on electronics with meal planning, prep, and grocery shopping.    Metabolic Matrix: Quality of food  Choose foods that do all 3 of the following  1-reduce/remove added sugar, avoid fructose. Both of these are found in processed food. Will not find in whole/natural food.  2-Increase fiber through whole foods which are naturally occurring and not processed, 4-5 servings of fruit/vegetables each day  3-Increase Healthy fats, aim for 2 servings day; avocado, olives, 1-2 Tbs extra virgin olive oil([place]), raw and unsalted seeds 1/4 cup or 2 TBS ground: sunflower, flax, sesame, and oily fish.  Read through handout about how to choose these foods.    Phenotype Hungry Brain      Medications:  Consider Topamax at future visit     Medications need to provide 5-10% meaningful weight loss.    Supplements: Consider Vit D3/K2 take 2,000 IU day, Omega 3 supplement 2-4 gm day, probiotic or daily fermented foods, Creatine 5 mg day-Mirna  [facility] certified, check examine.[email or web address]     Labs:  Reviewed    Referrals:    Calorimetry testing future date  CBT will consider    Behavior:  Label hunger 1-10 before eating.   Ride the craving-watch the cravings pass.   Change environment  Exercise-HIT, go outside for walk, do 5 minutes of movement.  CBT for tools to reframe mindset around food,exercise, and stress.     Chronic low grade stress- increase visceral fat storage/abdominal fat , and reduces lipolysis  To reduce stress:  1-regular  exercise  2-cyclic sighing for 5  minutes,5 minutes day of cyclic sighing-deep inhale through nose, with second short inhale then exhale through the mouth to completely empty lungs  3-remove processed food   4-quality sleep  11 minute cold water emersion weekly  See [facility] website:   https://www.[facility].org/healthy-lifestyle/stress-management/in-depth/stress-relievers/art-74254943     Good reads:  -Tiny Habits by CORTEZ Mendiola, PhD  -Eat What You Love Love What You Eat by Jessica Orona MD       Sleep: Current hours of sleep: 8 hours   Sleep hygiene:need 8 hours of darkness in bed  1-Bed time routine, non-stimulating activities, no electronics 2 hours before desire to sleep, turn down lights 2-3 hours before bed, breath work, stretching. Go to bed at a consistent time, and wake up at same time.  2-remove clocks from room  3-refrain from checking electronics when wake up, set goal to wait .  4- Set room temp for 65 F  5- warm body before bed with warm shower, bath, hot tub, or sauna, or wear socks to bed  6-Limit caffeine, take probiotics daily,  eat complex carbs with dinner, tryptophan with evening meal: Turkey, chicken, fish, nuts, seeds, milk.  7-Wake up and go to bed at consistent time. If need to sleep more, it is better to go to bed earlier than to sleep in later because body is sending signals at breakfast time to prepare for a meal, and you will not feel rested when wake up.  8- Go outside to get direct sunlight for 10 minutes when wake up  consider [employer] time[employer] rik for bedtime routine  cet.org for MEQ test    Goal:8 hours    3-5 mg melatonin nightly, 300-420 mg magnesium glycinate nightly, 200-400 mg L-theanine nightly. Insomnitol      1-month weight loss goals were discussed and mutually agreed upon:     -Nutrition:  Eating window: 8am-6pm    - Initiate food/fluid log using mobile rik (Novavax or myfitnesspal) or written journal and bring this with you to next clinic visit.   Macros  1500 calories  day  Protein 113 gm   gm  Fat 60 gm  Fiber 20-30 gm    Breakfast:30-40 grams of protein with 1 serving fruit or vegetable(15-30 gm carbohydrate)  Lunch:30-40 grams protein with 2 servings vegetable or fruit(15-30 gm carbohydrate)  Dinner: 30-40 grams protein with 2 servings vegetable or fruit(15-30 gm carbohydrate)  Potion control follow myplate    1-2 servings of nuts/vegetables    1-  Daily Protein goal: 113 gm day (1gm/lb IBW). 20-30 gm protein at each meal.   First meal: 30-45 grams of protein within 60-90 minutes of waking up to initiate/stimulate muscle building for 5 hours.  Breakfast ideas: breakfast casserole, omelet with vegetables, overnight oats- add greek yogurt and whey protein powder, smoothie, Greek yogurt or cottage cheese with fruit and nut/seeds, protein pancakes, egg/vegetables/bean burrito, oatmeal with 1/4 cup egg whites      Whey protein powder ideas: Levels, Naked Whey, Vital Performance, Transparent labs, Unjury unflavored whey protein  Recipies:  https://www.Hubba/30-grams-of-protein-for-breakfast-ideas/#20-protein-breakfast-recipe-ideas  https://www.[employer]/high-protein-breakfasts  https://experiencelife.lifetime.life/article/heres-what-30-grams-of-protein-looks-like/Breakfast    30 gm of protein= 4-5 oz meat, 5 eggs/whites, 1.5 cups greek yogurt or cottage cheese, whey protein powder    Snacks: limit nuts and cheese. greek yogurt-jerky, hard boiled eggs, unsalted seeds/nuts or fruit/vegetables, legumes/beans. Limit snacking to help with insulin sensitivity       2- Carbohydrates-15-30 gm at one setting, and keep daily carbohydrates around 100 gm day. Choose unprocessed carbohydrates:  Vegetables. All of them. It’s best to eat a variety of vegetables every day.  Whole fruits. Apples, bananas, strawberries, etc.  Legumes. Lentils, kidney beans, peas, etc.  Nuts. Almonds, walnuts, hazelnuts, macadamia nuts, peanuts, etc.  Seeds. Santana seeds and pumpkin seeds.  Whole  grains. Choose grains that are truly whole, as in pure oats, quinoa, brown rice, etc.  Tubers. Potatoes, sweet potatoes, etc.  Limit or remove  Sugary drinks. These are sodas, fruit juices with added sugar, and beverages sweetened with high fructose corn syrup.  White bread. These are refined carbohydrates that are low in essential nutrients and have a negative effect on metabolic health. This applies to most commercially available breads.  Pastries, cookies and cakes. These foods tend to be very high in sugar and refined wheat.  Ice cream. Most types of ice cream are very high in sugar, although there are exceptions.  Candies and chocolates. If you’re going to eat chocolate, choose quality dark chocolate.  French fries and potato chips. Whole potatoes are healthy. However, french fries and potato chips don’t provide the nutritional benefits that whole potatoes do.    Healthy fats choices include animal fat, butter, full-fat dairy, unheated olive oil, coconut oil, avocado oil. Have 2 servings healthy fat day.      Breakfast:30-40 grams of protein with 1 serving fruit or vegetable(15-30 gm carbohydrate)  Lunch:30-40 grams protein with 2 servings vegetable or fruit(15-30 gm carbohydrate)  Dinner: 30-40 grams protein with 2 servings vegetable or fruit(15-30 gm carbohydrate)    2-Limit liquid calories    3-Limit food that is white/colorless/processed. Eat 4-5 servings fruit/vegetables every day.    4-Timing:First 6-8  hours of the day most important to get in quality nutrition, also helps decrease evening snacking/cravings. Eat carbohydrates/starches at meal time: fruits, vegetables, and whole grains paired with a protein.    5- Water: >64 oz day Drink 8 -16 oz water before caffeine      - Physical Activity Goal:   Maintaining and improving health: 150 minutes/week  Prevention of weight gain: 150-250 minutes/week  Promote clinically significant weight loss: 225-420  minutes/week  Prevention of weight regain after weight  loss: 200-300 minutes/week  Cardio:  3 times week 30-40 minutes swimming, 10-20 minutes bike    Strength training:   Bands look at You tube 10 minutes, chair exercises for seniors seated     -Referrals have been placed. If you do not hear from them in 1-2 weeks you may call their office. See green sheet with contact information.     Corrine Holden will RTC in  1 month for ongoing medically supervised weight management.  She participated in the decision making process and agreed with the plan of care.  All questions were sought and answered. To schedule appointment call Arabella Betancourt [phone]    TOMAS Love    Total Time spent >60 minutes.    A voice recognition program was used to aid in medical record documentation. Sometimes words are printed not exactly as they were spoken. While efforts were made to carefully edit and correct any inaccuracies, some errors may be present and should be taken within the context of the discussion.  Please contact our office if you need assistance interpreting this medical record or notice any mistakes.

## 2024-03-11 NOTE — TELEPHONE ENCOUNTER
Caller would like to discuss (a)  paperwork  Writer has advised caller of a callback from within 24 hours.    Patient: Corrine Holden    Caller Name (Last and first, relation/role): Self    Name of Facility: N/A    Callback Number: [phone]    Best Availability: anytime    Fax Number: N/A    Additional Info: pt would like a call to discuss getting the handicap sticker for her car.    Did you confirm the message with the caller: Yes    Is it okay that the nurse communicates your response through Genophenhart? No

## 2024-03-12 ENCOUNTER — CONSULT (OUTPATIENT)
Dept: SURGERY | Facility: CLINIC | Age: 69
End: 2024-03-12
Payer: MEDICARE

## 2024-03-12 VITALS
BODY MASS INDEX: 49.85 KG/M2 | HEART RATE: 78 BPM | SYSTOLIC BLOOD PRESSURE: 138 MMHG | OXYGEN SATURATION: 95 % | WEIGHT: 292 LBS | TEMPERATURE: 97.7 F | DIASTOLIC BLOOD PRESSURE: 94 MMHG | HEIGHT: 64 IN

## 2024-03-12 DIAGNOSIS — E66.813 CLASS 3 SEVERE OBESITY DUE TO EXCESS CALORIES WITH SERIOUS COMORBIDITY AND BODY MASS INDEX (BMI) OF 45.0 TO 49.9 IN ADULT (CMS/HCC): Primary | ICD-10-CM

## 2024-03-12 DIAGNOSIS — E66.01 CLASS 3 SEVERE OBESITY DUE TO EXCESS CALORIES WITH SERIOUS COMORBIDITY AND BODY MASS INDEX (BMI) OF 45.0 TO 49.9 IN ADULT (CMS/HCC): Primary | ICD-10-CM

## 2024-03-12 DIAGNOSIS — G47.33 OBSTRUCTIVE SLEEP APNEA SYNDROME: ICD-10-CM

## 2024-03-12 DIAGNOSIS — M47.26 OSTEOARTHRITIS OF SPINE WITH RADICULOPATHY, LUMBAR REGION: ICD-10-CM

## 2024-03-12 PROCEDURE — 99215 OFFICE O/P EST HI 40 MIN: CPT | Mod: GF | Performed by: PHYSICIAN ASSISTANT

## 2024-03-12 PROCEDURE — G0463 HOSPITAL OUTPT CLINIC VISIT: HCPCS

## 2024-03-12 PROCEDURE — 99214 OFFICE O/P EST MOD 30 MIN: CPT | Mod: GF | Performed by: PHYSICIAN ASSISTANT

## 2024-03-12 ASSESSMENT — ENCOUNTER SYMPTOMS
CONSTIPATION: 0
ACTIVITY CHANGE: 0
SHORTNESS OF BREATH: 0
DIARRHEA: 0
ABDOMINAL PAIN: 0
SLEEP DISTURBANCE: 0
ARTHRALGIAS: 0
DYSPHORIC MOOD: 0
NERVOUS/ANXIOUS: 0

## 2024-03-12 NOTE — LETTER
[facility]  [address]  [place] [address]  [phone]  Dept: [phone]  03/12/24      No referring provider defined for this encounter.        To: No ref. provider found      Thank you for referring your patient, Corrine Holden, to receive care in my office. I have enclosed a copy of the note for Corrine from 03/12/24.    Please contact me with any questions you may have regarding the visit.    Sincerely,         TOMAS Love  [address]  [place] [address]  [phone]    CC: Becka Joy MD

## 2024-03-12 NOTE — PATIENT INSTRUCTIONS
1. Class 3 severe obesity due to excess calories with serious comorbidity and body mass index (BMI) of 45.0 to 49.9 in adult (CMS/[facility])      2. Obstructive sleep apnea syndrome      3. Osteoarthritis of spine with radiculopathy, lumbar region     Corrine Holden is a pleasant patient who's consult weight was 292 lbs, BMI is 50 kg/m², and has multiple comorbid conditions that are noted in the HPI.  Patient presents today to discuss weight loss options in order to improve her overall health and quality of life.  Patient has had multiple failed attempts at weight loss in the past that are noted in the HPI.  We reviewed foundational components of weight loss including nutrition, physical activity, and behavioral health as well as metabolic adaptation with weight loss and the effects of dieting.  We discussed importance of protein-rich diet, resistance training to combat metabolic adaptation, as well as to prevent loss of lean muscle and bone mass and importance of good quality sleep to aid in body recomposition.    The Weight Management [facility] Welcome Packet with supplemental materials was provided and is aware of the bariatric support group.     Foundational Nutrition Plan:    Discussed protein sparing weight loss plan,   First meal: 30-45 gm protein in first 30-60 minutes. May use whey protein powder as meal replacement.  Each meal contains 30-45 gm quality protein and 15-30 gm carbohydrates from 1-2 servings of fruit or vegetables, 1 serving oemga 3 rich fat.     1-Quality of food-whole foods-vegetables, fruits, legumes, whole grains, unsalted nuts and seeds  2-Quantity of food-use smaller plate, portion control  3-Timing-be consistent when eat am/pm meal, fast, and bedtime/wakeup, done eating after supper. Do not eat while watching electronics.   4-Save money: limit eating out, instead buy quality food at grocery store.  5- replace time on electronics with meal planning, prep, and grocery shopping.    Metabolic  Matrix: Quality of food  Choose foods that do all 3 of the following  1-reduce/remove added sugar, avoid fructose. Both of these are found in processed food. Will not find in whole/natural food.  2-Increase fiber through whole foods which are naturally occurring and not processed, 4-5 servings of fruit/vegetables each day  3-Increase Healthy fats, aim for 2 servings day; avocado, olives, 1-2 Tbs extra virgin olive oil([place]), raw and unsalted seeds 1/4 cup or 2 TBS ground: sunflower, flax, sesame, and oily fish.  Read through handout about how to choose these foods.    Phenotype Hungry Brain      Medications:    she is on weight neutral medications.  Today we reviewed risk/benefit of starting    Medications need to provide 5-10% meaningful weight loss.    Supplements: Consider Vit D3/K2 take 2,000 IU day, Omega 3 supplement 2-4 gm day, probiotic or daily fermented foods, Creatine 5 mg day-Mirna  [facility] certified, check examine.[employer]isan     Labs:  Reviewed    Referrals:    Calorimetry testing future date  CBT will consider    Behavior:  Label hunger 1-10 before eating.   Ride the craving-watch the cravings pass.   Change environment  Exercise-HIT, go outside for walk, do 5 minutes of movement.  CBT for tools to reframe mindset around food,exercise, and stress.     Chronic low grade stress- increase visceral fat storage/abdominal fat , and reduces lipolysis  To reduce stress:  1-regular exercise  2-cyclic sighing for 5  minutes,5 minutes day of cyclic sighing-deep inhale through nose, with second short inhale then exhale through the mouth to completely empty lungs  3-remove processed food   4-quality sleep  11 minute cold water emersion weekly  See [facility] website:   https://www.[facility].org/healthy-lifestyle/stress-management/in-depth/stress-relievers/art-13210827     Good reads:  -Tiny Habits by CORTEZ Mendiola, PhD  -Eat What You Love Love What You Eat by Jessica Orona MD       Sleep: Current hours of  sleep: 8 hours   Sleep hygiene:need 8 hours of darkness in bed  1-Bed time routine, non-stimulating activities, no electronics 2 hours before desire to sleep, turn down lights 2-3 hours before bed, breath work, stretching. Go to bed at a consistent time, and wake up at same time.  2-remove clocks from room  3-refrain from checking electronics when wake up, set goal to wait .  4- Set room temp for 65 F  5- warm body before bed with warm shower, bath, hot tub, or sauna, or wear socks to bed  6-Limit caffeine, take probiotics daily,  eat complex carbs with dinner, tryptophan with evening meal: Turkey, chicken, fish, nuts, seeds, milk.  7-Wake up and go to bed at consistent time. If need to sleep more, it is better to go to bed earlier than to sleep in later because body is sending signals at breakfast time to prepare for a meal, and you will not feel rested when wake up.  8- Go outside to get direct sunlight for 10 minutes when wake up  consider [employer] timer rik for bedtime routine  [email or web address] for MEQ test    Goal:8 hours    3-5 mg melatonin nightly, 300-420 mg magnesium glycinate nightly, 200-400 mg L-theanine nightly. Insomnitol      1-month weight loss goals were discussed and mutually agreed upon:     -Nutrition:  Eating window: 8am-6pm    - Initiate food/fluid log using mobile rik ([employer] or [employer]) or written journal and bring this with you to next clinic visit.   Macros  1500 calories day  Protein 113 gm   gm  Fat 60 gm  Fiber 20-30 gm    Breakfast:30-40 grams of protein with 1 serving fruit or vegetable(15-30 gm carbohydrate)  Lunch:30-40 grams protein with 2 servings vegetable or fruit(15-30 gm carbohydrate)  Dinner: 30-40 grams protein with 2 servings vegetable or fruit(15-30 gm carbohydrate)  Potion control follow myplate    1-2 servings of nuts/vegetables    1-  Daily Protein goal: 113 gm day (1gm/lb IBW). 20-30 gm protein at each meal.   First meal: 30-45 grams of protein within 60-90  minutes of waking up to initiate/stimulate muscle building for 5 hours.  Breakfast ideas: breakfast casserole, omelet with vegetables, overnight oats- add greek yogurt and whey protein powder, smoothie, Greek yogurt or cottage cheese with fruit and nut/seeds, protein pancakes, egg/vegetables/bean burrito, oatmeal with 1/4 cup egg whites      Whey protein powder ideas: Levels, Naked Whey, Vital Performance, Transparent labs, Unjury unflavored whey protein  Recipies:  https://Scancell.RPost/30-grams-of-protein-for-breakfast-ideas/#20-protein-breakfast-recipe-ideas  https://[email or web address].Cellectis/high-protein-breakfasts  https://experiencelife.Xobni.life/article/heres-what-30-grams-of-protein-looks-like/Breakfast    30 gm of protein= 4-5 oz meat, 5 eggs/whites, 1.5 cups greek yogurt or cottage cheese, whey protein powder    Snacks: limit nuts and cheese. greek yogurt-jerky, hard boiled eggs, unsalted seeds/nuts or fruit/vegetables, legumes/beans. Limit snacking to help with insulin sensitivity       2- Carbohydrates-15-30 gm at one setting, and keep daily carbohydrates around 100 gm day. Choose unprocessed carbohydrates:  Vegetables. All of them. It’s best to eat a variety of vegetables every day.  Whole fruits. Apples, bananas, strawberries, etc.  Legumes. Lentils, kidney beans, peas, etc.  Nuts. Almonds, walnuts, hazelnuts, macadamia nuts, peanuts, etc.  Seeds. Santana seeds and pumpkin seeds.  Whole grains. Choose grains that are truly whole, as in pure oats, quinoa, brown rice, etc.  Tubers. Potatoes, sweet potatoes, etc.  Limit or remove  Sugary drinks. These are sodas, fruit juices with added sugar, and beverages sweetened with high fructose corn syrup.  White bread. These are refined carbohydrates that are low in essential nutrients and have a negative effect on metabolic health. This applies to most commercially available breads.  Pastries, cookies and cakes. These foods tend to be very high in sugar and  refined wheat.  Ice cream. Most types of ice cream are very high in sugar, although there are exceptions.  Candies and chocolates. If you’re going to eat chocolate, choose quality dark chocolate.  French fries and potato chips. Whole potatoes are healthy. However, french fries and potato chips don’t provide the nutritional benefits that whole potatoes do.    Healthy fats choices include animal fat, butter, full-fat dairy, unheated olive oil, coconut oil, avocado oil. Have 2 servings healthy fat day.      Breakfast:30-40 grams of protein with 1 serving fruit or vegetable(15-30 gm carbohydrate)  Lunch:30-40 grams protein with 2 servings vegetable or fruit(15-30 gm carbohydrate)  Dinner: 30-40 grams protein with 2 servings vegetable or fruit(15-30 gm carbohydrate)    2-Limit liquid calories    3-Limit food that is white/colorless/processed. Eat 4-5 servings fruit/vegetables every day.    4-Timing:First 6-8  hours of the day most important to get in quality nutrition, also helps decrease evening snacking/cravings. Eat carbohydrates/starches at meal time: fruits, vegetables, and whole grains paired with a protein.    5- Water: >64 oz day Drink 8 -16 oz water before caffeine      - Physical Activity Goal:   Maintaining and improving health: 150 minutes/week  Prevention of weight gain: 150-250 minutes/week  Promote clinically significant weight loss: 225-420  minutes/week  Prevention of weight regain after weight loss: 200-300 minutes/week  Cardio:  3 times week 30-40 minutes swimming, 10-20 minutes bike    Strength training:   Bands look at You tube 10 minutes, chair exercises for seniors seated     -Referrals have been placed. If you do not hear from them in 1-2 weeks you may call their office. See green sheet with contact information.     Corrine Holden will RTC in  1 month for ongoing medically supervised weight management.  She participated in the decision making process and agreed with the plan of care.  All  questions were sought and answered. To schedule appointment call Arabella Betancourt [phone]    TOMAS Love    Total Time spent >60 minutes.

## 2024-03-13 NOTE — TELEPHONE ENCOUNTER
Phoned patient, patient made aware of this (PCP to fill out paperwork) patient stated she can get the paperwork at her appt on 3/21

## 2024-03-19 ENCOUNTER — TELEPHONE (OUTPATIENT)
Dept: PAIN MEDICINE | Facility: HOSPITAL | Age: 69
End: 2024-03-19
Payer: MEDICARE

## 2024-03-19 NOTE — TELEPHONE ENCOUNTER
Pt called asking if Provider can add an order MRI of shoulder,She is having one done on 4/11/24 and will be sedated. Pt states Lola has not seen pt for this. Pt was advised that Kourtney cannot write order because she has not been seen for this. Pt understood.

## 2024-03-21 ENCOUNTER — ANCILLARY PROCEDURE (OUTPATIENT)
Dept: RADIOLOGY | Facility: CLINIC | Age: 69
End: 2024-03-21
Payer: MEDICARE

## 2024-03-21 ENCOUNTER — OFFICE VISIT (OUTPATIENT)
Dept: INTERNAL MEDICINE | Facility: CLINIC | Age: 69
End: 2024-03-21
Payer: MEDICARE

## 2024-03-21 VITALS
TEMPERATURE: 97.3 F | SYSTOLIC BLOOD PRESSURE: 138 MMHG | DIASTOLIC BLOOD PRESSURE: 86 MMHG | OXYGEN SATURATION: 95 % | HEART RATE: 66 BPM | HEIGHT: 64 IN | BODY MASS INDEX: 50.12 KG/M2 | RESPIRATION RATE: 16 BRPM

## 2024-03-21 DIAGNOSIS — M25.512 CHRONIC LEFT SHOULDER PAIN: ICD-10-CM

## 2024-03-21 DIAGNOSIS — M25.511 CHRONIC RIGHT SHOULDER PAIN: ICD-10-CM

## 2024-03-21 DIAGNOSIS — I35.8 AORTIC VALVE SCLEROSIS: ICD-10-CM

## 2024-03-21 DIAGNOSIS — Z86.79 HISTORY OF SUPRAVENTRICULAR TACHYCARDIA: ICD-10-CM

## 2024-03-21 DIAGNOSIS — G89.29 CHRONIC LEFT SHOULDER PAIN: ICD-10-CM

## 2024-03-21 DIAGNOSIS — R73.01 IMPAIRED FASTING GLUCOSE: ICD-10-CM

## 2024-03-21 DIAGNOSIS — Z01.818 PREOP EXAMINATION: Primary | ICD-10-CM

## 2024-03-21 DIAGNOSIS — I51.7 LEFT VENTRICULAR HYPERTROPHY: ICD-10-CM

## 2024-03-21 DIAGNOSIS — G47.33 OBSTRUCTIVE SLEEP APNEA SYNDROME: ICD-10-CM

## 2024-03-21 DIAGNOSIS — G89.29 CHRONIC RIGHT SHOULDER PAIN: ICD-10-CM

## 2024-03-21 PROCEDURE — G0463 HOSPITAL OUTPT CLINIC VISIT: HCPCS | Mod: PO | Performed by: INTERNAL MEDICINE

## 2024-03-21 PROCEDURE — 73030 X-RAY EXAM OF SHOULDER: CPT | Mod: LT,PO

## 2024-03-21 PROCEDURE — 73030 X-RAY EXAM OF SHOULDER: CPT | Mod: RT,PO

## 2024-03-21 PROCEDURE — 99214 OFFICE O/P EST MOD 30 MIN: CPT | Performed by: INTERNAL MEDICINE

## 2024-03-21 ASSESSMENT — ENCOUNTER SYMPTOMS
PSYCHIATRIC NEGATIVE: 1
DIZZINESS: 0
ENDOCRINE NEGATIVE: 1
NERVOUS/ANXIOUS: 0
TROUBLE SWALLOWING: 0
FREQUENCY: 0
DYSURIA: 0
DIFFICULTY URINATING: 0
VOMITING: 0
UNEXPECTED WEIGHT CHANGE: 0
NECK STIFFNESS: 0
ACTIVITY CHANGE: 0
RHINORRHEA: 0
POLYPHAGIA: 0
SLEEP DISTURBANCE: 0
ARTHRALGIAS: 1
FLANK PAIN: 0
ADENOPATHY: 0
HEADACHES: 0
BLOOD IN STOOL: 0
LIGHT-HEADEDNESS: 0
DYSPHORIC MOOD: 0
NUMBNESS: 0
RESPIRATORY NEGATIVE: 1
WHEEZING: 0
CONSTIPATION: 0
HEMATURIA: 0
NAUSEA: 0
PHOTOPHOBIA: 0
SEIZURES: 0
SHORTNESS OF BREATH: 0
SINUS PRESSURE: 0
NEUROLOGICAL NEGATIVE: 1
WEAKNESS: 0
ABDOMINAL DISTENTION: 0
VOICE CHANGE: 0
CARDIOVASCULAR NEGATIVE: 1
CONSTITUTIONAL NEGATIVE: 1
ABDOMINAL PAIN: 0
EYES NEGATIVE: 1
HEMATOLOGIC/LYMPHATIC NEGATIVE: 1
CHILLS: 0
SPEECH DIFFICULTY: 0
BACK PAIN: 0
DIAPHORESIS: 0
POLYDIPSIA: 0
MYALGIAS: 0
APPETITE CHANGE: 0
BRUISES/BLEEDS EASILY: 0
FATIGUE: 0
CHEST TIGHTNESS: 0
JOINT SWELLING: 0
FEVER: 0
DIARRHEA: 0
COUGH: 0
GASTROINTESTINAL NEGATIVE: 1
TREMORS: 0
SORE THROAT: 0
PALPITATIONS: 0
NECK PAIN: 0

## 2024-03-21 ASSESSMENT — PAIN SCALES - GENERAL: PAINLEVEL: 9

## 2024-03-21 NOTE — PROGRESS NOTES
Subjective      Corrine Holden is a 69 y.o. female who presents for Pre-op Exam (MRI 04/11/24)  .    HPI    Corrine is here for preop prior to MRI sedation planned on April 11.    Corrine had been struggling with recovery after total ankle arthroplasty.  Her initial surgery for this was October 2022.  She ended up having 2 additional surgeries.  During that time, she could not be active and put on some weight.  She just recently has been doing better with her ankle and has been returning to exercise.  However, now, her low back has been hurting.  She went to pain management and they are wanting to update her lumbar MRI but she does need sedation for this.    She says she also has been having trouble with her shoulders for many years and if she is going to undergo the risk of sedation for MRI, she thinks her shoulders will need to be done as well.  She has pain in both shoulders but especially in the left.  She has had issues for years, she recalls a shoulder injection 5 or 6 years ago but the pain has worsened recently.  She cannot sleep on the left shoulder without waking up in pain overnight.  She says sometimes when she is just sitting there and relaxes she feels the shoulder almost pull out and there is a sharp pain otherwise, there are any specific movements that she has noticed that bring on the pain.  She is careful when she is lifting things overhead.  She just started swimming for exercise.  She uses Voltaren gel often when the shoulders are acting up.  She also takes 200 mg of ibuprofen 1-2 times daily alternating with some Tylenol.    Corrine has no history of MI, CVA, CKD, DM, CHF, valvular heart disease.  She has had hx of SVT but has not had problems recently.  Occ will feel some palpitations after drinking coffee.  Echo in 2019 showed aortic valve sclerosis without stenosis and mild LVH.  Blood sugars have risen in the last year and are in prediabetic range.  She does have SHANA.     Just in the last 2 weeks,  "she has been going to the gym about 4 times per week.  She is swimming for 40 to 60 minutes and exercising on the bike for about 15 minutes.  She had no exertional chest discomfort with this.     No personal or family hx of problems with anesthesia.  No personal or family history of bleeding disorders.  No dental issues.    Past Medical History:   Diagnosis Date    GERD (gastroesophageal reflux disease)     H. pylori infection 2009    Hypothyroidism     Injury of back     STATES, \"THIS IS REASON FOR MRI\"    Microscopic hematuria     SHANA (obstructive sleep apnea)     USES CPAP    Osteoarthritis     Palpitations     Respiratory disease     Supraventricular tachycardia     STATES, \"COFFEE RELATED\", VERY INTERMITTENT    Tinnitus        Current Outpatient Medications   Medication Sig Dispense Refill    lidocaine (LIDODERM) 5 % patch Apply 1 patch topically daily as needed for pain scale 1-3/10 Apply to low back as needed. Remove & discard patch within 12 hours or as directed by MD. 30 patch 2    [START ON 4/1/2024] omeprazole (PriLOSEC) 20 mg capsule Take 1 capsule (20 mg total) by mouth daily 30 capsule 11    levothyroxine (SYNTHROID, LEVOTHROID) 125 mcg tablet Take 1 tablet (125 mcg total) by mouth daily 90 tablet 2    diazePAM (VALIUM) 5 mg tablet Take 1 tablet (5 mg total) by mouth every 12 (twelve) hours as needed for anxiety or muscle spasms Max Daily Amount: 10 mg 30 tablet 0    calcium citrate-vitamin D3 500 mg-12.5 mcg /5 gram powder Take by mouth      ascorbic acid, vitamin C, (ascorbic acid with vane hips) 500 mg tablet Take 2 tablets (1,000 mg total) by mouth daily      tiZANidine (ZANAFLEX) 2 mg tablet Take 1 tablet (2 mg total) by mouth every 6 (six) hours as needed for muscle spasms      albuterol HFA 90 mcg/actuation inhaler Inhale 2 puffs every 6 (six) hours as needed for wheezing 1 Inhaler 0    guaiFENesin (MUCINEX) 600 mg 12 hr tablet Take 2 tablets (1,200 mg total) by mouth 2 (two) times a day As " needed      ibuprofen (ADVIL,MOTRIN) 200 mg tablet Take 1 tablet (200 mg total) by mouth every 6 (six) hours as needed      vit C/E/Zn/coppr/lutein/zeaxan (OCUVITE LUTEIN AND ZEAXANTHIN ORAL) PreserVision AREDS-2      zinc 50 mg tablet Take 50 mg by mouth daily      diclofenac sodium (VOLTAREN) 1 % gel APPLY 2 GRAMS TO THE AFFECTED AREA(S) 4 TIMES PER DAY AS NEEDED 300 g 2    fluticasone propionate (FLONASE) 50 mcg/actuation nasal spray USE 2 SPRAYS IN EACH NOSTRIL DAILY 48 g 2    budesonide (Pulmicort Flexhaler) 90 mcg/actuation inhaler Inhale 1 puff as needed (for shortness of breath)      cholecalciferol, vitamin D3, 25 mcg (1,000 unit) capsule Take 6 capsules (6,000 Units total) by mouth Takes 5 times daily      krill-om-3-dha-epa-phospho-ast [id number] mg capsule Take 1 capsule by mouth daily      multivitamin (THERAGRAN) tablet tablet 1 Every Day      acetaminophen (TYLENOL EXTRA STRENGTH) 500 mg tablet Take 2 tablets (1,000 mg total) by mouth 2 (two) times a day      methylPREDNISolone (Medrol, Maxi,) 4 mg tablet follow package directions (Patient not taking: Reported on 3/12/2024) 21 tablet 0    ketorolac (TORADOL) 10 mg tablet Take 1 tablet (10 mg total) by mouth every 6 (six) hours as needed for pain scale 8-10/10 for up to 5 days (Patient not taking: Reported on 2/12/2024) 20 tablet 0    meloxicam (MOBIC) 7.5 mg tablet Take 1 tablet (7.5 mg total) by mouth daily (Patient not taking: Reported on 3/12/2024) 90 tablet 3    cyclobenzaprine (FLEXERIL) 10 mg tablet Take 1 tablet (10 mg total) by mouth 2 (two) times a day as needed for muscle spasms. (Patient not taking: Reported on 3/12/2024) 60 tablet 0     No current facility-administered medications for this visit.      Allergies   Allergen Reactions    Bee Venom Protein (Honey Bee) Swelling    No Known Drug Allergies        Past Surgical History:   Procedure Laterality Date    CARDIAC ELECTROPHYSIOLOGY STUDY AND ABLATION  01/01/2007    attempted ablation  for SVT    CARPAL TUNNEL RELEASE Bilateral 06/26/2020    Procedure: LEFT ENDOSCOPIC CARPAL TUNNEL RELEASE, RIGHT OPEN CARPAL TUNNEL;  Surgeon: Mendez Sun MD;  Location: [facility] OR;  Service: Orthopedics;  Laterality: Bilateral;    COLONOSCOPY  01/21/2015    cecal polyp-tubular adenoma-recommend repeat 5 years    COLONOSCOPY N/A 06/22/2020    repeat 5 years - adenomatous polyp    DILATION AND CURETTAGE OF UTERUS  01/01/2006    ENDOMETRIAL BIOPSY  01/01/2012    pt reports negative    ESOPHAGOGASTRODUODENOSCOPY  01/01/2009    H pylori    ESOPHAGOGASTRODUODENOSCOPY N/A 06/22/2020    Procedure: ESOPHAGOGASTRODUODENOSCOPY;  Surgeon: Julian Faulkner MD;  Location: [facility];  Service: Endoscopy;  Laterality: N/A;    KNEE ARTHROPLASTY Right 04/01/2015    Dr Placido BRICE    TOTAL ANKLE ARTHROPLASTY Left 10/03/2022    Dr. Guerra, with talonavicular and subtalar fusion    TOTAL ANKLE ARTHROPLASTY Left 02/20/2023    total revision of previous surgery, fusion of non-unions    TOTAL ANKLE ARTHROPLASTY Left 11/13/2023    w/ fusion to foot joints    VAGINAL HYSTERECTOMY  12/16/2013    with BSO, fibroids     Family History   Problem Relation Age of Onset    Cancer Mother 84        Bladder    Osteoporosis Mother     Hypertension Mother     Colon cancer Father 65    Kidney cancer Father 77    Other Father's Brother         sudden cardiac death, 60s    Heart disease Maternal Grandfather     Heart attack Maternal Grandfather 59    Cancer Paternal Grandfather      Social History     Tobacco Use    Smoking status: Never    Smokeless tobacco: Never   Substance Use Topics    Alcohol use: No       Review of Systems   Constitutional: Negative.  Negative for activity change, appetite change, chills, diaphoresis, fatigue, fever and unexpected weight change.   HENT:  Negative for congestion, ear pain, hearing loss, mouth sores, nosebleeds, postnasal drip, rhinorrhea, sinus pressure, sneezing, sore throat, tinnitus, trouble swallowing and  "voice change.    Eyes: Negative.  Negative for photophobia and visual disturbance.   Respiratory: Negative.  Negative for cough, chest tightness, shortness of breath and wheezing.    Cardiovascular: Negative.  Negative for chest pain, palpitations and leg swelling.   Gastrointestinal: Negative.  Negative for abdominal distention, abdominal pain, blood in stool, constipation, diarrhea, nausea and vomiting.   Endocrine: Negative.  Negative for cold intolerance, heat intolerance, polydipsia, polyphagia and polyuria.   Genitourinary: Negative.  Negative for difficulty urinating, dysuria, flank pain, frequency, hematuria and urgency.   Musculoskeletal:  Positive for arthralgias. Negative for back pain, gait problem, joint swelling, myalgias, neck pain and neck stiffness.   Skin: Negative.  Negative for rash.   Neurological: Negative.  Negative for dizziness, tremors, seizures, syncope, speech difficulty, weakness, light-headedness, numbness and headaches.   Hematological: Negative.  Negative for adenopathy. Does not bruise/bleed easily.   Psychiatric/Behavioral: Negative.  Negative for dysphoric mood and sleep disturbance. The patient is not nervous/anxious.          Objective     Vitals  /86 (BP Location: Left arm, Patient Position: Sitting, Cuff Size: Long Adult)   Pulse 66   Temp 36.3 °C (97.3 °F) (Temporal)   Resp 16   Ht 1.626 m (5' 4\")   SpO2 95%   BMI 50.12 kg/m²     Physical Exam   GEN: pleasant, NAD  HEENT: PERRLA, sclera anicteric  NECK: no LAD or thyromegaly, no carotid bruits  LUNGS: CTAB, no wheezing rhonchi or rales  HEART: RRR no murmurs rubs or gallops  ABD: Soft, non tender, non distended, normal bowel sounds , no hepatosplenomegaly  Ext: Warm and well perfused, no edema  SKIN: no rash  Neuro:  CN 2-12 grossly intact, non focal, moving all extremities      Assessment/Plan   Diagnoses and all orders for this visit:    Preop examination    Chronic right shoulder pain  -     X-ray shoulder 2 or " more views right  -     MR shoulder right without contrast; Future    Chronic left shoulder pain  -     X-ray shoulder 2 or more views left  -     MR shoulder left without contrast; Future    Aortic valve sclerosis    Obstructive sleep apnea syndrome    Impaired fasting glucose    Left ventricular hypertrophy    History of supraventricular tachycardia      1. Preop before MRI sedation.  Corrine is a pleasant 70 yo lady here for preop prior to MRI sedation. She no history of MI, CVA, CKD, DM, CHF, valvular heart disease.  She has had hx of SVT but has not had problems recently.  She has no active cardiac conditions and is able to perform 4 METS of activity without SOB or chest pain. EKG was not repeated today as she had 1 in October prior to her recent surgery that showed sinus rhythm rate of 66, borderline LAD , diffuse T wave changes similar to prior.   Labs were done last month and were stable so were not repeated.  She can proceed to her sedation as planned.  2.  Chronic shoulder pain, left worse than right.  She has been having pain for years but especially on the left side it has worsened.  Sometimes the left side feels like it catches or falls out of alignment and cause severe pain.  X-rays today show degenerative changes, worse in the left shoulder.  Will order shoulder MRIs to try to schedule at the same time as her back MRI but I told her this may not be possible with the scheduling constraints.  At minimum, we will try to get the left shoulder scheduled as that is the one that is causing the most issues currently.    CM MON MD

## 2024-03-29 ENCOUNTER — TELEPHONE (OUTPATIENT)
Dept: ADMINISTRATIVE | Facility: HOSPITAL | Age: 69
End: 2024-03-29
Payer: MEDICARE

## 2024-04-01 ENCOUNTER — TELEPHONE (OUTPATIENT)
Dept: PAIN MEDICINE | Facility: HOSPITAL | Age: 69
End: 2024-04-01
Payer: MEDICARE

## 2024-04-02 ENCOUNTER — PROCEDURE VISIT (OUTPATIENT)
Dept: PAIN MEDICINE | Facility: HOSPITAL | Age: 69
End: 2024-04-02
Payer: MEDICARE

## 2024-04-02 ENCOUNTER — HOSPITAL ENCOUNTER (OUTPATIENT)
Dept: FLUOROSCOPY | Facility: HOSPITAL | Age: 69
Discharge: STILL ADMITTED OR ADMINISTRATIVE | End: 2024-04-02
Payer: MEDICARE

## 2024-04-02 ENCOUNTER — TELEPHONE (OUTPATIENT)
Dept: INTERNAL MEDICINE | Facility: CLINIC | Age: 69
End: 2024-04-02

## 2024-04-02 VITALS — DIASTOLIC BLOOD PRESSURE: 86 MMHG | HEART RATE: 90 BPM | OXYGEN SATURATION: 91 % | SYSTOLIC BLOOD PRESSURE: 148 MMHG

## 2024-04-02 DIAGNOSIS — M54.16 LUMBAR RADICULOPATHY: ICD-10-CM

## 2024-04-02 PROCEDURE — 6360000200 HC RX 636 W HCPCS (ALT 250 FOR IP): Mod: JZ

## 2024-04-02 PROCEDURE — 2550000100 HC RX 255

## 2024-04-02 PROCEDURE — 77002 NEEDLE LOCALIZATION BY XRAY: CPT | Mod: NC

## 2024-04-02 PROCEDURE — 62323 NJX INTERLAMINAR LMBR/SAC: CPT

## 2024-04-02 RX ORDER — TRIAMCINOLONE ACETONIDE 40 MG/ML
40 INJECTION, SUSPENSION INTRA-ARTICULAR; INTRAMUSCULAR ONCE
Status: COMPLETED | OUTPATIENT
Start: 2024-04-02 | End: 2024-04-02

## 2024-04-02 RX ADMIN — TRIAMCINOLONE ACETONIDE 40 MG: 40 INJECTION, SUSPENSION INTRA-ARTICULAR; INTRAMUSCULAR at 11:17

## 2024-04-02 ASSESSMENT — PAIN - FUNCTIONAL ASSESSMENT: PAIN_FUNCTIONAL_ASSESSMENT: 0-10

## 2024-04-02 NOTE — PROGRESS NOTES
Procedures  Date of service:4/2/2024    Procedure: Lumbar Epidural Steroid Injection    Preprocedure Diagnosis:   1.  Lumbar radiculitis  2.  Lumbar neuroforaminal stenosis    Postprocedure Diagnosis:   1.  Lumbar radiculitis  2.  Lumbar neuroforaminal stenosis    Anesthesia: Local     Complications: None    Findings: Successful Block    History: 69-year-old female presents with severe back pain that at times has gone into the feet.  On physical examination strength is normal.  She did have an epidural injection in June which gave her greater than 80% relief of her pain for several months.  She presents today for repeat intervention in hopes of continued pain control.  She feels this pain is very debilitating for her and limits her function throughout the day.  It does awaken her at night.  Risks and benefits of the procedure were discussed with the patient and she would like to proceed with this intervention today.    Procedure:   Consent obtained. Patient brought to fluoroscopy suite and positioned in a prone position. Time out procedure completed. Back betadyne prepped and draped. Subcutaneous tissues localized with 1% Lidocaine. Utilizing fluoroscopy the interlaminar space was identified at L5-S1. Utilizing an 18 G 15cm epidural needle the epidural space was entered on the slight right side with clear loss of resistance to saline. Aspiration negative for CSF or blood. No paresthesias. Lateral image confirmed proper depth.  Contrast demonstrated epidural spread.  A therapeutic solution consisting of 40mg Kenalog and 2ml normal saline was easily injected. Patient tolerated procedure well and was discharged home in stable condition.     Plan:   Will have patient follow up with NP in 2-3 weeks.  She may benefit from lumbar facet injections in the future.

## 2024-04-02 NOTE — TELEPHONE ENCOUNTER
Corrine is currently experiencing a dilemma, she already has her back MRI scheduled for 04/11. She asked was it possible to get her back and both shoulders done on the same day but they advised her that she would only be able to get two. So she is currently thinking about getting the back MRI as well as her left shoulder (which is the worse) done on 04/11 and then wait to get the right shoulder MRI done later since she isn't experiencing much pain from it. They also advised her that she could have the third MRI done on 04/18, which is a week apart but she doesn't want to do anesthetic twice in a short period of time.    She was also offered the option to have all three MRI's done in May, but she is concerned that she would need to have another pre-op appointment. I advised her that I would send Dr. Joy a message to get her opinion on what Corrine should do.

## 2024-04-02 NOTE — PATIENT INSTRUCTIONS
Epidural Steroid Injection    An epidural steroid injection is a shot of steroid medicine and numbing medicine that is given into the space between the spinal cord and the bones of the back (epidural space). The shot helps relieve pain caused by an irritated or swollen nerve root.  The amount of pain relief you get from the injection depends on what is causing the nerve to be swollen and irritated, and how long your pain lasts. You are more likely to benefit from this injection if your pain is strong and comes on suddenly rather than if you have had long-term (chronic) pain.  Tell a health care provider about:  Any allergies you have.  All medicines you are taking, including vitamins, herbs, eye drops, creams, and over-the-counter medicines.  Any problems you or family members have had with anesthetic medicines.  Any blood disorders you have.  Any surgeries you have had.  Any medical conditions you have.  Whether you are pregnant or may be pregnant.  What are the risks?  Generally, this is a safe procedure. However, problems may occur, including:  Headache.  Bleeding.  Infection.  Allergic reaction to medicines.  Nerve damage.  What happens before the procedure?  Staying hydrated  Follow instructions from your health care provider about hydration, which may include:  Up to 2 hours before the procedure - you may continue to drink clear liquids, such as water, clear fruit juice, black coffee, and plain tea.  Eating and drinking restrictions  Follow instructions from your health care provider about eating and drinking, which may include:  8 hours before the procedure - stop eating heavy meals or foods, such as meat, fried foods, or fatty foods.  6 hours before the procedure - stop eating light meals or foods, such as toast or cereal.  6 hours before the procedure - stop drinking milk or drinks that contain milk.  2 hours before the procedure - stop drinking clear liquids.  Medicines  You may be given medicines to lower  anxiety.  Ask your health care provider about:  Changing or stopping your regular medicines. This is especially important if you are taking diabetes medicines or blood thinners.  Taking medicines such as aspirin and ibuprofen. These medicines can thin your blood. Do not take these medicines unless your health care provider tells you to take them.  Taking over-the-counter medicines, vitamins, herbs, and supplements.  General instructions  Ask your health care provider what steps will be taken to prevent infection.  Plan to have a responsible adult take you home from the hospital or clinic.  If you will be going home right after the procedure, plan to have a responsible adult care for you for the time you are told. This is important.  What happens during the procedure?  An IV will be inserted into one of your veins.  You will be given one or more of the following:  A medicine to help you relax (sedative).  A medicine to numb the area (local anesthetic).  You will be asked to lie on your abdomen or sit.  The injection site will be cleaned.  A needle will be inserted through your skin into the epidural space. This may cause you some discomfort. An X-ray machine will be used to guide the needle as close as possible to the affected nerve.  A steroid medicine and a local anesthetic will be injected into the epidural space.  The needle and IV will be removed.  A bandage (dressing) will be put over the injection site.  The procedure may vary among health care providers and hospitals.  What can I expect after the procedure?  Your blood pressure, heart rate, breathing rate, and blood oxygen level will be monitored until you leave the hospital or clinic.  Your arm or leg may feel weak or numb for a few hours.  The injection site may feel sore.  Follow these instructions at home:  Injection site care  You may remove the bandage (dressing) after 24 hours.  Check your injection site every day for signs of infection. Check  for:  Redness, swelling, or pain.  Fluid or blood.  Warmth.  Pus or a bad smell.  Managing pain, stiffness, and swelling  For 24 hours after the procedure:  Avoid using heat on the injection site.  Do not take baths, swim, or use a hot tub until your health care provider approves. Ask your health care provider if you may take showers. You may only be allowed to take sponge baths.  If directed, put ice on the injection site. To do this:    Put ice in a plastic bag.  Place a towel between your skin and the bag.  Leave the ice on for 20 minutes, 2-3 times a day.     Activity  If you were given a sedative during the procedure, it can affect you for several hours. Do not drive or operate machinery until your health care provider says that it is safe.  Return to your normal activities as told by your health care provider. Ask your health care provider what activities are safe for you.  General instructions  Take over-the-counter and prescription medicines only as told by your health care provider.  Drink enough fluid to keep your urine pale yellow.  Keep all follow-up visits as told by your health care provider. This is important.  Contact a health care provider if:  You have any of these signs of infection:  Redness, swelling, or pain around your injection site.  Fluid or blood coming from your injection site.  Warmth coming from your injection site.  Pus or a bad smell coming from your injection site.  A fever.  You continue to have pain and soreness around the injection site, even after taking over-the-counter pain medicine.  You have severe, sudden, or lasting nausea or vomiting.  Get help right away if:  You have severe pain at the injection site that is not relieved by medicines.  You develop a severe headache or a stiff neck.  You become sensitive to light.  You have any new numbness or weakness in your legs or arms.  You lose control of your bladder or bowel movements.  You have trouble breathing.  Summary  An  epidural steroid injection is a shot of steroid medicine and numbing medicine that is given into the epidural space.  The shot helps relieve pain caused by an irritated or swollen nerve root.  You are more likely to benefit from this injection if your pain is strong and comes on suddenly rather than if you have had chronic pain.  This information is not intended to replace advice given to you by your health care provider. Make sure you discuss any questions you have with your health care provider.  Document Revised: 11/17/2022 Document Reviewed: 06/29/2020  Elsevier Patient Education © 2023 [employer]

## 2024-04-02 NOTE — TELEPHONE ENCOUNTER
Caller would like to discuss (a)  clinical question  Writer has advised caller of a callback from within 24 hours.    Patient: Corrine Holden    Caller Name (Last and first, relation/role): self    Name of Facility: [facility]    Callback Number: [phone]    Best Availability: any    Fax Number: na    Additional Info: would like to talk with nurse about shoulder MRI,    Did you confirm the message with the caller: Yes    Is it okay that the nurse communicates your response through MyChart? No

## 2024-04-03 NOTE — TELEPHONE ENCOUNTER
Spoke to Corrine and advised that Dr. Joy recommends she does the left shoulder and back MRI on 04/11/24, since her right isn't causing any issues. She doesn't want her to wait and delay other treatment.

## 2024-04-05 RX ORDER — CHOLECALCIFEROL (VITAMIN D3) 25 MCG
2000 TABLET ORAL 2 TIMES DAILY
COMMUNITY

## 2024-04-05 NOTE — PRE-PROCEDURE INSTRUCTIONS
Pre-Surgery Instructions:   Medication Instructions    cholecalciferol, vitamin D3, 25 mcg (1,000 unit) tablet Do not take/administer morning of surgery/procedure    omeprazole (PriLOSEC) 20 mg capsule Continue as prescribed do not hold    levothyroxine (SYNTHROID, LEVOTHROID) 125 mcg tablet Continue as prescribed do not hold    diazePAM (VALIUM) 5 mg tablet Continue as prescribed do not hold    ascorbic acid, vitamin C, (ascorbic acid with vane hips) 500 mg tablet Do not take/administer morning of surgery/procedure    methylPREDNISolone (Medrol, Maxi,) 4 mg tablet Continue as prescribed do not hold    ketorolac (TORADOL) 10 mg tablet Continue as prescribed do not hold    albuterol HFA 90 mcg/actuation inhaler Continue as prescribed do not hold    ibuprofen (ADVIL,MOTRIN) 200 mg tablet Continue as prescribed do not hold    vit C/E/Zn/coppr/lutein/zeaxan (OCUVITE LUTEIN AND ZEAXANTHIN ORAL) Do not take/administer morning of surgery/procedure    diclofenac sodium (VOLTAREN) 1 % gel Do not take/administer morning of surgery/procedure    fluticasone propionate (FLONASE) 50 mcg/actuation nasal spray Continue as prescribed do not hold    budesonide (Pulmicort Flexhaler) 90 mcg/actuation inhaler Continue as prescribed do not hold    krill-om-3-dha-epa-phospho-ast 477-339-01-75 mg capsule Do not take/administer morning of surgery/procedure    cyclobenzaprine (FLEXERIL) 10 mg tablet Continue as prescribed do not hold    multivitamin (THERAGRAN) tablet tablet Do not take/administer morning of surgery/procedure    acetaminophen (TYLENOL EXTRA STRENGTH) 500 mg tablet Continue as prescribed do not hold    lidocaine (LIDODERM) 5 % patch Do not take/administer morning of surgery/procedure       Diet instructions for surgery:  Nothing to eat after Midnight; clears until 0500.    Preop questionnaire:  Preop Questionairre  Does patient have physical restrictions or limitations?: Yes (uses cane currently)  Has patient had an upper  respiratory tract infection in the last 2 weeks?: No  Home Oxygen: No    Preop instructions:  Pre-op Phone Call  Surgery Time Verified: Yes (MRI 04/11/24 at 0900am)  Arrival Time Verified: Yes (0745am)  Surgery Location Verified: Yes  Instructed to shower within 12 hours: Yes  Patient has special physician orders: No  Does Patient Require Bowel Prep for Procedure?: No  Instructed to remove/not apply makeup, petroleum products, lotion, powder, scents, or deodorant on the morning of surgery: Yes  Recommend eye glasses for day of surgery, contact lenses must be removed prior to surgery:: Yes  Instructed to bring CPAP mask: Yes  Instructed to bring Oxygen tank from home: N/A  Does the patient wear a glucose monitoring device?: N/A  Instructed to remove all jewelry, including piercings, and leave at home.: Yes  Instructed to leave all valuables at home: Yes  Instructed to leave all medications at home: Yes  Instructed to bring a valid photo ID, insurance card and form of payment: Yes  NPO Status Reinforced: Yes (NPO at midnight; clears until 0500)  Instruct patient to ensure transportation is available following surgery/procedure:: Yes  Consider having a caregiver stay with the patient for 24 hours following anesthesia:: Yes

## 2024-04-09 ENCOUNTER — TELEPHONE (OUTPATIENT)
Dept: SLEEP MEDICINE | Facility: HOSPITAL | Age: 69
End: 2024-04-09
Payer: MEDICARE

## 2024-04-09 NOTE — TELEPHONE ENCOUNTER
Spoke with this patient about needing to do a new diagnostic study to get them qualified according to medicare criteria. Explained to the patient that wearing PAP alleviates the inflamation and irritation in the airway, and that on a first night without PAP we may not get a true picture of the severity of their sleep disordered breathing. Asked patient that if they felt comfortable going without PAP for 2-3 nights it would be beneficial for the diagnosis of sleep disordered breathing.  Patient states understanding and will try and go off PAP.

## 2024-04-10 ENCOUNTER — ANESTHESIA EVENT (OUTPATIENT)
Dept: MRI IMAGING | Facility: HOSPITAL | Age: 69
End: 2024-04-10
Payer: MEDICARE

## 2024-04-11 ENCOUNTER — HOSPITAL ENCOUNTER (OUTPATIENT)
Dept: MRI IMAGING | Facility: HOSPITAL | Age: 69
Discharge: HOME | End: 2024-04-11
Payer: MEDICARE

## 2024-04-11 ENCOUNTER — TELEPHONE (OUTPATIENT)
Dept: PAIN MEDICINE | Facility: HOSPITAL | Age: 69
End: 2024-04-11
Payer: MEDICARE

## 2024-04-11 ENCOUNTER — ANESTHESIA (OUTPATIENT)
Dept: MRI IMAGING | Facility: HOSPITAL | Age: 69
End: 2024-04-11
Payer: MEDICARE

## 2024-04-11 VITALS
RESPIRATION RATE: 17 BRPM | OXYGEN SATURATION: 97 % | DIASTOLIC BLOOD PRESSURE: 100 MMHG | SYSTOLIC BLOOD PRESSURE: 140 MMHG | HEART RATE: 84 BPM | TEMPERATURE: 97.8 F

## 2024-04-11 VITALS
DIASTOLIC BLOOD PRESSURE: 85 MMHG | SYSTOLIC BLOOD PRESSURE: 126 MMHG | RESPIRATION RATE: 18 BRPM | TEMPERATURE: 97.6 F | HEART RATE: 69 BPM

## 2024-04-11 DIAGNOSIS — M54.16 LUMBAR RADICULOPATHY: ICD-10-CM

## 2024-04-11 DIAGNOSIS — M25.512 CHRONIC LEFT SHOULDER PAIN: ICD-10-CM

## 2024-04-11 DIAGNOSIS — G89.29 CHRONIC LEFT SHOULDER PAIN: ICD-10-CM

## 2024-04-11 PROCEDURE — (BLANK) HC RECOVERY PHASE-1 1ST  HOUR ACUITY LEVEL 2

## 2024-04-11 PROCEDURE — 01922 ANES N-INVAS IMG/RADJ THER: CPT

## 2024-04-11 PROCEDURE — 6360000200 HC RX 636 W HCPCS (ALT 250 FOR IP)

## 2024-04-11 PROCEDURE — 2580000300 HC RX 258

## 2024-04-11 PROCEDURE — 73221 MRI JOINT UPR EXTREM W/O DYE: CPT | Mod: LT

## 2024-04-11 PROCEDURE — (BLANK) HC RECOVERY PHASE-2 1ST 1/2 HOUR ACUITY LEVEL 1

## 2024-04-11 PROCEDURE — (BLANK) HC GENERAL ANESTHESIA FACILITY CHARGE EACH ADDITIONAL MIN

## 2024-04-11 PROCEDURE — 72148 MRI LUMBAR SPINE W/O DYE: CPT

## 2024-04-11 PROCEDURE — (BLANK) HC GENERAL ANESTHESIA FACILITY CHARGE 1ST 15 MIN

## 2024-04-11 RX ORDER — LABETALOL HYDROCHLORIDE 5 MG/ML
5 INJECTION, SOLUTION INTRAVENOUS EVERY 5 MIN PRN
Status: DISCONTINUED | OUTPATIENT
Start: 2024-04-11 | End: 2024-04-11 | Stop reason: HOSPADM

## 2024-04-11 RX ORDER — ONDANSETRON HYDROCHLORIDE 2 MG/ML
4 INJECTION, SOLUTION INTRAVENOUS ONCE
Status: CANCELLED | OUTPATIENT
Start: 2024-04-11 | End: 2024-04-11

## 2024-04-11 RX ORDER — SODIUM CHLORIDE, SODIUM LACTATE, POTASSIUM CHLORIDE, CALCIUM CHLORIDE 600; 310; 30; 20 MG/100ML; MG/100ML; MG/100ML; MG/100ML
INJECTION, SOLUTION INTRAVENOUS CONTINUOUS PRN
Status: DISCONTINUED | OUTPATIENT
Start: 2024-04-11 | End: 2024-04-11 | Stop reason: SURG

## 2024-04-11 RX ORDER — FENTANYL CITRATE/PF 50 MCG/ML
50 PLASTIC BAG, INJECTION (ML) INTRAVENOUS EVERY 5 MIN PRN
Status: DISCONTINUED | OUTPATIENT
Start: 2024-04-11 | End: 2024-04-11 | Stop reason: HOSPADM

## 2024-04-11 RX ORDER — ONDANSETRON HYDROCHLORIDE 2 MG/ML
INJECTION, SOLUTION INTRAVENOUS AS NEEDED
Status: DISCONTINUED | OUTPATIENT
Start: 2024-04-11 | End: 2024-04-11 | Stop reason: SURG

## 2024-04-11 RX ORDER — SODIUM CHLORIDE, SODIUM LACTATE, POTASSIUM CHLORIDE, CALCIUM CHLORIDE 600; 310; 30; 20 MG/100ML; MG/100ML; MG/100ML; MG/100ML
100 INJECTION, SOLUTION INTRAVENOUS CONTINUOUS
Status: CANCELLED | OUTPATIENT
Start: 2024-04-11

## 2024-04-11 RX ORDER — DEXAMETHASONE SODIUM PHOSPHATE 4 MG/ML
4 INJECTION, SOLUTION INTRA-ARTICULAR; INTRALESIONAL; INTRAMUSCULAR; INTRAVENOUS; SOFT TISSUE ONCE AS NEEDED
Status: DISCONTINUED | OUTPATIENT
Start: 2024-04-11 | End: 2024-04-11 | Stop reason: HOSPADM

## 2024-04-11 RX ORDER — SODIUM CHLORIDE 0.9 % (FLUSH) 0.9 %
2 SYRINGE (ML) INJECTION AS NEEDED
Status: CANCELLED | OUTPATIENT
Start: 2024-04-11

## 2024-04-11 RX ORDER — DIPHENHYDRAMINE HYDROCHLORIDE 50 MG/ML
25 INJECTION INTRAMUSCULAR; INTRAVENOUS ONCE AS NEEDED
Status: DISCONTINUED | OUTPATIENT
Start: 2024-04-11 | End: 2024-04-11 | Stop reason: HOSPADM

## 2024-04-11 RX ORDER — PROPOFOL 10 MG/ML
INJECTION, EMULSION INTRAVENOUS AS NEEDED
Status: DISCONTINUED | OUTPATIENT
Start: 2024-04-11 | End: 2024-04-11 | Stop reason: SURG

## 2024-04-11 RX ORDER — DEXAMETHASONE SODIUM PHOSPHATE 4 MG/ML
4 INJECTION, SOLUTION INTRA-ARTICULAR; INTRALESIONAL; INTRAMUSCULAR; INTRAVENOUS; SOFT TISSUE ONCE
Status: CANCELLED | OUTPATIENT
Start: 2024-04-11 | End: 2024-04-11

## 2024-04-11 RX ORDER — ONDANSETRON HYDROCHLORIDE 2 MG/ML
4 INJECTION, SOLUTION INTRAVENOUS ONCE AS NEEDED
Status: DISCONTINUED | OUTPATIENT
Start: 2024-04-11 | End: 2024-04-11 | Stop reason: HOSPADM

## 2024-04-11 RX ORDER — LIDOCAINE HYDROCHLORIDE 20 MG/ML
INJECTION, SOLUTION EPIDURAL; INFILTRATION; INTRACAUDAL; PERINEURAL AS NEEDED
Status: DISCONTINUED | OUTPATIENT
Start: 2024-04-11 | End: 2024-04-11 | Stop reason: SURG

## 2024-04-11 RX ADMIN — ONDANSETRON 4 MG: 2 INJECTION INTRAMUSCULAR; INTRAVENOUS at 09:12

## 2024-04-11 RX ADMIN — PROPOFOL 200 MG: 10 INJECTION, EMULSION INTRAVENOUS at 09:07

## 2024-04-11 RX ADMIN — SODIUM CHLORIDE, POTASSIUM CHLORIDE, SODIUM LACTATE AND CALCIUM CHLORIDE: 600; 310; 30; 20 INJECTION, SOLUTION INTRAVENOUS at 09:00

## 2024-04-11 RX ADMIN — LIDOCAINE HYDROCHLORIDE 100 MG: 20 INJECTION, SOLUTION EPIDURAL; INFILTRATION; INTRACAUDAL; PERINEURAL at 09:07

## 2024-04-11 NOTE — ANESTHESIA POSTPROCEDURE EVALUATION
Patient: Corrine Holden    Procedure Summary       Date: 04/11/24 Room / Location: [facility] MR Imaging    Anesthesia Start: 0900 Anesthesia Stop: 1018    Procedure: MR LUMBAR SPINE WO CONTRAST Diagnosis: Lumbar radiculopathy    Scheduled Providers: Evon De Leon CRNA; Virgilio Washington MD Responsible Provider: Virgilio Washington MD    Anesthesia Type: general ASA Status: 3            Anesthesia Type: general    Last vitals   Vitals Value Taken Time   /91 04/11/24 1020   Temp 36.2 °C (97.2 °F) 04/11/24 1011   Pulse 79 04/11/24 1020   Resp 17 04/11/24 1015   SpO2 98 % 04/11/24 1020   Pain Score 0 04/11/24 1015       Anesthesia Post Evaluation    Patient location during evaluation: PACU  Patient participation: complete - patient participated  Level of consciousness: awake and alert  Pain management: adequate  Airway patency: patent  Cardiovascular status: acceptable  Respiratory status: acceptable  Hydration status: acceptable  May dismiss recovered patient based on consultation with the appropriate physicians and/or meeting appropriate discharge criteria     No notable events documented.    Cosmetic?  This procedure is not cosmetic.

## 2024-04-11 NOTE — RESULT ENCOUNTER NOTE
Please see how patient is doing after the lumbar epidural.  I did review the MRI it does show some severe stenosis as well as some facet arthropathy.  We can offer a referral to neurosurgery if she is still having low back pain and radicular pain or we can follow-up as scheduled and review the MRI further.  If she has any questions please let me know

## 2024-04-11 NOTE — ANESTHESIA PREPROCEDURE EVALUATION
"Pre-Procedure Assessment    Patient: Corrine Holden, female, 69 y.o.    Ht Readings from Last 1 Encounters:   03/21/24 1.626 m (5' 4\")     Wt Readings from Last 1 Encounters:   03/12/24 132.5 kg (292 lb)       Last Vitals  BP      Temp      Pulse     Resp      SpO2      Pain Score         Problem list reviewed and Medical history reviewed    No history of anesthetic complications:    No family history of anesthetic complications:      Airway   Mallampati: II  TM distance: >3 FB  Neck ROM: full      Dental - normal exam     Pulmonary - normal exam   (+) sleep apnea  Cardiovascular - negative ROS and normal exam    Mental Status/Neuro/Psych    Pt is alert.      (+) psychiatric history, arthritis, peripheral neuropathy    GI/Hepatic/Renal    (+) GERD    Endo/Other    (+) hypothyroidism  Abdominal   (+) obese            Social History     Tobacco Use    Smoking status: Never    Smokeless tobacco: Never   Substance Use Topics    Alcohol use: No      Hematology   WBC   Date Value Ref Range Status   02/09/2024 6.1 4.5 - 10.5 10*3/uL Final     RBC   Date Value Ref Range Status   02/09/2024 4.75 3.70 - 5.30 10*6/µL Final     MCV   Date Value Ref Range Status   02/09/2024 82.7 81.0 - 97.0 fL Final     Hemoglobin   Date Value Ref Range Status   02/09/2024 12.8 11.5 - 15.5 g/dL Final     Hematocrit   Date Value Ref Range Status   02/09/2024 39.2 34.0 - 45.0 % Final     Platelets   Date Value Ref Range Status   02/09/2024 323 140 - 350 10*3/uL Final      Coagulation   Protime   Date Value Ref Range Status   06/05/2015 11.9 (L) 12.3 - 14.8 SEC      INR   Date Value Ref Range Status   06/05/2015 0.9 0.9 - 1.1       General Chemistry   Calcium   Date Value Ref Range Status   02/09/2024 9.5 8.6 - 10.3 mg/dL Final     BUN   Date Value Ref Range Status   02/09/2024 13 7 - 25 mg/dL Final     Creatinine   Date Value Ref Range Status   02/09/2024 0.82 0.60 - 1.10 mg/dL Final     Glucose   Date Value Ref Range Status   02/09/2024 124 (H) " 70 - 105 mg/dL Final     Sodium   Date Value Ref Range Status   02/09/2024 142 135 - 145 mmol/L Final     Potassium   Date Value Ref Range Status   02/09/2024 4.3 3.5 - 5.1 MMOL/L Final     Magnesium   Date Value Ref Range Status   01/12/2021 2.1 1.8 - 2.4 mg/dL Final     CO2   Date Value Ref Range Status   02/09/2024 22 21 - 32 mmol/L Final     Chloride   Date Value Ref Range Status   02/09/2024 105 98 - 107 mmol/L Final     Anesthesia Plan    ASA 3   NPO status reviewed: > 8 hours    General         Induction: intravenous   Airway Planning: LMA              Anesthetic plan and risks discussed with patient.      Plan discussed with CRNA.

## 2024-04-11 NOTE — TELEPHONE ENCOUNTER
----- Message from Lola Pizarro CNP sent at 4/11/2024 12:28 PM MDT -----  Please see how patient is doing after the lumbar epidural.  I did review the MRI it does show some severe stenosis as well as some facet arthropathy.  We can offer a referral to neurosurgery if she is still having low back pain and radicular pain or   we can follow-up as scheduled and review the MRI further.  If she has any questions please let me know

## 2024-04-11 NOTE — PERIOPERATIVE NURSING NOTE
Monitored in phase II. VSS patient able to dress self and maintain oxygen saturation greater than 92% on room air. Education provided both written and verbally. IV discontinued w/ tip intact. Patient escorted to vehicle via wheelchair to be driven home by son in law.

## 2024-04-11 NOTE — TELEPHONE ENCOUNTER
Called pt as requested by Provider. Pt states she is doing well after LEPSI. She states she got 85-90% relief. Notified of MRI results. Pt voiced understanding. She had no questions. Pt stated she would call back if she had any concerns.

## 2024-04-11 NOTE — ANESTHESIA PROCEDURE NOTES
Airway  Date/Time: 4/11/2024 9:09 AM  Urgency: elective    Airway not difficult    General Information and Staff    Patient location during procedure: OR  CRNA: Evon De Leon CRNA  Performed: CRNA     Indications and Patient Condition  Indications for airway management: anesthesia  Spontaneous Ventilation: absent  Sedation level: deep  Preoxygenated: yes  Patient position: sniffing  MILS maintained throughout  Mask difficulty assessment: 0 - not attempted    Final Airway Details  Final airway type: supraglottic airway      Successful airway: unique  Size 4     Placement verified by: capnometry   Number of attempts at approach: 1

## 2024-04-11 NOTE — PRE-PROCEDURE INSTRUCTIONS
Pre-Surgery Instructions:   Medication Instructions    cholecalciferol, vitamin D3, 25 mcg (1,000 unit) tablet {OR PAT MED INSTRUCTIONS:04038}    lidocaine (LIDODERM) 5 % patch {OR PAT MED INSTRUCTIONS:92782}    omeprazole (PriLOSEC) 20 mg capsule {OR PAT MED INSTRUCTIONS:70140}    levothyroxine (SYNTHROID, LEVOTHROID) 125 mcg tablet {OR PAT MED INSTRUCTIONS:03831}    diazePAM (VALIUM) 5 mg tablet {OR PAT MED INSTRUCTIONS:74899}    ascorbic acid, vitamin C, (ascorbic acid with vane hips) 500 mg tablet {OR PAT MED INSTRUCTIONS:63834}    tiZANidine (ZANAFLEX) 2 mg tablet {OR PAT MED INSTRUCTIONS:11549}    methylPREDNISolone (Medrol, Maxi,) 4 mg tablet {OR PAT MED INSTRUCTIONS:18467}    ketorolac (TORADOL) 10 mg tablet {OR PAT MED INSTRUCTIONS:76841}    albuterol HFA 90 mcg/actuation inhaler {OR PAT MED INSTRUCTIONS:49000}    meloxicam (MOBIC) 7.5 mg tablet {OR PAT MED INSTRUCTIONS:11371}    guaiFENesin (MUCINEX) 600 mg 12 hr tablet {OR PAT MED INSTRUCTIONS:56717}    ibuprofen (ADVIL,MOTRIN) 200 mg tablet {OR PAT MED INSTRUCTIONS:01881}    vit C/E/Zn/coppr/lutein/zeaxan (OCUVITE LUTEIN AND ZEAXANTHIN ORAL) {OR PAT MED INSTRUCTIONS:96561}    zinc 50 mg tablet {OR PAT MED INSTRUCTIONS:01769}    diclofenac sodium (VOLTAREN) 1 % gel {OR PAT MED INSTRUCTIONS:04217}    fluticasone propionate (FLONASE) 50 mcg/actuation nasal spray {OR PAT MED INSTRUCTIONS:16489}    budesonide (Pulmicort Flexhaler) 90 mcg/actuation inhaler {OR PAT MED INSTRUCTIONS:12620}    krill-om-3-dha-epa-phospho-ast [id number] mg capsule {OR PAT MED INSTRUCTIONS:40195}    cyclobenzaprine (FLEXERIL) 10 mg tablet {OR PAT MED INSTRUCTIONS:79414}    multivitamin (THERAGRAN) tablet tablet {OR PAT MED INSTRUCTIONS:61177}    acetaminophen (TYLENOL EXTRA STRENGTH) 500 mg tablet {OR PAT MED INSTRUCTIONS:88188}       Diet instructions for surgery:  {Diet instructions:56511}    Preop questionnaire:       Preop instructions:

## 2024-04-14 ENCOUNTER — ANCILLARY PROCEDURE (OUTPATIENT)
Dept: SLEEP MEDICINE | Facility: HOSPITAL | Age: 69
End: 2024-04-14
Payer: MEDICARE

## 2024-04-14 VITALS
BODY MASS INDEX: 49.85 KG/M2 | OXYGEN SATURATION: 95 % | RESPIRATION RATE: 16 BRPM | HEART RATE: 69 BPM | SYSTOLIC BLOOD PRESSURE: 120 MMHG | DIASTOLIC BLOOD PRESSURE: 79 MMHG | HEIGHT: 64 IN | WEIGHT: 292 LBS

## 2024-04-14 DIAGNOSIS — G47.33 OBSTRUCTIVE SLEEP APNEA SYNDROME: Primary | ICD-10-CM

## 2024-04-14 PROCEDURE — 95810 POLYSOM 6/> YRS 4/> PARAM: CPT

## 2024-04-14 PROCEDURE — 95810 POLYSOM 6/> YRS 4/> PARAM: CPT | Mod: 26 | Performed by: PSYCHIATRY & NEUROLOGY

## 2024-04-15 ENCOUNTER — TELEPHONE (OUTPATIENT)
Dept: PAIN MEDICINE | Facility: HOSPITAL | Age: 69
End: 2024-04-15
Payer: MEDICARE

## 2024-04-15 DIAGNOSIS — M19.012 ARTHRITIS OF LEFT SHOULDER REGION: Primary | ICD-10-CM

## 2024-04-15 NOTE — RESULT ENCOUNTER NOTE
Thank you for letting me know. We will reach out and get her scheduled for an injection if she would like and f/u on the MRI at her next visit, I have her scheduled with me in the next few weeks.      Appreciate it.   Lola

## 2024-04-15 NOTE — TELEPHONE ENCOUNTER
----- Message from Lola Pizarro CNP sent at 4/14/2024  7:51 PM MDT -----  Thank you for letting me know. We will reach out and get her scheduled for an injection if she would like and f/u on the MRI at her next visit, I have her scheduled with me in the next few weeks.      Appreciate it.   Lola

## 2024-04-17 ENCOUNTER — TELEPHONE (OUTPATIENT)
Dept: PAIN MEDICINE | Facility: HOSPITAL | Age: 69
End: 2024-04-17
Payer: MEDICARE

## 2024-04-17 NOTE — TELEPHONE ENCOUNTER
Called patient to inform her that insurance denied Lidocaine Patch. She stated she was aware of this and already had it covered.

## 2024-04-18 ENCOUNTER — TELEPHONE (OUTPATIENT)
Dept: INTERNAL MEDICINE | Facility: CLINIC | Age: 69
End: 2024-04-18

## 2024-04-18 DIAGNOSIS — G89.29 CHRONIC LEFT SHOULDER PAIN: Primary | ICD-10-CM

## 2024-04-18 DIAGNOSIS — M25.512 CHRONIC LEFT SHOULDER PAIN: Primary | ICD-10-CM

## 2024-04-18 NOTE — TELEPHONE ENCOUNTER
Caller would like to discuss (a) referral Writer has advised caller of a callback from within 24 hours.    Patient: Corrine Holden    Caller Name (Last and first, relation/role): self    Name of Facility:     Callback Number: [phone]    Best Availability: anytime    Fax Number:     Additional Info: Talked previously about results on MRI and patient would like referral sent to ortho. Would like callback.     Did you confirm the message with the caller: Yes    Is it okay that the nurse communicates your response through [employer]? No

## 2024-04-19 NOTE — TELEPHONE ENCOUNTER
Spoke to Corrine and she is now wanting to be seen with orthopedics. She'll like to see Dr. Urban at [facility], is it fine to place the referral?    Principal Discharge DX:	Neck pain

## 2024-04-23 NOTE — PROGRESS NOTES
"Subjective -  New Dermatology Skin examination  Corrine Holden is a 69 y.o. female who presents for a lesion of concern . Lesions of concern include: left cheek dark area and right upper arm.  Present for about 9 months.  Patient does not endorse a history of tanning bed use.  Patient skin history: Negative for personal history of non melanoma skin cancer, Negative for actinic keratosis, Negative personal history of melanoma,  Negative family history of melanoma.            Review of Systems  Review of Systems   Constitutional:  Negative for fatigue and fever.   Skin:  Negative for rash.   Allergic/Immunologic: Negative for environmental allergies and immunocompromised state.   Hematological:  Does not bruise/bleed easily.   All other systems reviewed and are negative.        Past Medical History:   Diagnosis Date    GERD (gastroesophageal reflux disease)     H. pylori infection 2009    Hypothyroidism     Injury of back     STATES, \"THIS IS REASON FOR MRI\"    Microscopic hematuria     SHANA (obstructive sleep apnea)     USES CPAP    Osteoarthritis     Palpitations     Respiratory disease     Supraventricular tachycardia (CMS/HCC)     STATES, \"COFFEE RELATED\", VERY INTERMITTENT    Tinnitus           Past Surgical History:   Procedure Laterality Date    CARDIAC ELECTROPHYSIOLOGY STUDY AND ABLATION  01/01/2007    attempted ablation for SVT    CARPAL TUNNEL RELEASE Bilateral 06/26/2020    Procedure: LEFT ENDOSCOPIC CARPAL TUNNEL RELEASE, RIGHT OPEN CARPAL TUNNEL;  Surgeon: Mendez Sun MD;  Location: [facility] OR;  Service: Orthopedics;  Laterality: Bilateral;    COLONOSCOPY  01/21/2015    cecal polyp-tubular adenoma-recommend repeat 5 years    COLONOSCOPY N/A 06/22/2020    repeat 5 years - adenomatous polyp    DILATION AND CURETTAGE OF UTERUS  01/01/2006    ENDOMETRIAL BIOPSY  01/01/2012    pt reports negative    ESOPHAGOGASTRODUODENOSCOPY  01/01/2009    H pylori    ESOPHAGOGASTRODUODENOSCOPY N/A 06/22/2020    Procedure: " ESOPHAGOGASTRODUODENOSCOPY;  Surgeon: Julian Faulkner MD;  Location: [facility];  Service: Endoscopy;  Laterality: N/A;    KNEE ARTHROPLASTY Right 04/01/2015    Dr Placido BRICE    TOTAL ANKLE ARTHROPLASTY Left 10/03/2022    Dr. Guerra, with talonavicular and subtalar fusion    TOTAL ANKLE ARTHROPLASTY Left 02/20/2023    total revision of previous surgery, fusion of non-unions    TOTAL ANKLE ARTHROPLASTY Left 11/13/2023    w/ fusion to foot joints    VAGINAL HYSTERECTOMY  12/16/2013    with BSO, fibroids         family history includes Cancer in her paternal grandfather; Cancer (age of onset: 84) in her mother; Colon cancer (age of onset: 65) in her father; Heart attack (age of onset: 59) in her maternal grandfather; Heart disease in her maternal grandfather; Hypertension in her mother; Kidney cancer (age of onset: 77) in her father; Osteoporosis in her mother; Other in her father's brother.      Medications  Current Outpatient Medications on File Prior to Visit   Medication Sig Dispense Refill    cholecalciferol, vitamin D3, 25 mcg (1,000 unit) tablet Take 2 tablets (2,000 Units total) by mouth 2 (two) times a day      lidocaine (LIDODERM) 5 % patch Apply 1 patch topically daily as needed for pain scale 1-3/10 Apply to low back as needed. Remove & discard patch within 12 hours or as directed by MD. 30 patch 2    omeprazole (PriLOSEC) 20 mg capsule Take 1 capsule (20 mg total) by mouth daily 30 capsule 11    levothyroxine (SYNTHROID, LEVOTHROID) 125 mcg tablet Take 1 tablet (125 mcg total) by mouth daily 90 tablet 2    diazePAM (VALIUM) 5 mg tablet Take 1 tablet (5 mg total) by mouth every 12 (twelve) hours as needed for anxiety or muscle spasms Max Daily Amount: 10 mg 30 tablet 0    ascorbic acid, vitamin C, (ascorbic acid with vane hips) 500 mg tablet Take 2 tablets (1,000 mg total) by mouth daily      tiZANidine (ZANAFLEX) 2 mg tablet Take 1 tablet (2 mg total) by mouth every 6 (six) hours as needed for  muscle spasms      methylPREDNISolone (Medrol, Maxi,) 4 mg tablet follow package directions 21 tablet 0    albuterol HFA 90 mcg/actuation inhaler Inhale 2 puffs every 6 (six) hours as needed for wheezing 1 Inhaler 0    meloxicam (MOBIC) 7.5 mg tablet Take 1 tablet (7.5 mg total) by mouth daily 90 tablet 3    guaiFENesin (MUCINEX) 600 mg 12 hr tablet Take 2 tablets (1,200 mg total) by mouth 2 (two) times a day As needed      ibuprofen (ADVIL,MOTRIN) 200 mg tablet Take 1 tablet (200 mg total) by mouth every 6 (six) hours as needed      vit C/E/Zn/coppr/lutein/zeaxan (OCUVITE LUTEIN AND ZEAXANTHIN ORAL) PreserVision AREDS-2      zinc 50 mg tablet Take 50 mg by mouth daily      diclofenac sodium (VOLTAREN) 1 % gel APPLY 2 GRAMS TO THE AFFECTED AREA(S) 4 TIMES PER DAY AS NEEDED 300 g 2    fluticasone propionate (FLONASE) 50 mcg/actuation nasal spray USE 2 SPRAYS IN EACH NOSTRIL DAILY 48 g 2    budesonide (Pulmicort Flexhaler) 90 mcg/actuation inhaler Inhale 1 puff as needed (for shortness of breath)      krill-om-3-dha-epa-phospho-ast 701-803-70-75 mg capsule Take 1 capsule by mouth daily      cyclobenzaprine (FLEXERIL) 10 mg tablet Take 1 tablet (10 mg total) by mouth 2 (two) times a day as needed for muscle spasms. 60 tablet 0    multivitamin (THERAGRAN) tablet tablet 1 Every Day      acetaminophen (TYLENOL EXTRA STRENGTH) 500 mg tablet Take 2 tablets (1,000 mg total) by mouth 2 (two) times a day      ketorolac (TORADOL) 10 mg tablet Take 1 tablet (10 mg total) by mouth every 6 (six) hours as needed for pain scale 8-10/10 for up to 5 days 20 tablet 0     No current facility-administered medications on file prior to visit.       Allergies  Bee venom protein (honey bee) and No known drug allergies      Physical Examination    Vital Signs /80 (BP Location: Left arm, Patient Position: Sitting, Cuff Size: Long Adult)   Pulse 69   Resp 20   SpO2 95%       Physical Exam Findings:   Constitutional: General Appearance:  healthy-appearing, well-nourished, and well-developed. Level of Distress: NAD.     A skin examination was performed of the following sites:    Trunk (including back, chest, abdomen): Stuck on tan brown papules, well demarcated tan brown macules, bright red cherry papules.    Arms/Hands: Lesion in question right deltoid stuck on papules.  stuck on papules, well demarcated tan brown macules.    Buttocks: No suspicious lesions noted.    Groin:No suspicious lesions noted.    Legs: stuck on papules, well demarcated tan brown macules.  Right pretibial leg Gritty erythematous papule, See procedure note for treated lesions   Feet:  Toenails are polished on exam today.  Scalp: No suspicious lesions noted.     Face: Lesion in question left cheek stuck on papules.  Makeup on exam today. well demarcated tan brown macules.                       Procedure Note:  Destruction of Benign or Premalignant Lesion    Date/Time: 5/1/2024 2:08 PM    Performed by: Elvie Barajas MD      Consent  Verbal consent was obtained from the patient. Written consent was not obtained from the patient. Risks include permanent scarring, light or dark discoloration, infection, pain, bleeding, bruising, redness, blister formation and recurrence of the lesion. Consent given by patient. The patient states understanding of the procedure being performed. Patient ID confirmed verbally with patient.     Location:  1 total lesions removed                     Lower Extremity location(s):   Number of Lower Extremity lesions removed: 1  Lower extremity location: right pretibial leg.    Procedure Details   Lesion(s) are pre-malignant.   Area(s) treated with alcohol 70%.  Local anesthesia was not used.   Lesion(s) destroyed with: liquid nitrogen. Liquid nitrogen application completed 2 times. Lesion(s) were frozen until an ice ball extended beyond the lesion.     Post-Procedure  Patient tolerated procedure well with no complications.     Procedure  Comments  Actinic keratosis            Corrine was seen today for suspicious skin lesion.    Diagnoses and all orders for this visit:    Keratosis, actinic  -     Destruction of Benign or Premalignant Lesion  - I discussed the pathogenesis of actinic keratosis with patient in detail.  I informed patient these are a precursor to skin cancer and are related to long-term sun damage.  We discussed cryotherapy is the recommended treatment and patient is in agreement with pursuing cryotherapy in the office. Please see above procedure for details. We discussed the increased risk of development of additional actinic keratoses in future.       Seborrheic keratoses  Diagnosis discussed. Discussed that these are skin thickenings that occur over time with age. Patient may obtain more over time. Reassured patient that lesions are benign and no treatment is necessary unless bothersome.      Lentigines  Discussed benign nature of lentigos, informed patient they are sun-induced brown flat lesions. Recommend diligent sun protection.           I emphasized daily sunscreen use with an SPF of 30-50, broad spectrum and water resistant.  I directed reapplication every two hours.  I recommended wide brimmed hats.  Finally, we discussed danger signs of changing skin lesions including sores not healing and moles changing in size, shape or color.  Should any of these lesions occur before next appointment, I instructed patent to call sooner for evaluation.        Return in about 1 year (around 5/1/2025) for Skin check.

## 2024-04-23 NOTE — PATIENT INSTRUCTIONS
"You were treated with liquid nitrogen today.     You should expect these areas to burn and once the burning subsides, the area(s)may itch. You should expect some reaction anywhere from welting of the skin to larger liquid filled blisters depending on how aggressively your lesion(s) needed to be treated. If you had treatment on your forehead, eyebrow areas or cheekbones, you could have some swelling under your eyes. This is normal and nothing to worry about. Blisters should be left intact until they pop and drain on their own. You will most likely have a clear drainage much like a blister when it pops.     The areas treated should be cared for by gentle daily cleansing with Dove soap and water and your hands. You should apply Polysporin ointment or Vaseline  WITH A Q-TIP to the areas daily as they are healing and continue this until they are completely healed. It may take anywhere from 7-14 days for areas to completely heal. If any other area than your face was treated, it may take longer for those areas to heal.    If warts were treated, they may turn to blood blisters and may appear purple, red or black. Leave the blister intact and let it pop on its own. Keep these areas dry and clean and do not use any ointment or Vaseline to warts.    IF YOU NOTICE INCREASED SURROUNDING REDNESS, TENDERNESS OR A PUS-LIKE DRAINAGE, PLEASE CONTACT OUR OFFICE IMMEDIATELY.     Avoid sunlight between the hours of 10 am and 2 pm, wear a broad spectrum, water resistant sunscreen with SPF of 30-50 year round. Reapply sunscreen every 2 hours and after exercising or swimming. Wearing a 6\" broad brimmed hat, a lip sunblock of SPF of 30-50, and sun protective clothing is also suggested year round.   Recommended sunscreens include Neutrogena Ultra Sheer Dry Touch SPF 50 or higher, Neutrogena Sheer Zinc Dry Touch SPF 50, and Vanicream Sport.  ---------------------------------------------------------------------  Monthly self-examination of " your skin is important. Should any lesions, including moles, change in size, shape, color, itch, bleed or burn, contact our office for sooner evaluation.  ----------------------------------------------------------------------  **We are always looking for opportunities to improve your care and patient experience. You will be receiving a survey about your care via text or e-mail. If you come across questions in the survey that are not applicable to your visit, please leave these questions blank. Your satisfaction is important to us so please be honest. Your identity is kept confidential. Thank you!**

## 2024-05-01 ENCOUNTER — OFFICE VISIT (OUTPATIENT)
Dept: DERMATOLOGY | Facility: CLINIC | Age: 69
End: 2024-05-01
Payer: MEDICARE

## 2024-05-01 ENCOUNTER — TELEPHONE (OUTPATIENT)
Dept: PAIN MEDICINE | Facility: HOSPITAL | Age: 69
End: 2024-05-01
Payer: MEDICARE

## 2024-05-01 VITALS
DIASTOLIC BLOOD PRESSURE: 80 MMHG | HEART RATE: 69 BPM | RESPIRATION RATE: 20 BRPM | SYSTOLIC BLOOD PRESSURE: 130 MMHG | OXYGEN SATURATION: 95 %

## 2024-05-01 DIAGNOSIS — L57.0 KERATOSIS, ACTINIC: Primary | ICD-10-CM

## 2024-05-01 DIAGNOSIS — L82.1 SEBORRHEIC KERATOSES: ICD-10-CM

## 2024-05-01 DIAGNOSIS — L81.4 LENTIGINES: ICD-10-CM

## 2024-05-01 PROCEDURE — 99203 OFFICE O/P NEW LOW 30 MIN: CPT | Mod: 25 | Performed by: STUDENT IN AN ORGANIZED HEALTH CARE EDUCATION/TRAINING PROGRAM

## 2024-05-01 PROCEDURE — G0463 HOSPITAL OUTPT CLINIC VISIT: HCPCS | Mod: 25,PO | Performed by: STUDENT IN AN ORGANIZED HEALTH CARE EDUCATION/TRAINING PROGRAM

## 2024-05-01 PROCEDURE — 17000 DESTRUCT PREMALG LESION: CPT | Mod: PO | Performed by: STUDENT IN AN ORGANIZED HEALTH CARE EDUCATION/TRAINING PROGRAM

## 2024-05-01 ASSESSMENT — PAIN SCALES - GENERAL: PAINLEVEL: 0-NO PAIN

## 2024-05-01 ASSESSMENT — ENCOUNTER SYMPTOMS
FEVER: 0
FATIGUE: 0
BRUISES/BLEEDS EASILY: 0

## 2024-05-01 NOTE — TELEPHONE ENCOUNTER
Advised patient that deciding between [facility] and [facility]PSI would best be discussed at her visit with Lola tomorrow and a decision could be made then.  Pt states understanding.

## 2024-05-01 NOTE — PROGRESS NOTES
Patient ID: Corrine Holden is a 69 y.o. female    Subjective     Pain Assessment: 0-10  Pain Score: 7 (best: 4, worst: 8)  Pain Type: Acute pain, Chronic pain  Pain Location: Back  Pain Radiating Towards: denies  Pain Descriptors: Nagging, Aching, Stabbing (catch)  Pain Frequency: Constant/continuous  Interference on Function: ADLs  Pain Onset: Ongoing  Date Pain First Started:  (woke up this morning with this)  Clinical Progression: Gradually worsening  Aggravating Factors: Walking  Work-Related Injury: No  Patient's Stated Pain Goal: 3-Sometimes distracts me  Pain Interventions: Repositioned, Rest, Topicals, Cold applied, Heat applied, Stretching/Range of motion, Home medication (swimming)  Response to Interventions: combination    HPI/Chief Complaint:  Chief complaint: Chronic low back pain, chronic left shoulder pain    Patient has been more active riding her bike more often, swimming and being more active overall. Increased pain in low back- worse in low back- staying in that area. Not into legs today. Worsening pain in left shoulder- she is holding off on her injection until she see [facility] for her shoulder- will let us know if she would dorita to set up an injection.  In regards to her low back she does continue to have the low back she does continue to have pain in her low back into her right hip and out towards her buttock.  She does tell me that the lumbar epidural was very beneficial she reports about 80% relief from this.  She does tell me that she would like to get on a better regimen if she continues to have the pain which is limiting her to be as active as she would like.  Again patient is doing more activities and she would like to be able to continue to see her grandkids at their sporting events    Conservative treatment:  Patient is doing at home exercises, swimming therapy regularly    Medications:  Tyenol as needed  ASA daily as needed   Ibuprofen as needed   Diazapam- patient uses for a muscle  relaxer    Medications previously tried -Valium as needed, Flexeril too sedating, ibuprofen off-and-on, gabapentin.    Patient denies any new weakness denies any loss of bowel or bladder denies any saddle anesthesia    Injections:   6/27/23 - LEPSI L5-S1 -95%  7/25/23 - SNR R) L5 -50%   4/2/24 - LEPSI-80%     Imaging Reviewed:  9/30/19 - MRI of the lumbosacral spine without contrast      Clinical History:  Low back pain;      Comparison(s):  CT 5/25/2018     Technique:   Sagittal and axial T2 and T1 and sagittal STIR sequences were obtained through the lumbosacral spine.     Findings:   5 lumbar vertebral bodies are presumed for purpose of this dictation.  The lumbar vertebral bodies are normal in height. Unchanged grade 1 anterolisthesis of L5 over S1 where there is severe loss of disc space and chronic appearing endplate degenerative signal. Likely hemangioma of L1. The conus medullaris terminates normally at the L1 level and the cauda equina is unremarkable.     At L1-2, no significant spinal canal or neuroforaminal narrowing. Mild facet arthropathy  At L2-3, no significant spinal canal or neuroforaminal narrowing. Mild facet arthropathy  At L3-4, mild broad-based disc bulge. No spinal canal narrowing. Prominent epidural fat. No neuroforaminal narrowing. Bilateral facet arthropathy with fluid in facet joints  At L4-5, small broad-based disc bulge. No spinal canal narrowing. No significant neuroforaminal narrowing. Bilateral facet arthropathy with fluid within facet joints  At L5-S1, broad-based disc bulge and ligamentum flavum hypertrophy without significant spinal canal narrowing. Bilateral facet arthropathy. Bilateral mild to moderate neuroforaminal narrowing.     IMPRESSION:  Unchanged grade 1 anterolisthesis of L5 over S1 with associated bilateral chronic pars defect.  Multilevel degenerative changes most pronounced in lower lumbar bilateral facets joints.    MR LUMBAR SPINE WO CONTRAST  DATE: 4/11/2024 10:13  AM  INDICATION: Lumbar radiculopathy; Low back pain  symptoms persist with > 6wks conservative treatment  COMPARISON: 10/27/2023     TECHNIQUE:  Noncontrast MR images of the lumbar spine     FINDINGS:  Exaggerated lumbar lordosis, unchanged. Grade 1 anterolisthesis at L5-S1. No compression deformities. Spondylotic endplate marrow changes, particular at L5-S1. No compression deformities or other acute fractures.     The paraspinal soft tissues are without acute abnormalities.      Conus is normally positioned. There is mechanical clumping of the cauda equina.     Level by levels:  L1-2: Disc bulge. Facet arthropathy and ligamentum flavum thickening. No spinal canal stenosis. Mild bilateral foraminal stenosis.  L2-3: Shallow disc bulge. Facet arthropathy and ligamentum flavum thickening. No spinal canal stenosis. Mild right foraminal stenosis.  L3-4: Disc bulge. Facet arthropathy and ligament flavum thickening. Bilateral facet effusions. Moderate narrowing of the lateral recesses. Mild to moderate bilateral foraminal stenosis.  L4-5: Shallow disc bulge. Facet arthropathy and ligamentum flavum thickening. Facet arthropathy and ligamentum flavum thickening. No spinal canal stenosis. Moderate bilateral foraminal stenosis.  L5-S1: Disc uncovering with superimposed disc bulge. Facet arthropathy and ligamentum flavum thickening. Moderate narrowing of the lateral recesses. Moderate to severe bilateral foraminal stenosis.     IMPRESSION:  Lumbar spondylotic changes with exaggerated lordosis, grade 1 anterolisthesis at L5-S1 and culminating in moderate narrowing of the lateral recesses and moderate to severe bilateral foraminal stenosis at L5-S1. Other levels as above.     MR SHOULDER LEFT WO CONTRAST  DATE: 4/11/2024 10:13 AM  INDICATION: Chronic left shoulder pain; Chronic left shoulder pain; Shoulder pain  rotator cuff disorder suspected  xray done  COMPARISON: Radiographs 3/21/2024.   Technique:  Noncontrast images were  "obtained.      FINDINGS:  Moderate glenohumeral joint effusion with mild cervitis  Focal intermediate grade partial-thickness fissure of the anterior supraspinatus tendon insertion. No retracted full-thickness tear of the rotator cuff.  Severe acromioclavicular joint osteoarthrosis. Moderate subacromial subdeltoid bursitis.  There is no acute bony fracture. The imaged vessels and nerves are intact.  Severe glenohumeral joint cartilage disease. Subchondral cyst of the glenoid.  Circumferential degenerated labral tear.  There are osteophytes of the humerus.  Imaged muscles are intact.  No concerning marrow replacement.  Long head of the biceps tendon demonstrates tendinopathy, without tear.  Subscapularis tendinopathy with intermediate-grade partial-thickness insertional tearing.  Incompletely remodeled rotator interval capsule.  Moderate glenohumeral joint effusion with mild synovitis.     Impression:  Severe glenohumeral joint cartilage disease. Circumferential degenerated labral tear.  Severe acromioclavicular joint osteoarthrosis and moderate subacromial-subdeltoid bursitis.  Focal intermediate grade partial-thickness tear of the subscapularis tendon insertion. Focal intermediate grade partial-thickness fissure of the anterior supraspinatus tendon insertion. No retracted full-thickness tear of the rotator cuff.  Moderate glenohumeral joint effusion with mild synovitis.      Past Medical History  Past Medical History:   Diagnosis Date    GERD (gastroesophageal reflux disease)     H. pylori infection 2009    Hypothyroidism     Injury of back     STATES, \"THIS IS REASON FOR MRI\"    Microscopic hematuria     SHANA (obstructive sleep apnea)     USES CPAP    Osteoarthritis     Palpitations     Respiratory disease     Supraventricular tachycardia (CMS/HCC)     STATES, \"COFFEE RELATED\", VERY INTERMITTENT    Tinnitus      Past Surgical History   Past Surgical History:   Procedure Laterality Date    CARDIAC ELECTROPHYSIOLOGY " STUDY AND ABLATION  01/01/2007    attempted ablation for SVT    CARPAL TUNNEL RELEASE Bilateral 06/26/2020    Procedure: LEFT ENDOSCOPIC CARPAL TUNNEL RELEASE, RIGHT OPEN CARPAL TUNNEL;  Surgeon: Mendez Sun MD;  Location: [facility] OR;  Service: Orthopedics;  Laterality: Bilateral;    COLONOSCOPY  01/21/2015    cecal polyp-tubular adenoma-recommend repeat 5 years    COLONOSCOPY N/A 06/22/2020    repeat 5 years - adenomatous polyp    DILATION AND CURETTAGE OF UTERUS  01/01/2006    ENDOMETRIAL BIOPSY  01/01/2012    pt reports negative    ESOPHAGOGASTRODUODENOSCOPY  01/01/2009    H pylori    ESOPHAGOGASTRODUODENOSCOPY N/A 06/22/2020    Procedure: ESOPHAGOGASTRODUODENOSCOPY;  Surgeon: Julian Faulkner MD;  Location: [facility];  Service: Endoscopy;  Laterality: N/A;    KNEE ARTHROPLASTY Right 04/01/2015    Dr Placido BRICE    TOTAL ANKLE ARTHROPLASTY Left 10/03/2022    Dr. Guerra, with talonavicular and subtalar fusion    TOTAL ANKLE ARTHROPLASTY Left 02/20/2023    total revision of previous surgery, fusion of non-unions    TOTAL ANKLE ARTHROPLASTY Left 11/13/2023    w/ fusion to foot joints    VAGINAL HYSTERECTOMY  12/16/2013    with BSO, fibroids     Social History    Corrine Holden  reports that she has never smoked. She has never used smokeless tobacco. She reports that she does not drink alcohol and does not use drugs.  Family History:  family history includes Cancer in her paternal grandfather; Cancer (age of onset: 84) in her mother; Colon cancer (age of onset: 65) in her father; Heart attack (age of onset: 59) in her maternal grandfather; Heart disease in her maternal grandfather; Hypertension in her mother; Kidney cancer (age of onset: 77) in her father; Osteoporosis in her mother; Other in her father's brother.  Allergies:  Allergies   Allergen Reactions    Bee Venom Protein (Honey Bee) Swelling    No Known Drug Allergies      Medications:     Current Outpatient Medications:     cholecalciferol, vitamin D3,  25 mcg (1,000 unit) tablet, Take 2 tablets (2,000 Units total) by mouth 2 (two) times a day, Disp: , Rfl:     lidocaine (LIDODERM) 5 % patch, Apply 1 patch topically daily as needed for pain scale 1-3/10 Apply to low back as needed. Remove & discard patch within 12 hours or as directed by MD., Disp: 30 patch, Rfl: 2    omeprazole (PriLOSEC) 20 mg capsule, Take 1 capsule (20 mg total) by mouth daily, Disp: 30 capsule, Rfl: 11    levothyroxine (SYNTHROID, LEVOTHROID) 125 mcg tablet, Take 1 tablet (125 mcg total) by mouth daily, Disp: 90 tablet, Rfl: 2    diazePAM (VALIUM) 5 mg tablet, Take 1 tablet (5 mg total) by mouth every 12 (twelve) hours as needed for anxiety or muscle spasms Max Daily Amount: 10 mg, Disp: 30 tablet, Rfl: 0    ascorbic acid, vitamin C, (ascorbic acid with vane hips) 500 mg tablet, Take 2 tablets (1,000 mg total) by mouth daily, Disp: , Rfl:     tiZANidine (ZANAFLEX) 2 mg tablet, Take 1 tablet (2 mg total) by mouth every 6 (six) hours as needed for muscle spasms, Disp: , Rfl:     methylPREDNISolone (Medrol, Maxi,) 4 mg tablet, follow package directions, Disp: 21 tablet, Rfl: 0    albuterol HFA 90 mcg/actuation inhaler, Inhale 2 puffs every 6 (six) hours as needed for wheezing, Disp: 1 Inhaler, Rfl: 0    meloxicam (MOBIC) 7.5 mg tablet, Take 1 tablet (7.5 mg total) by mouth daily, Disp: 90 tablet, Rfl: 3    guaiFENesin (MUCINEX) 600 mg 12 hr tablet, Take 2 tablets (1,200 mg total) by mouth 2 (two) times a day As needed, Disp: , Rfl:     ibuprofen (ADVIL,MOTRIN) 200 mg tablet, Take 1 tablet (200 mg total) by mouth every 6 (six) hours as needed, Disp: , Rfl:     vit C/E/Zn/coppr/lutein/zeaxan (OCUVITE LUTEIN AND ZEAXANTHIN ORAL), PreserVision AREDS-2, Disp: , Rfl:     zinc 50 mg tablet, Take 50 mg by mouth daily, Disp: , Rfl:     diclofenac sodium (VOLTAREN) 1 % gel, APPLY 2 GRAMS TO THE AFFECTED AREA(S) 4 TIMES PER DAY AS NEEDED, Disp: 300 g, Rfl: 2    fluticasone propionate (FLONASE) 50  mcg/actuation nasal spray, USE 2 SPRAYS IN EACH NOSTRIL DAILY, Disp: 48 g, Rfl: 2    budesonide (Pulmicort Flexhaler) 90 mcg/actuation inhaler, Inhale 1 puff as needed (for shortness of breath), Disp: , Rfl:     krill-om-3-dha-epa-phospho-ast [id number] mg capsule, Take 1 capsule by mouth daily, Disp: , Rfl:     cyclobenzaprine (FLEXERIL) 10 mg tablet, Take 1 tablet (10 mg total) by mouth 2 (two) times a day as needed for muscle spasms., Disp: 60 tablet, Rfl: 0    multivitamin (THERAGRAN) tablet tablet, 1 Every Day, Disp: , Rfl:     acetaminophen (TYLENOL EXTRA STRENGTH) 500 mg tablet, Take 2 tablets (1,000 mg total) by mouth 2 (two) times a day, Disp: , Rfl:     ketorolac (TORADOL) 10 mg tablet, Take 1 tablet (10 mg total) by mouth every 6 (six) hours as needed for pain scale 8-10/10 for up to 5 days, Disp: 20 tablet, Rfl: 0    Review of Systems   Constitutional:  Positive for activity change.   Musculoskeletal:  Positive for arthralgias, back pain, gait problem and myalgias.     Objective     Vital signs:  Vitals:    05/02/24 1108   BP: 143/86   BP Location: Left arm   Patient Position: Sitting   Pulse: 69   Resp: 16   SpO2: 93%        Exam:  Physical Exam  Vitals and nursing note reviewed.   Constitutional:       General: She is not in acute distress.     Appearance: Normal appearance. She is well-developed. She is obese. She is not ill-appearing, toxic-appearing or diaphoretic.   HENT:      Head: Normocephalic and atraumatic.      Right Ear: External ear normal.      Left Ear: External ear normal.      Nose: Nose normal.      Mouth/Throat:      Pharynx: Oropharynx is clear.   Eyes:      Conjunctiva/sclera: Conjunctivae normal.   Pulmonary:      Effort: Pulmonary effort is normal. No respiratory distress.   Abdominal:      General: There is no distension.   Musculoskeletal:         General: Tenderness present. No deformity.      Cervical back: Normal range of motion and neck supple.      Lumbar back:  Tenderness and bony tenderness present. Decreased range of motion. Negative right straight leg raise test.        Back:         Legs:       Comments: Patient is slow from sit to stand.  She does have tenderness over her lumbosacral region and into her right SI.  Positive Valentín positive [place] positive thigh thrust positive compression test right SI, increased pain with facet loading to lumbosacral region mainly over bilateral L3, 4, 5.  Ambulating with cane she does have an antalgic/waddling gait,  lower extremity strength is equal and bilateral, distal sensation intact.   Skin:     General: Skin is warm and dry.      Capillary Refill: Capillary refill takes less than 2 seconds.      Findings: No rash.   Neurological:      Mental Status: She is alert and oriented to person, place, and time.      Sensory: No sensory deficit.      Motor: No weakness.      Gait: Gait abnormal.   Psychiatric:         Mood and Affect: Mood normal.         Behavior: Behavior normal.         Thought Content: Thought content normal.         Judgment: Judgment normal.       Assessment:  1. Lumbar spondylosis  2 Level Medial Nerve Branch Block Injection Lumbar/Sacral      2. Lumbar radiculopathy  Epidural Lumbar Caudal      3. Other chronic pain        4. Sacroiliitis (CMS/HCC)  Sacroiliac Injection w/ Fluoro      5. Myofascial pain           Plan/Discussion:  Patient I had a nice discussion regarding her chronic pain.  Unfortunately her increased pain is still limiting her ability to be as active as she would like.  She does have tenderness over the right SI I do think setting up for a right SI injection will help.  She does have facet mediated pain as well.  I did place an order for medial nerve branch blocks to her bilateral L3, 4, 5.  Education and information regarding these injections was provided in depth.  The order was placed.  I will plan to follow the patient post injections.  In the meantime if she does meet with orthopedic  surgeon they want to set up a shoulder injection we are happy to this up for the patient.  We did discuss physical therapy she is to continue her at home exercises and continue swimming as discussed.  I will plan to follow the patient post injection.  If she has any increased pain questions or concerns she will reach out to us and let us know.  She was agreeable to this plan of care    Today's plan was a combined effort between the patient and myself. The patient understood the plan, verbally consented, and agreed to participate. An After Visit Summary regarding today's office visit was given to the patient.     I have reviewed the patient's current medications using all immediate resources available.    Dragon voice recognition program was used to aid in this documentation which might generate inaccuracies despite efforts made to avoid them.  Please take it into context and contact the provider with any questions.    Lola Pizarro CNP

## 2024-05-02 ENCOUNTER — OFFICE VISIT (OUTPATIENT)
Dept: PAIN MEDICINE | Facility: HOSPITAL | Age: 69
End: 2024-05-02
Payer: MEDICARE

## 2024-05-02 VITALS
SYSTOLIC BLOOD PRESSURE: 143 MMHG | DIASTOLIC BLOOD PRESSURE: 86 MMHG | OXYGEN SATURATION: 93 % | HEART RATE: 69 BPM | RESPIRATION RATE: 16 BRPM

## 2024-05-02 DIAGNOSIS — M47.816 LUMBAR SPONDYLOSIS: Primary | ICD-10-CM

## 2024-05-02 DIAGNOSIS — G89.29 OTHER CHRONIC PAIN: ICD-10-CM

## 2024-05-02 DIAGNOSIS — M79.18 MYOFASCIAL PAIN: ICD-10-CM

## 2024-05-02 DIAGNOSIS — M54.16 LUMBAR RADICULOPATHY: ICD-10-CM

## 2024-05-02 DIAGNOSIS — M46.1 SACROILIITIS (CMS/HCC): ICD-10-CM

## 2024-05-02 PROCEDURE — G0463 HOSPITAL OUTPT CLINIC VISIT: HCPCS

## 2024-05-02 ASSESSMENT — ENCOUNTER SYMPTOMS
MYALGIAS: 1
ACTIVITY CHANGE: 1
ARTHRALGIAS: 1
BACK PAIN: 1

## 2024-05-02 ASSESSMENT — PAIN - FUNCTIONAL ASSESSMENT: PAIN_FUNCTIONAL_ASSESSMENT: 0-10

## 2024-05-02 NOTE — PATIENT INSTRUCTIONS
Low Back Sprain or Strain Rehab  Please do the exercises that you are able to do safely- you do not HAVE to do all of them.    Ask your health care provider which exercises are safe for you. Do exercises exactly as told by your health care provider and adjust them as directed. It is normal to feel mild stretching, pulling, tightness, or discomfort as you do these exercises. Stop right away if you feel sudden pain or your pain gets worse. Do not begin these exercises until told by your health care provider.  Stretching and range-of-motion exercises  These exercises warm up your muscles and joints and improve the movement and flexibility of your back. These exercises also help to relieve pain, numbness, and tingling.  Lumbar rotation       Lie on your back on a firm surface and bend your knees.  Straighten your arms out to your sides so each arm forms a 90-degree angle (right angle) with a side of your body.  Slowly move (rotate) both of your knees to one side of your body until you feel a stretch in your lower back (lumbar). Try not to let your shoulders lift off the floor.  Hold this position for ____10______ seconds.  Tense your abdominal muscles and slowly move your knees back to the starting position.  Repeat this exercise on the other side of your body.  Repeat _____3_____ times. Complete this exercise _____2_____ times a day.  Single knee to chest       Lie on your back on a firm surface with both legs straight.  Bend one of your knees. Use your hands to move your knee up toward your chest until you feel a gentle stretch in your lower back and buttock.  Hold your leg in this position by holding on to the front of your knee.  Keep your other leg as straight as possible.  Hold this position for ____10______ seconds.  Slowly return to the starting position.  Repeat with your other leg.  Repeat ______3____ times. Complete this exercise ______2____ times a day.  Prone extension on elbows       Lie on your abdomen on  a firm surface (prone position).  Prop yourself up on your elbows.  Use your arms to help lift your chest up until you feel a gentle stretch in your abdomen and your lower back.  This will place some of your body weight on your elbows. If this is uncomfortable, try stacking pillows under your chest.  Your hips should stay down, against the surface that you are lying on. Keep your hip and back muscles relaxed.  Hold this position for ______10____ seconds.  Slowly relax your upper body and return to the starting position.  Repeat ______3____ times. Complete this exercise ______2____ times a day.  Strengthening exercises  These exercises build strength and endurance in your back. Endurance is the ability to use your muscles for a long time, even after they get tired.  Pelvic tilt  This exercise strengthens the muscles that lie deep in the abdomen.  Lie on your back on a firm surface. Bend your knees and keep your feet flat on the floor.  Tense your abdominal muscles. Tip your pelvis up toward the ceiling and flatten your lower back into the floor.  To help with this exercise, you may place a small towel under your lower back and try to push your back into the towel.  Hold this position for _____10_____ seconds.  Let your muscles relax completely before you repeat this exercise.  Repeat _____3_____ times. Complete this exercise _____2_____ times a day.  Alternating arm and leg raises       Get on your hands and knees on a firm surface. If you are on a hard floor, you may want to use padding, such as an exercise mat, to cushion your knees.  Line up your arms and legs. Your hands should be directly below your shoulders, and your knees should be directly below your hips.  Lift your left leg behind you. At the same time, raise your right arm and straighten it in front of you.  Do not lift your leg higher than your hip.  Do not lift your arm higher than your shoulder.  Keep your abdominal and back muscles tight.  Keep your  hips facing the ground.  Do not arch your back.  Keep your balance carefully, and do not hold your breath.  Hold this position for _____10_____ seconds.  Slowly return to the starting position.  Repeat with your right leg and your left arm.  Repeat ____3______ times. Complete this exercise ____2______ times a day.  Abdominal set with straight leg raise       Lie on your back on a firm surface.  Bend one of your knees and keep your other leg straight.  Tense your abdominal muscles and lift your straight leg up, 4-6 inches (10-15 cm) off the ground.  Keep your abdominal muscles tight and hold this position for _____10_____ seconds.  Do not hold your breath.  Do not arch your back. Keep it flat against the ground.  Keep your abdominal muscles tense as you slowly lower your leg back to the starting position.  Repeat with your other leg.  Repeat ____3______ times. Complete this exercise ______2____ times a day.  Single leg lower with bent knees  Lie on your back on a firm surface.  Tense your abdominal muscles and lift your feet off the floor, one foot at a time, so your knees and hips are bent in 90-degree angles (right angles).  Your knees should be over your hips and your lower legs should be parallel to the floor.  Keeping your abdominal muscles tense and your knee bent, slowly lower one of your legs so your toe touches the ground.  Lift your leg back up to return to the starting position.  Do not hold your breath.  Do not let your back arch. Keep your back flat against the ground.  Repeat with your other leg.  Repeat _____3_____ times. Complete this exercise _____2_____ times a day.  Posture and body mechanics  Good posture and healthy body mechanics can help to relieve stress in your body's tissues and joints. Body mechanics refers to the movements and positions of your body while you do your daily activities. Posture is part of body mechanics. Good posture means:  Your spine is in its natural S-curve position  (neutral).  Your shoulders are pulled back slightly.  Your head is not tipped forward.  Follow these guidelines to improve your posture and body mechanics in your everyday activities.  Standing       When standing, keep your spine neutral and your feet about hip width apart. Keep a slight bend in your knees. Your ears, shoulders, and hips should line up.  When you do a task in which you  one place for a long time, place one foot up on a stable object that is 2-4 inches (5-10 cm) high, such as a footstool. This helps keep your spine neutral.  Sitting       When sitting, keep your spine neutral and keep your feet flat on the floor. Use a footrest, if necessary, and keep your thighs parallel to the floor. Avoid rounding your shoulders, and avoid tilting your head forward.  When working at a desk or a computer, keep your desk at a height where your hands are slightly lower than your elbows. Slide your chair under your desk so you are close enough to maintain good posture.  When working at a computer, place your monitor at a height where you are looking straight ahead and you do not have to tilt your head forward or downward to look at the screen.  Resting  When lying down and resting, avoid positions that are most painful for you.  If you have pain with activities such as sitting, bending, stooping, or squatting, lie in a position in which your body does not bend very much. For example, avoid curling up on your side with your arms and knees near your chest (fetal position).  If you have pain with activities such as standing for a long time or reaching with your arms, lie with your spine in a neutral position and bend your knees slightly. Try the following positions:  Lying on your side with a pillow between your knees.  Lying on your back with a pillow under your knees.  Lifting       When lifting objects, keep your feet at least shoulder width apart and tighten your abdominal muscles.  Bend your knees and hips  and keep your spine neutral. It is important to lift using the strength of your legs, not your back. Do not lock your knees straight out.  Always ask for help to lift heavy or awkward objects.  This information is not intended to replace advice given to you by your health care provider. Make sure you discuss any questions you have with your health care provider.  Document Released: 12/18/2006 Document Revised: 04/10/2020 Document Reviewed: 01/09/2020  [employer] Patient Education © 2020 [employer] Sacroiliac Joint Dysfunction       Sacroiliac joint dysfunction is a condition that causes inflammation on one or both sides of the sacroiliac (SI) joint. The SI joint connects the lower part of the spine (sacrum) with the two upper portions of the pelvis (ilium). This condition causes deep aching or burning pain in the low back. In some cases, the pain may also spread into one or both buttocks, hips, or thighs.  What are the causes?  This condition may be caused by:  Pregnancy. During pregnancy, extra stress is put on the SI joints because the pelvis widens.  Injury, such as:  Injuries from car accidents.  Sports-related injuries.  Work-related injuries.  Having one leg that is shorter than the other.  Conditions that affect the joints, such as:  Rheumatoid arthritis.  Gout.  Psoriatic arthritis.  Joint infection (septic arthritis).  Sometimes, the cause of SI joint dysfunction is not known.  What are the signs or symptoms?  Symptoms of this condition include:  Aching or burning pain in the lower back. The pain may also spread to other areas, such as:  Buttocks.  Groin.  Thighs.  Muscle spasms in or around the painful areas.  Increased pain when standing, walking, running, stair climbing, bending, or lifting.  How is this diagnosed?  This condition is diagnosed with a physical exam and medical history. During the exam, the health care provider may move one or both of your legs to different positions to check for pain.  Various tests may be done to confirm the diagnosis, including:  Imaging tests to look for other causes of pain. These may include:  MRI.  CT scan.  Bone scan.  Diagnostic injection. A numbing medicine is injected into the SI joint using a needle. If your pain is temporarily improved or stopped after the injection, this can indicate that SI joint dysfunction is the problem.  How is this treated?  Treatment depends on the cause and severity of your condition. Treatment options may include:  Ice or heat applied to the lower back area after an injury. This may help reduce pain and muscle spasms.  Medicines to relieve pain or inflammation or to relax the muscles.  Wearing a back brace (sacroiliac brace) to help support the joint while your back is healing.  Physical therapy to increase muscle strength around the joint and flexibility at the joint. This may also involve learning proper body positions and ways of moving to relieve stress on the joint.  Direct manipulation of the SI joint.  Injections of steroid medicine into the joint to reduce pain and swelling.  Radiofrequency ablation to burn away nerves that are carrying pain messages from the joint.  Use of a device that provides electrical stimulation to help reduce pain at the joint.  Surgery to put in screws and plates that limit or prevent joint motion. This is rare.  Follow these instructions at home:  Medicines  Take over-the-counter and prescription medicines only as told by your health care provider.  Do not drive or use heavy machinery while taking prescription pain medicine.  If you are taking prescription pain medicine, take actions to prevent or treat constipation. Your health care provider may recommend that you:  Drink enough fluid to keep your urine pale yellow.  Eat foods that are high in fiber, such as fresh fruits and vegetables, whole grains, and beans.  Limit foods that are high in fat and processed sugars, such as fried or sweet foods.  Take an  over-the-counter or prescription medicine for constipation.  If you have a brace:  Wear the brace as told by your health care provider. Remove it only as told by your health care provider.  Keep the brace clean.  If the brace is not waterproof:  Do not let it get wet.  Cover it with a watertight covering when you take a bath or a shower.  Managing pain, stiffness, and swelling               Icing can help with pain and swelling. Heat may help with muscle tension or spasms. Ask your health care provider if you should use ice or heat.  If directed, put ice on the affected area:  If you have a removable brace, remove it as told by your health care provider.  Put ice in a plastic bag.  Place a towel between your skin and the bag.  Leave the ice on for 20 minutes, 2-3 times a day.  If directed, apply heat to the affected area. Use the heat source that your health care provider recommends, such as a moist heat pack or a heating pad.  Place a towel between your skin and the heat source.  Leave the heat on for 20-30 minutes.  Remove the heat if your skin turns bright red. This is especially important if you are unable to feel pain, heat, or cold. You may have a greater risk of getting burned.  General instructions  Rest as needed. Ask your health care provider what activities are safe for you.  Return to your normal activities as told by your health care provider.  Exercise as directed by your health care provider or physical therapist.  Do not use any products that contain nicotine or tobacco, such as cigarettes and e-cigarettes. These can delay bone healing. If you need help quitting, ask your health care provider.  Keep all follow-up visits as told by your health care provider. This is important.  Contact a health care provider if:  Your pain is not controlled with medicine.  You have a fever.  Your pain is getting worse.  Get help right away if:  You have weakness, numbness, or tingling in your legs or feet.  You lose  control of your bladder or bowel.  Summary  Sacroiliac joint dysfunction is a condition that causes inflammation on one or both sides of the sacroiliac (SI) joint.  This condition causes deep aching or burning pain in the low back. In some cases, the pain may also spread into one or both buttocks, hips, or thighs.  Treatment depends on the cause and severity of your condition. It may include medicines to reduce pain and swelling or to relax muscles.  This information is not intended to replace advice given to you by your health care provider. Make sure you discuss any questions you have with your health care provider.  Document Released: 03/16/2010 Document Revised: 08/14/2019 Document Reviewed: 01/28/2019  [employer] Patient Education © 2020 [employer]    Sacroiliac Joint Injection       A sacroiliac (SI) joint injection is a procedure to inject a numbing medicine (anesthetic block)--and sometimes a strong anti-inflammatory medicine (steroid)--into the SI joint. The SI joint is the joint between two bones of the pelvis called the sacrum and the ilium. The sacrum is the bone at the base of the spine. The ilium is the large bone that forms the hip.  You may need this procedure if you have pain because of an inflamed or diseased SI joint. Various conditions can cause pain in the SI joint, including rheumatoid arthritis, gout, psoriatic arthritis, infection, or injury. SI joint pain is a common cause of low back pain. It may also cause pain in your buttock or leg.  SI joint injection may be done to:  Find out if an anesthetic block relieves pain. This can confirm that the SI joint is the cause of pain (diagnostic use).  Treat a painful SI joint with steroids, anesthetic medicine, or both (therapeutic use).  Tell a health care provider about:  Any allergies you have.  All medicines you are taking, including vitamins, herbs, eye drops, creams, and over-the-counter medicines.  Any problems you or family members have had  with anesthetic medicines.  Any blood disorders you have.  Any surgeries you have had.  Any medical conditions you have.  Whether you are pregnant or may be pregnant.  What are the risks?  Generally, this is a safe procedure. However, problems may occur, including:  Infection.  Bleeding.  Nerve injury.  Temporary increase in pain.  Headache.  Failure of the procedure to relieve pain.  Bruising or soreness at the joint, in deep tissues, or at the injection site.  Allergic reactions to medicines or dyes.  Side effects from the steroid medicine. These may include facial flushing, increased appetite, diarrhea, and increased blood sugar.  What happens before the procedure?  You may have a physical exam.  You may have imaging tests, such as an X-ray, CT scan, or MRI.  Ask your health care provider about:  Changing or stopping your regular medicines. This is especially important if you are taking diabetes medicines or blood thinners.  Taking medicines such as aspirin and ibuprofen. These medicines can thin your blood. Do not take these medicines unless your health care provider tells you to take them.  Taking over-the-counter medicines, vitamins, herbs, and supplements.  Plan to have someone take you home from the hospital or clinic.  What happens during the procedure?  To lower your risk of infection:  Your health care team will wash or sanitize their hands.  Your skin will be washed with a germ-killing (antiseptic) solution.  You may be given one or more of the following:  A medicine to help you relax (sedative).  A medicine to numb the area (local anesthetic). Your health care provider will inject a local anesthetic into the skin above your SI joint.  You will be placed in the proper position on a procedure table to give the health care team the best access to your SI joint.  An X-ray machine that produces moving X-ray images (fluoroscopy) will be placed above the procedure table.  A long, thin needle will be inserted  through your skin and down to your SI joint.  The position of the needle will be checked with fluoroscopy imaging.  An X-ray dye (contrast media) will be injected to make sure the needle enters the joint space. You may be asked if you feel any pain.  Long-acting anesthetic medicine will be injected. Long-acting steroid medicine may also be injected.  The needle will be removed, and a bandage will be placed over the injection site.  The procedure may vary among health care providers and hospitals.  What happens after the procedure?  Your blood pressure, heart rate, breathing rate, and blood oxygen level will be monitored until the medicines you were given have worn off.  If dye was used, you will be told to drink plenty of water to wash (flush) the dye out of your body.  You may be asked if you have pain relief from the injection.  You will likely be able to go home shortly after the procedure.  Your health care provider will give you instructions for taking care of yourself after the procedure. These may include instructions for doing physical therapy exercises.  Do not drive for 24 hours if you were given a sedative during the procedure.  Summary  A sacroiliac (SI) joint injection is an injection of a numbing medicine (anesthetic block)--and sometimes a strong anti-inflammatory medicine (steroid)--into the SI joint.  You will be awake during the procedure, but the injection area will be made numb.  If you were given a medicine to help you relax (sedative during the procedure, do not drive for at least 24 hours.  This information is not intended to replace advice given to you by your health care provider. Make sure you discuss any questions you have with your health care provider.  Document Released: 09/24/2018 Document Revised: 11/30/2018 Document Reviewed: 09/24/2018  [employer] Patient Education © 2020 [employer]

## 2024-05-14 ENCOUNTER — OFFICE VISIT (OUTPATIENT)
Dept: SURGERY | Facility: CLINIC | Age: 69
End: 2024-05-14
Payer: MEDICARE

## 2024-05-14 VITALS
TEMPERATURE: 98.1 F | HEIGHT: 64 IN | DIASTOLIC BLOOD PRESSURE: 90 MMHG | SYSTOLIC BLOOD PRESSURE: 128 MMHG | BODY MASS INDEX: 48.28 KG/M2 | WEIGHT: 282.8 LBS | OXYGEN SATURATION: 93 % | HEART RATE: 62 BPM

## 2024-05-14 DIAGNOSIS — E66.01 CLASS 3 SEVERE OBESITY DUE TO EXCESS CALORIES WITH SERIOUS COMORBIDITY AND BODY MASS INDEX (BMI) OF 45.0 TO 49.9 IN ADULT (CMS/HCC): Primary | ICD-10-CM

## 2024-05-14 DIAGNOSIS — G47.33 OBSTRUCTIVE SLEEP APNEA SYNDROME: ICD-10-CM

## 2024-05-14 DIAGNOSIS — M47.26 OSTEOARTHRITIS OF SPINE WITH RADICULOPATHY, LUMBAR REGION: ICD-10-CM

## 2024-05-14 DIAGNOSIS — E66.813 CLASS 3 SEVERE OBESITY DUE TO EXCESS CALORIES WITH SERIOUS COMORBIDITY AND BODY MASS INDEX (BMI) OF 45.0 TO 49.9 IN ADULT (CMS/HCC): Primary | ICD-10-CM

## 2024-05-14 PROCEDURE — G0463 HOSPITAL OUTPT CLINIC VISIT: HCPCS

## 2024-05-14 PROCEDURE — 99214 OFFICE O/P EST MOD 30 MIN: CPT | Mod: GF | Performed by: PHYSICIAN ASSISTANT

## 2024-05-14 RX ORDER — AMOXICILLIN 500 MG/1
CAPSULE ORAL
COMMUNITY
Start: 2024-05-06 | End: 2025-01-13 | Stop reason: ALTCHOICE

## 2024-05-14 ASSESSMENT — ENCOUNTER SYMPTOMS
DIARRHEA: 0
SLEEP DISTURBANCE: 0
DYSPHORIC MOOD: 0
SHORTNESS OF BREATH: 0
ACTIVITY CHANGE: 0
ARTHRALGIAS: 0
NERVOUS/ANXIOUS: 0
ABDOMINAL PAIN: 0
CONSTIPATION: 0
BACK PAIN: 1

## 2024-05-14 NOTE — PROGRESS NOTES
GENERAL SURGERY     Provider:  TOMAS Love    Patient:  Corrine Holden  Age:  69 y.o.  MRN:  [id number]  Primary Care Provider: Becka Joy MD  Date of Service:  5/14/2024    SUBJECTIVE:  Chief Complaint   Patient presents with    Weight Management     Patient presents for monthly weight management follow up. Patient does not have questions/concerns    Protein Intake: some days are better than others- has tracking sheets with her (80-90)  Water Intake: 50-60  Exercise: swim center 3xwk    Anthropometric Measurements:   Neck: 15.5  Waist: 51  Hip: 59       Weight loss and body composition follow up appointment.    HPI   Corrine Holden is a 69 y.o. female who presents for follow up appointment of weight loss and body composition.   She is doing well with lifestyle changes.  What worked?Swimming 3 times week, also biking, increased protein, eating   Following improvements:foot pain has improved off crutches, and ankle no longer feels unstable.  What obstacles did you face?root canal-aching  Challenges/difficulty with goals from last visit?  Specific concerns or questions?  Is the plan sustainable?    Sleeve 6/2020?     Occupation retired nurse  What are your goals?50 lbs  Happy weight  What do you feel keeps you from achieving these goals?to be healthier,lose 50 lbs, portion control, about to turn 71 years-wants to come back from surgery     Do you find it takes more food than normal to feel full?yes  Do you feel hungry soon after eating?yes, bored  Do you eat to feel better or distract yourself?yes  Do you find you barely eat and still gain weight?no  Disordered eating patterns or behavior?multiple ankle surgeries, very decondition, quality-junk and eat too much. Loves to eat     Weight trend:  Consult weight 292 lbs BMI 50 kg/m²  Weight today 282 lbs BMI 48.5kg/m²  10 pound total weight loss.     Anti-Obesity Medicine:not  interested in AOM  Doing well on , no side effects or concerns. Improvement  in   Start date:  Supplements:Taking     Labs:     Behavioral/Mood:  Feeling better, less pain, more active helps    Sleep 10 pm-5:30 am  Consistent sleep window, 8 hours  8 hours of dark     Nutrition: 2 meals, no snacking, done by 6 pm  Protein: 90 gm  CHO: 50 gm  Fat: 100 gm  Fiber 20-30 gm  Eating window:  -Breakfast:2 cups coffee with cream 1 cup cottage cheese-ground flax, baconor protein drink or smoothie, scrambled eggs, greek yogurt  -Lunch: smoothie-yogurt, berries, spinach, almond milk, tuna, burrito  -Supper:salmon, ground beef, apple or berries, sweet potato and bean soup, cheese and apple or cottage cheese with flax, small slice pizza  -Snacks: rare craving for sugar  -Beverages: 64 oz  -Times of day that patient struggles with:  -Frequency of eating out or fast foods:    Physical activity:3-4 times week    Swim 3 times week, 6:30 am or bike  Bike at home  PT at home    BIOMETRIC TESTING ON 5/14/24 REVEALED:  Weight: 282.8 pounds (-10 pounds)  Fat percent: 55.5% (-%)  Fat Mass:157(-lbs)  Fat free mass 125.8 pounds (- pounds)  Visceral fat rating:  (-)    Desirable range fat percent: 24 to 35.9%  To reduce Fat %, work on loosing Fat Mass and gaining Fat Free Mass    Anthropometrics' on 5/14/24 revealed:  Neck circumference: 15.5 inches (- inches)  Waist: 51 inches (- inches)  Hip: 59 inches (- inches)   Waist circumference strong predictor of insulin resistance.  Female<35  Male <40    Comorbid conditions:    SHANA-CPAP  Chronic low back pain-pain mngt note from 3/24 reviewed-surgery with Dr. Guerra on her foot 3+ months ago. Patient has had an several surgeries in her foot over the last few years- this inflames it. She does have pain in her low back- can go to her legs at times, again she is compensating due to there crutches/boot so this making her pain worse.    grade 1 anterolisthesis of L5 over S1 with associated bilateral chronic pars defect.  Multilevel degenerative changes most pronounced in  lower lumbar bilateral facets joints.-Lumbar epidural?       She does not use nicotine products. Denies drug use. Alcohol: none.      Current Outpatient Medications:     cholecalciferol, vitamin D3, 25 mcg (1,000 unit) tablet, Take 2 tablets (2,000 Units total) by mouth 2 (two) times a day, Disp: , Rfl:     omeprazole (PriLOSEC) 20 mg capsule, Take 1 capsule (20 mg total) by mouth daily, Disp: 30 capsule, Rfl: 11    levothyroxine (SYNTHROID, LEVOTHROID) 125 mcg tablet, Take 1 tablet (125 mcg total) by mouth daily, Disp: 90 tablet, Rfl: 2    ascorbic acid, vitamin C, (ascorbic acid with vane hips) 500 mg tablet, Take 2 tablets (1,000 mg total) by mouth daily, Disp: , Rfl:     methylPREDNISolone (Medrol, Maxi,) 4 mg tablet, follow package directions, Disp: 21 tablet, Rfl: 0    albuterol HFA 90 mcg/actuation inhaler, Inhale 2 puffs every 6 (six) hours as needed for wheezing, Disp: 1 Inhaler, Rfl: 0    meloxicam (MOBIC) 7.5 mg tablet, Take 1 tablet (7.5 mg total) by mouth daily, Disp: 90 tablet, Rfl: 3    ibuprofen (ADVIL,MOTRIN) 200 mg tablet, Take 1 tablet (200 mg total) by mouth every 6 (six) hours as needed, Disp: , Rfl:     vit C/E/Zn/coppr/lutein/zeaxan (OCUVITE LUTEIN AND ZEAXANTHIN ORAL), PreserVision AREDS-2, Disp: , Rfl:     fluticasone propionate (FLONASE) 50 mcg/actuation nasal spray, USE 2 SPRAYS IN EACH NOSTRIL DAILY, Disp: 48 g, Rfl: 2    krill-om-3-dha-epa-phospho-ast [id number] mg capsule, Take 1 capsule by mouth daily, Disp: , Rfl:     multivitamin (THERAGRAN) tablet tablet, 1 Every Day, Disp: , Rfl:     acetaminophen (TYLENOL EXTRA STRENGTH) 500 mg tablet, Take 2 tablets (1,000 mg total) by mouth 2 (two) times a day, Disp: , Rfl:     amoxicillin (AMOXIL) 500 mg capsule, SMARTSI Capsule(s) By Mouth, Disp: , Rfl:     lidocaine (LIDODERM) 5 % patch, Apply 1 patch topically daily as needed for pain scale 1-3/10 Apply to low back as needed. Remove & discard patch within 12 hours or as directed by MD.  "(Patient not taking: Reported on 5/14/2024), Disp: 30 patch, Rfl: 2    diazePAM (VALIUM) 5 mg tablet, Take 1 tablet (5 mg total) by mouth every 12 (twelve) hours as needed for anxiety or muscle spasms Max Daily Amount: 10 mg (Patient not taking: Reported on 5/14/2024), Disp: 30 tablet, Rfl: 0    tiZANidine (ZANAFLEX) 2 mg tablet, Take 1 tablet (2 mg total) by mouth every 6 (six) hours as needed for muscle spasms, Disp: , Rfl:     ketorolac (TORADOL) 10 mg tablet, Take 1 tablet (10 mg total) by mouth every 6 (six) hours as needed for pain scale 8-10/10 for up to 5 days (Patient not taking: Reported on 5/14/2024), Disp: 20 tablet, Rfl: 0    zinc 50 mg tablet, Take 50 mg by mouth daily, Disp: , Rfl:     diclofenac sodium (VOLTAREN) 1 % gel, APPLY 2 GRAMS TO THE AFFECTED AREA(S) 4 TIMES PER DAY AS NEEDED (Patient not taking: Reported on 5/14/2024), Disp: 300 g, Rfl: 2    budesonide (Pulmicort Flexhaler) 90 mcg/actuation inhaler, Inhale 1 puff as needed (for shortness of breath), Disp: , Rfl:     cyclobenzaprine (FLEXERIL) 10 mg tablet, Take 1 tablet (10 mg total) by mouth 2 (two) times a day as needed for muscle spasms. (Patient not taking: Reported on 5/14/2024), Disp: 60 tablet, Rfl: 0    Review of Systems:  Review of Systems   Constitutional:  Negative for activity change.   Respiratory:  Negative for shortness of breath.    Cardiovascular:  Negative for chest pain.   Gastrointestinal:  Negative for abdominal pain, constipation and diarrhea.   Endocrine: Negative for cold intolerance and heat intolerance.   Musculoskeletal:  Positive for back pain. Negative for arthralgias.   Psychiatric/Behavioral:  Negative for dysphoric mood and sleep disturbance. The patient is not nervous/anxious.    All other systems reviewed and are negative.       OBJECTIVE:  Vital Signs  /90 (BP Location: Left arm, Patient Position: Sitting, Cuff Size: Long Adult)   Pulse 62   Temp 36.7 °C (98.1 °F) (Temporal)   Ht 1.626 m (5' 4\")  "  Wt 128.3 kg (282 lb 12.8 oz)   SpO2 93%   BMI 48.54 kg/m²     Physical Exam:  Physical Exam  Constitutional:       Appearance: Normal appearance. She is obese.   Cardiovascular:      Rate and Rhythm: Normal rate.   Pulmonary:      Effort: Pulmonary effort is normal.   Musculoskeletal:         General: Normal range of motion.      Cervical back: Normal range of motion.   Skin:     General: Skin is warm and dry.   Neurological:      General: No focal deficit present.      Mental Status: She is alert and oriented to person, place, and time.   Psychiatric:         Mood and Affect: Mood normal.         Behavior: Behavior normal.          ASSESSMENT & PLAN:  1. Class 3 severe obesity due to excess calories with serious comorbidity and body mass index (BMI) of 45.0 to 49.9 in adult (CMS/[facility])      2. Obstructive sleep apnea syndrome      3. Osteoarthritis of spine with radiculopathy, lumbar region         Corrine is a pleasant patient who's consult weight was 292 lbs, BMI  49 kg/m². Today, weighs 282 , BMI 48.5kg/m².   10 pound total weight loss.    Biometrics  To reduce Fat %, work on loosing Fat Mass and gaining Fat Free Mass    Anthropometrics  Waist circumference strong predictor of insulin resistance.  Female<35  Male <40     We reviewed foundational components of weight loss including nutrition through whole foods, physical activity, and behavioral health as well as metabolic adaptation with weight loss and the effects of dieting.  We discussed importance of protein-rich diet and resistance training to combat metabolic adaptation.    Phenotype:  Medications:    We did discussed the possibility of adding an anti-obesity medication and reviewed medication options today    Supplements: Consider Vit D3/K2 take 2,000 IU day take with meal am or at lunch, Fish Oil 2-4 gm day-take with fatty meal, probiotic or daily fermented foods, Creatine 5 mg day  [facility] certified, check examine.[employer]       Labs  reviewed    Behavior:    Sleep: Current hours of sleep: 8.5  3-5 mg melatonin nightly, 300-420 mg magnesium glycinate nightly, 200-400 mg L-theanine nightly.     CBT or counseling tools to reframe mindset around food,exercise, and stress.  Label hunger 1-10 before eating.   Ride the craving-watch the cravings pass.   Change environment,   Exercise-HIT, go outside for walk, do 5 minutes of movement.     Chronic low grade stress- via cortisol and NPY increase visceral fat storage/abdominal fat , and reduces lipolysis  To reduce stress:  1-regular exercise  2-cyclic sighing for 5  minutes,5 minutes day of cyclic sighing-deep inhale through nose, with second short inhale then exhale through the mouth to completely empty lungs  3-remove processed food   4-quality sleep  11 minute cold water emersion    Referrals:  Ambulatory referral to RD; Future  -metabolic testing  -CBT    The following 1-month weight loss goals were discussed and mutually agreed upon:     Nutrition:  Keep logging  Decrease coffee-may consider adding protein powder instead of heavy cream  May use protein powder for meal replacement  Increase water-drink 16 oz before coffee  Nutrition:  Eating window: 8am-6 pm  Electrolyte powder- ucan, ultima, cure, LMNT     - Initiate food/fluid log using mobile rik ([employer] or [employer]) or written journal and bring this with you to next clinic visit.   Macros  1500 calories day  Protein 113 gm   gm  Fat 60 gm  Fiber 20-30 gm     Breakfast:30-40 grams of protein with 1 serving fruit or vegetable(15-30 gm carbohydrate)  Lunch:30-40 grams protein with 2 servings vegetable or fruit(15-30 gm carbohydrate)  Dinner: 30-40 grams protein with 2 servings vegetable or fruit(15-30 gm carbohydrate)  Potion control follow myplate     1-2 servings of nuts/vegetables     1-  Daily Protein goal: 113 gm day (1gm/lb IBW). 20-30 gm protein at each meal.   First meal: 30-45 grams of protein within 60-90 minutes of waking  up to initiate/stimulate muscle building for 5 hours.  Breakfast ideas: breakfast casserole, omelet with vegetables, overnight oats- add greek yogurt and whey protein powder, smoothie, Greek yogurt or cottage cheese with fruit and nut/seeds, protein pancakes, egg/vegetables/bean burrito, oatmeal with 1/4 cup egg whites                             Whey protein powder ideas: Levels, Naked Whey, Vital Performance, Transparent labs, Unjury unflavored whey protein  Recipies:  https://www.DiscGenics/30-grams-of-protein-for-breakfast-ideas/#20-protein-breakfast-recipe-ideas  https://Easy Solutions.[employer]/high-protein-breakfasts  https://experiencelife.[employer].life/article/heres-what-30-grams-of-protein-looks-like/Breakfast     30 gm of protein= 4-5 oz meat, 5 eggs/whites, 1.5 cups greek yogurt or cottage cheese, whey protein powder     Snacks: limit nuts and cheese. greek yogurt-jerky, hard boiled eggs, unsalted seeds/nuts or fruit/vegetables, legumes/beans. Limit snacking to help with insulin sensitivity      3- Water: >64 oz water day, increase for exercise, if taking diuretics, hot environments, sauna/hot tub. Drink 8 -16 oz water before caffeine    -snacks: high protein plus fiber(fruit or vegetables). Avoid simple carbohydrates, and processed snacks.          Physical Activity:increasing physical activity is how to sustain weight loss, and prevent weight regain  Maintaining and improving health: 150 minutes/week  Prevention of weight gain: 150-250 minutes/week  Promote clinically significant weight loss: 225-420  minutes/week  Prevention of weight regain after weight loss: 200-300 minutes/week  Cardio:  3-4 days week swim or bike    Strength training:     -Referrals have been placed. If you do not hear from them in 1-2 weeks you may call their office.      Corrine Holden will RTC in  1 month for ongoing medically supervised weight management.  She participated in the decision making process and agreed with the  plan of care.  All questions were sought and answered. To schedule appointment call Arabella Betancourt [phone]    TOMAS Love    Total Time spent 30 minutes.    A voice recognition program was used to aid in medical record documentation. Sometimes words are printed not exactly as they were spoken. While efforts were made to carefully edit and correct any inaccuracies, some errors may be present and should be taken within the context of the discussion.  Please contact our office if you need assistance interpreting this medical record or notice any mistakes.

## 2024-05-14 NOTE — PATIENT INSTRUCTIONS
1. Class 3 severe obesity due to excess calories with serious comorbidity and body mass index (BMI) of 45.0 to 49.9 in adult (CMS/[facility])      2. Obstructive sleep apnea syndrome      3. Osteoarthritis of spine with radiculopathy, lumbar region         Corrine is a pleasant patient who's consult weight was 292 lbs, BMI  49 kg/m². Today, weighs 282 , BMI 48.5kg/m².   10 pound total weight loss.    Biometrics  To reduce Fat %, work on loosing Fat Mass and gaining Fat Free Mass    Anthropometrics  Waist circumference strong predictor of insulin resistance.  Female<35  Male <40     We reviewed foundational components of weight loss including nutrition through whole foods, physical activity, and behavioral health as well as metabolic adaptation with weight loss and the effects of dieting.  We discussed importance of protein-rich diet and resistance training to combat metabolic adaptation.    Phenotype:  Medications:    We did discussed the possibility of adding an anti-obesity medication and reviewed medication options today    Supplements: Consider Vit D3/K2 take 2,000 IU day take with meal am or at lunch, Fish Oil 2-4 gm day-take with fatty meal, probiotic or daily fermented foods, Creatine 5 mg day  [facility] certified, check examine.[employer]       Labs reviewed    Behavior:    Sleep: Current hours of sleep: 8.5  3-5 mg melatonin nightly, 300-420 mg magnesium glycinate nightly, 200-400 mg L-theanine nightly.     CBT or counseling tools to reframe mindset around food,exercise, and stress.  Label hunger 1-10 before eating.   Ride the craving-watch the cravings pass.   Change environment,   Exercise-HIT, go outside for walk, do 5 minutes of movement.     Chronic low grade stress- via cortisol and NPY increase visceral fat storage/abdominal fat , and reduces lipolysis  To reduce stress:  1-regular exercise  2-cyclic sighing for 5  minutes,5 minutes day of cyclic sighing-deep inhale through nose, with second short inhale then exhale  through the mouth to completely empty lungs  3-remove processed food   4-quality sleep  11 minute cold water emersion    Referrals:  Ambulatory referral to RD; Future  -metabolic testing  -CBT    The following 1-month weight loss goals were discussed and mutually agreed upon:     Nutrition:  Keep logging  Decrease coffee-may consider adding protein powder instead of heavy cream  May use protein powder for meal replacement  Increase water-drink 16 oz before coffee  Nutrition:  Eating window: 8am-6 pm  Electrolyte powder- ucan, ultima, cure, LMNT     - Initiate food/fluid log using mobile rik ([employer] or [employer]) or written journal and bring this with you to next clinic visit.   Macros  1500 calories day  Protein 113 gm   gm  Fat 60 gm  Fiber 20-30 gm     Breakfast:30-40 grams of protein with 1 serving fruit or vegetable(15-30 gm carbohydrate)  Lunch:30-40 grams protein with 2 servings vegetable or fruit(15-30 gm carbohydrate)  Dinner: 30-40 grams protein with 2 servings vegetable or fruit(15-30 gm carbohydrate)  Potion control follow myplate     1-2 servings of nuts/vegetables     1-  Daily Protein goal: 113 gm day (1gm/lb IBW). 20-30 gm protein at each meal.   First meal: 30-45 grams of protein within 60-90 minutes of waking up to initiate/stimulate muscle building for 5 hours.  Breakfast ideas: breakfast casserole, omelet with vegetables, overnight oats- add greek yogurt and whey protein powder, smoothie, Greek yogurt or cottage cheese with fruit and nut/seeds, protein pancakes, egg/vegetables/bean burrito, oatmeal with 1/4 cup egg whites                             Whey protein powder ideas: Levels, Naked Whey, Vital Performance, Transparent labs, Unjury unflavored whey  protein  Recipies:  https://www.Chron[employer]olf/30-grams-of-protein-for-breakfast-ideas/#20-protein-breakfast-recipe-ideas  https://www.Sooligan/high-protein-breakfasts  https://experiencelife.lifetime.life/article/heres-what-30-grams-of-protein-looks-like/Breakfast     30 gm of protein= 4-5 oz meat, 5 eggs/whites, 1.5 cups greek yogurt or cottage cheese, whey protein powder     Snacks: limit nuts and cheese. greek yogurt-jerky, hard boiled eggs, unsalted seeds/nuts or fruit/vegetables, legumes/beans. Limit snacking to help with insulin sensitivity      3- Water: >64 oz water day, increase for exercise, if taking diuretics, hot environments, sauna/hot tub. Drink 8 -16 oz water before caffeine    -snacks: high protein plus fiber(fruit or vegetables). Avoid simple carbohydrates, and processed snacks.          Physical Activity:increasing physical activity is how to sustain weight loss, and prevent weight regain  Maintaining and improving health: 150 minutes/week  Prevention of weight gain: 150-250 minutes/week  Promote clinically significant weight loss: 225-420  minutes/week  Prevention of weight regain after weight loss: 200-300 minutes/week  Cardio:  3-4 days week swim or bike    Strength training:     -Referrals have been placed. If you do not hear from them in 1-2 weeks you may call their office.      Corrine Holden will RTC in  1 month for ongoing medically supervised weight management.  She participated in the decision making process and agreed with the plan of care.  All questions were sought and answered. To schedule appointment call Arabella Betancourt [phone]    TOMAS Love

## 2024-05-21 DIAGNOSIS — M46.1 SACROILIITIS (CMS/HCC): Primary | ICD-10-CM

## 2024-05-29 ENCOUNTER — OFFICE VISIT (OUTPATIENT)
Dept: NEUROLOGY | Facility: CLINIC | Age: 69
End: 2024-05-29
Payer: MEDICARE

## 2024-05-29 VITALS — DIASTOLIC BLOOD PRESSURE: 86 MMHG | OXYGEN SATURATION: 91 % | SYSTOLIC BLOOD PRESSURE: 128 MMHG | HEART RATE: 63 BPM

## 2024-05-29 DIAGNOSIS — G47.33 OBSTRUCTIVE SLEEP APNEA SYNDROME: Primary | ICD-10-CM

## 2024-05-29 PROCEDURE — 99214 OFFICE O/P EST MOD 30 MIN: CPT | Performed by: PHYSICIAN ASSISTANT

## 2024-05-29 PROCEDURE — G0463 HOSPITAL OUTPT CLINIC VISIT: HCPCS | Performed by: PHYSICIAN ASSISTANT

## 2024-05-29 ASSESSMENT — ENCOUNTER SYMPTOMS
EYE REDNESS: 0
BACK PAIN: 1
NAUSEA: 0
SINUS PRESSURE: 0
SINUS PAIN: 0
FEVER: 0
EYE ITCHING: 0
EYE PAIN: 0
SORE THROAT: 0
NECK PAIN: 0
SHORTNESS OF BREATH: 0
VOMITING: 0
DIZZINESS: 0
AGITATION: 0
CHILLS: 0
FATIGUE: 0
WEAKNESS: 0
SLEEP DISTURBANCE: 1
ROS GI COMMENTS: GERD
COUGH: 0
ARTHRALGIAS: 1
APNEA: 1
HEADACHES: 0
PALPITATIONS: 0

## 2024-05-29 NOTE — PROGRESS NOTES
Sleep Follow up Note   Name: Corrine Holden  MRN: [id number]  : 1955  Visit Date: 24    Primary Diagnosis   Obstructive sleep apnea syndrome [G47.33]     Chief Complaint   Patient presents with    Sleep Apnea        HPI   Sleep study date: 8/15/2014 (diagnostic)       2024 (Medicare re-qualification)  Severity: Mild  AHI: 8  Minimum O2 saturation: 81%  Machine type: CPAP   Mask: Nasal    Gets CPAP supplies from [facility].    Corrine Holden is a 69 y.o. female who returns to the clinic today in follow-up regarding obstructive sleep apnea and annual CPAP follow-up. It is noted that the patient has a long-standing history of SHANA dating back to . At that time she underwent sleep study secondary to complaints of excessive daytime fatigability and somnolence. She also had complaints of significant morning headaches which were debilitating for her and she found it hard to work. Sleep study was completed on 8/15/2014 and demonstrated moderate obstructive sleep apnea with 13.4 apneas and hypopneas per hour. Minimum oxygen saturation was noted to be 82%. Patient was utilizing CPAP with a set pressure of 8.6 cmH2O. Patient was covered under Medicare and was required to undergo repeat diagnostic PSG with Medicare criteria.  This was completed 2024. This study demonstrated mild obstructive sleep apnea with 8 apneas and hypopneas per hour.  Minimum oxygen saturation was noted to be 81%.  Following this study, it was recommended that patient undergo repeat titration study.  However, patient felt that she was doing well with CPAP settings and wished to stay on current therapy. Therefore, she continues to utilize CPAP at a set pressure of 8.6 cm H2O.     She does have a past medical history positive for GERD, hypothyroidism, history of supraventricular tachycardia, osteoarthritis, and obesity.     At today’s appointment the patient reports that she tolerates CPAP and current  "pressure setting well. She denies any complaints or concerns in regards to CPAP. She is able to initiate sleep without difficulty. She sleeps well throughout the night and feels that she is getting restful sleep. She will awaken every 2 hours or so to utilize the bathroom, but denies trouble getting back to sleep. Overall, she feels well refreshed upon awakening and denies daytime fatigue. She will occasionally take a short nap if she has been busy, but states that this is not a daily occurrence. She continues to work on exercise and diet and stays active. Patient is currently utilizing a nasal mask without the use of a chin strap. She denies any concerns or complaints in regards to this and reports that current mask fit is very comfortable for her. Mask leak is currently very well controlled at 0.6 in the 95th percentile. She was previously using nasal pillows but has found the nasal mask to be more comfortable for her.     CPAP compliance report today demonstrates 100% usage over 4 hours. On average she utilizes her machine 7 hours and 56 minutes a night. AHI is very well controlled at 0.7. Overall, from a sleep standpoint the patient is noted to be doing very well. Patient is pleasant to visit with. 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 However, she has retired and is enjoying residential and spending time with her grandchildren.     Of note, [place] Sleepiness Scale was completed in the office today with a total score of 2/24.      PAST MEDICAL HISTORY     Past Medical History:   Diagnosis Date    GERD (gastroesophageal reflux disease)     H. pylori infection 2009    Hypothyroidism     Injury of back     STATES, \"THIS IS REASON FOR MRI\"    Microscopic hematuria     SHANA (obstructive sleep apnea)     USES CPAP    Osteoarthritis     Palpitations     Respiratory disease     Supraventricular tachycardia (CMS/HCC)     STATES, \"COFFEE RELATED\", VERY INTERMITTENT    Tinnitus  "       MEDICATIONS     Current Outpatient Medications   Medication Instructions    albuterol HFA 90 mcg/actuation inhaler 2 puffs, inhalation, Every 6 hours PRN    amoxicillin (AMOXIL) 500 mg capsule SMARTSI Capsule(s) By Mouth    ascorbic acid (vitamin C) (ASCORBIC ACID WITH BIB HIPS) 1,000 mg, oral, Daily    budesonide (Pulmicort Flexhaler) 90 mcg/actuation inhaler 1 puff, inhalation, As needed    cholecalciferol (vitamin D3) 2,000 Units, oral, 2 times daily    cyclobenzaprine (FLEXERIL) 10 mg, oral, 2 times daily PRN    diazePAM (VALIUM) 5 mg, oral, Every 12 hours PRN    diclofenac sodium (VOLTAREN) 1 % gel APPLY 2 GRAMS TO THE AFFECTED AREA(S) 4 TIMES PER DAY AS NEEDED    fluticasone propionate (FLONASE) 50 mcg/actuation nasal spray USE 2 SPRAYS IN EACH NOSTRIL DAILY    ibuprofen (ADVIL,MOTRIN) 200 mg, oral, Every 6 hours PRN    ketorolac (TORADOL) 10 mg, oral, Every 6 hours PRN    krill-om-3-dha-epa-phospho-ast [id number] mg capsule 1 capsule, oral, Daily    levothyroxine (SYNTHROID, LEVOTHROID) 125 mcg, oral, Daily    lidocaine (LIDODERM) 5 % patch 1 patch, topical (top), Daily PRN, Apply to low back as needed. Remove & discard patch within 12 hours or as directed by MD.    meloxicam (MOBIC) 7.5 mg, oral, Daily    methylPREDNISolone (Medrol, Maxi,) 4 mg tablet follow package directions    multivitamin (THERAGRAN) tablet tablet 1 Every Day    omeprazole (PRILOSEC) 20 mg, oral, Daily    tiZANidine (ZANAFLEX) 2 mg, oral, Every 6 hours PRN    Tylenol Extra Strength 1,000 mg, oral, 2 times daily    vit C/E/Zn/coppr/lutein/zeaxan (OCUVITE LUTEIN AND ZEAXANTHIN ORAL) PreserVision AREDS-2    zinc 50 mg, oral, Daily        REVIEW OF SYSTEMS   Review of Systems   Constitutional:  Negative for chills, fatigue and fever.   HENT:  Negative for congestion, ear pain, sinus pressure, sinus pain and sore throat.    Eyes:  Negative for pain, redness and itching.   Respiratory:  Positive for apnea (Known diagnosis of SHANA.  On CPAP). Negative for cough and shortness of breath.         No snoring, unusual movements or abnormal dream activity, restless legs, or trouble sleeping (insomnia) and not excessively sleepy during daytime.   Cardiovascular:  Negative for chest pain, palpitations and leg swelling.        History of supraventricular tachycardia   Gastrointestinal:  Negative for nausea and vomiting.        GERD   Endocrine:        Hypothyriodism   Musculoskeletal:  Positive for arthralgias and back pain (lower back). Negative for neck pain.        Osteoarthritis   Allergic/Immunologic: Negative for environmental allergies.   Neurological:  Negative for dizziness, syncope, weakness and headaches.   Psychiatric/Behavioral:  Positive for sleep disturbance. Negative for agitation and behavioral problems.         Vital Signs/Weight   /86 (BP Location: Left arm, Patient Position: Sitting, Cuff Size: Regular Adult)   Pulse 63   SpO2 91%    There is no height or weight on file to calculate BMI.       Results of Compliance Report:   Pressure: 8.6 cmH2O  AHI: 0.7  Usage over 4 hours: 100%  Average hourly usage: 7:56  O2: No      PHYSICAL EXAM   Physical Exam  Vitals reviewed.   Constitutional:       General: She is not in acute distress.     Appearance: She is well-developed.      Comments: Utilizing ayala when outside the home   HENT:      Head: Normocephalic and atraumatic.   Eyes:      Conjunctiva/sclera: Conjunctivae normal.   Cardiovascular:      Rate and Rhythm: Normal rate and regular rhythm.   Pulmonary:      Effort: Pulmonary effort is normal.      Breath sounds: Normal breath sounds.   Musculoskeletal:      Cervical back: Neck supple.   Neurological:      Mental Status: She is alert.   Psychiatric:         Behavior: Behavior normal.          ASSESSMENT:   1. Patient underwent most recent diagnostic sleep study for Medicare purposes on 4/14/2024 and was found to have mild obstructive sleep apnea with an AHI of 8 and a minimum  oxygen saturation of 81%. She is currently on CPAP with a set pressure of 8.6 cmH2O.   2.  ESS was completed in the office today with a total score of 2/24.  3.  Past medical history positive for GERD, hypothyroidism, osteoarthritis, obesity, and history of supraventricular tachycardia.      PLAN:   1. Patient is currently using and benefiting from current CPAP therapy. She tolerates CPAP well. There is no need to make any changes in regards to her settings as AHI is very well controlled at 0.7. She is noted to be compliant with 100% usage over 4 hours. Overall, from a sleep standpoint, she is noted to be stable.   2. The patient will be provided with a prescription to obtain new CPAP supplies today.  3. She will follow up in 1 year, sooner if needed. Her questions were addressed and answered.  4. She was counseled on what to look for if pressure is too high or too low.   5. She is to stay active both physically and mentally.   6. She will follow up with her PCP for general medical needs.  7. The diagnostic assessment has been reviewed. Patient has expressed a good understanding of the assessment and recommendation from today's visit. There are no apparent barriers to understanding this information. She has been advised to contact this office or the answering service with questions or concerns that may arise. On this date of service 33 minutes of total time was spent in evaluation and management on this encounter.       A voice recognition program was used to aid in documentation of the record. Sometimes words are not printed exactly as they were spoken. While efforts were made to carefully edit and correct any inaccuracies, some may be present; please take these into context. Please contact the provider if areas are identified.      Nakita Holcomb PA-C

## 2024-05-30 ENCOUNTER — HOSPITAL ENCOUNTER (OUTPATIENT)
Dept: FLUOROSCOPY | Facility: HOSPITAL | Age: 69
Discharge: STILL ADMITTED OR ADMINISTRATIVE | End: 2024-05-30
Payer: MEDICARE

## 2024-05-30 ENCOUNTER — PROCEDURE VISIT (OUTPATIENT)
Dept: PAIN MEDICINE | Facility: HOSPITAL | Age: 69
End: 2024-05-30
Payer: MEDICARE

## 2024-05-30 VITALS — OXYGEN SATURATION: 94 % | SYSTOLIC BLOOD PRESSURE: 161 MMHG | DIASTOLIC BLOOD PRESSURE: 80 MMHG | HEART RATE: 70 BPM

## 2024-05-30 DIAGNOSIS — M46.1 SACROILIITIS (CMS/HCC): ICD-10-CM

## 2024-05-30 PROCEDURE — 77002 NEEDLE LOCALIZATION BY XRAY: CPT | Mod: NC

## 2024-05-30 PROCEDURE — 6360000200 HC RX 636 W HCPCS (ALT 250 FOR IP): Mod: JZ

## 2024-05-30 PROCEDURE — 2550000100 HC RX 255

## 2024-05-30 PROCEDURE — 27096 INJECT SACROILIAC JOINT: CPT | Mod: 50,KX

## 2024-05-30 RX ORDER — TRIAMCINOLONE ACETONIDE 40 MG/ML
40 INJECTION, SUSPENSION INTRA-ARTICULAR; INTRAMUSCULAR ONCE
Status: COMPLETED | OUTPATIENT
Start: 2024-05-30 | End: 2024-05-30

## 2024-05-30 RX ORDER — LIDOCAINE HYDROCHLORIDE 10 MG/ML
30 INJECTION, SOLUTION EPIDURAL; INFILTRATION; INTRACAUDAL; PERINEURAL ONCE
Status: DISCONTINUED | OUTPATIENT
Start: 2024-05-30 | End: 2024-05-30

## 2024-05-30 RX ORDER — BUPIVACAINE HYDROCHLORIDE 5 MG/ML
30 INJECTION, SOLUTION EPIDURAL; INTRACAUDAL ONCE
Status: DISCONTINUED | OUTPATIENT
Start: 2024-05-30 | End: 2024-05-30

## 2024-05-30 RX ORDER — TRIAMCINOLONE ACETONIDE 40 MG/ML
40 INJECTION, SUSPENSION INTRA-ARTICULAR; INTRAMUSCULAR ONCE
Status: DISCONTINUED | OUTPATIENT
Start: 2024-05-30 | End: 2024-05-30

## 2024-05-30 RX ORDER — BUPIVACAINE HYDROCHLORIDE 5 MG/ML
30 INJECTION, SOLUTION EPIDURAL; INTRACAUDAL ONCE
Status: COMPLETED | OUTPATIENT
Start: 2024-05-30 | End: 2024-05-30

## 2024-05-30 RX ORDER — LIDOCAINE HYDROCHLORIDE 10 MG/ML
30 INJECTION, SOLUTION EPIDURAL; INFILTRATION; INTRACAUDAL; PERINEURAL ONCE
Status: COMPLETED | OUTPATIENT
Start: 2024-05-30 | End: 2024-05-30

## 2024-05-30 RX ADMIN — BUPIVACAINE HYDROCHLORIDE 30 ML: 5 INJECTION, SOLUTION EPIDURAL; INTRACAUDAL at 09:39

## 2024-05-30 RX ADMIN — TRIAMCINOLONE ACETONIDE 40 MG: 40 INJECTION, SUSPENSION INTRA-ARTICULAR; INTRAMUSCULAR at 09:44

## 2024-05-30 RX ADMIN — LIDOCAINE HYDROCHLORIDE 30 ML: 10 INJECTION, SOLUTION EPIDURAL; INFILTRATION; INTRACAUDAL; PERINEURAL at 09:38

## 2024-05-30 ASSESSMENT — PAIN - FUNCTIONAL ASSESSMENT: PAIN_FUNCTIONAL_ASSESSMENT: 0-10

## 2024-05-30 NOTE — PROGRESS NOTES
Procedures  Date of service:5/30/2024    Procedure: Bilateral diagnostic sacroiliac joint steroid injection    Preprocedure diagnosis:  1.  Bilateral sacroiliitis  2.  Lumbosacral degenerative joint disease    Postprocedure diagnosis:  1.  Bilateral Sacroiliitis  2.  Lumbosacral degenerative joint disease    Complications: None    Findings: Successful block    Anesthesia: Local    History: 69-year-old female presents with severe low back pain.  This patient feels that their pain is moderate to severe in nature over the anatomic locations of the sacroiliac joint.  It has been going on for over 3 months.  It does seem to be below the L5 level.  The patient denies any significant radicular symptoms and feels that the axial component of the pain is the worst component of the pain.  Imaging studies were reviewed and do not support additional primary pain generators beyond sacroiliitis.  On physical examination they have at least 3 positive provocative maneuvers including Houston's, thigh thrust, SI compression, gaenslen.  This low back pain persists despite over 4 weeks of conservative treatment options.  The patient would like to try SI joint injections along with continued conservative treatment options.  The patient will continue rehabilitative therapy.  Patient's preprocedure pain is 7 out of 10.  Patient's postprocedure pain is 1 out of 10.  A 2 view image of the procedure was saved in PACS.     Procedure: Consent obtained.  Patient brought to the fluoroscopy suite and positioned in a prone position.  Timeout procedure completed.  Back ChloroPrep prepped and draped in a sterile fashion.  Utilizing fluoroscopy the bilateral sacroiliac joint was identified.  Utilizing a 22-gauge 4-1/2 inch spinal needle the joint was entered where diagnostic solution consisting of 40 mg of Kenalog and 3 mL of 0.5% bupivacaine was injected.  Contrast demonstrated intra-articular flow.  Patient tolerated procedure well.  After short  observational stay patient was discharged home in stable condition.    Patient reports greater than 80% relief of her pain postprocedure.  She is able to move easier, walk easier bend without pain.  I would classify this is a positive diagnostic injection demonstrating that a component of her pain is certainly coming from the sacroiliac joints.    Plan: We will have patient follow-up for her previously scheduled facet injections.  At some point she certainly could proceed with a therapeutic bilateral sacroiliac joint injection in the future

## 2024-05-30 NOTE — PATIENT INSTRUCTIONS
Discharge instructions following injections:    * No soaking - hot tub, bathtub, swimming   * Numbing medication will wear off in 4-6 hours, Steroid can take 3-7 days to take full effect and start to help  * You may have more pain tomorrow than you did today when you came in so please use ice to help decrease inflammation. No more than 10 minutes but if you can due this every hour while your awake for the next 24 - 48 hours this would be very beneficial and help to relieve some of the inflammation.   * Avoid using heat for 24 hours - Heat can increase your risk of infection. After 24 hours you can start using heat again if you choose.  * Please call the clinic if you notice any of the following:   Redness from the injection site   Drainage from the injection site   Fevers or chills    If a steroid was used and you are diabetic, please monitor your blood sugars over the next week. Steroids can increase blood sugars. If your blood sugars get to be to elevated please notify your primary care provider.      Sacroiliac Joint Injection, Care After  This sheet gives you information about how to care for yourself after your procedure. Your health care provider may also give you more specific instructions. If you have problems or questions, contact your health care provider.  What can I expect after the procedure?  After the procedure, it is common to have bruising or soreness at the injection site.  Follow these instructions at home:  Managing pain and swelling         Keep a record of your pain (a pain log) to share with your health care provider at your follow-up visit. If a long-acting anti-inflammatory medicine (steroid) was included in your injection, you may not notice an improvement in your pain level for a few days.  Do not use a heating pad, and do not apply heat directly to the area after the procedure.  Ask your health care provider about using ice therapy.  If directed, put ice on the affected area. To do  this:  Put ice in a plastic bag.  Place a towel between your skin and the bag.  Leave the ice on for 20 minutes, 2-3 times a day.  Remove the ice if your skin turns bright red. This is very important. If you cannot feel pain, heat, or cold, you have a greater risk of damage to the area.  Injection site care  Check your injection site every day for signs of infection. Check for:  Redness, swelling, or pain.  Fluid or blood.  Warmth.  Pus or a bad smell.  Do not take baths, swim, or use a hot tub until your health care provider says that it is safe. You may take showers.  Activity  Rest on the day of your procedure. Avoid doing a lot of activity on that day.  Avoid any activities that take a lot of effort for 24 hours after the injection.  Return to your normal activities as told by your health care provider. Ask your health care provider what activities are safe for you.  Do physical therapy exercises as told by your health care provider or physical therapist. These exercises are used to strengthen muscles surrounding the SI joint, and that will help relieve pain.  General instructions  Take over-the-counter and prescription medicines only as told by your health care provider.  Since dye was used during your procedure, drink plenty of water to flush the dye out of your body.  If you were given a sedative during the procedure, it can affect you for several hours. Do not drive or operate machinery until your health care provider says that it is safe.  Keep all follow-up visits. This is important.  Contact a health care provider if:  Your pain does not improve or it gets worse.  You have numbness, tingling, or weakness.  You have a fever or chills.  You have redness, swelling, or pain around your injection site.  You have fluid or blood coming from your injection site.  Your injection site feels warm to the touch.  You have pus or a bad smell coming from your injection site.  Get help right away if:  You have chest pain  or shortness of breath.  These symptoms may represent a serious problem that is an emergency. Do not wait to see if the symptoms will go away. Get medical help right away. Call your local emergency services (911 in the [place] Do not drive yourself to the hospital.  Summary  After the procedure, it is common to have bruising or soreness at the injection site.  Keep a record of your pain (a pain log) to share with your health care provider at your follow-up visit.  Return to your normal activities as told by your health care provider. Ask your health care provider what activities are safe for you.  Contact your health care provider if you have pain that is getting worse, weakness, numbness, or any sign of infection at your injection site.  This information is not intended to replace advice given to you by your health care provider. Make sure you discuss any questions you have with your health care provider.  Document Revised: 04/29/2021 Document Reviewed: 04/29/2021  [employer]vier Patient Education © 2023 [employer]

## 2024-06-12 NOTE — PROGRESS NOTES
Procedures  Date of service: 6/13/2024    Procedure: Bilateral lumbar 3, lumbar 4 medial branch nerve, lumbar 5 dorsal primary ramus branch diagnostic block      2 levels block    Preprocedure diagnosis:  1.  Lumbar spondylosis  2.  Chronic low back pain    Postprocedure diagnosis:  1.  Same  2.  Same    Complications: None    Findings: Successful block under direct fluroscopy    Anesthesia: Local    Risk and benefits were gone over with the patient including risk of infection, bleeding, or nerve irritation of the spine.  Alternatives include surgical options, physical therapy, psychotherapy and medication management.          MRI/x-ray  Jignesh Montenegro MD  [phone] 4/11/2024      Narrative & Impression     EXAM:  MR LUMBAR SPINE WO CONTRAST     DATE: 4/11/2024 10:13 AM     INDICATION: Lumbar radiculopathy; Low back pain  symptoms persist with > 6wks conservative treatment     COMPARISON: 10/27/2023     TECHNIQUE:  Noncontrast MR images of the lumbar spine     FINDINGS:  Exaggerated lumbar lordosis, unchanged. Grade 1 anterolisthesis at L5-S1. No compression deformities. Spondylotic endplate marrow changes, particular at L5-S1. No compression deformities or other acute fractures.     The paraspinal soft tissues are without acute abnormalities.      Conus is normally positioned. There is mechanical clumping of the cauda equina.     Level by levels:  L1-2: Disc bulge. Facet arthropathy and ligamentum flavum thickening. No spinal canal stenosis. Mild bilateral foraminal stenosis.  L2-3: Shallow disc bulge. Facet arthropathy and ligamentum flavum thickening. No spinal canal stenosis. Mild right foraminal stenosis.  L3-4: Disc bulge. Facet arthropathy and ligament flavum thickening. Bilateral facet effusions. Moderate narrowing of the lateral recesses. Mild to moderate bilateral foraminal stenosis.  L4-5: Shallow disc bulge. Facet arthropathy and ligamentum flavum thickening. Facet arthropathy and ligamentum flavum  "thickening. No spinal canal stenosis. Moderate bilateral foraminal stenosis.  L5-S1: Disc uncovering with superimposed disc bulge. Facet arthropathy and ligamentum flavum thickening. Moderate narrowing of the lateral recesses. Moderate to severe bilateral foraminal stenosis.        IMPRESSION:  Lumbar spondylotic changes with exaggerated lordosis, grade 1 anterolisthesis at L5-S1 and culminating in moderate narrowing of the lateral recesses and moderate to severe bilateral foraminal stenosis at L5-S1. Other levels as above.              Exam Ended: 04/11/24 10:13 Last Resulted: 04/11/24 10:17                                          RN Assessment:  Pain Assessment: 0-10  Pain Score: 2 (lowest is a 2 and highest is a 4)  Pain Location: Back  Pain Radiating Towards: radiates down back of right leg down to foot  Pain Descriptors: Tingling, Numbness, Annoying, Aching, Stabbing, Dull, Discomfort, Radiating, Throbbing, Spasm, Pins and needles, Pressure  Pain Frequency: Constant/continuous  Interference on Function: ADL's  Pain Onset: Awakened from sleep (no troubles sleeping)  Aggravating Factors: Standing, Walking, ROM, Exercise, Weather  Patient's Stated Pain Goal: 2-Notice pain, does not interfere with activities  Pain Interventions: Medication (See MAR), Home medication, Cold applied, Rest, Repositioned, Topicals, Shower  Response to Interventions: combination                                  Review of systems:  Review of Systems    Allergies   Allergen Reactions    Bee Venom Protein (Honey Bee) Swelling    No Known Drug Allergies      Vitals:    06/13/24 0931   BP: (!) 178/106   Pulse:    SpO2:      Past Medical History:   Diagnosis Date    GERD (gastroesophageal reflux disease)     H. pylori infection 2009    Hypothyroidism     Injury of back     STATES, \"THIS IS REASON FOR MRI\"    Microscopic hematuria     SHANA (obstructive sleep apnea)     USES CPAP    Osteoarthritis     Palpitations     Respiratory disease     " "Supraventricular tachycardia (CMS/HCC)     STATES, \"COFFEE RELATED\", VERY INTERMITTENT    Tinnitus      Social History     Socioeconomic History    Marital status: Single     Spouse name: Not on file    Number of children: Not on file    Years of education: Not on file    Highest education level: Not on file   Occupational History    Not on file   Tobacco Use    Smoking status: Never    Smokeless tobacco: Never   Vaping Use    Vaping status: Never Used   Substance and Sexual Activity    Alcohol use: No    Drug use: No    Sexual activity: Defer     Comment: declines to answer   Other Topics Concern    Not on file   Social History Narrative    Not on file     Social Determinants of Health     Financial Resource Strain: Not on file   Food Insecurity: Not on file   Transportation Needs: No Transportation Needs (11/28/2023)    OASIS : Transportation     Lack of Transportation (Medical): No     Lack of Transportation (Non-Medical): No     Patient Unable or Declines to Respond: No   Physical Activity: Not on file   Stress: Not on file   Social Connections: Feeling Socially Integrated (11/28/2023)    OASIS : Social Isolation     Frequency of experiencing loneliness or isolation: Never   Intimate Partner Violence: Not on file   Housing Stability: Not on file     Family History   Problem Relation Age of Onset    Cancer Mother 84        Bladder    Osteoporosis Mother     Hypertension Mother     Colon cancer Father 65    Kidney cancer Father 77    Other Father's Brother         sudden cardiac death, 60s    Heart disease Maternal Grandfather     Heart attack Maternal Grandfather 59    Cancer Paternal Grandfather      Past Surgical History:   Procedure Laterality Date    CARDIAC ELECTROPHYSIOLOGY STUDY AND ABLATION  01/01/2007    attempted ablation for SVT    CARPAL TUNNEL RELEASE Bilateral 06/26/2020    Procedure: LEFT ENDOSCOPIC CARPAL TUNNEL RELEASE, RIGHT OPEN CARPAL TUNNEL;  Surgeon: Mendez Sun MD;  Location: " [facility] OR;  Service: Orthopedics;  Laterality: Bilateral;    COLONOSCOPY  2015    cecal polyp-tubular adenoma-recommend repeat 5 years    COLONOSCOPY N/A 2020    repeat 5 years - adenomatous polyp    DILATION AND CURETTAGE OF UTERUS  2006    ENDOMETRIAL BIOPSY  2012    pt reports negative    ESOPHAGOGASTRODUODENOSCOPY  2009    H pylori    ESOPHAGOGASTRODUODENOSCOPY N/A 2020    Procedure: ESOPHAGOGASTRODUODENOSCOPY;  Surgeon: Julian Faulkner MD;  Location: [facility];  Service: Endoscopy;  Laterality: N/A;    KNEE ARTHROPLASTY Right 2015    Dr Placido BRICE    TOTAL ANKLE ARTHROPLASTY Left 10/03/2022    Dr. Guerra, with talonavicular and subtalar fusion    TOTAL ANKLE ARTHROPLASTY Left 2023    total revision of previous surgery, fusion of non-unions    TOTAL ANKLE ARTHROPLASTY Left 2023    w/ fusion to foot joints    VAGINAL HYSTERECTOMY  2013    with BSO, fibroids     Current Outpatient Medications   Medication Sig Dispense Refill    amoxicillin (AMOXIL) 500 mg capsule SMARTSI Capsule(s) By Mouth      cholecalciferol, vitamin D3, 25 mcg (1,000 unit) tablet Take 2 tablets (2,000 Units total) by mouth 2 (two) times a day      omeprazole (PriLOSEC) 20 mg capsule Take 1 capsule (20 mg total) by mouth daily 30 capsule 11    levothyroxine (SYNTHROID, LEVOTHROID) 125 mcg tablet Take 1 tablet (125 mcg total) by mouth daily 90 tablet 2    diazePAM (VALIUM) 5 mg tablet Take 1 tablet (5 mg total) by mouth every 12 (twelve) hours as needed for anxiety or muscle spasms Max Daily Amount: 10 mg 30 tablet 0    ascorbic acid, vitamin C, (ascorbic acid with vane hips) 500 mg tablet Take 2 tablets (1,000 mg total) by mouth daily      methylPREDNISolone (Medrol, Maxi,) 4 mg tablet follow package directions 21 tablet 0    albuterol HFA 90 mcg/actuation inhaler Inhale 2 puffs every 6 (six) hours as needed for wheezing 1 Inhaler 0    ibuprofen (ADVIL,MOTRIN) 200 mg tablet  Take 1 tablet (200 mg total) by mouth every 6 (six) hours as needed      vit C/E/Zn/coppr/lutein/zeaxan (OCUVITE LUTEIN AND ZEAXANTHIN ORAL) PreserVision AREDS-2      diclofenac sodium (VOLTAREN) 1 % gel APPLY 2 GRAMS TO THE AFFECTED AREA(S) 4 TIMES PER DAY AS NEEDED 300 g 2    fluticasone propionate (FLONASE) 50 mcg/actuation nasal spray USE 2 SPRAYS IN EACH NOSTRIL DAILY 48 g 2    budesonide (Pulmicort Flexhaler) 90 mcg/actuation inhaler Inhale 1 puff as needed (for shortness of breath)      cyclobenzaprine (FLEXERIL) 10 mg tablet Take 1 tablet (10 mg total) by mouth 2 (two) times a day as needed for muscle spasms. 60 tablet 0    multivitamin (THERAGRAN) tablet tablet 1 Every Day      acetaminophen (TYLENOL EXTRA STRENGTH) 500 mg tablet Take 2 tablets (1,000 mg total) by mouth 2 (two) times a day      tiZANidine (ZANAFLEX) 2 mg tablet Take 1 tablet (2 mg total) by mouth every 6 (six) hours as needed for muscle spasms      ketorolac (TORADOL) 10 mg tablet Take 1 tablet (10 mg total) by mouth every 6 (six) hours as needed for pain scale 8-10/10 for up to 5 days (Patient not taking: Reported on 5/14/2024) 20 tablet 0    meloxicam (MOBIC) 7.5 mg tablet Take 1 tablet (7.5 mg total) by mouth daily (Patient not taking: Reported on 6/13/2024) 90 tablet 3    zinc 50 mg tablet Take 50 mg by mouth daily      krill-om-3-dha-epa-phospho-ast [phone] mg capsule Take 1 capsule by mouth daily       Current Facility-Administered Medications   Medication Dose Route Frequency Provider Last Rate Last Admin    iohexoL (OMNIPAQUE) 240 mg iodine/mL injection 3 mL  3 mL injection PRN    3 mL at 06/13/24 0916                             History: 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  Here for her first set of diagnostic bilateral lumbar 3, 4, 5 medial nerve branch blocks.  She has been seen in our clinic for  epidural steroid injections past, with the recent SI joint injections.  She has done multiple physical therapy techniques in the past.  Wished [place] surgical intervention.  Currently pain is mainly axial.    She is on Zanaflex, Mobic, has tried Flexeril in the past also, and Voltaren topical gel.  Pain does cause functional impairment.  Walking for prolonged distances greater than quarter mile will aggravate it.  Standing for greater than 10 minutes.  Sitting for prolonged periods of time traveling also aggravates it.  Can affect her sleep cycle averaging 4-6 hours at night.  Avoids lifting anything heavy.            Pain Procedures/Return Office Visit from encounters over the past 365 days     5/30/24 5/2/24 4/2/24 3/5/24 8/9/23 7/25/23 7/11/23 6/27/23   Date of Service 05/30/24 05/02/24 04/02/24 03/05/24 08/09/23 07/25/23 07/11/23 06/27/23   Procedure SI [B) diagnostic] -- LEPSI -- -- SNR [R)L5] -- LEPSI   Return Office Visit (ROV) -- Post LEPSI -- Discuss repeat LEPSI Post LEPSI -- Post LEPSI --   Provider Daysi Pizarro, THANH Pizarro, THNAH Samson,THANH Samson,THANH Tavarez   Steroid Medication Used Kenalog 40 mg -- Kenalog 40mg -- -- Jeromy 40 -- kenalog 40mg   Treatment Plan Changes As scheduled Set up Dx R) SI and MNBB B) L3,4,5 FU with Lola in 2-4 wks Post LEPSI, KENNETHSI in 3 months Set up LEPSI, Lumbar MRI, med [start lidocaine patches] F/U PRN Roly Meds, Change 7/25 appt to Lumbar SNR [Add Voltaren Gel] facets R) L3,4,5     Above in table and note will be sent to primary care for yearly total steroid dose given at this clinic              Physical exam:  Heart Rate:  [78] 78  SpO2:  [95 %] 95 %  BP: (126-178)/() 178/106          Procedure: Consent obtained.  Patient brought to the fluoroscopy suite and positioned in a prone position.  Timeout procedure completed    Back was prepped with ChloraPrep and draped in a sterile technique..  Utilizing fluoroscopy and an AP view the target zone for the  bilateral lumbar 3, lumbar 4 medial branch nerve was identified.(Patient has a small lumbar 5 vertebral body, bifurcated spinous process, I counted down from T12, 2 separate times to identify landmarks) at the same time at the sacral ala notch the bilateral lumbar 5 dorsal primary ramus branch was also identified.  Please see x-ray for needle placement.     Utilizing a 22-gauge 4-three-quarter inch spinal needle the target areas were entered.  Contrast (omnipaque) demonstrated adequate spread and no intravascular uptake.  A diagnostic solution consisting of 40 mg of Kenalog and 3 milliliters of 1% lidocaine was divided equally between the injection sites.     Patient tolerated procedure very well.  20 minutes post procedure patient reports 100 percent relief of pain.  On physical examination patient has 0 pain to palpation and with hyperextension.      Post-Pain Assessment  How do you rate your pain post procedure?: 0 - No pain  What is your Percentage of Relief Post Procedure?: 100%          Plan: Keep scheduled visit with Dr. Caro  Will reorder second diagnostic bilateral lumbar 3, 4, 5 facet nerve block          A voice recognition program was used to aid in documentation of this record. Sometimes words are not printed exactly as they were spoken.  While efforts were made to carefully edit and correct any inaccuracies, some errors may be present; please take these into context.  Please contact the provider's office if you have any questions or concerns.

## 2024-06-13 ENCOUNTER — HOSPITAL ENCOUNTER (OUTPATIENT)
Dept: FLUOROSCOPY | Facility: HOSPITAL | Age: 69
Discharge: STILL ADMITTED OR ADMINISTRATIVE | End: 2024-06-13
Payer: MEDICARE

## 2024-06-13 ENCOUNTER — PROCEDURE VISIT (OUTPATIENT)
Dept: PAIN MEDICINE | Facility: HOSPITAL | Age: 69
End: 2024-06-13
Payer: MEDICARE

## 2024-06-13 VITALS — SYSTOLIC BLOOD PRESSURE: 178 MMHG | HEART RATE: 78 BPM | DIASTOLIC BLOOD PRESSURE: 106 MMHG | OXYGEN SATURATION: 95 %

## 2024-06-13 DIAGNOSIS — M47.816 LUMBAR SPONDYLOSIS: ICD-10-CM

## 2024-06-13 PROCEDURE — 2550000100 HC RX 255

## 2024-06-13 PROCEDURE — 64494 INJ PARAVERT F JNT L/S 2 LEV: CPT | Mod: 50,KX

## 2024-06-13 PROCEDURE — 6360000200 HC RX 636 W HCPCS (ALT 250 FOR IP): Mod: JZ

## 2024-06-13 PROCEDURE — 64493 INJ PARAVERT F JNT L/S 1 LEV: CPT | Mod: 50,KX

## 2024-06-13 PROCEDURE — 77002 NEEDLE LOCALIZATION BY XRAY: CPT | Mod: NC

## 2024-06-13 RX ORDER — LIDOCAINE HYDROCHLORIDE 10 MG/ML
10 INJECTION, SOLUTION EPIDURAL; INFILTRATION; INTRACAUDAL; PERINEURAL ONCE
Status: COMPLETED | OUTPATIENT
Start: 2024-06-13 | End: 2024-06-13

## 2024-06-13 RX ORDER — TRIAMCINOLONE ACETONIDE 40 MG/ML
40 INJECTION, SUSPENSION INTRA-ARTICULAR; INTRAMUSCULAR ONCE
Status: COMPLETED | OUTPATIENT
Start: 2024-06-13 | End: 2024-06-13

## 2024-06-13 RX ADMIN — LIDOCAINE HYDROCHLORIDE 10 ML: 10 INJECTION, SOLUTION EPIDURAL; INFILTRATION; INTRACAUDAL; PERINEURAL at 09:15

## 2024-06-13 RX ADMIN — TRIAMCINOLONE ACETONIDE 40 MG: 40 INJECTION, SUSPENSION INTRA-ARTICULAR; INTRAMUSCULAR at 09:15

## 2024-06-13 ASSESSMENT — PAIN - FUNCTIONAL ASSESSMENT: PAIN_FUNCTIONAL_ASSESSMENT: 0-10

## 2024-06-13 NOTE — LETTER
[facility]  [address] [facility] [place] [id number] [phone]  Dept: [phone]  06/13/24      Richard Tavarez Md  [address] [facility] [place] [address]        To: Richard Tavarez MD      Thank you for referring your patient, Corrine Holden, to receive care in my office. I have enclosed a copy of the note for Corrine from 06/13/24.    Please contact me with any questions you may have regarding the visit.    Sincerely,         Prem Reese MD  [address] [facility] [place] [id number] [phone]    CC: Becka Joy MD

## 2024-06-13 NOTE — PATIENT INSTRUCTIONS
Facet Joint Block  The facet joints connect the bones of the spine (vertebrae). They make it possible for you to bend, twist, and make other movements with your spine. They also keep you from bending too far, twisting too far, and making other extreme movements.  A facet joint block is a procedure in which a numbing medicine (anesthetic) is injected into a facet joint. In many cases, an anti-inflammatory medicine (steroid) is also injected. A facet joint block may be done:  To diagnose neck or back pain. If the pain gets better after a facet joint block, it means the pain is probably coming from the facet joint. If the pain does not get better, it means the pain is probably not coming from the facet joint.  To relieve neck or back pain that is caused by an inflamed facet joint.  A facet joint block is only done to relieve pain if the pain does not improve with other methods, such as medicine, exercise programs, and physical therapy.  Tell a health care provider about:  Any allergies you have.  All medicines you are taking, including vitamins, herbs, eye drops, creams, and over-the-counter medicines.  Any problems you or family members have had with anesthetic medicines.  Any blood disorders you have.  Any surgeries you have had.  Any medical conditions you have or have had.  Whether you are pregnant or may be pregnant.  What are the risks?  Generally, this is a safe procedure. However, problems may occur, including:  Bleeding.  Injury to a nerve near the injection site.  Pain at the injection site.  Weakness or numbness in areas controlled by nerves near the injection site.  Infection.  Temporary fluid retention.  Allergic reactions to medicines or dyes.  Injury to other structures or organs near the injection site.  What happens before the procedure?  Medicines  Ask your health care provider about:  Changing or stopping your regular medicines. This is especially important if you are taking diabetes medicines or  blood thinners.  Taking medicines such as aspirin and ibuprofen. These medicines can thin your blood. Do not take these medicines unless your health care provider tells you to take them.  Taking over-the-counter medicines, vitamins, herbs, and supplements.  Eating and drinking  Follow instructions from your health care provider about eating and drinking, which may include:  8 hours before the procedure - stop eating heavy meals or foods, such as meat, fried foods, or fatty foods.  6 hours before the procedure - stop eating light meals or foods, such as toast or cereal.  6 hours before the procedure - stop drinking milk or drinks that contain milk.  2 hours before the procedure - stop drinking clear liquids.  Staying hydrated  Follow instructions from your health care provider about hydration, which may include:  Up to 2 hours before the procedure - you may continue to drink clear liquids, such as water, clear fruit juice, black coffee, and plain tea.  General instructions  Do not use any products that contain nicotine or tobacco for at least 4-6 weeks before the procedure. These products include cigarettes, e-cigarettes, and chewing tobacco. If you need help quitting, ask your health care provider.  Plan to have someone take you home from the hospital or clinic.  Ask your health care provider:  How your surgery site will be marked.  What steps will be taken to help prevent infection. These may include:  Removing hair at the surgery site.  Washing skin with a germ-killing soap.  Receiving antibiotic medicine.  What happens during the procedure?  A side view of a person and part of the spine with a close-up of the vertebrae, showing an injection into the facet joint.      You will put on a hospital gown.  You will lie on your stomach on an X-ray table. You may be asked to lie in a different position if an injection will be made in your neck.  Machines will be used to monitor your oxygen levels, heart rate, and blood  pressure.  Your skin will be cleaned.  If an injection will be made in your neck, an IV will be inserted into one of your veins. Fluids and medicine will flow directly into your body through the IV.  A numbing medicine (local anesthetic) will be applied to your skin. Your skin may sting or burn for a moment.  A video X-ray machine (fluoroscopy) will be used to find the joint. In some cases, a CT scan may be used.  A contrast dye may be injected into the facet joint area to help find the joint.  When the joint is located, an anesthetic will be injected into the joint through the needle.  Your health care provider will ask you whether you feel pain relief.  If you feel relief, a steroid may be injected to provide pain relief for a longer period of time.  If you do not feel relief or feel only partial relief, additional injections of an anesthetic may be made in other facet joints.  The needle will be removed.  Your skin will be cleaned.  A bandage (dressing) will be applied over each injection site.  The procedure may vary among health care providers and hospitals.  What happens after the procedure?  Your blood pressure, heart rate, breathing rate, and blood oxygen level will be monitored until you leave the hospital or clinic.  You will lie down and rest for a period of time.  Summary  A facet joint block is a procedure in which a numbing medicine (anesthetic) is injected into a facet joint. An anti-inflammatory medicine (steroid) may also be injected.  Follow instructions from your health care provider about medicines and eating and drinking before the procedure.  Do not use any products that contain nicotine or tobacco for at least 4-6 weeks before the procedure.  You will lie on your stomach for the procedure, but you may be asked to lie in a different position if an injection will be made in your neck.  When the joint is located, an anesthetic will be injected into the joint through the needle.  This information  is not intended to replace advice given to you by your health care provider. Make sure you discuss any questions you have with your health care provider.  Document Revised: 10/17/2022 Document Reviewed: 10/17/2022  [employer] Patient Education © 2023 [employer]    Facet Joint Block, Care After  This sheet gives you information about how to care for yourself after your procedure. Your health care provider may also give you more specific instructions. If you have problems or questions, contact your health care provider.  What can I expect after the procedure?  After the procedure, it is common to have:  Some tenderness over the injection sites for 2 days after the procedure.  A temporary increase in blood sugar if you have diabetes.  Follow these instructions at home:  Medicines  Take over-the-counter and prescription medicines only as told by your health care provider.  You may need to limit pain medicine within the first 4-6 hours after the procedure.  If you are taking blood thinners, ask your health care provider when it is safe to start taking them.  Talk with your health care provider before you take any medicines that contain aspirin or NSAIDs, such as ibuprofen. These medicines increase your risk for dangerous bleeding.  Take your medicine exactly as told, at the same time every day.  Avoid activities that could cause injury or bruising, and follow instructions about how to prevent falls.  Wear a medical alert bracelet or carry a card that lists what medicines you take.  Managing pain, stiffness, and swelling  Bag of ice on a towel on the skin.      If the injection site is tender, try putting ice on the area. To do this:  Put ice in a plastic bag.  Place a towel between your skin and the bag.  Leave the ice on for 20 minutes, 2-3 times a day.     General instructions  Keep track of the amount of pain relief you feel and how long it lasts.  Remove your bandages (dressings) the morning after the  procedure.  Check your injection site every day for signs of infection. Check for:  Redness, swelling, or pain.  Fluid or blood.  Warmth.  Pus or a bad smell.  For the first 24 hours after the procedure:  Do not apply heat near or over the injection sites.  Do not take a bath or soak in water, such as in a pool or lake.  Do not drive or use heavy machinery unless approved by your health care provider.  Avoid activities that need a lot of effort.  Rest as told by your health care provider.  Return to your normal activities as told by your health care provider. Ask your health care provider what activities are safe for you.  If you have diabetes, monitor your blood sugar levels as told by your health care provider.  Keep all follow-up visits as told by your health care provider. This is important.  Contact a health care provider if:  You have diabetes and your blood sugar is above 180 mg/dL.  You have fluid or blood coming from your injection site.  Your injection site feels warm to the touch.  Get help right away if:  You have a fever or chills.  You have worsening pain or swelling around an injection site.  You have pus or a bad smell coming from your injection site.  There are red streaks around your injection site.  You have severe pain that is not controlled by your medicines.  You have a headache, stiff neck, nausea, or vomiting.  Your eyes become very sensitive to light.  You have weakness, paralysis, or tingling in your arms or legs that was not there before the procedure.  You have trouble urinating.  You have trouble breathing.  These symptoms may represent a serious problem that is an emergency. Do not wait to see if the symptoms will go away. Get medical help right away. Call your local emergency services (911 in the [place] Do not drive yourself to the hospital.  Summary  After this procedure, it is common to have tenderness over the injection site and a brief increase in blood sugar if you have  diabetes.  Take over-the-counter and prescription medicines only as told by your health care provider.  Return to your normal activities as told by your health care provider. Ask your health care provider what activities are safe for you.  Contact a health care provider if fluid is coming from an injection site or if there is significant bleeding or swelling at an injection site.  Get help right away if you have weakness, paralysis, or tingling in your arms or legs that was not there before the procedure.  This information is not intended to replace advice given to you by your health care provider. Make sure you discuss any questions you have with your health care provider.  Document Revised: 10/17/2022 Document Reviewed: 10/17/2022  [employer] Patient Education © 2023 [employer]

## 2024-06-25 ENCOUNTER — TELEPHONE (OUTPATIENT)
Dept: UROLOGY | Facility: CLINIC | Age: 69
End: 2024-06-25
Payer: MEDICARE

## 2024-06-25 NOTE — TELEPHONE ENCOUNTER
Caller would like to discuss appointment     Patient: Corrine Holden    Callback Number: [phone]     Best Availability: anytime    Additional Info: Patient called to cancel her 6/27 appt with [facility] no reason was given but did want to speak with scheduling to reschedule.      I advised caller of a callback by 48 hours.

## 2024-07-02 ENCOUNTER — PROCEDURE VISIT (OUTPATIENT)
Dept: PAIN MEDICINE | Facility: HOSPITAL | Age: 69
End: 2024-07-02
Payer: MEDICARE

## 2024-07-02 ENCOUNTER — HOSPITAL ENCOUNTER (OUTPATIENT)
Dept: FLUOROSCOPY | Facility: HOSPITAL | Age: 69
Discharge: STILL ADMITTED OR ADMINISTRATIVE | End: 2024-07-02
Payer: MEDICARE

## 2024-07-02 VITALS — OXYGEN SATURATION: 96 % | SYSTOLIC BLOOD PRESSURE: 153 MMHG | HEART RATE: 65 BPM | DIASTOLIC BLOOD PRESSURE: 88 MMHG

## 2024-07-02 DIAGNOSIS — M46.1 SACROILIITIS (CMS/HCC): Primary | ICD-10-CM

## 2024-07-02 DIAGNOSIS — M47.816 LUMBAR SPONDYLOSIS: ICD-10-CM

## 2024-07-02 PROCEDURE — G0463 HOSPITAL OUTPT CLINIC VISIT: HCPCS

## 2024-07-02 PROCEDURE — 99213 OFFICE O/P EST LOW 20 MIN: CPT | Performed by: ANESTHESIOLOGY

## 2024-07-02 RX ORDER — TRIAMCINOLONE ACETONIDE 40 MG/ML
40 INJECTION, SUSPENSION INTRA-ARTICULAR; INTRAMUSCULAR ONCE
Status: DISCONTINUED | OUTPATIENT
Start: 2024-07-02 | End: 2024-07-02

## 2024-07-02 RX ORDER — BUPIVACAINE HYDROCHLORIDE 5 MG/ML
30 INJECTION, SOLUTION EPIDURAL; INTRACAUDAL ONCE
Status: DISCONTINUED | OUTPATIENT
Start: 2024-07-02 | End: 2024-07-02

## 2024-07-02 RX ORDER — LIDOCAINE HYDROCHLORIDE 10 MG/ML
30 INJECTION, SOLUTION EPIDURAL; INFILTRATION; INTRACAUDAL; PERINEURAL ONCE
Status: DISCONTINUED | OUTPATIENT
Start: 2024-07-02 | End: 2024-07-02

## 2024-07-02 ASSESSMENT — ENCOUNTER SYMPTOMS: CONSTITUTIONAL NEGATIVE: 1

## 2024-07-02 ASSESSMENT — PAIN - FUNCTIONAL ASSESSMENT: PAIN_FUNCTIONAL_ASSESSMENT: 0-10

## 2024-07-02 NOTE — PROGRESS NOTES
"  Subjective     Patient ID: Corrine Holden is a 69 y.o. female.    HPI:   69-year-old female presents with pain in the low back region.  She currently rates his pain 4 of 10 on the pain scale.  It is aching, gnawing, discomfort in nature.  She does feel its gradually improving.  It is worse with exercise, range of motion, lifting, standing, walking.  Is better with heat applied, repositioning, rest.  She did undergo sacroiliac joint injections and feels this was a \"game changer\" for her.  She felt like much of her pain that she had previously had has been gone since those injections.  She did also undergo facet injections with some benefit.  At this point she feels her pain is under fairly good control and wonders if we should proceed with additional interventions today or not.        Review of Systems:  Review of Systems   Constitutional: Negative.        Objective     Vital Signs:  /88 (BP Location: Left arm, Patient Position: Sitting, Cuff Size: Regular Adult)   Pulse 65   SpO2 96%     Medications:    Current Outpatient Medications:     amoxicillin (AMOXIL) 500 mg capsule, SMARTSI Capsule(s) By Mouth, Disp: , Rfl:     cholecalciferol, vitamin D3, 25 mcg (1,000 unit) tablet, Take 2 tablets (2,000 Units total) by mouth 2 (two) times a day, Disp: , Rfl:     omeprazole (PriLOSEC) 20 mg capsule, Take 1 capsule (20 mg total) by mouth daily, Disp: 30 capsule, Rfl: 11    levothyroxine (SYNTHROID, LEVOTHROID) 125 mcg tablet, Take 1 tablet (125 mcg total) by mouth daily, Disp: 90 tablet, Rfl: 2    diazePAM (VALIUM) 5 mg tablet, Take 1 tablet (5 mg total) by mouth every 12 (twelve) hours as needed for anxiety or muscle spasms Max Daily Amount: 10 mg, Disp: 30 tablet, Rfl: 0    ascorbic acid, vitamin C, (ascorbic acid with vane hips) 500 mg tablet, Take 2 tablets (1,000 mg total) by mouth daily, Disp: , Rfl:     tiZANidine (ZANAFLEX) 2 mg tablet, Take 1 tablet (2 mg total) by mouth every 6 (six) hours as needed " for muscle spasms, Disp: , Rfl:     methylPREDNISolone (Medrol, Maxi,) 4 mg tablet, follow package directions, Disp: 21 tablet, Rfl: 0    albuterol HFA 90 mcg/actuation inhaler, Inhale 2 puffs every 6 (six) hours as needed for wheezing, Disp: 1 Inhaler, Rfl: 0    meloxicam (MOBIC) 7.5 mg tablet, Take 1 tablet (7.5 mg total) by mouth daily, Disp: 90 tablet, Rfl: 3    ibuprofen (ADVIL,MOTRIN) 200 mg tablet, Take 1 tablet (200 mg total) by mouth every 6 (six) hours as needed, Disp: , Rfl:     vit C/E/Zn/coppr/lutein/zeaxan (OCUVITE LUTEIN AND ZEAXANTHIN ORAL), PreserVision AREDS-2, Disp: , Rfl:     zinc 50 mg tablet, Take 50 mg by mouth daily, Disp: , Rfl:     diclofenac sodium (VOLTAREN) 1 % gel, APPLY 2 GRAMS TO THE AFFECTED AREA(S) 4 TIMES PER DAY AS NEEDED, Disp: 300 g, Rfl: 2    fluticasone propionate (FLONASE) 50 mcg/actuation nasal spray, USE 2 SPRAYS IN EACH NOSTRIL DAILY, Disp: 48 g, Rfl: 2    budesonide (Pulmicort Flexhaler) 90 mcg/actuation inhaler, Inhale 1 puff as needed (for shortness of breath), Disp: , Rfl:     krill-om-3-dha-epa-phospho-ast [id number] mg capsule, Take 1 capsule by mouth daily, Disp: , Rfl:     cyclobenzaprine (FLEXERIL) 10 mg tablet, Take 1 tablet (10 mg total) by mouth 2 (two) times a day as needed for muscle spasms., Disp: 60 tablet, Rfl: 0    multivitamin (THERAGRAN) tablet tablet, 1 Every Day, Disp: , Rfl:     acetaminophen (TYLENOL EXTRA STRENGTH) 500 mg tablet, Take 2 tablets (1,000 mg total) by mouth 2 (two) times a day, Disp: , Rfl:     ketorolac (TORADOL) 10 mg tablet, Take 1 tablet (10 mg total) by mouth every 6 (six) hours as needed for pain scale 8-10/10 for up to 5 days (Patient not taking: Reported on 5/14/2024), Disp: 20 tablet, Rfl: 0    Current Facility-Administered Medications:     iohexoL (OMNIPAQUE) 240 mg iodine/mL injection 3 mL, 3 mL, injection, PRN, , 3 mL at 06/13/24 0916    Physical Exam:  Physical Exam  Vitals and nursing note reviewed.   Constitutional:        General: She is not in acute distress.     Appearance: She is well-developed.   HENT:      Head: Normocephalic and atraumatic.   Eyes:      Conjunctiva/sclera: Conjunctivae normal.      Pupils: Pupils are equal, round, and reactive to light.   Cardiovascular:      Rate and Rhythm: Normal rate and regular rhythm.   Pulmonary:      Effort: Pulmonary effort is normal.         Assessment:   1. Sacroiliitis (CMS/HCC)    2. Lumbar spondylosis        Plan:  In regards to interventional therapy, at this point she really feels her pain is under fairly good control.  She wonders if she needs an injection today.  After a long discussion with her it seems that the most benefit she received was from the sacroiliac joint injections.  She got greater than 75% post along with 75% for the duration of the medication used which was a positive diagnostic injection.  She certainly would be a candidate for additional therapeutic injections in the future.  She has met all the parameters such as 3 positive provocative maneuvers including Houston's, thigh thrust, SI compression and Yeison.  She has tried extensive conservative treatment in the past.  She feels this pain relief after sacroiliac joint injections has been substantial and allowed her to be much more functional.  At this juncture we will hold off on doing interventions today, will have her follow-up for possible repeat sacroiliac joint injections when her pain returns.  In the future we also could continue to explore moving forward with a second test injection for facet arthropathy.    Richard Tavarez MD      DISCUSSION  Today's plan was a combined effort between the patient and myself. The patient understood the plan, verbally consented, and agreed to participate. An After Visit Summary regarding today's office visit was given to the patient.     The diagnostic assessment has been reviewed. Patient and/or patient's surrogate has expressed a good understanding of the  assessment and recommendations from today's visit. There are no apparent barriers to understanding this information. Patient’s questions were addressed.    Patient has been advised to contact this office or the answering service with questions or concerns that may arise. Patient has been advised to follow up with Primary Care Provider for general medical needs.

## 2024-07-05 ENCOUNTER — TELEPHONE (OUTPATIENT)
Dept: INTERNAL MEDICINE | Facility: CLINIC | Age: 69
End: 2024-07-05

## 2024-07-05 NOTE — TELEPHONE ENCOUNTER
Spoke to Corrine, she stated she noticed a bulge on her right rib cage. She stated it is painful but she's just noticing it today. Routing to Dr. Joy for further review.

## 2024-07-05 NOTE — TELEPHONE ENCOUNTER
Caller would like to discuss (a)  Clinical Question  Writer has advised caller of a callback from within 24 hours.    Patient: Corrine Holden    Caller Name (Last and first, relation/role): self    Name of Facility: [facility]    Callback Number: [phone]    Best Availability: anytime    Fax Number: na    Additional Info: Would like a call to discuss finding a bulge on right rib cage    Did you confirm the message with the caller: Yes    Is it okay that the nurse communicates your response through [employer]? No

## 2024-07-05 NOTE — TELEPHONE ENCOUNTER
Patient is aware she should go to UC if bulge turns into a rash or redness appear over the weekend. Scheduled an 20 minute appointment with Dr. Joy for 07/08 @11:20am.

## 2024-07-08 ENCOUNTER — LAB (OUTPATIENT)
Dept: LAB | Facility: CLINIC | Age: 69
End: 2024-07-08
Payer: MEDICARE

## 2024-07-08 ENCOUNTER — OFFICE VISIT (OUTPATIENT)
Dept: INTERNAL MEDICINE | Facility: CLINIC | Age: 69
End: 2024-07-08
Payer: MEDICARE

## 2024-07-08 VITALS
HEART RATE: 80 BPM | SYSTOLIC BLOOD PRESSURE: 126 MMHG | BODY MASS INDEX: 48.54 KG/M2 | DIASTOLIC BLOOD PRESSURE: 86 MMHG | RESPIRATION RATE: 19 BRPM | TEMPERATURE: 98.1 F | OXYGEN SATURATION: 94 % | HEIGHT: 64 IN

## 2024-07-08 DIAGNOSIS — R19.01 ABDOMINAL WALL MASS OF RIGHT UPPER QUADRANT: ICD-10-CM

## 2024-07-08 DIAGNOSIS — R07.81 COSTAL MARGIN PAIN: Primary | ICD-10-CM

## 2024-07-08 LAB
ANION GAP SERPL CALC-SCNC: 9 MMOL/L (ref 3–11)
BUN SERPL-MCNC: 25 MG/DL (ref 7–25)
CALCIUM SERPL-MCNC: 9.3 MG/DL (ref 8.6–10.3)
CHLORIDE SERPL-SCNC: 106 MMOL/L (ref 98–107)
CO2 SERPL-SCNC: 26 MMOL/L (ref 21–32)
CREAT SERPL-MCNC: 0.84 MG/DL (ref 0.6–1.1)
EGFRCR SERPLBLD CKD-EPI 2021: 76 ML/MIN/1.73M*2
GLUCOSE SERPL-MCNC: 136 MG/DL (ref 70–105)
POTASSIUM SERPL-SCNC: 4.1 MMOL/L (ref 3.5–5.1)
SODIUM SERPL-SCNC: 141 MMOL/L (ref 135–145)

## 2024-07-08 PROCEDURE — 99213 OFFICE O/P EST LOW 20 MIN: CPT | Performed by: INTERNAL MEDICINE

## 2024-07-08 PROCEDURE — 36415 COLL VENOUS BLD VENIPUNCTURE: CPT | Mod: PO

## 2024-07-08 PROCEDURE — G0463 HOSPITAL OUTPT CLINIC VISIT: HCPCS | Mod: PO | Performed by: INTERNAL MEDICINE

## 2024-07-08 PROCEDURE — 80048 BASIC METABOLIC PNL TOTAL CA: CPT | Mod: PO

## 2024-07-08 ASSESSMENT — PAIN SCALES - GENERAL: PAINLEVEL: 2

## 2024-07-08 NOTE — PROGRESS NOTES
"Michaela Holden is a 69 y.o. female who presents for Same Day (Bulge in right ribcage)  .    SHANNON Castle is here for an acute visit to discuss a bulge under her right rib cage.    She says last week Thursday or Friday, she noticed a bulge in her right upper abdomen.  She has been swimming and she noticed it through her swimsuit.  She notices it more with standing.  Does ache at times but is just a dull ache.  She has not noticed anything that brings on the discomfort.  She has been feeling a little bloated the last couple weeks.  No bowel changes.  No nausea or vomiting.  She has been burping a little bit more and continues to take omeprazole once daily.  She has been exercising going to swim 3 times per week.  She has noticed some intermittent shortness of breath.    Past Medical History:   Diagnosis Date    GERD (gastroesophageal reflux disease)     H. pylori infection     Hypothyroidism     Injury of back     STATES, \"THIS IS REASON FOR MRI\"    Microscopic hematuria     SHANA (obstructive sleep apnea)     USES CPAP    Osteoarthritis     Palpitations     Respiratory disease     Supraventricular tachycardia (CMS/HCC)     STATES, \"COFFEE RELATED\", VERY INTERMITTENT    Tinnitus        Current Outpatient Medications   Medication Sig Dispense Refill    calc/FA/B/D3/E/bioflav/soybean (CALCI-MAX ORAL)       amoxicillin (AMOXIL) 500 mg capsule SMARTSI Capsule(s) By Mouth      cholecalciferol, vitamin D3, 25 mcg (1,000 unit) tablet Take 2 tablets (2,000 Units total) by mouth 2 (two) times a day      omeprazole (PriLOSEC) 20 mg capsule Take 1 capsule (20 mg total) by mouth daily 30 capsule 11    levothyroxine (SYNTHROID, LEVOTHROID) 125 mcg tablet Take 1 tablet (125 mcg total) by mouth daily 90 tablet 2    diazePAM (VALIUM) 5 mg tablet Take 1 tablet (5 mg total) by mouth every 12 (twelve) hours as needed for anxiety or muscle spasms Max Daily Amount: 10 mg 30 tablet 0    ascorbic acid, vitamin C, " (ascorbic acid with vane hips) 500 mg tablet Take 2 tablets (1,000 mg total) by mouth daily      methylPREDNISolone (Medrol, Maxi,) 4 mg tablet follow package directions 21 tablet 0    albuterol HFA 90 mcg/actuation inhaler Inhale 2 puffs every 6 (six) hours as needed for wheezing 1 Inhaler 0    ibuprofen (ADVIL,MOTRIN) 200 mg tablet Take 1 tablet (200 mg total) by mouth every 6 (six) hours as needed      vit C/E/Zn/coppr/lutein/zeaxan (OCUVITE LUTEIN AND ZEAXANTHIN ORAL) PreserVision AREDS-2      diclofenac sodium (VOLTAREN) 1 % gel APPLY 2 GRAMS TO THE AFFECTED AREA(S) 4 TIMES PER DAY AS NEEDED 300 g 2    fluticasone propionate (FLONASE) 50 mcg/actuation nasal spray USE 2 SPRAYS IN EACH NOSTRIL DAILY 48 g 2    budesonide (Pulmicort Flexhaler) 90 mcg/actuation inhaler Inhale 1 puff as needed (for shortness of breath)      krill-om-3-dha-epa-phospho-ast [id number] mg capsule Take 1 capsule by mouth daily      cyclobenzaprine (FLEXERIL) 10 mg tablet Take 1 tablet (10 mg total) by mouth 2 (two) times a day as needed for muscle spasms. 60 tablet 0    multivitamin (THERAGRAN) tablet tablet 1 Every Day      acetaminophen (TYLENOL EXTRA STRENGTH) 500 mg tablet Take 2 tablets (1,000 mg total) by mouth 2 (two) times a day      tiZANidine (ZANAFLEX) 2 mg tablet Take 1 tablet (2 mg total) by mouth every 6 (six) hours as needed for muscle spasms      ketorolac (TORADOL) 10 mg tablet Take 1 tablet (10 mg total) by mouth every 6 (six) hours as needed for pain scale 8-10/10 for up to 5 days (Patient not taking: Reported on 5/14/2024) 20 tablet 0    meloxicam (MOBIC) 7.5 mg tablet Take 1 tablet (7.5 mg total) by mouth daily (Patient not taking: Reported on 7/8/2024) 90 tablet 3    zinc 50 mg tablet Take 50 mg by mouth daily       Current Facility-Administered Medications   Medication Dose Route Frequency Provider Last Rate Last Admin    iohexoL (OMNIPAQUE) 240 mg iodine/mL injection 3 mL  3 mL injection PRN    3 mL at 06/13/24  0916      Allergies   Allergen Reactions    Bee Venom Protein (Honey Bee) Swelling    No Known Drug Allergies        Past Surgical History:   Procedure Laterality Date    CARDIAC ELECTROPHYSIOLOGY STUDY AND ABLATION  01/01/2007    attempted ablation for SVT    CARPAL TUNNEL RELEASE Bilateral 06/26/2020    Procedure: LEFT ENDOSCOPIC CARPAL TUNNEL RELEASE, RIGHT OPEN CARPAL TUNNEL;  Surgeon: Mendez Sun MD;  Location: [facility] OR;  Service: Orthopedics;  Laterality: Bilateral;    COLONOSCOPY  01/21/2015    cecal polyp-tubular adenoma-recommend repeat 5 years    COLONOSCOPY N/A 06/22/2020    repeat 5 years - adenomatous polyp    DILATION AND CURETTAGE OF UTERUS  01/01/2006    ENDOMETRIAL BIOPSY  01/01/2012    pt reports negative    ESOPHAGOGASTRODUODENOSCOPY  01/01/2009    H pylori    ESOPHAGOGASTRODUODENOSCOPY N/A 06/22/2020    Procedure: ESOPHAGOGASTRODUODENOSCOPY;  Surgeon: Julian Faulkner MD;  Location: [facility];  Service: Endoscopy;  Laterality: N/A;    KNEE ARTHROPLASTY Right 04/01/2015    Dr Placido BRICE    TOTAL ANKLE ARTHROPLASTY Left 10/03/2022    Dr. Guerra, with talonavicular and subtalar fusion    TOTAL ANKLE ARTHROPLASTY Left 02/20/2023    total revision of previous surgery, fusion of non-unions    TOTAL ANKLE ARTHROPLASTY Left 11/13/2023    w/ fusion to foot joints    VAGINAL HYSTERECTOMY  12/16/2013    with BSO, fibroids     Family History   Problem Relation Age of Onset    Cancer Mother 84        Bladder    Osteoporosis Mother     Hypertension Mother     Colon cancer Father 65    Kidney cancer Father 77    Other Father's Brother         sudden cardiac death, 60s    Heart disease Maternal Grandfather     Heart attack Maternal Grandfather 59    Cancer Paternal Grandfather      Social History     Tobacco Use    Smoking status: Never    Smokeless tobacco: Never   Substance Use Topics    Alcohol use: No       Review of Systems    As state in HPI    Objective     Vitals  /86 (BP Location: Left  "arm, Patient Position: Sitting, Cuff Size: Long Adult)   Pulse 80   Temp 36.7 °C (98.1 °F) (Temporal)   Resp 19   Ht 1.626 m (5' 4\")   SpO2 94%   BMI 48.54 kg/m²     Physical Exam   GEN: pleasant, NAD  HEENT: PERRLA, sclera anicteric  NECK: Supple  LUNGS: CTAB, no wheezing rhonchi or rales  HEART: RRR 2 of 6 stock murmur  ABD: When she stands, there appears to be an orange sized bulge on the right upper abdomen, when she lays down, this bulge centers over the right inferior rib but I cannot palpate a discrete mass, I was unable to palpate the liver edge, area of the mass and inferior ribs is mildly tender to palpation.  Ext: Warm and well perfused, no edema  SKIN: no rash  Neuro:  CN 2-12 grossly intact, non focal, moving all extremities      Assessment/Plan   Diagnoses and all orders for this visit:    Costal margin pain  -     CT chest abdomen pelvis with IV contrast; Future    Abdominal wall mass of right upper quadrant  -     CT chest abdomen pelvis with IV contrast; Future  -     Basic metabolic panel Blood, Venous; Future    1.  Abdominal wall mass.  Will get a CT chest abdomen pelvis to make sure to include the ribs and lower chest as well as upper abdomen.    CM MON MD  "

## 2024-07-19 ENCOUNTER — TELEPHONE (OUTPATIENT)
Dept: INTERNAL MEDICINE | Facility: CLINIC | Age: 69
End: 2024-07-19
Payer: MEDICARE

## 2024-07-19 NOTE — TELEPHONE ENCOUNTER
Returned call for unsure, tried nurse, got vm    Best time to reach you: anytime    Is it ok to leave a detailed voicemail:yes     Is it ok to message you through [facility]:  no

## 2024-07-25 ENCOUNTER — OFFICE VISIT (OUTPATIENT)
Dept: UROLOGY | Facility: CLINIC | Age: 69
End: 2024-07-25
Payer: MEDICARE

## 2024-07-25 VITALS — WEIGHT: 282 LBS | BODY MASS INDEX: 48.14 KG/M2 | HEIGHT: 64 IN

## 2024-07-25 DIAGNOSIS — N39.0 FREQUENT UTI: Primary | ICD-10-CM

## 2024-07-25 PROCEDURE — G0463 HOSPITAL OUTPT CLINIC VISIT: HCPCS | Performed by: UROLOGY

## 2024-07-25 PROCEDURE — 99214 OFFICE O/P EST MOD 30 MIN: CPT | Performed by: UROLOGY

## 2024-07-25 RX ORDER — SULFAMETHOXAZOLE AND TRIMETHOPRIM 800; 160 MG/1; MG/1
1 TABLET ORAL 2 TIMES DAILY
Qty: 10 TABLET | Refills: 1 | Status: SHIPPED | OUTPATIENT
Start: 2024-07-25 | End: 2024-07-30

## 2024-07-25 ASSESSMENT — PAIN SCALES - GENERAL: PAINLEVEL: 0-NO PAIN

## 2024-07-25 NOTE — PROGRESS NOTES
Subjective     Patient ID: Corrine Holden is a 69 y.o. female.    HPI    The patient is a 69-year-old female previously evaluated for microscopic hematuria, frequent UTI here for follow-up on the above.  On 10/6/2023 she noted UTI symptoms with urine culture Klebsiella and Aerococcus treated with a 7-day course of Ceftin.  On 10/23/2023 she noted recurrence of symptoms with urine culture Klebsiella initially treated with Keflex with change to Cipro.  Cystoscopy 2023 requiring urethral calibration with otherwise normal bladder.    CTU 10/27/2023 negative  In early 2024 she completed her low-dose antibiotic course.  She currently denies UTI symptoms with dysuria, flank pain, gross hematuria.  She continues drinking 60 ounces of water daily.  She reports family history of father with kidney cancer  79 years old and mother with bladder cancer  86 years old.     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    Assessment/Plan     Diagnoses and all orders for this visit:    Frequent UTI  -     sulfamethoxazole-trimethoprim (BACTRIM DS,SEPTRA DS) 800-160 mg per tablet; Take 1 tablet (160 mg of trimethoprim total) by mouth 2 (two) times a day for 5 days Take as needed for UTI symptoms    Frequent UTI, uncertain prognosis  No evidence incomplete bladder emptying, stable  No evidence stones on CT 2024  -Bactrim DS 1 p.o. twice daily x 5 days as needed for UTI symptoms  --prescription sent to pharmacy  - Dietary recommendations for UTI prevention reviewed  --CT report 2024 personally reviewed   --CT 2024 with personal interpretation with normal kidneys without stone, hydronephrosis, mass or distended bladder  --urine culture results 2024, 2023, 2023, 10/23/2023, 10/6/2023 personally reviewed   --follow-up 6  months

## 2024-08-12 ENCOUNTER — TELEPHONE (OUTPATIENT)
Dept: INTERNAL MEDICINE | Facility: CLINIC | Age: 69
End: 2024-08-12
Payer: MEDICARE

## 2024-08-12 DIAGNOSIS — K21.9 GASTROESOPHAGEAL REFLUX DISEASE WITHOUT ESOPHAGITIS: ICD-10-CM

## 2024-08-14 NOTE — TELEPHONE ENCOUNTER
Pharmacy calling on this encounter, requesting new script be sent for a 90 day supply    Fax number # [phone]

## 2024-08-15 RX ORDER — OMEPRAZOLE 20 MG/1
20 CAPSULE, DELAYED RELEASE ORAL DAILY
Qty: 90 CAPSULE | Refills: 1 | Status: SHIPPED | OUTPATIENT
Start: 2024-08-15

## 2024-08-15 NOTE — TELEPHONE ENCOUNTER
Refill of omeprazole 20mg once daily has been sent to [facility] for #90, with 1 refill. Patient is scheduled for [facility] in February 2025, another year's worth of medication will be sent at that time if indicated.

## 2024-08-23 NOTE — PROGRESS NOTES
Patient ID: Corrine Holden is a 69 y.o. female    Subjective     Pain Assessment: 0-10  Pain Score: 2  Pain Type: Chronic pain  Pain Location: Back  Pain Radiating Towards: denies  Pain Descriptors: Aching  Pain Frequency: Constant/continuous  Interference on Function: denies  Pain Onset: Ongoing  Clinical Progression: Gradually improving  Aggravating Factors: Standing, Walking  Patient's Stated Pain Goal: 2-Notice pain, does not interfere with activities  Pain Interventions: Home medication, Medication (See MAR), Heat applied, Cold applied, Topicals, Rest (ibuprofen, tylenol, swim)  Response to Interventions: combination    HPI/Chief Complaint:  Chief complaint: Chronic low back pain, chronic left shoulder pain, left knee pain    Patient is here today following up on bilateral SI injections by Dr. Tavarez on 5/30/24, and on MNBB B) L 3,4,5 by Dr. Reese on 6/13/24.  Corrine reports 80% relief after the SI injections, she reports 90% relief after the medial nerve branch blocks she does tell me overall she is doing fairly well today.  Her pain today is a 2 out of 10. Increased left knee pain, had an injection in her knee at [facility] a few weeks ago. She had some increased pain post injection. She did have some pain/swelling post injection. Ibuprofen/tylenol as needed.     Patient is working on weight loss, down 15lbs currently.     Conservative treatment:  Patient is doing at home exercises, swimming therapy regularly    Medications:  Tyenol as needed  ASA daily as needed   Ibuprofen as needed   Diazapam- patient uses for a muscle relaxer    Medications previously tried -Valium as needed, Flexeril too sedating, ibuprofen off-and-on, gabapentin.    Patient denies any new weakness denies any loss of bowel or bladder denies any saddle anesthesia    Injections:   6/27/23 - LEPSI L5-S1 -95%  7/25/23 - SNR R) L5 -50%   4/2/24 - LEPSI -80%  5/30/24 - B) SI -80%   6/13/24 - MNBB B) L 3,4,5  -90%     Imaging Reviewed:  9/30/19 - MRI of  the lumbosacral spine without contrast      Clinical History:  Low back pain;      Comparison(s):  CT 5/25/2018     Technique:   Sagittal and axial T2 and T1 and sagittal STIR sequences were obtained through the lumbosacral spine.     Findings:   5 lumbar vertebral bodies are presumed for purpose of this dictation.  The lumbar vertebral bodies are normal in height. Unchanged grade 1 anterolisthesis of L5 over S1 where there is severe loss of disc space and chronic appearing endplate degenerative signal. Likely hemangioma of L1. The conus medullaris terminates normally at the L1 level and the cauda equina is unremarkable.     At L1-2, no significant spinal canal or neuroforaminal narrowing. Mild facet arthropathy  At L2-3, no significant spinal canal or neuroforaminal narrowing. Mild facet arthropathy  At L3-4, mild broad-based disc bulge. No spinal canal narrowing. Prominent epidural fat. No neuroforaminal narrowing. Bilateral facet arthropathy with fluid in facet joints  At L4-5, small broad-based disc bulge. No spinal canal narrowing. No significant neuroforaminal narrowing. Bilateral facet arthropathy with fluid within facet joints  At L5-S1, broad-based disc bulge and ligamentum flavum hypertrophy without significant spinal canal narrowing. Bilateral facet arthropathy. Bilateral mild to moderate neuroforaminal narrowing.     IMPRESSION:  Unchanged grade 1 anterolisthesis of L5 over S1 with associated bilateral chronic pars defect.  Multilevel degenerative changes most pronounced in lower lumbar bilateral facets joints.    4/11/24 - MR LUMBAR SPINE WO CONTRAST  INDICATION: Lumbar radiculopathy; Low back pain  symptoms persist with > 6wks conservative treatment  COMPARISON: 10/27/2023     TECHNIQUE:  Noncontrast MR images of the lumbar spine     FINDINGS:  Exaggerated lumbar lordosis, unchanged. Grade 1 anterolisthesis at L5-S1. No compression deformities. Spondylotic endplate marrow changes, particular at L5-S1.  No compression deformities or other acute fractures.     The paraspinal soft tissues are without acute abnormalities.      Conus is normally positioned. There is mechanical clumping of the cauda equina.     Level by levels:  L1-2: Disc bulge. Facet arthropathy and ligamentum flavum thickening. No spinal canal stenosis. Mild bilateral foraminal stenosis.  L2-3: Shallow disc bulge. Facet arthropathy and ligamentum flavum thickening. No spinal canal stenosis. Mild right foraminal stenosis.  L3-4: Disc bulge. Facet arthropathy and ligament flavum thickening. Bilateral facet effusions. Moderate narrowing of the lateral recesses. Mild to moderate bilateral foraminal stenosis.  L4-5: Shallow disc bulge. Facet arthropathy and ligamentum flavum thickening. Facet arthropathy and ligamentum flavum thickening. No spinal canal stenosis. Moderate bilateral foraminal stenosis.  L5-S1: Disc uncovering with superimposed disc bulge. Facet arthropathy and ligamentum flavum thickening. Moderate narrowing of the lateral recesses. Moderate to severe bilateral foraminal stenosis.     IMPRESSION:  Lumbar spondylotic changes with exaggerated lordosis, grade 1 anterolisthesis at L5-S1 and culminating in moderate narrowing of the lateral recesses and moderate to severe bilateral foraminal stenosis at L5-S1. Other levels as above.     4/11/24 - MR SHOULDER LEFT WO CONTRAST  INDICATION: Chronic left shoulder pain; Chronic left shoulder pain; Shoulder pain  rotator cuff disorder suspected  xray done  COMPARISON: Radiographs 3/21/2024.   Technique:  Noncontrast images were obtained.      FINDINGS:  Moderate glenohumeral joint effusion with mild cervitis  Focal intermediate grade partial-thickness fissure of the anterior supraspinatus tendon insertion. No retracted full-thickness tear of the rotator cuff.  Severe acromioclavicular joint osteoarthrosis. Moderate subacromial subdeltoid bursitis.  There is no acute bony fracture. The imaged vessels  "and nerves are intact.  Severe glenohumeral joint cartilage disease. Subchondral cyst of the glenoid.  Circumferential degenerated labral tear.  There are osteophytes of the humerus.  Imaged muscles are intact.  No concerning marrow replacement.  Long head of the biceps tendon demonstrates tendinopathy, without tear.  Subscapularis tendinopathy with intermediate-grade partial-thickness insertional tearing.  Incompletely remodeled rotator interval capsule.  Moderate glenohumeral joint effusion with mild synovitis.     Impression:  Severe glenohumeral joint cartilage disease. Circumferential degenerated labral tear.  Severe acromioclavicular joint osteoarthrosis and moderate subacromial-subdeltoid bursitis.  Focal intermediate grade partial-thickness tear of the subscapularis tendon insertion. Focal intermediate grade partial-thickness fissure of the anterior supraspinatus tendon insertion. No retracted full-thickness tear of the rotator cuff.  Moderate glenohumeral joint effusion with mild synovitis.      Past Medical History  Past Medical History:   Diagnosis Date    GERD (gastroesophageal reflux disease)     H. pylori infection 2009    Hypothyroidism     Injury of back     STATES, \"THIS IS REASON FOR MRI\"    Microscopic hematuria     SHANA (obstructive sleep apnea)     USES CPAP    Osteoarthritis     Palpitations     Respiratory disease     Supraventricular tachycardia (CMS/HCC)     STATES, \"COFFEE RELATED\", VERY INTERMITTENT    Tinnitus      Past Surgical History   Past Surgical History:   Procedure Laterality Date    CARDIAC ELECTROPHYSIOLOGY STUDY AND ABLATION  01/01/2007    attempted ablation for SVT    CARPAL TUNNEL RELEASE Bilateral 06/26/2020    Procedure: LEFT ENDOSCOPIC CARPAL TUNNEL RELEASE, RIGHT OPEN CARPAL TUNNEL;  Surgeon: Mendez Sun MD;  Location: [facility];  Service: Orthopedics;  Laterality: Bilateral;    COLONOSCOPY  01/21/2015    cecal polyp-tubular adenoma-recommend repeat 5 years    COLONOSCOPY " N/A 2020    repeat 5 years - adenomatous polyp    DILATION AND CURETTAGE OF UTERUS  2006    ENDOMETRIAL BIOPSY  2012    pt reports negative    ESOPHAGOGASTRODUODENOSCOPY  2009    H pylori    ESOPHAGOGASTRODUODENOSCOPY N/A 2020    Procedure: ESOPHAGOGASTRODUODENOSCOPY;  Surgeon: Julian Faulkner MD;  Location: [facility];  Service: Endoscopy;  Laterality: N/A;    KNEE ARTHROPLASTY Right 2015    Dr Placido BRICE    TOTAL ANKLE ARTHROPLASTY Left 10/03/2022    Dr. Guerra, with talonavicular and subtalar fusion    TOTAL ANKLE ARTHROPLASTY Left 2023    total revision of previous surgery, fusion of non-unions    TOTAL ANKLE ARTHROPLASTY Left 2023    w/ fusion to foot joints    VAGINAL HYSTERECTOMY  2013    with BSO, fibroids     Social History    Corrine Holden  reports that she has never smoked. She has never used smokeless tobacco. She reports that she does not drink alcohol and does not use drugs.    Family History:  family history includes Cancer in her paternal grandfather; Cancer (age of onset: 84) in her mother; Colon cancer (age of onset: 65) in her father; Heart attack (age of onset: 59) in her maternal grandfather; Heart disease in her maternal grandfather; Hypertension in her mother; Kidney cancer (age of onset: 77) in her father; Osteoporosis in her mother; Other in her father's brother.    Allergies:  Allergies   Allergen Reactions    Bee Venom Protein (Honey Bee) Swelling    No Known Drug Allergies      Medications:     Current Outpatient Medications:     omeprazole (PriLOSEC) 20 mg capsule, Take 1 capsule (20 mg total) by mouth daily, Disp: 90 capsule, Rfl: 1    calc/FA/B/D3/E/bioflav/soybean (CALCI-MAX ORAL), , Disp: , Rfl:     amoxicillin (AMOXIL) 500 mg capsule, SMARTSI Capsule(s) By Mouth, Disp: , Rfl:     cholecalciferol, vitamin D3, 25 mcg (1,000 unit) tablet, Take 2 tablets (2,000 Units total) by mouth 2 (two) times a day, Disp: , Rfl:      levothyroxine (SYNTHROID, LEVOTHROID) 125 mcg tablet, Take 1 tablet (125 mcg total) by mouth daily, Disp: 90 tablet, Rfl: 2    diazePAM (VALIUM) 5 mg tablet, Take 1 tablet (5 mg total) by mouth every 12 (twelve) hours as needed for anxiety or muscle spasms Max Daily Amount: 10 mg, Disp: 30 tablet, Rfl: 0    ascorbic acid, vitamin C, (ascorbic acid with vane hips) 500 mg tablet, Take 2 tablets (1,000 mg total) by mouth daily, Disp: , Rfl:     tiZANidine (ZANAFLEX) 2 mg tablet, Take 1 tablet (2 mg total) by mouth every 6 (six) hours as needed for muscle spasms, Disp: , Rfl:     methylPREDNISolone (Medrol, Maxi,) 4 mg tablet, follow package directions, Disp: 21 tablet, Rfl: 0    albuterol HFA 90 mcg/actuation inhaler, Inhale 2 puffs every 6 (six) hours as needed for wheezing, Disp: 1 Inhaler, Rfl: 0    ibuprofen (ADVIL,MOTRIN) 200 mg tablet, Take 1 tablet (200 mg total) by mouth every 6 (six) hours as needed, Disp: , Rfl:     vit C/E/Zn/coppr/lutein/zeaxan (OCUVITE LUTEIN AND ZEAXANTHIN ORAL), PreserVision AREDS-2, Disp: , Rfl:     zinc 50 mg tablet, Take 50 mg by mouth daily, Disp: , Rfl:     diclofenac sodium (VOLTAREN) 1 % gel, APPLY 2 GRAMS TO THE AFFECTED AREA(S) 4 TIMES PER DAY AS NEEDED, Disp: 300 g, Rfl: 2    fluticasone propionate (FLONASE) 50 mcg/actuation nasal spray, USE 2 SPRAYS IN EACH NOSTRIL DAILY, Disp: 48 g, Rfl: 2    budesonide (Pulmicort Flexhaler) 90 mcg/actuation inhaler, Inhale 1 puff as needed (for shortness of breath), Disp: , Rfl:     krill-om-3-dha-epa-phospho-ast 309-292-06-75 mg capsule, Take 1 capsule by mouth daily, Disp: , Rfl:     cyclobenzaprine (FLEXERIL) 10 mg tablet, Take 1 tablet (10 mg total) by mouth 2 (two) times a day as needed for muscle spasms., Disp: 60 tablet, Rfl: 0    multivitamin (THERAGRAN) tablet tablet, 1 Every Day, Disp: , Rfl:     acetaminophen (TYLENOL EXTRA STRENGTH) 500 mg tablet, Take 2 tablets (1,000 mg total) by mouth 2 (two) times a day, Disp: , Rfl:      "ketorolac (TORADOL) 10 mg tablet, Take 1 tablet (10 mg total) by mouth every 8 (eight) hours as needed for pain scale 8-10/10 for up to 5 days, Disp: 15 tablet, Rfl: 0    Current Facility-Administered Medications:     iohexoL (OMNIPAQUE) 240 mg iodine/mL injection 3 mL, 3 mL, injection, PRN, , 3 mL at 06/13/24 0916    Review of Systems   Constitutional:  Positive for activity change.   Musculoskeletal:  Positive for arthralgias, back pain, gait problem and myalgias.   Psychiatric/Behavioral:  Positive for sleep disturbance.    All other systems reviewed and are negative.    Objective     Vital signs:  Vitals:    09/03/24 1326 09/03/24 1328   BP: 165/100    BP Location: Right arm  Comment: forearm    Patient Position: Sitting    Cuff Size: Long Adult    Pulse: 87    SpO2:  92%   Weight: Comment: refused    Height: 1.626 m (5' 4\")       Exam:  Physical Exam  Vitals and nursing note reviewed.   Constitutional:       General: She is not in acute distress.     Appearance: Normal appearance. She is well-developed. She is obese. She is not ill-appearing, toxic-appearing or diaphoretic.   HENT:      Head: Normocephalic and atraumatic.      Right Ear: External ear normal.      Left Ear: External ear normal.      Nose: Nose normal.      Mouth/Throat:      Pharynx: Oropharynx is clear.   Eyes:      Conjunctiva/sclera: Conjunctivae normal.   Pulmonary:      Effort: Pulmonary effort is normal. No respiratory distress.   Abdominal:      General: There is no distension.   Musculoskeletal:         General: Tenderness present. No deformity.      Cervical back: Normal range of motion and neck supple.      Lumbar back: Tenderness and bony tenderness present. Decreased range of motion. Negative right straight leg raise test.        Back:       Left knee: Decreased range of motion.        Legs:       Comments: Patient is slow from sit to stand.  She does have tenderness over her lumbosacral region and into her SI,  bilateral.  Positive " Valentín positive [place] positive thigh thrust positive compression test right SI, increased pain with facet loading to lumbosacral region mainly over bilateral L3, 4, 5. Ambulating with cane, she does have an antalgic/waddling gait, lower extremity strength is equal and bilateral, distal sensation intact.   Skin:     General: Skin is warm and dry.      Capillary Refill: Capillary refill takes less than 2 seconds.      Findings: No rash.   Neurological:      Mental Status: She is alert and oriented to person, place, and time.      Sensory: No sensory deficit.      Motor: No weakness.      Gait: Gait abnormal.   Psychiatric:         Mood and Affect: Mood normal.         Behavior: Behavior normal.         Thought Content: Thought content normal.         Judgment: Judgment normal.       Assessment:  1. Sacroiliitis (CMS/HCC)  ketorolac (TORADOL) 10 mg tablet      2. Back pain of lumbar region with sciatica  ketorolac (TORADOL) 10 mg tablet      3. Osteoarthritis of spine with radiculopathy, lumbar region  ketorolac (TORADOL) 10 mg tablet      4. Lumbar radicular pain  ketorolac (TORADOL) 10 mg tablet      5. Other chronic pain        6. Lumbar spondylosis           Plan/Discussion:  Provided more exercises for SI and back to add to her current HEP.     Did send a refill of Toradol today 10 mg TID PRN for no more than 5 days for pain flair up. Do not take with other NSAIDS as reviewed. She does have a medrol dose pack at home, uses rarely as needed. Can call for refill of a medrol dose pack if needed.     Keep scheduled for SI- keep scheduled.     Today's plan was a combined effort between the patient and myself. The patient understood the plan, verbally consented, and agreed to participate. An After Visit Summary regarding today's office visit was given to the patient.     I have reviewed the patient's current medications using all immediate resources available.    Dragon voice recognition program was used to aid in this  documentation which might generate inaccuracies despite efforts made to avoid them.  Please take it into context and contact the provider with any questions.    Lola Pizarro CNP

## 2024-09-03 ENCOUNTER — OFFICE VISIT (OUTPATIENT)
Dept: PAIN MEDICINE | Facility: HOSPITAL | Age: 69
End: 2024-09-03
Payer: MEDICARE

## 2024-09-03 VITALS
SYSTOLIC BLOOD PRESSURE: 165 MMHG | OXYGEN SATURATION: 92 % | HEIGHT: 64 IN | DIASTOLIC BLOOD PRESSURE: 100 MMHG | BODY MASS INDEX: 48.41 KG/M2 | HEART RATE: 87 BPM

## 2024-09-03 DIAGNOSIS — M47.26 OSTEOARTHRITIS OF SPINE WITH RADICULOPATHY, LUMBAR REGION: ICD-10-CM

## 2024-09-03 DIAGNOSIS — M46.1 SACROILIITIS (CMS/HCC): Primary | ICD-10-CM

## 2024-09-03 DIAGNOSIS — G89.29 OTHER CHRONIC PAIN: ICD-10-CM

## 2024-09-03 DIAGNOSIS — M47.816 LUMBAR SPONDYLOSIS: ICD-10-CM

## 2024-09-03 DIAGNOSIS — M54.40 BACK PAIN OF LUMBAR REGION WITH SCIATICA: ICD-10-CM

## 2024-09-03 DIAGNOSIS — M54.16 LUMBAR RADICULAR PAIN: ICD-10-CM

## 2024-09-03 PROCEDURE — G0463 HOSPITAL OUTPT CLINIC VISIT: HCPCS

## 2024-09-03 PROCEDURE — 99214 OFFICE O/P EST MOD 30 MIN: CPT | Performed by: NURSE PRACTITIONER

## 2024-09-03 PROCEDURE — G2211 COMPLEX E/M VISIT ADD ON: HCPCS | Performed by: NURSE PRACTITIONER

## 2024-09-03 RX ORDER — KETOROLAC TROMETHAMINE 10 MG/1
10 TABLET, FILM COATED ORAL EVERY 8 HOURS PRN
Qty: 15 TABLET | Refills: 0 | Status: SHIPPED | OUTPATIENT
Start: 2024-09-03 | End: 2024-09-16

## 2024-09-03 ASSESSMENT — ENCOUNTER SYMPTOMS
BACK PAIN: 1
SLEEP DISTURBANCE: 1
ACTIVITY CHANGE: 1
MYALGIAS: 1
ARTHRALGIAS: 1

## 2024-09-03 ASSESSMENT — PAIN - FUNCTIONAL ASSESSMENT: PAIN_FUNCTIONAL_ASSESSMENT: 0-10

## 2024-09-03 ASSESSMENT — PAIN DESCRIPTION - DESCRIPTORS: DESCRIPTORS: ACHING

## 2024-09-03 NOTE — PATIENT INSTRUCTIONS
Sacroiliac Joint Dysfunction           Sacroiliac joint dysfunction is a condition that causes inflammation on one or both sides of the sacroiliac (SI) joint. The SI joint connects the lower part of the spine (sacrum) with the two upper portions of the pelvis (ilium). This condition causes deep aching or burning pain in the low back. In some cases, the pain may also spread into one or both buttocks, hips, or thighs.  What are the causes?  This condition may be caused by:  Pregnancy. During pregnancy, extra stress is put on the SI joints because the pelvis widens.  Injury, such as:  Injuries from car accidents.  Sports-related injuries.  Work-related injuries.  Having one leg that is shorter than the other.  Conditions that affect the joints, such as:  Rheumatoid arthritis.  Gout.  Psoriatic arthritis.  Joint infection (septic arthritis).  Sometimes, the cause of SI joint dysfunction is not known.  What are the signs or symptoms?  Symptoms of this condition include:  Aching or burning pain in the lower back. The pain may also spread to other areas, such as:  Buttocks.  Groin.  Thighs.  Muscle spasms in or around the painful areas.  Increased pain when standing, walking, running, stair climbing, bending, or lifting.  How is this diagnosed?  This condition is diagnosed with a physical exam and medical history. During the exam, the health care provider may move one or both of your legs to different positions to check for pain. Various tests may be done to confirm the diagnosis, including:  Imaging tests to look for other causes of pain. These may include:  MRI.  CT scan.  Bone scan.  Diagnostic injection. A numbing medicine is injected into the SI joint using a needle. If your pain is temporarily improved or stopped after the injection, this can indicate that SI joint dysfunction is the problem.  How is this treated?  Treatment depends on the cause and severity of your condition. Treatment options may include:  Ice or  heat applied to the lower back area after an injury. This may help reduce pain and muscle spasms.  Medicines to relieve pain or inflammation or to relax the muscles.  Wearing a back brace (sacroiliac brace) to help support the joint while your back is healing.  Physical therapy to increase muscle strength around the joint and flexibility at the joint. This may also involve learning proper body positions and ways of moving to relieve stress on the joint.  Direct manipulation of the SI joint.  Injections of steroid medicine into the joint to reduce pain and swelling.  Radiofrequency ablation to burn away nerves that are carrying pain messages from the joint.  Use of a device that provides electrical stimulation to help reduce pain at the joint.  Surgery to put in screws and plates that limit or prevent joint motion. This is rare.  Follow these instructions at home:  Medicines  Take over-the-counter and prescription medicines only as told by your health care provider.  Do not drive or use heavy machinery while taking prescription pain medicine.  If you are taking prescription pain medicine, take actions to prevent or treat constipation. Your health care provider may recommend that you:  Drink enough fluid to keep your urine pale yellow.  Eat foods that are high in fiber, such as fresh fruits and vegetables, whole grains, and beans.  Limit foods that are high in fat and processed sugars, such as fried or sweet foods.  Take an over-the-counter or prescription medicine for constipation.  If you have a brace:  Wear the brace as told by your health care provider. Remove it only as told by your health care provider.  Keep the brace clean.  If the brace is not waterproof:  Do not let it get wet.  Cover it with a watertight covering when you take a bath or a shower.  Managing pain, stiffness, and swelling               Icing can help with pain and swelling. Heat may help with muscle tension or spasms. Ask your health care  provider if you should use ice or heat.  If directed, put ice on the affected area:  If you have a removable brace, remove it as told by your health care provider.  Put ice in a plastic bag.  Place a towel between your skin and the bag.  Leave the ice on for 20 minutes, 2-3 times a day.  If directed, apply heat to the affected area. Use the heat source that your health care provider recommends, such as a moist heat pack or a heating pad.  Place a towel between your skin and the heat source.  Leave the heat on for 20-30 minutes.  Remove the heat if your skin turns bright red. This is especially important if you are unable to feel pain, heat, or cold. You may have a greater risk of getting burned.  General instructions  Rest as needed. Ask your health care provider what activities are safe for you.  Return to your normal activities as told by your health care provider.  Exercise as directed by your health care provider or physical therapist.  Do not use any products that contain nicotine or tobacco, such as cigarettes and e-cigarettes. These can delay bone healing. If you need help quitting, ask your health care provider.  Keep all follow-up visits as told by your health care provider. This is important.  Contact a health care provider if:  Your pain is not controlled with medicine.  You have a fever.  Your pain is getting worse.  Get help right away if:  You have weakness, numbness, or tingling in your legs or feet.  You lose control of your bladder or bowel.  Summary  Sacroiliac joint dysfunction is a condition that causes inflammation on one or both sides of the sacroiliac (SI) joint.  This condition causes deep aching or burning pain in the low back. In some cases, the pain may also spread into one or both buttocks, hips, or thighs.  Treatment depends on the cause and severity of your condition. It may include medicines to reduce pain and swelling or to relax muscles.  This information is not intended to replace  advice given to you by your health care provider. Make sure you discuss any questions you have with your health care provider.  Document Released: 03/16/2010 Document Revised: 08/14/2019 Document Reviewed: 01/28/2019  [employer] Patient Education © 2020 [employer]    Sacroiliac Joint Injection       A sacroiliac (SI) joint injection is a procedure to inject a numbing medicine (anesthetic block)--and sometimes a strong anti-inflammatory medicine (steroid)--into the SI joint. The SI joint is the joint between two bones of the pelvis called the sacrum and the ilium. The sacrum is the bone at the base of the spine. The ilium is the large bone that forms the hip.  You may need this procedure if you have pain because of an inflamed or diseased SI joint. Various conditions can cause pain in the SI joint, including rheumatoid arthritis, gout, psoriatic arthritis, infection, or injury. SI joint pain is a common cause of low back pain. It may also cause pain in your buttock or leg.  SI joint injection may be done to:  Find out if an anesthetic block relieves pain. This can confirm that the SI joint is the cause of pain (diagnostic use).  Treat a painful SI joint with steroids, anesthetic medicine, or both (therapeutic use).  Tell a health care provider about:  Any allergies you have.  All medicines you are taking, including vitamins, herbs, eye drops, creams, and over-the-counter medicines.  Any problems you or family members have had with anesthetic medicines.  Any blood disorders you have.  Any surgeries you have had.  Any medical conditions you have.  Whether you are pregnant or may be pregnant.  What are the risks?  Generally, this is a safe procedure. However, problems may occur, including:  Infection.  Bleeding.  Nerve injury.  Temporary increase in pain.  Headache.  Failure of the procedure to relieve pain.  Bruising or soreness at the joint, in deep tissues, or at the injection site.  Allergic reactions to medicines  or dyes.  Side effects from the steroid medicine. These may include facial flushing, increased appetite, diarrhea, and increased blood sugar.  What happens before the procedure?  You may have a physical exam.  You may have imaging tests, such as an X-ray, CT scan, or MRI.  Ask your health care provider about:  Changing or stopping your regular medicines. This is especially important if you are taking diabetes medicines or blood thinners.  Taking medicines such as aspirin and ibuprofen. These medicines can thin your blood. Do not take these medicines unless your health care provider tells you to take them.  Taking over-the-counter medicines, vitamins, herbs, and supplements.  Plan to have someone take you home from the hospital or clinic.  What happens during the procedure?  To lower your risk of infection:  Your health care team will wash or sanitize their hands.  Your skin will be washed with a germ-killing (antiseptic) solution.  You may be given one or more of the following:  A medicine to help you relax (sedative).  A medicine to numb the area (local anesthetic). Your health care provider will inject a local anesthetic into the skin above your SI joint.  You will be placed in the proper position on a procedure table to give the health care team the best access to your SI joint.  An X-ray machine that produces moving X-ray images (fluoroscopy) will be placed above the procedure table.  A long, thin needle will be inserted through your skin and down to your SI joint.  The position of the needle will be checked with fluoroscopy imaging.  An X-ray dye (contrast media) will be injected to make sure the needle enters the joint space. You may be asked if you feel any pain.  Long-acting anesthetic medicine will be injected. Long-acting steroid medicine may also be injected.  The needle will be removed, and a bandage will be placed over the injection site.  The procedure may vary among health care providers and  .  What happens after the procedure?  Your blood pressure, heart rate, breathing rate, and blood oxygen level will be monitored until the medicines you were given have worn off.  If dye was used, you will be told to drink plenty of water to wash (flush) the dye out of your body.  You may be asked if you have pain relief from the injection.  You will likely be able to go home shortly after the procedure.  Your health care provider will give you instructions for taking care of yourself after the procedure. These may include instructions for doing physical therapy exercises.  Do not drive for 24 hours if you were given a sedative during the procedure.  Summary  A sacroiliac (SI) joint injection is an injection of a numbing medicine (anesthetic block)--and sometimes a strong anti-inflammatory medicine (steroid)--into the SI joint.  You will be awake during the procedure, but the injection area will be made numb.  If you were given a medicine to help you relax (sedative during the procedure, do not drive for at least 24 hours.  This information is not intended to replace advice given to you by your health care provider. Make sure you discuss any questions you have with your health care provider.  Document Released: 09/24/2018 Document Revised: 11/30/2018 Document Reviewed: 09/24/2018  [email or web address] Patient Education © 2020 [email or web address]     Low Back Sprain or Strain Rehab  Please do the exercises that you are able to do safely- you do not HAVE to do all of them.    Ask your health care provider which exercises are safe for you. Do exercises exactly as told by your health care provider and adjust them as directed. It is normal to feel mild stretching, pulling, tightness, or discomfort as you do these exercises. Stop right away if you feel sudden pain or your pain gets worse. Do not begin these exercises until told by your health care provider.  Stretching and range-of-motion exercises  These exercises warm up your  muscles and joints and improve the movement and flexibility of your back. These exercises also help to relieve pain, numbness, and tingling.  Lumbar rotation       Lie on your back on a firm surface and bend your knees.  Straighten your arms out to your sides so each arm forms a 90-degree angle (right angle) with a side of your body.  Slowly move (rotate) both of your knees to one side of your body until you feel a stretch in your lower back (lumbar). Try not to let your shoulders lift off the floor.  Hold this position for ____10______ seconds.  Tense your abdominal muscles and slowly move your knees back to the starting position.  Repeat this exercise on the other side of your body.  Repeat _____3_____ times. Complete this exercise _____2_____ times a day.  Single knee to chest       Lie on your back on a firm surface with both legs straight.  Bend one of your knees. Use your hands to move your knee up toward your chest until you feel a gentle stretch in your lower back and buttock.  Hold your leg in this position by holding on to the front of your knee.  Keep your other leg as straight as possible.  Hold this position for ____10______ seconds.  Slowly return to the starting position.  Repeat with your other leg.  Repeat ______3____ times. Complete this exercise ______2____ times a day.  Prone extension on elbows       Lie on your abdomen on a firm surface (prone position).  Prop yourself up on your elbows.  Use your arms to help lift your chest up until you feel a gentle stretch in your abdomen and your lower back.  This will place some of your body weight on your elbows. If this is uncomfortable, try stacking pillows under your chest.  Your hips should stay down, against the surface that you are lying on. Keep your hip and back muscles relaxed.  Hold this position for ______10____ seconds.  Slowly relax your upper body and return to the starting position.  Repeat ______3____ times. Complete this exercise  ______2____ times a day.  Strengthening exercises  These exercises build strength and endurance in your back. Endurance is the ability to use your muscles for a long time, even after they get tired.  Pelvic tilt  This exercise strengthens the muscles that lie deep in the abdomen.  Lie on your back on a firm surface. Bend your knees and keep your feet flat on the floor.  Tense your abdominal muscles. Tip your pelvis up toward the ceiling and flatten your lower back into the floor.  To help with this exercise, you may place a small towel under your lower back and try to push your back into the towel.  Hold this position for _____10_____ seconds.  Let your muscles relax completely before you repeat this exercise.  Repeat _____3_____ times. Complete this exercise _____2_____ times a day.  Alternating arm and leg raises       Get on your hands and knees on a firm surface. If you are on a hard floor, you may want to use padding, such as an exercise mat, to cushion your knees.  Line up your arms and legs. Your hands should be directly below your shoulders, and your knees should be directly below your hips.  Lift your left leg behind you. At the same time, raise your right arm and straighten it in front of you.  Do not lift your leg higher than your hip.  Do not lift your arm higher than your shoulder.  Keep your abdominal and back muscles tight.  Keep your hips facing the ground.  Do not arch your back.  Keep your balance carefully, and do not hold your breath.  Hold this position for _____10_____ seconds.  Slowly return to the starting position.  Repeat with your right leg and your left arm.  Repeat ____3______ times. Complete this exercise ____2______ times a day.  Abdominal set with straight leg raise       Lie on your back on a firm surface.  Bend one of your knees and keep your other leg straight.  Tense your abdominal muscles and lift your straight leg up, 4-6 inches (10-15 cm) off the ground.  Keep your abdominal  muscles tight and hold this position for _____10_____ seconds.  Do not hold your breath.  Do not arch your back. Keep it flat against the ground.  Keep your abdominal muscles tense as you slowly lower your leg back to the starting position.  Repeat with your other leg.  Repeat ____3______ times. Complete this exercise ______2____ times a day.  Single leg lower with bent knees  Lie on your back on a firm surface.  Tense your abdominal muscles and lift your feet off the floor, one foot at a time, so your knees and hips are bent in 90-degree angles (right angles).  Your knees should be over your hips and your lower legs should be parallel to the floor.  Keeping your abdominal muscles tense and your knee bent, slowly lower one of your legs so your toe touches the ground.  Lift your leg back up to return to the starting position.  Do not hold your breath.  Do not let your back arch. Keep your back flat against the ground.  Repeat with your other leg.  Repeat _____3_____ times. Complete this exercise _____2_____ times a day.  Posture and body mechanics  Good posture and healthy body mechanics can help to relieve stress in your body's tissues and joints. Body mechanics refers to the movements and positions of your body while you do your daily activities. Posture is part of body mechanics. Good posture means:  Your spine is in its natural S-curve position (neutral).  Your shoulders are pulled back slightly.  Your head is not tipped forward.  Follow these guidelines to improve your posture and body mechanics in your everyday activities.  Standing       When standing, keep your spine neutral and your feet about hip width apart. Keep a slight bend in your knees. Your ears, shoulders, and hips should line up.  When you do a task in which you  one place for a long time, place one foot up on a stable object that is 2-4 inches (5-10 cm) high, such as a footstool. This helps keep your spine neutral.  Sitting       When  sitting, keep your spine neutral and keep your feet flat on the floor. Use a footrest, if necessary, and keep your thighs parallel to the floor. Avoid rounding your shoulders, and avoid tilting your head forward.  When working at a desk or a computer, keep your desk at a height where your hands are slightly lower than your elbows. Slide your chair under your desk so you are close enough to maintain good posture.  When working at a computer, place your monitor at a height where you are looking straight ahead and you do not have to tilt your head forward or downward to look at the screen.  Resting  When lying down and resting, avoid positions that are most painful for you.  If you have pain with activities such as sitting, bending, stooping, or squatting, lie in a position in which your body does not bend very much. For example, avoid curling up on your side with your arms and knees near your chest (fetal position).  If you have pain with activities such as standing for a long time or reaching with your arms, lie with your spine in a neutral position and bend your knees slightly. Try the following positions:  Lying on your side with a pillow between your knees.  Lying on your back with a pillow under your knees.  Lifting       When lifting objects, keep your feet at least shoulder width apart and tighten your abdominal muscles.  Bend your knees and hips and keep your spine neutral. It is important to lift using the strength of your legs, not your back. Do not lock your knees straight out.  Always ask for help to lift heavy or awkward objects.  This information is not intended to replace advice given to you by your health care provider. Make sure you discuss any questions you have with your health care provider.  Document Released: 12/18/2006 Document Revised: 04/10/2020 Document Reviewed: 01/09/2020  [employer] Patient Education © 2020 [employer]

## 2024-09-09 ENCOUNTER — LAB (OUTPATIENT)
Dept: LAB | Facility: CLINIC | Age: 69
End: 2024-09-09
Payer: MEDICARE

## 2024-09-09 DIAGNOSIS — E67.3 HYPERVITAMINOSIS D: ICD-10-CM

## 2024-09-09 DIAGNOSIS — R73.01 IMPAIRED FASTING GLUCOSE: ICD-10-CM

## 2024-09-09 DIAGNOSIS — E78.5 BORDERLINE HYPERLIPIDEMIA: ICD-10-CM

## 2024-09-09 DIAGNOSIS — E03.9 HYPOTHYROIDISM, UNSPECIFIED TYPE: ICD-10-CM

## 2024-09-09 LAB
ALBUMIN SERPL-MCNC: 4.8 G/DL (ref 3.5–5.3)
ALP SERPL-CCNC: 58 U/L (ref 55–142)
ALT SERPL-CCNC: 19 U/L (ref 7–52)
ANION GAP SERPL CALC-SCNC: 13 MMOL/L (ref 3–11)
AST SERPL-CCNC: 18 U/L
BILIRUB SERPL-MCNC: 0.54 MG/DL (ref 0.2–1.4)
BUN SERPL-MCNC: 23 MG/DL (ref 7–25)
CALCIUM ALBUM COR SERPL-MCNC: 9.2 MG/DL (ref 8.6–10.3)
CALCIUM SERPL-MCNC: 9.8 MG/DL (ref 8.6–10.3)
CHLORIDE SERPL-SCNC: 105 MMOL/L (ref 98–107)
CHOLEST SERPL-MCNC: 193 MG/DL (ref 0–199)
CO2 SERPL-SCNC: 23 MMOL/L (ref 21–32)
CREAT SERPL-MCNC: 0.76 MG/DL (ref 0.6–1.1)
EGFRCR SERPLBLD CKD-EPI 2021: 85 ML/MIN/1.73M*2
EST. AVERAGE GLUCOSE BLD GHB EST-MCNC: 125.5 MG/DL
FASTING STATUS PATIENT QL REPORTED: YES
GLUCOSE SERPL-MCNC: 120 MG/DL (ref 70–105)
HBA1C MFR BLD: 6 % (ref 4–6)
HDLC SERPL-MCNC: 60 MG/DL
LDLC SERPL CALC-MCNC: 108 MG/DL (ref 20–99)
POTASSIUM SERPL-SCNC: 4 MMOL/L (ref 3.5–5.1)
PROT SERPL-MCNC: 6.9 G/DL (ref 6–8.3)
SODIUM SERPL-SCNC: 141 MMOL/L (ref 135–145)
TRIGL SERPL-MCNC: 126 MG/DL
TSH SERPL DL<=0.05 MIU/L-ACNC: 1.3 UIU/ML (ref 0.34–4.82)
VIT B12 SERPL-MCNC: 676 PG/ML (ref 180–914)

## 2024-09-09 PROCEDURE — 82306 VITAMIN D 25 HYDROXY: CPT

## 2024-09-09 PROCEDURE — 80061 LIPID PANEL: CPT | Mod: PO

## 2024-09-09 PROCEDURE — 80053 COMPREHEN METABOLIC PANEL: CPT | Mod: PO

## 2024-09-09 PROCEDURE — 36415 COLL VENOUS BLD VENIPUNCTURE: CPT | Mod: PO

## 2024-09-09 PROCEDURE — 83036 HEMOGLOBIN GLYCOSYLATED A1C: CPT | Mod: PO

## 2024-09-09 PROCEDURE — 82607 VITAMIN B-12: CPT

## 2024-09-09 PROCEDURE — 84443 ASSAY THYROID STIM HORMONE: CPT | Mod: PO

## 2024-09-12 LAB
25(OH)D2 SERPL-MCNC: <4 NG/ML
25(OH)D3 SERPL-MCNC: 57 NG/ML
25(OH)D3+25(OH)D2 SERPL-MCNC: 57 NG/ML

## 2024-09-16 ENCOUNTER — OFFICE VISIT (OUTPATIENT)
Dept: INTERNAL MEDICINE | Facility: CLINIC | Age: 69
End: 2024-09-16
Payer: MEDICARE

## 2024-09-16 VITALS
OXYGEN SATURATION: 93 % | TEMPERATURE: 98.1 F | HEART RATE: 71 BPM | SYSTOLIC BLOOD PRESSURE: 130 MMHG | DIASTOLIC BLOOD PRESSURE: 84 MMHG

## 2024-09-16 DIAGNOSIS — E78.5 BORDERLINE HYPERLIPIDEMIA: ICD-10-CM

## 2024-09-16 DIAGNOSIS — E03.9 HYPOTHYROIDISM, UNSPECIFIED TYPE: ICD-10-CM

## 2024-09-16 DIAGNOSIS — G47.33 OBSTRUCTIVE SLEEP APNEA SYNDROME: ICD-10-CM

## 2024-09-16 DIAGNOSIS — R73.01 IMPAIRED FASTING GLUCOSE: Primary | ICD-10-CM

## 2024-09-16 PROCEDURE — G0463 HOSPITAL OUTPT CLINIC VISIT: HCPCS | Mod: PO | Performed by: INTERNAL MEDICINE

## 2024-09-16 PROCEDURE — 99214 OFFICE O/P EST MOD 30 MIN: CPT | Performed by: INTERNAL MEDICINE

## 2024-09-16 NOTE — PROGRESS NOTES
"Subjective      Corrine Holden is a 69 y.o. female who presents for Follow-up  .    SHANNON Castle is here for f/u of hypothyroidism, prediabetes, chronic ankle pain.    She has had bilateral SI joint injections that were helpful.  She did some yardwork and then could hardly walk from some knee pain .  She went to ortho clinic Aug 29 and had steroid injection in knee but had worse pain after that.  They have ordered an MRI of her L knee.  She usually needs anesthesia for MRI but she is going to try to do it with oral sedation only.      She is still recovering from her ankle surgery.  It is painful if she is on it too long.  She has been working on increasing her exercise.  She has been swimming for 1-1.5 hr 2-3 times/week and that has been very helpful.  She's been working on her balance too.  She feels that is getting better.  She is down about 16 lbs as well.  A1c is down from 6.4 to 6.0.    TSH looks good.    B12 normal.    Lipids not too bad by numbers.    She checks her BP about twice/mo.        Past Medical History:   Diagnosis Date    GERD (gastroesophageal reflux disease)     H. pylori infection     Hypothyroidism     Injury of back     STATES, \"THIS IS REASON FOR MRI\"    Microscopic hematuria     SHANA (obstructive sleep apnea)     USES CPAP    Osteoarthritis     Palpitations     Respiratory disease     Supraventricular tachycardia (CMS/HCC)     STATES, \"COFFEE RELATED\", VERY INTERMITTENT    Tinnitus        Current Outpatient Medications   Medication Sig Dispense Refill    ketorolac (TORADOL) 10 mg tablet Take 1 tablet (10 mg total) by mouth every 8 (eight) hours as needed for pain scale 8-10/10 for up to 5 days 15 tablet 0    omeprazole (PriLOSEC) 20 mg capsule Take 1 capsule (20 mg total) by mouth daily 90 capsule 1    calc/FA/B/D3/E/bioflav/soybean (CALCI-MAX ORAL)       amoxicillin (AMOXIL) 500 mg capsule SMARTSI Capsule(s) By Mouth      cholecalciferol, vitamin D3, 25 mcg (1,000 unit) tablet Take 2 " tablets (2,000 Units total) by mouth 2 (two) times a day      levothyroxine (SYNTHROID, LEVOTHROID) 125 mcg tablet Take 1 tablet (125 mcg total) by mouth daily 90 tablet 2    diazePAM (VALIUM) 5 mg tablet Take 1 tablet (5 mg total) by mouth every 12 (twelve) hours as needed for anxiety or muscle spasms Max Daily Amount: 10 mg 30 tablet 0    ascorbic acid, vitamin C, (ascorbic acid with vane hips) 500 mg tablet Take 2 tablets (1,000 mg total) by mouth daily      tiZANidine (ZANAFLEX) 2 mg tablet Take 1 tablet (2 mg total) by mouth every 6 (six) hours as needed for muscle spasms      methylPREDNISolone (Medrol, Maxi,) 4 mg tablet follow package directions 21 tablet 0    albuterol HFA 90 mcg/actuation inhaler Inhale 2 puffs every 6 (six) hours as needed for wheezing 1 Inhaler 0    ibuprofen (ADVIL,MOTRIN) 200 mg tablet Take 1 tablet (200 mg total) by mouth every 6 (six) hours as needed      vit C/E/Zn/coppr/lutein/zeaxan (OCUVITE LUTEIN AND ZEAXANTHIN ORAL) PreserVision AREDS-2      zinc 50 mg tablet Take 50 mg by mouth daily      diclofenac sodium (VOLTAREN) 1 % gel APPLY 2 GRAMS TO THE AFFECTED AREA(S) 4 TIMES PER DAY AS NEEDED 300 g 2    fluticasone propionate (FLONASE) 50 mcg/actuation nasal spray USE 2 SPRAYS IN EACH NOSTRIL DAILY 48 g 2    budesonide (Pulmicort Flexhaler) 90 mcg/actuation inhaler Inhale 1 puff as needed (for shortness of breath)      krill-om-3-dha-epa-phospho-ast 250-495-99-75 mg capsule Take 1 capsule by mouth daily      cyclobenzaprine (FLEXERIL) 10 mg tablet Take 1 tablet (10 mg total) by mouth 2 (two) times a day as needed for muscle spasms. 60 tablet 0    multivitamin (THERAGRAN) tablet tablet 1 Every Day      acetaminophen (TYLENOL EXTRA STRENGTH) 500 mg tablet Take 2 tablets (1,000 mg total) by mouth 2 (two) times a day       Current Facility-Administered Medications   Medication Dose Route Frequency Provider Last Rate Last Admin    iohexoL (OMNIPAQUE) 240 mg iodine/mL injection 3 mL  3 mL  injection PRN    3 mL at 06/13/24 0916      Allergies   Allergen Reactions    Bee Venom Protein (Honey Bee) Swelling    No Known Drug Allergies        Past Surgical History:   Procedure Laterality Date    CARDIAC ELECTROPHYSIOLOGY STUDY AND ABLATION  01/01/2007    attempted ablation for SVT    CARPAL TUNNEL RELEASE Bilateral 06/26/2020    Procedure: LEFT ENDOSCOPIC CARPAL TUNNEL RELEASE, RIGHT OPEN CARPAL TUNNEL;  Surgeon: Mendez Sun MD;  Location: [facility] OR;  Service: Orthopedics;  Laterality: Bilateral;    COLONOSCOPY  01/21/2015    cecal polyp-tubular adenoma-recommend repeat 5 years    COLONOSCOPY N/A 06/22/2020    repeat 5 years - adenomatous polyp    DILATION AND CURETTAGE OF UTERUS  01/01/2006    ENDOMETRIAL BIOPSY  01/01/2012    pt reports negative    ESOPHAGOGASTRODUODENOSCOPY  01/01/2009    H pylori    ESOPHAGOGASTRODUODENOSCOPY N/A 06/22/2020    Procedure: ESOPHAGOGASTRODUODENOSCOPY;  Surgeon: Julian Faulkner MD;  Location: [facility];  Service: Endoscopy;  Laterality: N/A;    KNEE ARTHROPLASTY Right 04/01/2015    Dr Placido BRICE    TOTAL ANKLE ARTHROPLASTY Left 10/03/2022    Dr. Guerra, with talonavicular and subtalar fusion    TOTAL ANKLE ARTHROPLASTY Left 02/20/2023    total revision of previous surgery, fusion of non-unions    TOTAL ANKLE ARTHROPLASTY Left 11/13/2023    w/ fusion to foot joints    VAGINAL HYSTERECTOMY  12/16/2013    with BSO, fibroids     Family History   Problem Relation Age of Onset    Cancer Mother 84        Bladder    Osteoporosis Mother     Hypertension Mother     Colon cancer Father 65    Kidney cancer Father 77    Other Father's Brother         sudden cardiac death, 60s    Heart disease Maternal Grandfather     Heart attack Maternal Grandfather 59    Cancer Paternal Grandfather      Social History     Tobacco Use    Smoking status: Never    Smokeless tobacco: Never   Substance Use Topics    Alcohol use: No       Review of Systems   Constitutional: Negative.  Negative for  activity change, appetite change, chills, diaphoresis, fatigue, fever and unexpected weight change.   HENT:  Negative for congestion, ear pain, hearing loss, mouth sores, nosebleeds, postnasal drip, rhinorrhea, sinus pressure, sneezing, sore throat, tinnitus, trouble swallowing and voice change.    Eyes: Negative.  Negative for photophobia and visual disturbance.   Respiratory: Negative.  Negative for cough, chest tightness, shortness of breath and wheezing.    Cardiovascular: Negative.  Negative for chest pain, palpitations and leg swelling.   Gastrointestinal: Negative.  Negative for abdominal distention, abdominal pain, blood in stool, constipation, diarrhea, nausea and vomiting.   Endocrine: Negative.  Negative for cold intolerance, heat intolerance, polydipsia, polyphagia and polyuria.   Genitourinary: Negative.  Negative for difficulty urinating, dysuria, flank pain, frequency, hematuria and urgency.   Musculoskeletal:  Positive for arthralgias. Negative for back pain, gait problem, joint swelling, myalgias, neck pain and neck stiffness.   Skin: Negative.  Negative for rash.   Neurological: Negative.  Negative for dizziness, tremors, seizures, syncope, speech difficulty, weakness, light-headedness, numbness and headaches.   Hematological: Negative.  Negative for adenopathy. Does not bruise/bleed easily.   Psychiatric/Behavioral: Negative.  Negative for dysphoric mood and sleep disturbance. The patient is not nervous/anxious.        As state in HPI    Objective     Vitals  /84 (BP Location: Left arm, Patient Position: Sitting, Cuff Size: Regular Adult) Comment (BP Location): forearm  Pulse 71   Temp 36.7 °C (98.1 °F) (Temporal)   SpO2 93%     Physical Exam   GEN: pleasant, NAD  HEENT: PERRLA, sclera anicteric  NECK: no LAD or thyromegaly, no carotid bruits  LUNGS: CTAB, no wheezing rhonchi or rales  HEART: RRR no murmurs rubs or gallops  ABD: Soft, non distended  Ext: trace RLE edema  SKIN: no  rash  Neuro:  CN 2-12 grossly intact, non focal, moving all extremities      Assessment/Plan   Diagnoses and all orders for this visit:    Impaired fasting glucose  -     CBC w/auto differential Blood, Venous; Future  -     Comprehensive metabolic panel Blood, Venous; Future  -     Hemoglobin A1c (glycosylated) Blood, Venous; Future    Hypothyroidism, unspecified type  -     Thyroid Stimulating Hormone, Ultrasensitive Blood, Venous; Future    Obstructive sleep apnea syndrome    Borderline hyperlipidemia    Impaired fasting glucose.  A1c has improved with weight loss and exercise.  Could discuss metformin next time.  Hypothyroidism  tsh looks good.  Borderline hyperlipidemia.  LDL not too bad but with 10 year risk calculator, at about 7.6% risk now which is elevated.  Discussed getting more information from calcium score but we will wait for now.  Chronic joint pains, working with pain clinic and ortho.    Medical conditions are stable and she is stable for MRI sedation.  Exercises regularly with swimming 3-4 times/week without CP or SOB.    CM MON MD

## 2024-09-25 ASSESSMENT — ENCOUNTER SYMPTOMS
NERVOUS/ANXIOUS: 0
PHOTOPHOBIA: 0
PSYCHIATRIC NEGATIVE: 1
ABDOMINAL DISTENTION: 0
PALPITATIONS: 0
WHEEZING: 0
DIARRHEA: 0
BACK PAIN: 0
JOINT SWELLING: 0
NECK STIFFNESS: 0
DYSURIA: 0
SINUS PRESSURE: 0
SPEECH DIFFICULTY: 0
ABDOMINAL PAIN: 0
HEMATOLOGIC/LYMPHATIC NEGATIVE: 1
NUMBNESS: 0
ADENOPATHY: 0
VOMITING: 0
CONSTITUTIONAL NEGATIVE: 1
SHORTNESS OF BREATH: 0
COUGH: 0
LIGHT-HEADEDNESS: 0
EYES NEGATIVE: 1
FEVER: 0
NAUSEA: 0
FREQUENCY: 0
CONSTIPATION: 0
SLEEP DISTURBANCE: 0
NEUROLOGICAL NEGATIVE: 1
MYALGIAS: 0
UNEXPECTED WEIGHT CHANGE: 0
DIZZINESS: 0
CHEST TIGHTNESS: 0
CARDIOVASCULAR NEGATIVE: 1
FATIGUE: 0
NECK PAIN: 0
POLYPHAGIA: 0
SORE THROAT: 0
ENDOCRINE NEGATIVE: 1
HEMATURIA: 0
TROUBLE SWALLOWING: 0
POLYDIPSIA: 0
WEAKNESS: 0
ACTIVITY CHANGE: 0
VOICE CHANGE: 0
ARTHRALGIAS: 1
TREMORS: 0
RESPIRATORY NEGATIVE: 1
SEIZURES: 0
HEADACHES: 0
BLOOD IN STOOL: 0
CHILLS: 0
GASTROINTESTINAL NEGATIVE: 1
FLANK PAIN: 0
DIFFICULTY URINATING: 0
DYSPHORIC MOOD: 0
BRUISES/BLEEDS EASILY: 0
DIAPHORESIS: 0
RHINORRHEA: 0
APPETITE CHANGE: 0

## 2024-10-17 DIAGNOSIS — E03.9 HYPOTHYROIDISM, UNSPECIFIED TYPE: ICD-10-CM

## 2024-10-18 NOTE — TELEPHONE ENCOUNTER
No care due was identified.  [facility] Embedded Care Due Messages. Reference number: [id number]. 10/17/2024 6:39:57 PM MDT

## 2024-10-19 RX ORDER — LEVOTHYROXINE SODIUM 125 UG/1
125 TABLET ORAL DAILY
Qty: 90 TABLET | Refills: 3 | Status: SHIPPED | OUTPATIENT
Start: 2024-10-19

## 2024-11-07 ENCOUNTER — TELEPHONE (OUTPATIENT)
Dept: PAIN MEDICINE | Facility: HOSPITAL | Age: 69
End: 2024-11-07
Payer: MEDICARE

## 2024-12-11 ENCOUNTER — TELEPHONE (OUTPATIENT)
Dept: UROLOGY | Facility: CLINIC | Age: 69
End: 2024-12-11

## 2024-12-11 NOTE — TELEPHONE ENCOUNTER
Caller would like to discuss  Speak to nurse      Patient: Corrine ANTHONY Michaelowen    Callback Number: [phone]     Best Availability: anytime    Additional Info: Pt calling regarding antibiotics.     I advised caller of a callback by 24 hours.

## 2024-12-12 ENCOUNTER — OFFICE VISIT (OUTPATIENT)
Dept: INTERNAL MEDICINE | Facility: CLINIC | Age: 69
End: 2024-12-12
Payer: MEDICARE

## 2024-12-12 ENCOUNTER — TELEPHONE (OUTPATIENT)
Dept: INTERNAL MEDICINE | Facility: CLINIC | Age: 69
End: 2024-12-12

## 2024-12-12 VITALS
HEIGHT: 64 IN | BODY MASS INDEX: 46.95 KG/M2 | DIASTOLIC BLOOD PRESSURE: 92 MMHG | RESPIRATION RATE: 20 BRPM | SYSTOLIC BLOOD PRESSURE: 136 MMHG | WEIGHT: 275 LBS | OXYGEN SATURATION: 95 % | HEART RATE: 84 BPM | TEMPERATURE: 98.2 F

## 2024-12-12 DIAGNOSIS — E03.9 HYPOTHYROIDISM, UNSPECIFIED TYPE: ICD-10-CM

## 2024-12-12 DIAGNOSIS — I35.8 AORTIC VALVE SCLEROSIS: ICD-10-CM

## 2024-12-12 DIAGNOSIS — G47.33 OBSTRUCTIVE SLEEP APNEA SYNDROME: ICD-10-CM

## 2024-12-12 DIAGNOSIS — Z86.79 HISTORY OF SUPRAVENTRICULAR TACHYCARDIA: ICD-10-CM

## 2024-12-12 DIAGNOSIS — I51.7 MILD CONCENTRIC LEFT VENTRICULAR HYPERTROPHY (LVH): ICD-10-CM

## 2024-12-12 DIAGNOSIS — R03.0 ELEVATED BLOOD PRESSURE READING: ICD-10-CM

## 2024-12-12 DIAGNOSIS — Z01.818 PREOP EXAMINATION: Primary | ICD-10-CM

## 2024-12-12 DIAGNOSIS — R73.01 IMPAIRED FASTING GLUCOSE: ICD-10-CM

## 2024-12-12 PROCEDURE — 99213 OFFICE O/P EST LOW 20 MIN: CPT | Performed by: INTERNAL MEDICINE

## 2024-12-12 PROCEDURE — G0463 HOSPITAL OUTPT CLINIC VISIT: HCPCS | Mod: PO | Performed by: INTERNAL MEDICINE

## 2024-12-12 PROCEDURE — G0463 HOSPITAL OUTPT CLINIC VISIT: HCPCS | Performed by: INTERNAL MEDICINE

## 2024-12-12 ASSESSMENT — ENCOUNTER SYMPTOMS
PSYCHIATRIC NEGATIVE: 1
POLYPHAGIA: 0
ACTIVITY CHANGE: 0
COUGH: 0
DYSPHORIC MOOD: 0
CHILLS: 0
CONSTITUTIONAL NEGATIVE: 1
SPEECH DIFFICULTY: 0
CARDIOVASCULAR NEGATIVE: 1
NECK STIFFNESS: 0
JOINT SWELLING: 0
FLANK PAIN: 0
PHOTOPHOBIA: 0
NEUROLOGICAL NEGATIVE: 1
EYES NEGATIVE: 1
SINUS PRESSURE: 0
CHEST TIGHTNESS: 0
DIZZINESS: 0
WHEEZING: 0
BLOOD IN STOOL: 0
APPETITE CHANGE: 0
NUMBNESS: 0
RESPIRATORY NEGATIVE: 1
WEAKNESS: 0
DIFFICULTY URINATING: 0
HEMATURIA: 0
TROUBLE SWALLOWING: 0
GASTROINTESTINAL NEGATIVE: 1
VOMITING: 0
SLEEP DISTURBANCE: 0
DYSURIA: 0
SEIZURES: 0
NECK PAIN: 0
ABDOMINAL PAIN: 0
FATIGUE: 0
DIARRHEA: 0
VOICE CHANGE: 0
SORE THROAT: 0
MYALGIAS: 0
ARTHRALGIAS: 1
HEMATOLOGIC/LYMPHATIC NEGATIVE: 1
ABDOMINAL DISTENTION: 0
HEADACHES: 0
NAUSEA: 0
BRUISES/BLEEDS EASILY: 0
SHORTNESS OF BREATH: 0
BACK PAIN: 0
UNEXPECTED WEIGHT CHANGE: 0
ADENOPATHY: 0
FREQUENCY: 0
RHINORRHEA: 0
CONSTIPATION: 0
DIAPHORESIS: 0
NERVOUS/ANXIOUS: 0
LIGHT-HEADEDNESS: 0
FEVER: 0
ENDOCRINE NEGATIVE: 1
TREMORS: 0
PALPITATIONS: 0
POLYDIPSIA: 0

## 2024-12-12 ASSESSMENT — PAIN SCALES - GENERAL: PAINLEVEL_OUTOF10: 4

## 2024-12-12 NOTE — TELEPHONE ENCOUNTER
Caller would like to discuss (a)  clinical question  Writer has advised caller of a callback from within 24 hours.    Patient: Corrine Holden    Caller Name (Last and first, relation/role): self    Name of Facility: na    Callback Number: [phone]    Best Availability: anytime    Fax Number: na    Additional Info: Pre op appointment is set for 12-30. Patient states it needs to be sooner. Needs it done two weeks prior to surgery on 1-8. Would like the appointment on 12-16. Nothing available    Did you confirm the message with the caller: Yes    Is it okay that the nurse communicates your response through [place]? No

## 2024-12-12 NOTE — PROGRESS NOTES
Subjective      Corrine Holden is a 69 y.o. female who presents for Pre-op Exam  .    SHANNON Castle is here for a preop prior to left total knee arthroplasty planned by Dr. Oropeza at [facility] on January 8.    Last year she was recovering from her third ankle surgery and finally became weightbearing in February.  Since then, she has been trying to build back her exercise tolerance.  She has been going to the swim center 2-3 times per week.  However, by July, she was having pain in the left leg and by August she can hardly walk.  She went to [facility] orthopedics and had a left knee steroid injection around Labor Day.  This unfortunately made her feel worse at the time and she was back on crutches.  In retrospect, she thinks it was not necessarily the steroid but the volume of fluid that was put into the joint.  She ended up getting an MRI of the knee around October.  I do not have a copy of that to review.  However, she was also having pain in the whole leg and end up seeing Dr. Oropeza in mid October.  He did diagnose her with left trochanteric bursitis and she had a hip injection at that time which did really help with some of the leg pain and it became more clear that the primary problem was her left knee.  2 weeks ago she was in the pool at the swim center and was feeling really good so she tried to move backwards in the current but afterwards realize she overdid it and had a lot of pain again.  She is also feel very off balance like her leg might give out.  She has been working on some balance exercises and that has improved somewhat.  Has not had any numbness or tingling in her feet.    Corrine has no history of MI, CVA, CKD, DM, CHF, valvular heart disease.  She has had hx of SVT but has not had problems recently.  Occ will feel some palpitations after drinking coffee.  Echo was last done in 2022 and showed aortic valve sclerosis without stenosis and mild LVH.  Blood sugars been in the  "prediabetic range.  She does have SHANA.     She had a dental exam around October.  No teeth pain.  Last year, had a few urinary tract infections and briefly was on daily Macrobid for about 2 months.  She is not having any urinary symptoms today but is concerned about potentially getting a bladder infection before her surgery and has a call out to her urologist about potentially starting a prophylactic antibiotic again before surgery.    She had labs and EKG done through the [facility] orthopedic office on .  She did bring those with her today to review.     No personal or family hx of problems with anesthesia.  No personal or family history of bleeding disorders.  No dental issues.    Past Medical History:   Diagnosis Date    GERD (gastroesophageal reflux disease)     H. pylori infection     Hypothyroidism     Injury of back     STATES, \"THIS IS REASON FOR MRI\"    Microscopic hematuria     SHANA (obstructive sleep apnea)     USES CPAP    Osteoarthritis     Palpitations     Respiratory disease     Supraventricular tachycardia (CMS/HCC)     STATES, \"COFFEE RELATED\", VERY INTERMITTENT    Tinnitus        Current Outpatient Medications   Medication Sig Dispense Refill    levothyroxine (SYNTHROID, LEVOTHROID) 125 mcg tablet Take 1 tablet (125 mcg total) by mouth daily 90 tablet 3    omeprazole (PriLOSEC) 20 mg capsule Take 1 capsule (20 mg total) by mouth daily 90 capsule 1    calc/FA/B/D3/E/bioflav/soybean (CALCI-MAX ORAL)       amoxicillin (AMOXIL) 500 mg capsule SMARTSI Capsule(s) By Mouth      cholecalciferol, vitamin D3, 25 mcg (1,000 unit) tablet Take 2 tablets (2,000 Units total) by mouth 2 (two) times a day      diazePAM (VALIUM) 5 mg tablet Take 1 tablet (5 mg total) by mouth every 12 (twelve) hours as needed for anxiety or muscle spasms Max Daily Amount: 10 mg 30 tablet 0    ascorbic acid, vitamin C, (ascorbic acid with vane hips) 500 mg tablet Take 2 tablets (1,000 mg total) by mouth daily      " tiZANidine (ZANAFLEX) 2 mg tablet Take 1 tablet (2 mg total) by mouth every 6 (six) hours as needed for muscle spasms      methylPREDNISolone (Medrol, Maxi,) 4 mg tablet follow package directions 21 tablet 0    albuterol HFA 90 mcg/actuation inhaler Inhale 2 puffs every 6 (six) hours as needed for wheezing 1 Inhaler 0    ibuprofen (ADVIL,MOTRIN) 200 mg tablet Take 1 tablet (200 mg total) by mouth every 6 (six) hours as needed      vit C/E/Zn/coppr/lutein/zeaxan (OCUVITE LUTEIN AND ZEAXANTHIN ORAL) PreserVision AREDS-2      zinc 50 mg tablet Take 50 mg by mouth daily      diclofenac sodium (VOLTAREN) 1 % gel APPLY 2 GRAMS TO THE AFFECTED AREA(S) 4 TIMES PER DAY AS NEEDED 300 g 2    fluticasone propionate (FLONASE) 50 mcg/actuation nasal spray USE 2 SPRAYS IN EACH NOSTRIL DAILY 48 g 2    budesonide (Pulmicort Flexhaler) 90 mcg/actuation inhaler Inhale 1 puff by mouth as needed (for shortness of breath)      krill-om-3-dha-epa-phospho-ast 944-586-29-75 mg capsule Take 1 capsule by mouth daily      cyclobenzaprine (FLEXERIL) 10 mg tablet Take 1 tablet (10 mg total) by mouth 2 (two) times a day as needed for muscle spasms. 60 tablet 0    multivitamin (THERAGRAN) tablet tablet 1 Every Day      acetaminophen (TYLENOL EXTRA STRENGTH) 500 mg tablet Take 2 tablets (1,000 mg total) by mouth 2 (two) times a day      ketorolac (TORADOL) 10 mg tablet Take 1 tablet (10 mg total) by mouth every 8 (eight) hours as needed for pain scale 8-10/10 for up to 5 days 15 tablet 0     Current Facility-Administered Medications   Medication Dose Route Frequency Provider Last Rate Last Admin    iohexoL (OMNIPAQUE) 240 mg iodine/mL injection 3 mL  3 mL injection PRN    3 mL at 06/13/24 0916      Allergies   Allergen Reactions    Bee Venom Protein (Honey Bee) Swelling    No Known Drug Allergies        Past Surgical History:   Procedure Laterality Date    CARDIAC ELECTROPHYSIOLOGY STUDY AND ABLATION  01/01/2007    attempted ablation for SVT     CARPAL TUNNEL RELEASE Bilateral 06/26/2020    Procedure: LEFT ENDOSCOPIC CARPAL TUNNEL RELEASE, RIGHT OPEN CARPAL TUNNEL;  Surgeon: Mendez Sun MD;  Location: [facility] OR;  Service: Orthopedics;  Laterality: Bilateral;    COLONOSCOPY  01/21/2015    cecal polyp-tubular adenoma-recommend repeat 5 years    COLONOSCOPY N/A 06/22/2020    repeat 5 years - adenomatous polyp    DILATION AND CURETTAGE OF UTERUS  01/01/2006    ENDOMETRIAL BIOPSY  01/01/2012    pt reports negative    ESOPHAGOGASTRODUODENOSCOPY  01/01/2009    H pylori    ESOPHAGOGASTRODUODENOSCOPY N/A 06/22/2020    Procedure: ESOPHAGOGASTRODUODENOSCOPY;  Surgeon: Julian Faulkner MD;  [facility];  Service: Endoscopy;  Laterality: N/A;    KNEE ARTHROPLASTY Right 04/01/2015    Dr Placido BRICE    TOTAL ANKLE ARTHROPLASTY Left 10/03/2022    Dr. Guerra, with talonavicular and subtalar fusion    TOTAL ANKLE ARTHROPLASTY Left 02/20/2023    total revision of previous surgery, fusion of non-unions    TOTAL ANKLE ARTHROPLASTY Left 11/13/2023    w/ fusion to foot joints    VAGINAL HYSTERECTOMY  12/16/2013    with BSO, fibroids     Family History   Problem Relation Age of Onset    Cancer Mother 84        Bladder    Osteoporosis Mother     Hypertension Mother     Colon cancer Father 65    Kidney cancer Father 77    Other Father's Brother         sudden cardiac death, 60s    Heart disease Maternal Grandfather     Heart attack Maternal Grandfather 59    Cancer Paternal Grandfather      Social History     Tobacco Use    Smoking status: Never    Smokeless tobacco: Never   Substance Use Topics    Alcohol use: No       Review of Systems   Constitutional: Negative.  Negative for activity change, appetite change, chills, diaphoresis, fatigue, fever and unexpected weight change.   HENT:  Negative for congestion, ear pain, hearing loss, mouth sores, nosebleeds, postnasal drip, rhinorrhea, sinus pressure, sneezing, sore throat, tinnitus, trouble swallowing and voice  "change.    Eyes: Negative.  Negative for photophobia and visual disturbance.   Respiratory: Negative.  Negative for cough, chest tightness, shortness of breath and wheezing.    Cardiovascular: Negative.  Negative for chest pain, palpitations and leg swelling.   Gastrointestinal: Negative.  Negative for abdominal distention, abdominal pain, blood in stool, constipation, diarrhea, nausea and vomiting.   Endocrine: Negative.  Negative for cold intolerance, heat intolerance, polydipsia, polyphagia and polyuria.   Genitourinary: Negative.  Negative for difficulty urinating, dysuria, flank pain, frequency, hematuria and urgency.   Musculoskeletal:  Positive for arthralgias. Negative for back pain, gait problem, joint swelling, myalgias, neck pain and neck stiffness.   Skin: Negative.  Negative for rash.   Neurological: Negative.  Negative for dizziness, tremors, seizures, syncope, speech difficulty, weakness, light-headedness, numbness and headaches.   Hematological: Negative.  Negative for adenopathy. Does not bruise/bleed easily.   Psychiatric/Behavioral: Negative.  Negative for dysphoric mood and sleep disturbance. The patient is not nervous/anxious.          Objective     Vitals  /92 (BP Location: Left arm, Patient Position: Sitting, Cuff Size: Large Adult)   Pulse 84   Temp 36.8 °C (98.2 °F) (Temporal)   Resp 20   Ht 1.626 m (5' 4\")   Wt 124.7 kg (275 lb)   SpO2 95%   BMI 47.20 kg/m²     Physical Exam   GEN: pleasant, NAD  HEENT: PERRLA, sclera anicteric, TMs are clear  NECK: no LAD or thyromegaly, no carotid bruits  LUNGS: CTAB, no wheezing rhonchi or rales  HEART: RRR no murmurs rubs or gallops  ABD: Nondistended  Ext: Warm and well perfused, no edema  SKIN: no rash  Neuro:  CN 2-12 grossly intact, non focal, moving all extremities      Assessment/Plan   Diagnoses and all orders for this visit:    Preop examination    Mild concentric left ventricular hypertrophy (LVH)    Aortic valve " sclerosis    Impaired fasting glucose    Hypothyroidism, unspecified type    Obstructive sleep apnea syndrome    History of supraventricular tachycardia    Elevated blood pressure reading    1.  Preop.  Corrine is here for preop prior to left total knee arthroplasty planned for January 8.  She has no history of MI, CVA, CKD, DM, CHF, valvular heart disease.  She has had hx of SVT but has not had problems recently.  She did have postoperative anemia after her last ankle surgery.  EKG that was done at [facility] orthopedics yesterday was reviewed.  This showed normal sinus rhythm rate of 71 otherwise normal.  She had CBC CMP A1c magnesium and TSH that were drawn and normal with the exception creatinine of 1.12 and GFR of 53 which is down from baseline GFR in the 80s.  She is able to exercise at more than 4 METS of activity without chest pain or shortness of breath and can proceed to elective surgery without further cardiac workup.  Blood pressure is slightly elevated today but she reports she is checking at home and usually getting 120s to 130s over 80s.  All other medical conditions are stable and she can proceed to surgery as scheduled.    CM MON MD

## 2024-12-12 NOTE — TELEPHONE ENCOUNTER
Patient is having a joint replacement in January and is concerned about getting a UTI after this procedure. She states after her last procedure she had with another physician she had 2 UTI's back to back and wants to avoid this from happening again. She takes a low dose ABX as needed for UTI symptoms,however, she would like to take one daily until her procedure. I advised her I would speak with Dr. Lowe concerning this and go from there. She stated understanding and thanked me for the call.

## 2024-12-13 NOTE — TELEPHONE ENCOUNTER
Caller would like to discuss  speak to a nurse       Patient: Corrine Holden    Callback Number: [phone]     Best Availability: anytime    Additional Info: pt called to speak to a nurse in regards to antibiotic and just following up     I advised caller of a callback by 24 hours.

## 2024-12-16 DIAGNOSIS — N39.0 FREQUENT UTI: Primary | ICD-10-CM

## 2024-12-16 NOTE — TELEPHONE ENCOUNTER
Caller would like to discuss appointment     Patient: Corrine Holden    Callback Number: [phone]     Best Availability: anytime    Additional Info: Patient is asking to speak with a nurse regarding a U/A and appt....    I advised caller of a callback by 24 hours.

## 2024-12-16 NOTE — TELEPHONE ENCOUNTER
Corrine would like to proceed with SCUA and treatment if need be.  Surgery is scheduled 01/08/25, so we will do the SCUA on 12/30/24 just to be sure we could get her treated if need be.  She agrees.

## 2024-12-30 ENCOUNTER — CLINICAL SUPPORT (OUTPATIENT)
Dept: UROLOGY | Facility: CLINIC | Age: 69
End: 2024-12-30
Payer: MEDICARE

## 2024-12-30 ENCOUNTER — LAB (OUTPATIENT)
Dept: LAB | Facility: CLINIC | Age: 69
End: 2024-12-30
Payer: MEDICARE

## 2024-12-30 VITALS
BODY MASS INDEX: 46.95 KG/M2 | DIASTOLIC BLOOD PRESSURE: 94 MMHG | SYSTOLIC BLOOD PRESSURE: 148 MMHG | OXYGEN SATURATION: 94 % | TEMPERATURE: 98 F | HEIGHT: 64 IN | WEIGHT: 275 LBS | HEART RATE: 69 BPM

## 2024-12-30 DIAGNOSIS — N39.0 FREQUENT UTI: ICD-10-CM

## 2024-12-30 PROCEDURE — 51701 INSERT BLADDER CATHETER: CPT | Performed by: UROLOGY

## 2024-12-30 PROCEDURE — 51701 INSERT BLADDER CATHETER: CPT

## 2024-12-30 PROCEDURE — 87088 URINE BACTERIA CULTURE: CPT

## 2024-12-30 ASSESSMENT — PAIN SCALES - GENERAL: PAINLEVEL_OUTOF10: 0-NO PAIN

## 2024-12-30 NOTE — PROGRESS NOTES
Bladder Catheterization    Performed by: Nakita Nunez RN  Authorized by: Aleksandra Lowe MD    Consent    Verbal consent was obtained from the patient. Written consent was not obtained from the patient. Risks, benefits, and alternatives were discussed. Consent given by patient. The patient states understanding of the procedure being performed. Patient ID confirmed verbally with the patient.     Indications:    Indications: urine specimen collection    Procedure Details:     Preparation: Patient was prepped and draped in usual sterile fashion      Procedure: catheter insertion    Catheter insertion:  Non-indwelling           Catheter type: silicone    Catheter size:  12 Fr        Irrigation:no    Complicated insertion: No      Altered anatomy: No      Number of attempts:  1    Volume of urine drained (mL):  50    Urine characteristics:  Dark, clear and yellow  Post-procedure:     Patient tolerance:  Patient tolerated the procedure well with no immediate complications  Procedure comments: Urine sent for culture.  Patient has a knee surgery coming up 01/08/25.  She is wondering if she will need prophylaxis macrodantin again.  Will discuss with Dr. Lowe.

## 2025-01-01 LAB — BACTERIA UR CULT: NORMAL

## 2025-01-02 ENCOUNTER — TELEPHONE (OUTPATIENT)
Dept: UROLOGY | Facility: CLINIC | Age: 70
End: 2025-01-02

## 2025-01-02 NOTE — TELEPHONE ENCOUNTER
Caller would like to discuss appointment     Patient: Corrine Holden    Callback Number: [phone]     Best Availability: anytime    Additional Info: PT is calling to cancel appt and r/s to march 20th supriya    I advised caller of a callback by 24 hours.

## 2025-01-03 NOTE — TELEPHONE ENCOUNTER
Left message notifying patient of negative urine culture and also I scheduled her an appointment for Dr. Lowe on 03/20/2025. Asked that she call to let us know if that 03/20 appointment won't work for her.  Otherwise wished her speedy healing for her surgery.

## 2025-01-10 ENCOUNTER — CLINICAL DOCUMENTATION ONLY (OUTPATIENT)
Dept: PHYSICAL THERAPY | Facility: HOSPITAL | Age: 70
End: 2025-01-10

## 2025-01-11 ENCOUNTER — HOME HEALTH ADMISSION (OUTPATIENT)
Dept: HOME HEALTH SERVICES | Facility: HOME HEALTH | Age: 70
End: 2025-01-11
Payer: MEDICARE

## 2025-01-13 ENCOUNTER — HOME CARE VISIT (OUTPATIENT)
Dept: HOME HEALTH SERVICES | Facility: HOSPICE | Age: 70
End: 2025-01-13
Payer: MEDICARE

## 2025-01-13 ENCOUNTER — HOME CARE VISIT (OUTPATIENT)
Dept: HOME HEALTH SERVICES | Facility: HOME HEALTH | Age: 70
End: 2025-01-13
Payer: MEDICARE

## 2025-01-13 VITALS
HEIGHT: 64 IN | HEART RATE: 93 BPM | RESPIRATION RATE: 20 BRPM | DIASTOLIC BLOOD PRESSURE: 84 MMHG | TEMPERATURE: 97.8 F | WEIGHT: 275 LBS | BODY MASS INDEX: 46.95 KG/M2 | OXYGEN SATURATION: 93 % | SYSTOLIC BLOOD PRESSURE: 137 MMHG

## 2025-01-13 PROCEDURE — 02300001 HH START OF CARE

## 2025-01-13 PROCEDURE — 10901201 HH PPS REVENUE DEBIT

## 2025-01-13 PROCEDURE — G0299 HHS/HOSPICE OF RN EA 15 MIN: HCPCS | Mod: PPS

## 2025-01-13 PROCEDURE — (BLANK) HH NO-PAY RAP

## 2025-01-13 PROCEDURE — 10901101 HH PPS REVENUE CREDIT

## 2025-01-13 ASSESSMENT — ENCOUNTER SYMPTOMS
PAIN LOCATION - PAIN DURATION: CONSTANT
SHORTNESS OF BREATH: 1
MUSCLE WEAKNESS: 1
DYSPNEA ACTIVITY LEVEL: AT REST
LOWEST PAIN SEVERITY IN PAST 24 HOURS: 2/10
PAIN LOCATION: LEFT KNEE
PAIN LOCATION - PAIN SEVERITY: 7/10
FREQUENCY: 1
HIGHEST PAIN SEVERITY IN PAST 24 HOURS: 9/10
PAIN LOCATION - PAIN FREQUENCY: CONSTANT
STOOL FREQUENCY: DAILY
SUBJECTIVE PAIN PROGRESSION: UNCHANGED
LAST BOWEL MOVEMENT: 67218
BOWEL PATTERN NORMAL: 1
PAIN CAUSE: LEFT KNEE
PAIN SEVERITY GOAL: 5/10
LIMITED RANGE OF MOTION: 1
PERSON REPORTING PAIN: PATIENT

## 2025-01-13 ASSESSMENT — BALANCE ASSESSMENTS
STANDING UNSUPPORTED WITH EYES CLOSED: 1
STANDING UNSUPPORTED: 1 - NEEDS SEVERAL TRIES TO STAND 30 SECONDS UNSUPPORTED
STANDING UNSUPPORTED WITH EYES CLOSED: 1 - UNABLE TO KEEP EYES CLOSED 3 SECONDS BUT STAYS SAFELY
LONG VERSION TOTAL SCORE (MAX 56): 16
STANDING ON ONE LEG: 0 - UNABLE TO TRY OR NEEDS ASSIST TO PREVENT FALL
SITTING TO STANDING: 0 - NEEDS MODERATE OR MAXIMAL ASSIST TO STAND
TURN 360 DEGREES: 1
STANDING UNSUPPORTED: 1
STANDING UNSUPPORTED ONE FOOT IN FRONT: 1 - NEEDS HELP TO STEP BUT CAN HOLD 15 SECONDS
STANDING ON ONE LEG: 0
PICK UP OBJECT FROM THE FLOOR FROM A STANDING POSITION: 1 - UNABLE TO PICK UP AND NEEDS SUPERVISION WHILE TRYING
TRANSFERS: 1 - NEEDS ONE PERSON TO ASSIST
STANDING UNSUPPORTED WITH FEET TOGETHER: 1
STANDING TO SITTING: 2
TURN 360 DEGREES: 1 - NEEDS CLOSE SUPERVISION OR VERBAL CUING
SITTING TO STANDING: 0
STANDING UNSUPPORTED ONE FOOT IN FRONT: 1
STANDING UNSUPPORTED WITH FEET TOGETHER: 1 - NEEDS HELP TO ATTAIN POSITION BUT ABLE TO STAND 15 SECONDS FEET TOGETHER
REACHING FORWARD WITH OUTSTRETCHED ARM WHILE STANDING: 1 - REACHES FORWARD BUT NEEDS SUPERVISION
TRANSFERS: 1
STANDING TO SITTING: 2 - USES BACK OF LEGS AGAINST CHAIR TO CONTROL DESCENT

## 2025-01-13 ASSESSMENT — ACTIVITIES OF DAILY LIVING (ADL)
LAUNDRY: DEPENDENT
LAUNDRY ASSESSED: 1
DRESSING_UB_CURRENT_FUNCTION: MODERATE ASSIST
TRANSPORTATION ASSESSED: 1
DRESSING_REQUIRES_ASSISTANCE: 1
OASIS_M1830: 03
LIGHT HOUSEKEEPING: DEPENDENT
AMBULATION ASSISTANCE: STAND BY ASSIST
TRANSPORTATION: DEPENDENT
TOILETING: 1
DRESSING_LB_CURRENT_FUNCTION: MODERATE ASSIST
BATHING_CURRENT_FUNCTION: MODERATE ASSIST
BATHING ASSESSED: 1
CURRENT_FUNCTION: STAND BY ASSIST
HOUSEKEEPING ASSESSED: 1
TOILETING: MODIFIED INDEPENDENT
PREPARING MEALS: DEPENDENT
ENTERING_EXITING_HOME: MAXIMUM ASSIST
BATHING_REQUIRES_ASSISTANCE: 1

## 2025-01-13 NOTE — HOME HEALTH
Patient was admitted for post op left knee repair.  Co-morbidities include GERD, hypothyroism, mauro, osteoarthritis, and svt.  Patient was discharged from [facility] on 1/10/25 (date).      Procedures/infections during admission: left knee repair    Patient accepts the following disciplines in the home: Nursing, Physical Therapy and Occupational Therapy.  Patient declined the following disciplines in the home:Aide  Skilled needs include: Skilled Nursing: disease education, medication management and wound care, Physical Therapy: strengthening and balance, endurance, fall safety education and range of motion and Occupational Therapy: energy conservation education and ADL/IADL training     Subjective/Objective clinical findings related to patient's need for care:  Patient is a obese white female alert and orientated. she ambulates with a walker. she has wound to left knee, dressing was dry and intact. she has steri strips to incision. No s/s of infection. educated patient of s/s of infection and steps to take if she notices issues. Educated patient on call number as needed. Consent were signed and admission booklet was reviewed . pateint voiced understanding.     Response to care: patient tolerated visit well.  Progress toward previous goals: care/treatment is still necessary and reinforcement/education needed  Potential barriers: co-morbidities, environmental barriers and obesity  Patient strengths: motivated      Education/instructions provided on Home/environmental safety related to fall hazards, Fall/injury prevention related to removal fall/trip hazards, use of assistive device, importance of well-lit environment and ambulating with assistance, Medications purpose on  Eliquis and hydromophone  and Wound/skin care related to s/sx of infection or complication    Education provided to patient, family and caregiver through explanation.  Patient demonstrated eagerness and acceptance.  Plan:  Care plan  reviewed and appropriate.  Plan for next visit to include providing education on post op knee repair.

## 2025-01-14 PROCEDURE — 10901101 HH PPS REVENUE CREDIT

## 2025-01-14 PROCEDURE — 10901201 HH PPS REVENUE DEBIT

## 2025-01-15 ENCOUNTER — HOME CARE VISIT (OUTPATIENT)
Dept: HOME HEALTH SERVICES | Facility: HOME HEALTH | Age: 70
End: 2025-01-15
Payer: MEDICARE

## 2025-01-15 ENCOUNTER — HOME CARE VISIT (OUTPATIENT)
Dept: HOME HEALTH SERVICES | Facility: HOSPICE | Age: 70
End: 2025-01-15
Payer: MEDICARE

## 2025-01-15 PROCEDURE — 10901101 HH PPS REVENUE CREDIT

## 2025-01-15 PROCEDURE — 10901201 HH PPS REVENUE DEBIT

## 2025-01-15 PROCEDURE — G0151 HHCP-SERV OF PT,EA 15 MIN: HCPCS | Mod: PPS

## 2025-01-15 NOTE — HOME HEALTH
Diagnosis: Post-op Left Knee Repair     Disciplines involved: Nursing, Physical Therapy and Occupational Therapy    Rehospitalization risk: number of medications and high risk medication    What interventions have been implemented on high-risk re-hospitalization patients? Not identified as high-risk    Concerns in home: None    Hospitalized since admission? No    Barriers to meeting Goals? Not identified    Any concerns for a LUPA? No    Any Referrals needed for post discharge? Initial visits with team disciplines pending

## 2025-01-16 ENCOUNTER — HOME CARE VISIT (OUTPATIENT)
Dept: HOME HEALTH SERVICES | Facility: HOSPICE | Age: 70
End: 2025-01-16
Payer: MEDICARE

## 2025-01-16 ENCOUNTER — HOME CARE VISIT (OUTPATIENT)
Dept: HOME HEALTH SERVICES | Facility: HOME HEALTH | Age: 70
End: 2025-01-16
Payer: MEDICARE

## 2025-01-16 VITALS
DIASTOLIC BLOOD PRESSURE: 84 MMHG | OXYGEN SATURATION: 93 % | SYSTOLIC BLOOD PRESSURE: 132 MMHG | HEART RATE: 88 BPM | TEMPERATURE: 98.2 F

## 2025-01-16 VITALS
OXYGEN SATURATION: 93 % | TEMPERATURE: 97.2 F | HEART RATE: 93 BPM | DIASTOLIC BLOOD PRESSURE: 92 MMHG | SYSTOLIC BLOOD PRESSURE: 128 MMHG

## 2025-01-16 PROCEDURE — G0152 HHCP-SERV OF OT,EA 15 MIN: HCPCS | Mod: PPS | Performed by: OCCUPATIONAL THERAPIST

## 2025-01-16 PROCEDURE — 10901201 HH PPS REVENUE DEBIT

## 2025-01-16 PROCEDURE — 10901101 HH PPS REVENUE CREDIT

## 2025-01-16 ASSESSMENT — ACTIVITIES OF DAILY LIVING (ADL)
DRESSING_LB_CURRENT_FUNCTION: MODIFIED INDEPENDENT
ORAL_CARE_CURRENT_FUNCTION: INDEPENDENT
AMBULATION_DISTANCE/DURATION_TOLERATED: 75 FEET
DRESSING_UB_CURRENT_FUNCTION: INDEPENDENT
GROOMING_CURRENT_FUNCTION: INDEPENDENT
USING THE TELPHONE: INDEPENDENT
ORAL_CARE_ASSESSED: 1
HOUSEKEEPING ASSESSED: 1
TRANSPORTATION_METHOD: REGULAR CAR
FEEDING: INDEPENDENT
LAUNDRY: NEEDS ASSISTANCE
TRANSPORTATION_METHOD: FAMILY
ENTERING_EXITING_HOME: STAND BY ASSIST
ENTERING_EXITING_HOME: MINIMUM ASSIST
TELEPHONE USE ASSESSED: 1
BATHING ASSESSED: 1
BATHING_CURRENT_FUNCTION: SUPERVISION
MONEY MANAGEMENT (EXPENSES/BILLS): INDEPENDENT
FEEDING ASSESSED: 1
GROOMING ASSESSED: 1
SHOPPING ASSESSED: 1
PREPARING MEALS: NEEDS ASSISTANCE
LIGHT HOUSEKEEPING: NEEDS ASSISTANCE
TRANSPORTATION_METHOD: REGULAR CAR
SHOPPING: NEEDS ASSISTANCE
TOILETING: INDEPENDENT
TRANSPORTATION: DEPENDENT
TOILETING: 1
TRANSPORTATION_METHOD: FAMILY
TRANSPORTATION ASSESSED: 1
LAUNDRY ASSESSED: 1

## 2025-01-16 ASSESSMENT — BALANCE ASSESSMENTS
STANDING ON ONE LEG: 2 - ABLE TO LIFT LEG INDEPENDENTLY AND HOLD >= 3 SECONDS
STANDING UNSUPPORTED ONE FOOT IN FRONT: 1 - NEEDS HELP TO STEP BUT CAN HOLD 15 SECONDS
STANDING UNSUPPORTED WITH FEET TOGETHER: 2
PICK UP OBJECT FROM THE FLOOR FROM A STANDING POSITION: 2 - UNABLE TO PICK UP BUT REACHES 2-5 CM (1-2 INCHES) FROM SLIPPER AND KEEPS BALANCE INDEPENDENTLY
SITTING TO STANDING: 3 - ABLE TO STAND INDEPENDENTLY USING HANDS
STANDING UNSUPPORTED: 3 - ABLE TO STAND 2 MINUTES WITH SUPERVISION
TURN 360 DEGREES: 1 - NEEDS CLOSE SUPERVISION OR VERBAL CUING
TRANSFERS: 3
TURN 360 DEGREES: 1
STANDING UNSUPPORTED WITH EYES CLOSED: 3
SITTING TO STANDING: 3
STANDING UNSUPPORTED WITH EYES CLOSED: 3 - ABLE TO STAND 10 SECONDS WITH SUPERVISION
STANDING ON ONE LEG: 2
TRANSFERS: 3 - ABLE TO TRANSFER SAFELY DEFINITE NEED OF HANDS
STANDING UNSUPPORTED: 3
STANDING TO SITTING: 3
STANDING TO SITTING: 3 - CONTROLS DESCENT BY USING HANDS
LONG VERSION TOTAL SCORE (MAX 56): 31
REACHING FORWARD WITH OUTSTRETCHED ARM WHILE STANDING: 1 - REACHES FORWARD BUT NEEDS SUPERVISION
STANDING UNSUPPORTED ONE FOOT IN FRONT: 1
STANDING UNSUPPORTED WITH FEET TOGETHER: 2 - ABLE TO PLACE FEET TOGETHER INDEPENDENTLY BUT UNABLE TO HOLD FOR 30 SECONDS

## 2025-01-16 ASSESSMENT — ENCOUNTER SYMPTOMS
PERSON REPORTING PAIN: PATIENT
PAIN LOCATION: LEFT KNEE
PAIN LOCATION - PAIN FREQUENCY: CONSTANT
PAIN: 1
PAIN LOCATION: LEFT KNEE
PAIN SEVERITY GOAL: 5/10
PAIN LOCATION - PAIN FREQUENCY: CONSTANT
PAIN: 1
PAIN LOCATION - RELIEVING FACTORS: REST, PAIN MEDICATION
PAIN LOCATION - PAIN SEVERITY: 6/10
PAIN LOCATION - PAIN SEVERITY: 7/10
LOWEST PAIN SEVERITY IN PAST 24 HOURS: 7/10
PERSON REPORTING PAIN: PATIENT
HIGHEST PAIN SEVERITY IN PAST 24 HOURS: 9/10
PAIN LOCATION - PAIN QUALITY: ACHY
SUBJECTIVE PAIN PROGRESSION: WAXING AND WANING
PAIN LOCATION - PAIN DURATION: WEEK

## 2025-01-16 NOTE — CASE COMMUNICATION
Patient has been admitted from Physical Therapy services.     Toileting Transfer 0     Toileting Hygiene 0     Transferrin - Able to transfer with minimal human assistance or with use of an assistive device     Ambulation/Locomotion:  2 - Requires use of a two-handed device (for example, walker or crutches) to walk alone on a level surface and/or requires human supervision or assistance to negotiate stairs or st eps or uneven surfaces     Shortness of Breath:  1 - When walking more than 20 feet, climbing stairs

## 2025-01-16 NOTE — HOME HEALTH
Subjective:  Patient was admitted for post op left knee repair.   Co-morbidities include GERD, hypothyroid, SHANA, osteoarthritis, and svt.   Patient was discharged from [facility] on 1/10/25 (date).   Procedures/infections during admission: left knee repair     Patient is a kind and sally 69 year old female who was admitted to services for a left total knee repair. Patient lives alone, but is doing well independently since discharge. Patient demonstrates the ability to answer all questions during the evaluation. Patient reports that she has had continued pain since her discharge and has been staying on top of her pain for the most part. Continues to use ice and heat for pain management as well. She uses a FWW to complete all ambulation needs at this time, and is independent with bed transfers, gait training, toilet and chair transfers, did not attempt a shower/tub transfer. Patient reports that she is managing all of her medications independently. Reports no falls since returning home. Denies any dizziness, shortness of breath, lightheadedness or signs of infection to the surgical wound. Has a history of multiple surgeries to L ankle which limited ankle ROM.     Objective clinical findings related to patient's need for care:  Assessed vitals: WNL     L knee AROM:  L Knee flexion: 80 degrees  L knee extension: 3 degrees     30 second sit to stand: Patient completed 7 modified sit to stands in 30 seconds   Amaya balance assessment: Patient scored a 31/56 on the amaya balance assessment.     Functional transfers:   -Chair: independent  -Toilet: independent  -Bed: independent  -Shower/tub: not tested    Bed mobility:  -Patient is independent with all functional mobility. Uses a gait belt to assist with LE management occasionally.     Gait:  -See gait section     Stair negotiation:   -talked through stair negotiation; did not complete stair negotiation     Assessment:  Response to care: Patient is doing  well following L TKA and being discharged to home. She is completing all functional mobility with use of FWW and ambulates with a steppage pattern with decreased stance time on the L LE and decreased weight shift onto the L LE. She lacks knee flexion during swing phase and relies heavily on UE assist. Patient demonstrates the ability to complete most functional transfers independently, but a shower/tub transfer was not tested. Patient demonstrates fair AROM to the L knee. Patient demonstrates continued pain to the L knee and demonstrates decrease functional endurance and fatigues easily.   Goals: To improve functional strength, improve functional mobility, decrease fall risk, and improve AROM to the L knee.   Potential barriers: co-morbidities  Patient strengths: high level of prior function, motivated and strong support system/involved caregiver  Potential to improve with skilled physical therapy is good    Education/instructions provided on Home/environmental safety related to fall hazards, Fall/injury prevention related to removal fall/trip hazards, use of assistive device, importance of well-lit environment, ambulating with assistance and how medications can affect fall risk, Nutrition/hydration related to wound healing, skin integrity and increased fall risk and Wound/skin care related to s/sx of infection or complication  Education provided to patient, family and caregiver through explanation. Learners displayed acceptance and verbalizes understanding.    Plan:  Need for skilled services: Physical Therapy: strengthening and balance, equipment needs & education, endurance, fall safety education, range of motion, pain management, gait training and transfer training 2 times a week for 7 weeks.  Care plan reviewed and appropriate. Plan for next visit issue and review strengthening exercises, work on PROM/AROM exercises for L knee.    Plan of Care Updates:   Yes, patient/representative consulted and consented

## 2025-01-17 PROCEDURE — G0180 MD CERTIFICATION HHA PATIENT: HCPCS | Performed by: INTERNAL MEDICINE

## 2025-01-17 PROCEDURE — 10901101 HH PPS REVENUE CREDIT

## 2025-01-17 PROCEDURE — 10901201 HH PPS REVENUE DEBIT

## 2025-01-18 PROCEDURE — 10901201 HH PPS REVENUE DEBIT

## 2025-01-18 PROCEDURE — 10901101 HH PPS REVENUE CREDIT

## 2025-01-19 PROCEDURE — 10901201 HH PPS REVENUE DEBIT

## 2025-01-19 PROCEDURE — 10901101 HH PPS REVENUE CREDIT

## 2025-01-20 ENCOUNTER — HOME CARE VISIT (OUTPATIENT)
Dept: HOME HEALTH SERVICES | Facility: HOSPICE | Age: 70
End: 2025-01-20
Payer: MEDICARE

## 2025-01-20 VITALS
HEART RATE: 77 BPM | DIASTOLIC BLOOD PRESSURE: 80 MMHG | TEMPERATURE: 98.3 F | OXYGEN SATURATION: 95 % | SYSTOLIC BLOOD PRESSURE: 138 MMHG

## 2025-01-20 VITALS
DIASTOLIC BLOOD PRESSURE: 104 MMHG | RESPIRATION RATE: 20 BRPM | HEART RATE: 72 BPM | SYSTOLIC BLOOD PRESSURE: 160 MMHG | TEMPERATURE: 97.2 F | OXYGEN SATURATION: 97 %

## 2025-01-20 PROCEDURE — G0151 HHCP-SERV OF PT,EA 15 MIN: HCPCS | Mod: PPS

## 2025-01-20 PROCEDURE — 10901201 HH PPS REVENUE DEBIT

## 2025-01-20 PROCEDURE — G0300 HHS/HOSPICE OF LPN EA 15 MIN: HCPCS | Mod: PPS

## 2025-01-20 PROCEDURE — 10901101 HH PPS REVENUE CREDIT

## 2025-01-20 ASSESSMENT — ENCOUNTER SYMPTOMS
LOWEST PAIN SEVERITY IN PAST 24 HOURS: 2/10
PAIN LOCATION - PAIN SEVERITY: 5/10
LAST BOWEL MOVEMENT: 67225
RESTLESSNESS: 1
HIGHEST PAIN SEVERITY IN PAST 24 HOURS: 8/10
PAIN LOCATION - PAIN QUALITY: DULL, ACHE, SHARP
SUBJECTIVE PAIN PROGRESSION: WAXING AND WANING
PAIN LOCATION: LEFT KNEE
PAIN: 1
PAIN LOCATION - PAIN SEVERITY: 4/10
PAIN SEVERITY GOAL: 4/10
BOWEL PATTERN NORMAL: 1
PAIN LOCATION - RELIEVING FACTORS: REST, PAIN MEDICATION
PAIN LOCATION - EXACERBATING FACTORS: WALKING, EXERCISES
PERSON REPORTING PAIN: PATIENT
PAIN LOCATION - PAIN FREQUENCY: CONSTANT
HIGHEST PAIN SEVERITY IN PAST 24 HOURS: 8/10
PERSON REPORTING PAIN: PATIENT
PAIN: 1
PAIN LOCATION - PAIN DURATION: WEEKS
PAIN LOCATION: LEFT KNEE
LOWEST PAIN SEVERITY IN PAST 24 HOURS: 4/10

## 2025-01-20 ASSESSMENT — ACTIVITIES OF DAILY LIVING (ADL)
TRANSPORTATION_METHOD: FAMILY
ENTERING_EXITING_HOME: STAND BY ASSIST
TRANSPORTATION_METHOD: FRIEND
TRANSPORTATION_METHOD: ACCESSIBLE CAR
TRANSPORTATION_METHOD: FAMILY
TRANSPORTATION_METHOD: REGULAR CAR
ENTERING_EXITING_HOME: MINIMUM ASSIST

## 2025-01-20 NOTE — HOME HEALTH
Subjective:  Patient seen for skilled nursing visit    Objective clinical findings related to patient's need for care:  Patient greeted nurse at door with walker. Patient reports she had a bursa injection in her left hip Friday or Saturday for pain. Patient knee pain today is a 5 today. Patient is elevating and icing. Patient is using 5 hydromorphone daily with tylenol. Patient VS increased today and she reports its because of her pain. per log they have not been running this high she relates it to pain. Patient is excited to work with therapy to gain strength and get back to ADL. Medications reviewed. Patient is wondering if she can use ibuprofen, call placed to Dr Oropeza office to confirm. that she cannot until after elequis. Wound is CDI steri strips in place. Care coordinated with patient     Assessment:  Response to care: good    Progress toward previous goals: care/treatment is still necessary    Potential barriers: unstable medical condition    Patient strengths: strong support system/involved caregiver    Education/instructions provided on Home/environmental safety related to fall hazards, Fall/injury prevention related to use of assistive device and Wound/skin care related to S/sx of infection or complication    Education provided to patient, family and caregiver through explanation.  Patient  demonstrated eagerness and acceptance and understanding}.    Plan:  Continued need for skilled services: Skilled Nursing: disease education and Physical Therapy: strengthening and balance  Care coordination report provided to primary nurse. Individuals reported to Cassandra.  Report included change in assessment.      Care plan reviewed and appropriate. Plan for next visit to include providing education on disease progression.    Plan of Care Updates:   Plan of Care in development was verbally reviewed with the patient and  included visit schedule. A copy of the plan of care was given to the patient.

## 2025-01-21 PROCEDURE — 10901101 HH PPS REVENUE CREDIT

## 2025-01-21 PROCEDURE — 10901201 HH PPS REVENUE DEBIT

## 2025-01-21 NOTE — HOME HEALTH
Subjective:  Patient greets the therapist at the door. Patient reports that she has been compliant with her supine exercises as well as her ROM and ambulation. She reports that she has had some increased restless leg syndrome over the last several days as well as some increased edema to the L LE. She reports that she has been elevating her legs, but that it is often uncomfortable for her to elevate them for a prolonged period. She reports no changes in her medications, no falls, and moderate pain levels.     Objective clinical findings related to patient's need for care:  Assessed vitals: WNL     Supine exercises: 1 x 10  -ankle pumps, quad sets x 5 second hold, heel slides, hip abduction, glute set x 5 second hold, SAQ, SLR with min A x 1, bridges. Needs intermittent assistance due to L LE weakness, as well as verbal cues to complete with improved form and to provide tactile feed back for improved muscle contraction.     Seated exercises: 1 x 10  -seated marches (min A x 1 on L LE), HR, TR, LAQ x 3 second hold, hip abduction x RTB, hip adduction x 3 second hold, HS curls x RTB.     Sit to stands with use of FWW: 1 x 10 with B UE assist    AROM:  L knee flexion: 84 degrees  L knee extension: 0 degrees    Assessment:  Response to care: Patient tolerates the session well, and demonstrates improved tolerance to activity with minimal increases in pain. Patient needs intermittent TC for feedback for improved muscle contraction. Patient demonstrates good overall L knee ROM, but is limited by pain and edema to the L knee. Patient demonstrates good understanding of the HEP issued, and was encouraged to continue to work on daily ambulation with use of AD.     Progress toward previous goals: patient is progressing with current care/treatment and care/treatment is still necessary    Potential barriers: co-morbidities    Patient strengths: high level of prior function, motivated and strong support system/involved  caregiver    Education/instructions provided on Home/environmental safety related to fall hazards, Fall/injury prevention related to removal fall/trip hazards, use of assistive device, importance of well-lit environment, ambulating with assistance and how medications can affect fall risk, Nutrition/hydration related to wound healing, skin integrity and increased fall risk and Wound/skin care related to s/sx of infection or complication    Education provided to patient, family and caregiver through explanation.  Patient demonstrated acceptance and verbalizes understanding.    Plan:  Continued need for skilled services: Physical Therapy: strengthening and balance, equipment needs & education, endurance, fall safety education, range of motion, pain management, gait training and transfer training  Care coordination report provided to therapy assistant. Individuals reported to Adrienne.  Report included change in assessment.    Care plan reviewed and appropriate. Plan for next visit to include provide exercise recommendations for completion of daily HEP, daily ambulation, and will look to start gentle STM to help reduce the edema to the L LE.    Plan of Care Updates:   Plan of Care in development was verbally reviewed with the patient and  included visit schedule. A copy of the plan of care was given to the patient.

## 2025-01-22 ENCOUNTER — TELEPHONE (OUTPATIENT)
Dept: INTERNAL MEDICINE | Facility: CLINIC | Age: 70
End: 2025-01-22

## 2025-01-22 ENCOUNTER — HOME CARE VISIT (OUTPATIENT)
Dept: HOME HEALTH SERVICES | Facility: HOME HEALTH | Age: 70
End: 2025-01-22
Payer: MEDICARE

## 2025-01-22 PROCEDURE — 10901101 HH PPS REVENUE CREDIT

## 2025-01-22 PROCEDURE — 10901201 HH PPS REVENUE DEBIT

## 2025-01-22 NOTE — TELEPHONE ENCOUNTER
Pt states she only wants refill by Claudia sutton nurse directly and would like a call from claudia rebolledo only.

## 2025-01-22 NOTE — TELEPHONE ENCOUNTER
Patient has contacted the pharmacy? No    Patient Advised:     Pharmacy for refill? (Provide Name and Location/Address/Phone):   [facility] [address] Phone: [phone]   Fax: [phone]          Name of Medications (Have patient read from the bottle or COPY from Medication Orders):       Are you having any trouble with the medication:   No    How many doses do you have left: (Multiple med requests; Report the doses of the medication with the least amount left) 5    Is the Diagnosis (DX) active?     Additional Information:     Patient's preferred pharmacy has been noted and populated.    Is it okay that the nurse communicates your response through Merlin? No      Caller has been advised: Your request does not guarantee an immediate refill. Please allow up to 72 hours for completion.

## 2025-01-22 NOTE — TELEPHONE ENCOUNTER
Contact patient in regards of phone call. Patient said that she is 2 wks out from her resent knee surgery. Patient says she is still having some pain. Patient says that she is feeling that the prescription helps her with pain. Patient is wanting a refill.

## 2025-01-23 ENCOUNTER — HOME CARE VISIT (OUTPATIENT)
Dept: HOME HEALTH SERVICES | Facility: HOSPICE | Age: 70
End: 2025-01-23
Payer: MEDICARE

## 2025-01-23 VITALS
SYSTOLIC BLOOD PRESSURE: 140 MMHG | TEMPERATURE: 98.1 F | HEART RATE: 79 BPM | OXYGEN SATURATION: 93 % | DIASTOLIC BLOOD PRESSURE: 84 MMHG

## 2025-01-23 PROCEDURE — 10901101 HH PPS REVENUE CREDIT

## 2025-01-23 PROCEDURE — 10901201 HH PPS REVENUE DEBIT

## 2025-01-23 PROCEDURE — G0151 HHCP-SERV OF PT,EA 15 MIN: HCPCS | Mod: PPS

## 2025-01-23 ASSESSMENT — ENCOUNTER SYMPTOMS
PERSON REPORTING PAIN: PATIENT
PAIN LOCATION: LEFT KNEE
SUBJECTIVE PAIN PROGRESSION: WAXING AND WANING
PAIN LOCATION - PAIN FREQUENCY: CONSTANT
LOWEST PAIN SEVERITY IN PAST 24 HOURS: 5/10
HIGHEST PAIN SEVERITY IN PAST 24 HOURS: 7/10
PAIN LOCATION - PAIN QUALITY: DULL, ACHE
PAIN LOCATION - RELIEVING FACTORS: REST, PAIN MEDICATION
PAIN SEVERITY GOAL: 4/10
PAIN LOCATION - PAIN DURATION: WEEKS
PAIN: 1
PAIN LOCATION - PAIN SEVERITY: 5/10

## 2025-01-23 ASSESSMENT — ACTIVITIES OF DAILY LIVING (ADL)
TRANSPORTATION_METHOD: FAMILY
TRANSPORTATION_METHOD: REGULAR CAR
TRANSPORTATION_METHOD: FRIEND
ENTERING_EXITING_HOME: STAND BY ASSIST

## 2025-01-24 DIAGNOSIS — M62.838 MUSCLE SPASM: Primary | ICD-10-CM

## 2025-01-24 DIAGNOSIS — M62.838 MUSCLE SPASM: ICD-10-CM

## 2025-01-24 PROCEDURE — 10901201 HH PPS REVENUE DEBIT

## 2025-01-24 PROCEDURE — 10901101 HH PPS REVENUE CREDIT

## 2025-01-24 RX ORDER — CYCLOBENZAPRINE HCL 5 MG
5 TABLET ORAL 3 TIMES DAILY PRN
Qty: 30 TABLET | Refills: 0 | Status: SHIPPED | OUTPATIENT
Start: 2025-01-24 | End: 2025-02-23

## 2025-01-24 RX ORDER — CYCLOBENZAPRINE HCL 5 MG
5 TABLET ORAL AS NEEDED
Qty: 30 TABLET | Refills: 1 | Status: SHIPPED | OUTPATIENT
Start: 2025-01-24 | End: 2025-01-24 | Stop reason: SDUPTHER

## 2025-01-24 NOTE — HOME HEALTH
Subjective:  Patient reports that the exercises are going fairly, and that the she needs to be more compliant with them. Continues to work on ambulation, but tends to resort back to a steppage pattern with increased reliance of UE on the FWW. Denies falls or changes to medication.     Objective clinical findings related to patient's need for care:  Assessed vitals: WNL    Seated exercises: 1 x 10   -seated marches (min A x 1 on L LE), HR, TR, LAQ x 3 second hold, hip abduction x RTB, hip adduction x 3 second hold, HS curls x RTB.   Sit to stands with use of FWW: 1 x 10 with B UE assist   Gait training:  -Patient ambulates for several laps in her home with use of FWW and SBA x 1. Needs verbal cues to improve gait pattern from steppage to step through with decreased reliance on the FWW and to improve step length. Demonstrates hesitation to weight shift onto L LE, but does demonstrate increased ability to complete L knee flexion with step through gait pattern. Needs several standing breaks and seated breaks due to increased fatigue and shortness of breath. Encouraged patient to be mindful of her mechanics and to continue to ambulate with use of her FWW.    AROM:   L knee flexion: 87 degrees with overpressure  L knee extension: 0 degrees      Assessment:  Response to care: Patient need reinforcement with completion of exercises independently outside of skilled PT sessions. Patient needs verbal cues to complete with improved form and overall technique. Encouraged patient to continue to work on static ROM stretches/exercises as well as to continue to work on her ambulation with focus on stepping through each step rather than a steppage pattern.    Progress toward previous goals: patient is progressing with current care/treatment, care/treatment is still necessary and reinforcement/education needed    Potential barriers: co-morbidities    Patient strengths: high level of prior function    Education/instructions provided on  Home/environmental safety related to fall hazards, Fall/injury prevention related to removal fall/trip hazards, use of assistive device, importance of well-lit environment and how medications can affect fall risk and Wound/skin care related to s/sx of infection or complication    Education provided to patient, family and caregiver through explanation.  Patient demonstrated acceptance and verbalizes understanding.    Plan:  Continued need for skilled services: Physical Therapy: strengthening and balance, equipment needs & education, endurance, fall safety education, range of motion, pain management, gait training and transfer training  Care coordination report provided to PT. Individuals reported to Piter.  Report included change in assessment.    Care plan reviewed and appropriate. Plan for next visit to include provide exercise recommendations for ROM exercises, possibly standing exercises.    Plan of Care Updates:   Plan of Care in development was verbally reviewed with the patient and  included visit schedule. A copy of the plan of care was given to the patient.

## 2025-01-25 PROCEDURE — 10901201 HH PPS REVENUE DEBIT

## 2025-01-25 PROCEDURE — 10901101 HH PPS REVENUE CREDIT

## 2025-01-26 PROCEDURE — 10901201 HH PPS REVENUE DEBIT

## 2025-01-26 PROCEDURE — 10901101 HH PPS REVENUE CREDIT

## 2025-01-27 ENCOUNTER — HOME CARE VISIT (OUTPATIENT)
Dept: HOME HEALTH SERVICES | Facility: HOSPICE | Age: 70
End: 2025-01-27
Payer: MEDICARE

## 2025-01-27 PROCEDURE — 10901201 HH PPS REVENUE DEBIT

## 2025-01-27 PROCEDURE — 10901101 HH PPS REVENUE CREDIT

## 2025-01-27 PROCEDURE — G0151 HHCP-SERV OF PT,EA 15 MIN: HCPCS | Mod: PPS | Performed by: PHYSICAL THERAPIST

## 2025-01-27 PROCEDURE — G0299 HHS/HOSPICE OF RN EA 15 MIN: HCPCS | Mod: PPS

## 2025-01-27 ASSESSMENT — ACTIVITIES OF DAILY LIVING (ADL)
ENTERING_EXITING_HOME: ONE PERSON
TRANSPORTATION_METHOD: REGULAR CAR

## 2025-01-27 ASSESSMENT — ENCOUNTER SYMPTOMS
PAIN LOCATION - EXACERBATING FACTORS: BENDING
PAIN LOCATION - RELIEVING FACTORS: REST, MEDS
PAIN LOCATION - PAIN FREQUENCY: CONSTANT
PERSON REPORTING PAIN: PATIENT
PAIN LOCATION - PAIN DURATION: 3 WEEKS
PAIN: 1
PAIN LOCATION - PAIN SEVERITY: 4/10
PAIN LOCATION - PAIN QUALITY: ACHE
PAIN LOCATION: LEFT KNEE

## 2025-01-28 VITALS
DIASTOLIC BLOOD PRESSURE: 96 MMHG | OXYGEN SATURATION: 95 % | HEART RATE: 90 BPM | RESPIRATION RATE: 16 BRPM | SYSTOLIC BLOOD PRESSURE: 149 MMHG | TEMPERATURE: 97.3 F

## 2025-01-28 PROCEDURE — 10901101 HH PPS REVENUE CREDIT

## 2025-01-28 PROCEDURE — 10901201 HH PPS REVENUE DEBIT

## 2025-01-28 ASSESSMENT — ENCOUNTER SYMPTOMS
PERSON REPORTING PAIN: PATIENT
EDEMA: 1
DRY SKIN: 1
HIGHEST PAIN SEVERITY IN PAST 24 HOURS: 4/10
MUSCLE WEAKNESS: 1
BOWEL PATTERN NORMAL: 1
PAIN LOCATION - PAIN FREQUENCY: INTERMITTENT
PAIN LOCATION: LEFT HIP
PAIN LOCATION - PAIN QUALITY: DEEP ACHE
PAIN SEVERITY GOAL: 5/10
PAIN LOCATION - EXACERBATING FACTORS: STANDING AND WALKING
PAIN LOCATION - EXACERBATING FACTORS: STANDING AND WALKING
LOWEST PAIN SEVERITY IN PAST 24 HOURS: 2/10
PAIN LOCATION: LEFT KNEE
PAIN: 1
SUBJECTIVE PAIN PROGRESSION: WAXING AND WANING
PAIN LOCATION - PAIN FREQUENCY: INTERMITTENT
PAIN LOCATION - PAIN QUALITY: DEEP ACHE
PAIN LOCATION - PAIN SEVERITY: 4/10
PAIN LOCATION - PAIN SEVERITY: 2/10
LAST BOWEL MOVEMENT: 67232

## 2025-01-28 ASSESSMENT — ACTIVITIES OF DAILY LIVING (ADL)
ENTERING_EXITING_HOME: MODERATE ASSIST
TRANSPORTATION_METHOD: REGULAR VAN
TRANSPORTATION_METHOD: FAMILY

## 2025-01-28 NOTE — HOME HEALTH
Subjective:  patient reports she is not having problems sleeping anymore.      Objective clinical findings related to patient's need for care:  instructed patient on seated, supine, standing exercises for strength and ROM. reviewed HEP.      Assessment:  Response to care: demonstates 90 degrees of left knee flexion post tx.      Progress toward previous goals: patient is progressing with current care/treatment and care/treatment is still necessary    Potential barriers: co-morbidities    Patient strengths: high level of prior function    Education/instructions provided on Post-op care including incision care, surgical precautions and HEP.     Education provided to patient, family and caregiver through explanation, demonstration and handout.  Patient demonstrated eagerness and  verbalizes understanding.    Plan:  Continued need for skilled services: Physical Therapy: strengthening and balance, range of motion and gait training  Care coordination report provided to primary PT.  Report included progress.  Provider notified of progress by case communication.    Care plan reviewed and appropriate. Plan for next visit to include provide exercise recommendations for strength, ROM, gait, and HEP.    Plan of Care Updates:   Plan of Care in development was verbally reviewed with the patient and  included visit schedule and visit frequency. A copy of the plan of care was given to the patient.

## 2025-01-29 PROCEDURE — 10901201 HH PPS REVENUE DEBIT

## 2025-01-29 PROCEDURE — 10901101 HH PPS REVENUE CREDIT

## 2025-01-29 NOTE — HOME HEALTH
Subjective and Objective clinical findings related to patient's need for care:  Corrine Holden is a 69yoF receiving [facility] services for education on disease processes, medications, wound care. Reviewed disaster preparedness with patient, and encouraged her to write down contact numbers for family and neighbors. Patient reports that she keeps two-weeks worth of medications on hand, and has evacuation plan in place; goal met. Reviewed high-risk for rehospitalization and patient is now taking Aspirin instead of Eliquis; goal met. Reviewed management of constipation, patient reports understanding and relays that her bowels are moving regularly; goal met. Patient is hopeful to transition to outpatient therapy and relays that she will ask PT next week. Today, patient was hypertensive yet asymptomatic; patient is recording B/P and pulse regularly, and relays what parameters to call to her provider. There were no falls since last SN visit.      Assessment:  Response to care: Tolerated well    Progress toward previous goals: care/treatment is still necessary and reinforcement/education needed    Potential barriers: co-morbidities and obesity    Patient strengths: motivated and strong support system/involved caregiver    Education/instructions provided on Fall/injury prevention related to use of assistive device and Wound/skin care related to wound dressing instructions, hand hygiene, s/sx of infection or complication and dressing change schedule    Education provided to patient through explanation.  Patient demonstrated acceptance and verbalizes understanding.    Plan:  Continued need for skilled services: Skilled Nursing: wound care          Care plan reviewed and appropriate. Plan for next visit to include performing wound care.    Plan of Care Updates:   Plan of Care in development was verbally reviewed with the patient and  included visit schedule and visit frequency.

## 2025-01-30 ENCOUNTER — HOME CARE VISIT (OUTPATIENT)
Dept: HOME HEALTH SERVICES | Facility: HOSPICE | Age: 70
End: 2025-01-30
Payer: MEDICARE

## 2025-01-30 PROCEDURE — 10901101 HH PPS REVENUE CREDIT

## 2025-01-30 PROCEDURE — 10901201 HH PPS REVENUE DEBIT

## 2025-01-30 PROCEDURE — G0151 HHCP-SERV OF PT,EA 15 MIN: HCPCS | Mod: PPS | Performed by: PHYSICAL THERAPIST

## 2025-01-30 ASSESSMENT — ACTIVITIES OF DAILY LIVING (ADL)
ENTERING_EXITING_HOME: ONE PERSON
TRANSPORTATION_METHOD: REGULAR CAR

## 2025-01-30 ASSESSMENT — ENCOUNTER SYMPTOMS
PERSON REPORTING PAIN: PATIENT
DENIES PAIN: 1

## 2025-01-31 PROCEDURE — 10901101 HH PPS REVENUE CREDIT

## 2025-01-31 PROCEDURE — 10901201 HH PPS REVENUE DEBIT

## 2025-01-31 NOTE — HOME HEALTH
Subjective:  patient reports got some numbness in leg from sitting too long. patient would like to d/c from home health next week and continue with outpatient PT.     Objective clinical findings related to patient's need for care:   instructed patient on seated, supine, standing exercises for strength and ROM. reviewed HEP.  gait training and stair traininng outdoors with crutches.  adjusted crutches to her height.   Assessment:   Response to care: demonstates 92 degrees of left knee flexion post tx.   Progress toward previous goals: patient is progressing with current care/treatment and care/treatment is still necessary   Potential barriers: co-morbidities   Patient strengths: high level of prior function   Education/instructions provided on Post-op care including incision care, surgical precautions and HEP.   Education provided to patient, family and caregiver through explanation, demonstration and handout. Patient demonstrated eagerness and verbalizes understanding.   Plan:   Continued need for skilled services: Physical Therapy: strengthening and balance, range of motion and gait training   Care coordination report provided to primary PT. Report included progress.   Provider notified of progress by case communication.   Care plan reviewed and appropriate. Plan for next visit to include discharge from home health.   Plan of Care Updates:   Plan of Care in development was verbally reviewed with the patient and included visit schedule and visit frequency. A copy of the plan of care was given to the patient.

## 2025-02-01 PROCEDURE — 10901101 HH PPS REVENUE CREDIT

## 2025-02-01 PROCEDURE — 10901201 HH PPS REVENUE DEBIT

## 2025-02-02 PROCEDURE — 10901101 HH PPS REVENUE CREDIT

## 2025-02-02 PROCEDURE — 10901201 HH PPS REVENUE DEBIT

## 2025-02-03 ENCOUNTER — HOME CARE VISIT (OUTPATIENT)
Dept: HOME HEALTH SERVICES | Facility: HOME HEALTH | Age: 70
End: 2025-02-03
Payer: MEDICARE

## 2025-02-03 ENCOUNTER — HOME CARE VISIT (OUTPATIENT)
Dept: HOME HEALTH SERVICES | Facility: HOSPICE | Age: 70
End: 2025-02-03
Payer: MEDICARE

## 2025-02-03 VITALS
TEMPERATURE: 98.4 F | SYSTOLIC BLOOD PRESSURE: 142 MMHG | OXYGEN SATURATION: 94 % | HEART RATE: 76 BPM | DIASTOLIC BLOOD PRESSURE: 80 MMHG

## 2025-02-03 PROCEDURE — G0151 HHCP-SERV OF PT,EA 15 MIN: HCPCS | Mod: PPS

## 2025-02-03 PROCEDURE — 10901201 HH PPS REVENUE DEBIT

## 2025-02-03 PROCEDURE — 10901101 HH PPS REVENUE CREDIT

## 2025-02-03 ASSESSMENT — ENCOUNTER SYMPTOMS
SUBJECTIVE PAIN PROGRESSION: GRADUALLY IMPROVING
DENIES PAIN: 1
PERSON REPORTING PAIN: PATIENT

## 2025-02-03 ASSESSMENT — ACTIVITIES OF DAILY LIVING (ADL)
TRANSPORTATION_METHOD: FAMILY
ENTERING_EXITING_HOME: INDEPENDENT
TRANSPORTATION_METHOD: REGULAR CAR
AMBULATION_DISTANCE/DURATION_TOLERATED: 50 FEET
HOME_HEALTH_OASIS: 00
OASIS_M1830: 00

## 2025-02-03 ASSESSMENT — BALANCE ASSESSMENTS
PICK UP OBJECT FROM THE FLOOR FROM A STANDING POSITION: 3 - ABLE TO PICK UP SLIPPER BUT NEEDS SUPERVISION
TURN 360 DEGREES: 2
REACHING FORWARD WITH OUTSTRETCHED ARM WHILE STANDING: 3 - CAN REACH FORWARD 12 CM (5 INCHES)
TRANSFERS: 3 - ABLE TO TRANSFER SAFELY DEFINITE NEED OF HANDS
STANDING UNSUPPORTED WITH EYES CLOSED: 3 - ABLE TO STAND 10 SECONDS WITH SUPERVISION
STANDING ON ONE LEG: 1 - TRIES TO LIFT LEG UNABLE TO HOLD 3 SECONDS BUT REMAINS STANDING INDEPENDENTLY
STANDING TO SITTING: 3
STANDING ON ONE LEG: 1
SITTING TO STANDING: 3
STANDING UNSUPPORTED WITH FEET TOGETHER: 3 - ABLE TO PLACE FEET TOGETHER INDEPENDENTLY AND STAND 1 MINUTE WITH SUPERVISION
STANDING UNSUPPORTED: 3
STANDING UNSUPPORTED WITH EYES CLOSED: 3
STANDING UNSUPPORTED: 3 - ABLE TO STAND 2 MINUTES WITH SUPERVISION
LONG VERSION TOTAL SCORE (MAX 56): 37
STANDING UNSUPPORTED WITH FEET TOGETHER: 3
TRANSFERS: 3
SITTING TO STANDING: 3 - ABLE TO STAND INDEPENDENTLY USING HANDS
STANDING TO SITTING: 3 - CONTROLS DESCENT BY USING HANDS
STANDING UNSUPPORTED ONE FOOT IN FRONT: 2
TURN 360 DEGREES: 2 - ABLE TO TURN 360 DEGREES SAFELY BUT SLOWLY
STANDING UNSUPPORTED ONE FOOT IN FRONT: 2 - ABLE TO TAKE SMALL STEP INDEPENDENTLY AND HOLD 30 SECONDS

## 2025-02-03 NOTE — HOME HEALTH
Subjective:  Upon therapist arrival, patient notes that she is ready to make the transition to outpatient PT and wishes to be discharged from home health services. Patient reports that she continues to have little to no pain, but noted increased stiffness following static positions for a prolonged period of time. She denies any recent falls or changes to her medications. She denies dizziness, shortness of breath or any lightheadedness. Patient agreeable to discharge from home health today to resume PT at outpatient therapy services.     Objective clinical findings and current physical therapy status:  Assessed vitals: WNL     AROM:  L knee flexion: 93 degrees  L knee extension: 0 degrees with overpressure     30 second sit to stand: Patient completed 8 modified sit to stands in 30 seconds with use of B UE assist and SBA x 1  Amaya balance assessment: Patient scored a 37/56 on the amaya balance assessment     Gait:   -see gait section    Stair negotiation:   -see stair section     Assessment:  Response to care: Patient has progressed fairly over the last several weeks, but wishes to be discharged from home health services at this time in order to begin outpatient PT services. She has been making incremental progress with her functional strength and with her functional AROM. She continues to demonstrate limited AROM to the L knee, but continues to be within functional ranges. She needs continued use of her FWW for all ambulation needs and demonstrates increased independence with ADL/IADLs, transfers, and other mobility. She would continue to benefit from skilled PT intervention in the outpatient setting to continue to improve her functional mobility, AROM, gait mechanics and overall functional endurance. Provided education to continue to complete daily HEP until beginning outpatient physical therapy.  Progress toward previous goals: care/treatment is still necessary  Education/instructions provided on Home/environmental  safety related to fall hazards, Fall/injury prevention related to removal fall/trip hazards, use of assistive device, importance of well-lit environment, ambulating with assistance and how medications can affect fall risk, Nutrition/hydration related to wound healing and increased fall risk and Wound/skin care related to s/sx of infection or complication  Education provided to patient, family and caregiver through explanation. Learners displayed acceptance and verbalizes understanding.    Plan:  Discharge from physical therapy because patient has elected to discontinue to begin outpatient PT.

## 2025-02-04 PROCEDURE — 10901101 HH PPS REVENUE CREDIT

## 2025-02-04 PROCEDURE — 10901201 HH PPS REVENUE DEBIT

## 2025-02-04 NOTE — CASE COMMUNICATION
Patient, Corrine Holden, is being discharged from home health services at this time per patient's request as she wishes to begin outpatient PT services. Goal progress has been updated. Patient is agreeable to discharge at this time.     Summary of Care provided: Response to care: Patient has progressed fairly over the last several weeks, but wishes to be discharged from home health services at this time in order to begin outpatient PT se rvices. She has been making incremental progress with her functional strength and with her functional AROM. She continues to demonstrate limited AROM to the L knee, but continues to be within functional ranges. She needs continued use of her FWW for all ambulation needs and demonstrates increased independence with ADL/IADLs, transfers, and other mobility. She would continue to benefit [facility] to improve her functional mobility, AROM, gait mechanics and overall functional endurance. Provided education to continue to complete daily HEP until beginning outpatient physical therapy. Education/instructions provided on Home/environmental safety related to fall hazards, Fall/injury prevention related to removal fall/trip hazards, use of assistive device, importance of well-lit environment, ambulating with assistance a nd how medications can affect fall risk, Nutrition/hydration related to wound healing and increased fall risk and Wound/skin care related to s/sx of infection or complication. Education provided to patient, family and caregiver through explanation. Learners displayed acceptance and verbalizes understanding.    Thank you,  Ella Patiño, PT, DPT

## 2025-02-05 ENCOUNTER — APPOINTMENT (OUTPATIENT)
Dept: CT IMAGING | Facility: HOSPITAL | Age: 70
End: 2025-02-05
Payer: MEDICARE

## 2025-02-05 ENCOUNTER — TELEPHONE (OUTPATIENT)
Dept: PAIN MEDICINE | Facility: HOSPITAL | Age: 70
End: 2025-02-05
Payer: MEDICARE

## 2025-02-05 ENCOUNTER — NURSE TRIAGE (OUTPATIENT)
Dept: INTERNAL MEDICINE | Facility: CLINIC | Age: 70
End: 2025-02-05
Payer: MEDICARE

## 2025-02-05 ENCOUNTER — HOSPITAL ENCOUNTER (EMERGENCY)
Facility: HOSPITAL | Age: 70
Discharge: HOME | End: 2025-02-05
Attending: EMERGENCY MEDICINE
Payer: MEDICARE

## 2025-02-05 VITALS
RESPIRATION RATE: 21 BRPM | TEMPERATURE: 98.6 F | WEIGHT: 280.43 LBS | OXYGEN SATURATION: 93 % | SYSTOLIC BLOOD PRESSURE: 125 MMHG | BODY MASS INDEX: 47.88 KG/M2 | HEART RATE: 88 BPM | HEIGHT: 64 IN | DIASTOLIC BLOOD PRESSURE: 81 MMHG

## 2025-02-05 DIAGNOSIS — Z96.659 HISTORY OF TOTAL KNEE REPLACEMENT: ICD-10-CM

## 2025-02-05 DIAGNOSIS — E66.01 MORBID OBESITY (CMS/HCC): ICD-10-CM

## 2025-02-05 DIAGNOSIS — R06.02 SHORTNESS OF BREATH: ICD-10-CM

## 2025-02-05 DIAGNOSIS — R73.9 HYPERGLYCEMIA: ICD-10-CM

## 2025-02-05 DIAGNOSIS — R07.9 CHEST PAIN: Primary | ICD-10-CM

## 2025-02-05 LAB
ALBUMIN SERPL-MCNC: 4.3 G/DL (ref 3.5–5.3)
ALP SERPL-CCNC: 66 U/L (ref 55–142)
ALT SERPL-CCNC: 18 U/L (ref 7–52)
ANION GAP SERPL CALC-SCNC: 8 MMOL/L (ref 3–11)
AST SERPL-CCNC: 17 U/L
BACTERIA #/AREA URNS HPF: NORMAL /HPF
BASOPHILS # BLD AUTO: 0 10*3/UL
BASOPHILS NFR BLD AUTO: 0.5 % (ref 0–2)
BILIRUB SERPL-MCNC: 0.39 MG/DL (ref 0.2–1.4)
BILIRUB UR QL: NEGATIVE
BNP SERPL-MCNC: 20 PG/ML (ref 0–100)
BUN SERPL-MCNC: 19 MG/DL (ref 7–25)
CALCIUM ALBUM COR SERPL-MCNC: 9 MG/DL (ref 8.6–10.3)
CALCIUM SERPL-MCNC: 9.2 MG/DL (ref 8.6–10.3)
CHLORIDE SERPL-SCNC: 106 MMOL/L (ref 98–107)
CLARITY UR: ABNORMAL
CO2 SERPL-SCNC: 26 MMOL/L (ref 21–32)
COLOR UR: ABNORMAL
CREAT SERPL-MCNC: 0.74 MG/DL (ref 0.6–1.1)
DELTA HIGH SENSITIVITY TROPONIN I, 1 HOUR: >0.2
EGFRCR SERPLBLD CKD-EPI 2021: 88 ML/MIN/1.73M*2
EOSINOPHIL # BLD AUTO: 0.1 10*3/UL
EOSINOPHIL NFR BLD AUTO: 2.3 % (ref 0–3)
ERYTHROCYTE [DISTWIDTH] IN BLOOD BY AUTOMATED COUNT: 13.8 % (ref 11.5–14)
FLUAV RNA NPH QL NAA+NON-PROBE: NEGATIVE
FLUBV RNA NPH QL NAA+NON-PROBE: NEGATIVE
GLUCOSE SERPL-MCNC: 121 MG/DL (ref 70–105)
GLUCOSE UR QL: NEGATIVE MG/DL
HCT VFR BLD AUTO: 39.9 % (ref 34–45)
HGB BLD-MCNC: 13.9 G/DL (ref 11.5–15.5)
HGB UR QL: NEGATIVE
KETONES UR-MCNC: NEGATIVE MG/DL
LEUKOCYTE ESTERASE UR QL STRIP: NEGATIVE
LYMPHOCYTES # BLD AUTO: 1 10*3/UL
LYMPHOCYTES NFR BLD AUTO: 19.5 % (ref 11–47)
MCH RBC QN AUTO: 34 PG (ref 28–33)
MCHC RBC AUTO-ENTMCNC: 34.9 G/DL (ref 32–36)
MCV RBC AUTO: 97.2 FL (ref 81–97)
MONOCYTES # BLD AUTO: 0.5 10*3/UL
MONOCYTES NFR BLD AUTO: 9.2 % (ref 3–11)
NEUTROPHILS # BLD AUTO: 3.6 10*3/UL
NEUTROPHILS NFR BLD AUTO: 68.5 % (ref 41–81)
NITRITE UR QL: NEGATIVE
PH UR: 5.5 PH
PLATELET # BLD AUTO: 239 10*3/UL (ref 140–350)
PMV BLD AUTO: 7 FL (ref 6.9–10.8)
POTASSIUM SERPL-SCNC: 3.6 MMOL/L (ref 3.5–5.1)
PROT SERPL-MCNC: 6.7 G/DL (ref 6–8.3)
PROT UR STRIP-MCNC: NEGATIVE MG/DL
RBC # BLD AUTO: 4.1 10*6/UL (ref 3.7–5.3)
RBC #/AREA URNS HPF: NORMAL /HPF
RSV RNA NPH QL NAA+NON-PROBE: NEGATIVE
SARS-COV-2 RNA RESP QL NAA+PROBE: NEGATIVE
SODIUM SERPL-SCNC: 140 MMOL/L (ref 135–145)
SP GR UR: 1.02 (ref 1–1.03)
SQUAMOUS #/AREA URNS HPF: NORMAL /HPF
TROPONIN I SERPL-MCNC: 2.5 PG/ML
TROPONIN I SERPL-MCNC: <2.3 PG/ML
UROBILINOGEN UR-MCNC: NORMAL MG/DL
WBC # BLD AUTO: 5.2 10*3/UL (ref 4.5–10.5)
WBC #/AREA URNS HPF: NORMAL /HPF
WBC CLUMPS #/AREA URNS HPF: NORMAL /HPF

## 2025-02-05 PROCEDURE — 96360 HYDRATION IV INFUSION INIT: CPT

## 2025-02-05 PROCEDURE — 2580000300 HC RX 258: Performed by: EMERGENCY MEDICINE

## 2025-02-05 PROCEDURE — 80053 COMPREHEN METABOLIC PANEL: CPT

## 2025-02-05 PROCEDURE — 81001 URINALYSIS AUTO W/SCOPE: CPT | Performed by: EMERGENCY MEDICINE

## 2025-02-05 PROCEDURE — 93005 ELECTROCARDIOGRAM TRACING: CPT

## 2025-02-05 PROCEDURE — 2550000100 HC RX 255: Performed by: EMERGENCY MEDICINE

## 2025-02-05 PROCEDURE — 84484 ASSAY OF TROPONIN QUANT: CPT | Performed by: EMERGENCY MEDICINE

## 2025-02-05 PROCEDURE — 85025 COMPLETE CBC W/AUTO DIFF WBC: CPT | Performed by: EMERGENCY MEDICINE

## 2025-02-05 PROCEDURE — 84460 ALANINE AMINO (ALT) (SGPT): CPT | Performed by: EMERGENCY MEDICINE

## 2025-02-05 PROCEDURE — 83880 ASSAY OF NATRIURETIC PEPTIDE: CPT | Performed by: EMERGENCY MEDICINE

## 2025-02-05 PROCEDURE — 87637 SARSCOV2&INF A&B&RSV AMP PRB: CPT | Performed by: EMERGENCY MEDICINE

## 2025-02-05 PROCEDURE — 36415 COLL VENOUS BLD VENIPUNCTURE: CPT | Performed by: EMERGENCY MEDICINE

## 2025-02-05 PROCEDURE — 99285 EMERGENCY DEPT VISIT HI MDM: CPT | Performed by: EMERGENCY MEDICINE

## 2025-02-05 PROCEDURE — 71275 CT ANGIOGRAPHY CHEST: CPT

## 2025-02-05 PROCEDURE — 96361 HYDRATE IV INFUSION ADD-ON: CPT

## 2025-02-05 RX ORDER — SODIUM CHLORIDE 9 MG/ML
500 INJECTION, SOLUTION INTRAVENOUS ONCE
Status: COMPLETED | OUTPATIENT
Start: 2025-02-05 | End: 2025-02-05

## 2025-02-05 RX ORDER — IOPAMIDOL 755 MG/ML
80 INJECTION, SOLUTION INTRAVASCULAR ONCE
Status: COMPLETED | OUTPATIENT
Start: 2025-02-05 | End: 2025-02-05

## 2025-02-05 RX ADMIN — IOPAMIDOL 80 ML: 755 INJECTION, SOLUTION INTRAVENOUS at 13:50

## 2025-02-05 RX ADMIN — SODIUM CHLORIDE 500 ML: 9 INJECTION, SOLUTION INTRAVENOUS at 12:55

## 2025-02-05 ASSESSMENT — PAIN DESCRIPTION - DESCRIPTORS: DESCRIPTORS: OTHER (COMMENT)

## 2025-02-05 NOTE — TELEPHONE ENCOUNTER
"[facility] Nurse Triage Note  Are you currently in [place]? (After identifying street address, ask if at home): Yes  Are you currently driving (if at home and address is in [place] no need to ask)?: No                                        ------------------------------------------------------------  REASON FOR CALL: Post-op Problem  Additional symptoms (optional):  Nurse note:  Caller states she is 4 weeks post op knee surgery, everything is going fine, and is compliant with cpap. She states she woke up short of breath in the middle of the night, gasping for air, even though cpap was on. She states this never happens, and it persisted until this morning. She is currently short of breath at a 2, or a little, at rest, and \"feels heaviness in her chest.\" She tried an albuterol inhaler and it did not help. She states her bp is 142/100, heartrate 86; 136/96 76 hearrate;. She states it is trending higher than normal.     She states she has been off of pain medicine the past 4 days.      (Optional) Patient/caregiver DENIES: chest pain      ----------------------------------------------------------    DISPOSITION:   Go to ED Now     ----------------------------------------------------------  Recommended Next Steps  *ED: \"Please go to ED now, have a  if needed.\" Have someone else drive you, Due to urgent symptoms, triage note not completed.  *Patient/caregiver verbalized understanding of the above information  Assisting patient with recommended care advice:  Are you (patient/caregiver) comfortable and/or will follow through on this plan? Yes    Nurse Triage Communication  * provider PCP. Note routed via Routing to clinic staff and PCP.     CARE ADVICE:   Please review Encounters in Chart review for blue links showing specific triage questions asked by triage nurse and specific Care Advice given.    Sunita Spicer RN  February 5, 2025 10:41 AM          Reason for Disposition   Major surgery in the past " month    Protocols used: Breathing Difficulty-A-AH

## 2025-02-05 NOTE — ED PROVIDER NOTES
"Chest Pain (Comes in w/ chest pain, pressure, SOB that started this morning. Had total knee 4 weeks ago. )        HPI:  This is a 69-year-old female presenting to the emergency department with complaints of chest pain and shortness of breath.  Patient states that she experienced these symptoms early this morning before she had gotten out of bed.  They have been present all day.  She believes that exertion seems to make them worse.  Patient had a total knee replacement 1 month ago.  She denies any significant cough or sputum production.  No hemoptysis.  She denies abdominal pain.  No urinary symptoms.  No other complaints.      Past Medical History:   Diagnosis Date    GERD (gastroesophageal reflux disease)     H. pylori infection 2009    Hypothyroidism     Injury of back     STATES, \"THIS IS REASON FOR MRI\"    Microscopic hematuria     SHANA (obstructive sleep apnea)     USES CPAP    Osteoarthritis     Palpitations     Respiratory disease     Supraventricular tachycardia (CMS/HCC)     STATES, \"COFFEE RELATED\", VERY INTERMITTENT    Tinnitus        Past Surgical History:   Procedure Laterality Date    CARDIAC ELECTROPHYSIOLOGY STUDY AND ABLATION  01/01/2007    attempted ablation for SVT    CARPAL TUNNEL RELEASE Bilateral 06/26/2020    Procedure: LEFT ENDOSCOPIC CARPAL TUNNEL RELEASE, RIGHT OPEN CARPAL TUNNEL;  Surgeon: Mendez Sun MD;  Location: [facility] OR;  Service: Orthopedics;  Laterality: Bilateral;    COLONOSCOPY  01/21/2015    cecal polyp-tubular adenoma-recommend repeat 5 years    COLONOSCOPY N/A 06/22/2020    repeat 5 years - adenomatous polyp    DILATION AND CURETTAGE OF UTERUS  01/01/2006    ENDOMETRIAL BIOPSY  01/01/2012    pt reports negative    ESOPHAGOGASTRODUODENOSCOPY  01/01/2009    H pylori    ESOPHAGOGASTRODUODENOSCOPY N/A 06/22/2020    Procedure: ESOPHAGOGASTRODUODENOSCOPY;  Surgeon: Julian Faulkner MD;  Location: [facility];  Service: Endoscopy;  Laterality: N/A;    KNEE ARTHROPLASTY Right " 04/01/2015    Dr Placido BRICE    TOTAL ANKLE ARTHROPLASTY Left 10/03/2022    Dr. Guerra, with talonavicular and subtalar fusion    TOTAL ANKLE ARTHROPLASTY Left 02/20/2023    total revision of previous surgery, fusion of non-unions    TOTAL ANKLE ARTHROPLASTY Left 11/13/2023    w/ fusion to foot joints    VAGINAL HYSTERECTOMY  12/16/2013    with BSO, fibroids       Social History     Socioeconomic History    Marital status: Single     Spouse name: Not on file    Number of children: Not on file    Years of education: Not on file    Highest education level: Not on file   Occupational History    Not on file   Tobacco Use    Smoking status: Never    Smokeless tobacco: Never   Vaping Use    Vaping status: Never Used   Substance and Sexual Activity    Alcohol use: No    Drug use: No    Sexual activity: Defer     Comment: declines to answer   Other Topics Concern    Not on file   Social History Narrative    Not on file     Social Drivers of Health     Financial Resource Strain: Not on file   Food Insecurity: Not on file   Transportation Needs: No Transportation Needs (2/3/2025)    OASIS : Transportation     Lack of Transportation (Medical): No     Lack of Transportation (Non-Medical): No     Patient Unable or Declines to Respond: No   Physical Activity: Not on file   Stress: Not on file   Social Connections: Feeling Socially Integrated (2/3/2025)    OASIS : Social Isolation     Frequency of experiencing loneliness or isolation: Never   Intimate Partner Violence: Not on file   Housing Stability: Not on file       Family History   Problem Relation Age of Onset    Cancer Mother 84        Bladder    Osteoporosis Mother     Hypertension Mother     Colon cancer Father 65    Kidney cancer Father 77    Other Father's Brother         sudden cardiac death, 60s    Heart disease Maternal Grandfather     Heart attack Maternal Grandfather 59    Cancer Paternal Grandfather        Allergies   Allergen Reactions    Bee Venom  Protein (Honey Bee) Swelling    No Known Drug Allergies     Tizanidine          Current Outpatient Medications:     aspirin 81 mg chewable tablet, Take 81 mg by mouth 2 (two) times a day., Disp: , Rfl:     ibuprofen (ADVIL,MOTRIN) 200 mg tablet, Take 600 mg by mouth every 6 (six) hours as needed for pain scale 1-3/10., Disp: , Rfl:     cyclobenzaprine (FLEXERIL) 5 mg tablet, Take 1 tablet (5 mg total) by mouth 3 (three) times a day as needed for muscle spasms, Disp: 30 tablet, Rfl: 0    apixaban (ELIQUIS) 2.5 mg tablet, Take 2.5 mg by mouth 2 (two) times a day. Indications: deep vein thrombosis prevention in knee replacement (Patient not taking: Reported on 2/5/2025), Disp: , Rfl:     CRANBERRY ORAL, Take 500 mg by mouth daily., Disp: , Rfl:     sennosides-docusate sodium (Senna Plus) 8.6-50 mg, Take 1 tablet by mouth daily as needed for constipation., Disp: , Rfl:     magnesium hydroxide (MILK OF MAGNESIA) 400 mg/5 mL suspension, Take 30 mL by mouth daily as needed (constipation)., Disp: , Rfl:     levothyroxine (SYNTHROID, LEVOTHROID) 125 mcg tablet, Take 1 tablet (125 mcg total) by mouth daily, Disp: 90 tablet, Rfl: 3    ketorolac (TORADOL) 10 mg tablet, Take 1 tablet (10 mg total) by mouth every 8 (eight) hours as needed for pain scale 8-10/10 for up to 5 days, Disp: 15 tablet, Rfl: 0    omeprazole (PriLOSEC) 20 mg capsule, Take 1 capsule (20 mg total) by mouth daily, Disp: 90 capsule, Rfl: 1    calc/FA/B/D3/E/bioflav/soybean (CALCI-MAX ORAL), , Disp: , Rfl:     cholecalciferol, vitamin D3, 25 mcg (1,000 unit) tablet, Take 2 tablets (2,000 Units total) by mouth 2 (two) times a day, Disp: , Rfl:     diazePAM (VALIUM) 5 mg tablet, Take 1 tablet (5 mg total) by mouth every 12 (twelve) hours as needed for anxiety or muscle spasms Max Daily Amount: 10 mg, Disp: 30 tablet, Rfl: 0    ascorbic acid, vitamin C, (ascorbic acid with vane hips) 500 mg tablet, Take 2 tablets (1,000 mg total) by mouth daily, Disp: , Rfl:      methylPREDNISolone (Medrol, Maxi,) 4 mg tablet, follow package directions, Disp: 21 tablet, Rfl: 0    vit C/E/Zn/coppr/lutein/zeaxan (OCUVITE LUTEIN AND ZEAXANTHIN ORAL), PreserVision AREDS-2, Disp: , Rfl:     zinc 50 mg tablet, Take 50 mg by mouth daily, Disp: , Rfl:     diclofenac sodium (VOLTAREN) 1 % gel, APPLY 2 GRAMS TO THE AFFECTED AREA(S) 4 TIMES PER DAY AS NEEDED, Disp: 300 g, Rfl: 2    fluticasone propionate (FLONASE) 50 mcg/actuation nasal spray, USE 2 SPRAYS IN EACH NOSTRIL DAILY, Disp: 48 g, Rfl: 2    krill-om-3-dha-epa-phospho-ast [id number] mg capsule, Take 1 capsule by mouth daily, Disp: , Rfl:     multivitamin (THERAGRAN) tablet tablet, 1 Every Day, Disp: , Rfl:     acetaminophen (TYLENOL EXTRA STRENGTH) 500 mg tablet, Take 2 tablets (1,000 mg total) by mouth 2 (two) times a day, Disp: , Rfl:       ROS:  Otherwise negative with the exception of what is identified in the [facility]      ED Triage Vitals   Temp Heart Rate Resp BP SpO2   02/05/25 1138 02/05/25 1138 02/05/25 1138 02/05/25 1138 02/05/25 1138   37 °C (98.6 °F) 88 19 154/81 91 %      Mean BP (mmHg) Temp Source Heart Rate Source Patient Position BP Location   02/05/25 1215 02/05/25 1138 -- -- --   106 Oral         FiO2 (%)       --                    Physical Exam:  Nursing note and vitals reviewed.  Constitutional: Pleasant elderly female.  She answers questions appropriately.  Head: Normocephalic and atraumatic. Mucous membranes are moist.   Eyes: Pupils are equal, round, and reactive to light.   Neck: Supple.  Cardiovascular: Regular rate and rhythm without murmur.   Pulmonary/Chest: No respiratory distress.  Clear to auscultation bilaterally.  Abdominal: Soft and nontender.    Back: No CVA tenderness. No midline tenderness.   Musculoskeletal: No edema  Neurological: Alert.   Skin: Skin is warm and dry. No rash noted.   Psychiatric: Normal mood and affect.        Labs:  Labs Reviewed   COMPREHENSIVE METABOLIC PANEL - Abnormal       Result  Value    Sodium 140      Potassium 3.6      Chloride 106      CO2 26      Anion Gap 8      BUN 19      Creatinine 0.74      Glucose 121 (*)     Calcium 9.2      AST 17      ALT (SGPT) 18      Alkaline Phosphatase 66      Total Protein 6.7      Albumin 4.3      Total Bilirubin 0.39      Corrected Calcium 9.0      eGFR 88      Narrative:     Calculation based on the 2021 Chronic Kidney Disease Epidemiology Collaboration (CKD-EPI) equation refit without adjustment for race.   CBC WITH AUTO DIFFERENTIAL - Abnormal    WBC 5.2      RBC 4.10      Hemoglobin 13.9      Hematocrit 39.9      MCV 97.2 (*)     MCH 34.0 (*)     MCHC 34.9      RDW 13.8      Platelets 239      MPV 7.0      Neutrophils% 68.5      Lymphocytes% 19.5      Monocytes% 9.2      Eosinophils% 2.3      Basophils% 0.5      ANC (auto diff) 3.60      Lymphocytes Absolute 1.00      Monocytes Absolute 0.50      Eosinophils Absolute 0.10      Basophils Absolute 0.00     URINALYSIS, DIPSTICK ONLY - Abnormal    Color, Urine Light Yellow      Clarity, Urine Turbid (*)     Specific Gravity, Urine 1.022      Leukocytes, Urine Negative      Nitrite, Urine Negative      Protein, Urine Negative      Ketones, Urine Negative      Urobilinogen, Urine Normal      Bilirubin, Urine Negative      Blood, Urine Negative      Glucose, Urine Negative      pH, Urine 5.5      Narrative:     If patient has an indwelling urinary catheter, follow the Adult Indwelling Urinary Catheter Removal and Replacement Protocol to remove the catheter prior to obtaining the Urinalysis. If questions, please contact the primary provider for guidance.   SARS/INFLUENZA/RSV QUADRUPLEX PCR - Normal    Influenza A Screen by PCR Negative      Influenza B Screen by PCR Negative      COVID-19 PCR Negative      Respiratory Syncytial Virus Screen by PCR Negative      Narrative:     Negative results do not preclude SARS-CoV-2, influenza A virus, influenza B virus and/or RSV infection and should not be used as the  sole basis for treatment or other patient management decisions.  Negative results must be combined with clinical presentation, patient history, and/or epidemiological information.                                    Positive results are indicative of the presence of RNA from the identified virus; clinical correlation with patient history and other diagnostic information is necessary to determine patient infection status.                                    Testing was performed using the Xpert Xpress SARS-CoV-2/Flu/RSV RT-PCR assay ([employer]) on the GeneXpert system.  This test has received FDA Emergency Use Authorization (EUA) and performance characteristics have been verified by [employer].  Fact sheets for this Emergency Use Authorization (EUA) assay can be found at the following links:                  For Patients:   https://www.fda.gov/media/467221/download                  For Healthcare Providers:  https://www.fda.gov/media/740053/download                                       URINALYSIS, MICROSCOPIC ONLY - Normal    RBC, Urine 0-2      WBC, Urine 0-4      WBC Clumps, Urine None seen      Squamous Epithelial, Urine 0-4      Bacteria, Urine Few      Narrative:     If patient has an indwelling urinary catheter, follow the Adult Indwelling Urinary Catheter Removal and Replacement Protocol to remove the catheter prior to obtaining the Urinalysis. If questions, please contact the primary provider for guidance.   B-TYPE NATRIURETIC PEPTIDE (BNP) - Normal    BNP 20     HIGH SENSITIVE TROPONIN I - Normal    hsTnI 0 Hour <2.3     HIGH SENSITIVE TROPONIN I, 1 HOUR - Normal    hsTnI 1 Hour 2.5      Delta from 0 Hour >0.2     HIGH SENSITIVE TROPONIN I PANEL ([facility], 1HR)    Narrative:     The following orders were created for panel order HS Troponin I Panel (0HR, 1HR) Blood, Venous.                  Procedure                               Abnormality         Status                                      ---------                               -----------         ------                                     HS Troponin I[[id number]]                Normal              Final result                               1HR High Sensitive Trop I[[id number]]    Normal              Final result                                                 Please view results for these tests on the individual orders.   URINALYSIS WITH MICROSCOPIC    Narrative:     The following orders were created for panel order Urinalysis w/microscopic Urine, Clean Catch.                  Procedure                               Abnormality         Status                                     ---------                               -----------         ------                                     Urinalysis, microscopic ...[[id number]]  Normal              Final result                               Urinalysis, dipstick Uri...[[id number]]  Abnormal            Final result                                                 Please view results for these tests on the individual orders.   LAVENDER TOP RAINBOW       Imaging:  CT angiogram chest PE with IV contrast   Final Result   IMPRESSION:   1.  Negative for pulmonary emboli.                     ED Course Summary:  This is a 69-year-old female 4 weeks status post total knee replacement presenting to the emergency department with complaints of chest pain and shortness of breath.  She will be given a small amount of IV fluids.  CTA of her chest will be obtained.  EKG and multiple troponins will be ordered.  Baseline labs will be obtained.  Patient will be observed.    Old records were reviewed.  Patient is currently not anticoagulated.  Most of her medical problems are related to obesity.  She does have a history of SVT.  Patient was maintained on telemetry.  Differential diagnosis includes GERD, acute coronary syndrome, pulmonary embolus, pneumothorax, viral pneumonia, UTI, URI, and bacterial pneumonia.  Some history was  obtained from the patient's friend who accompanies her.    Urinalysis shows no evidence of infection.  Respiratory quad is negative.  EKG shows no significant ischemic changes and multiple troponins are negative.  BNP is negative.  CMP shows a glucose of 121 and is otherwise normal.  CBC is normal.  CTA of her chest shows no pneumothorax per my interpretation.  No pulmonary embolus is noted and no evidence of pneumonia.  Patient otherwise remained stable.  She has normal vitals.  I believe that she could be discharged home.    Discussion of management options was performed with the patient    Financial and social constraints along with proximity to a healthcare facilities and comorbidities were considered in the treatment plan for this patient.      Given   Medications   sodium chloride 0.9 % bolus 500 mL (0 mL intravenous Stopped 2/5/25 4631)   iopamidoL (ISOVUE-370) 370 mg iodine /mL (76 %) injection 80 mL (80 mL intravenous Given 2/5/25 1350)         Procedure    Procedures    EKG:  Sinus rhythm with a rate of 86.  Leftward axis deviation.  Normal intervals.  Minimal ST sagging in the lateral leads.    Clinical Impression:    Final diagnoses:   [R07.9] Chest pain   [R06.02] Shortness of breath   [Z96.659] History of total knee replacement   [E66.01] Morbid obesity (CMS/HCC)   [R73.9] Hyperglycemia     2/5/2025  2:27 PM    Mina BARTON MD, personally performed the services described in this documentation.     Mina Marlow MD  02/07/25 [id number]

## 2025-02-05 NOTE — TELEPHONE ENCOUNTER
Schedule from March recall, reschedule 12/17 appt, Dr Tavarez only, patient scheduled for 3/18 with provider of choice, 1st attempt

## 2025-02-05 NOTE — DISCHARGE INSTRUCTIONS
Follow-up with your primary care physician.  Return to the emergency department for any other problems

## 2025-02-06 DIAGNOSIS — E66.01 CLASS 3 SEVERE OBESITY DUE TO EXCESS CALORIES WITH SERIOUS COMORBIDITY AND BODY MASS INDEX (BMI) OF 45.0 TO 49.9 IN ADULT (CMS/HCC): ICD-10-CM

## 2025-02-06 DIAGNOSIS — E67.3 HYPERVITAMINOSIS D: Primary | ICD-10-CM

## 2025-02-06 DIAGNOSIS — E66.813 CLASS 3 SEVERE OBESITY DUE TO EXCESS CALORIES WITH SERIOUS COMORBIDITY AND BODY MASS INDEX (BMI) OF 45.0 TO 49.9 IN ADULT (CMS/HCC): ICD-10-CM

## 2025-02-07 ENCOUNTER — HOSPITAL ENCOUNTER (OUTPATIENT)
Dept: PHYSICAL THERAPY | Facility: HOSPITAL | Age: 70
Setting detail: THERAPIES SERIES
Discharge: STILL ADMITTED OR ADMINISTRATIVE | End: 2025-02-07
Payer: MEDICARE

## 2025-02-07 DIAGNOSIS — Z47.1 AFTERCARE FOLLOWING LEFT KNEE JOINT REPLACEMENT SURGERY: ICD-10-CM

## 2025-02-07 DIAGNOSIS — Z96.652 AFTERCARE FOLLOWING LEFT KNEE JOINT REPLACEMENT SURGERY: ICD-10-CM

## 2025-02-07 PROCEDURE — 97110 THERAPEUTIC EXERCISES: CPT | Mod: GP

## 2025-02-07 PROCEDURE — 97161 PT EVAL LOW COMPLEX 20 MIN: CPT | Mod: GP

## 2025-02-07 NOTE — INTERDISCIPLINARY/THERAPY
"  [address] [facility]  [address] [facility] [id number] [phone]       Outpatient Physical Therapy Initial Evaluation    Date of Service: 25  Patient Name: Corrine Holden  : 1955  Referring Provider: David Montano PA-C  Medicare or Medicaid note, cosigner has been added.: Yes  Onset Date: 2025  SOC Date: 2025  Certification Period: 2025  HICN: [id number]  Medical Diagnosis listed first. Additional are Treatment Diagnoses:  1. Aftercare following left knee joint replacement surgery      Subjective   Subjective Comments: Patient had a left TKA on 25.  She reports no complications with surgery.  She had home health PT up until Tuesday of this week.  She feels like she has been making progress.  Activities Limited by Patient Complaint  Activities limited by patient complaint: Walking/Mobility, Prolonged standing, Lifting, ADLs, Negotiating stairs, Household/Yard work  Social/Occupational/Recreational  Lived With: Alone  Receives Help From: Family  Vocational Status: Retired  Patient's Goal for Therapy: Resume full activities  STEADI Fall Risk  Have you fallen in the past year? : No  Do you feel unsteady when standing or walking?: Yes  Do you worry about falling? : No  Therapy Fall Interventions  Fall Interventions: Balance training   Pain Assessment Scale  Pain Scale: 0-10  Pain Score: 3-Sometimes distracts me  Patient's Stated Pain Goal: 0-No pain  Has Pain Adversely Affected Your Usual Activity, Sleep, Mood or Stress in the Last 24 Hours?: No  Pain Type: Surgical pain  Pain Location: Knee  Pain Orientation: Left  Pain Descriptors: Tightness  Pain Frequency: Intermittent  Pain Onset: Ongoing  Clinical Progression: Gradually improving  Pain Interventions: Repositioned, Stretching/Range of motion  Past Medical History:   Diagnosis Date    GERD (gastroesophageal reflux disease)     H. pylori infection 2009    Hypothyroidism     Injury of back     STATES, \"THIS IS REASON FOR MRI\"    " "Microscopic hematuria     SHANA (obstructive sleep apnea)     USES CPAP    Osteoarthritis     Palpitations     Respiratory disease     Supraventricular tachycardia (CMS/HCC)     STATES, \"COFFEE RELATED\", VERY INTERMITTENT    Tinnitus        Current Outpatient Medications:     aspirin 81 mg chewable tablet, Take 81 mg by mouth 2 (two) times a day., Disp: , Rfl:     ibuprofen (ADVIL,MOTRIN) 200 mg tablet, Take 600 mg by mouth every 6 (six) hours as needed for pain scale 1-3/10., Disp: , Rfl:     cyclobenzaprine (FLEXERIL) 5 mg tablet, Take 1 tablet (5 mg total) by mouth 3 (three) times a day as needed for muscle spasms, Disp: 30 tablet, Rfl: 0    apixaban (ELIQUIS) 2.5 mg tablet, Take 2.5 mg by mouth 2 (two) times a day. Indications: deep vein thrombosis prevention in knee replacement (Patient not taking: Reported on 2/5/2025), Disp: , Rfl:     CRANBERRY ORAL, Take 500 mg by mouth daily., Disp: , Rfl:     sennosides-docusate sodium (Senna Plus) 8.6-50 mg, Take 1 tablet by mouth daily as needed for constipation., Disp: , Rfl:     magnesium hydroxide (MILK OF MAGNESIA) 400 mg/5 mL suspension, Take 30 mL by mouth daily as needed (constipation)., Disp: , Rfl:     levothyroxine (SYNTHROID, LEVOTHROID) 125 mcg tablet, Take 1 tablet (125 mcg total) by mouth daily, Disp: 90 tablet, Rfl: 3    ketorolac (TORADOL) 10 mg tablet, Take 1 tablet (10 mg total) by mouth every 8 (eight) hours as needed for pain scale 8-10/10 for up to 5 days, Disp: 15 tablet, Rfl: 0    omeprazole (PriLOSEC) 20 mg capsule, Take 1 capsule (20 mg total) by mouth daily, Disp: 90 capsule, Rfl: 1    calc/FA/B/D3/E/bioflav/soybean (CALCI-MAX ORAL), , Disp: , Rfl:     cholecalciferol, vitamin D3, 25 mcg (1,000 unit) tablet, Take 2 tablets (2,000 Units total) by mouth 2 (two) times a day, Disp: , Rfl:     diazePAM (VALIUM) 5 mg tablet, Take 1 tablet (5 mg total) by mouth every 12 (twelve) hours as needed for anxiety or muscle spasms Max Daily Amount: 10 mg, Disp: " 30 tablet, Rfl: 0    ascorbic acid, vitamin C, (ascorbic acid with vane hips) 500 mg tablet, Take 2 tablets (1,000 mg total) by mouth daily, Disp: , Rfl:     methylPREDNISolone (Medrol, Maxi,) 4 mg tablet, follow package directions, Disp: 21 tablet, Rfl: 0    vit C/E/Zn/coppr/lutein/zeaxan (OCUVITE LUTEIN AND ZEAXANTHIN ORAL), PreserVision AREDS-2, Disp: , Rfl:     zinc 50 mg tablet, Take 50 mg by mouth daily, Disp: , Rfl:     diclofenac sodium (VOLTAREN) 1 % gel, APPLY 2 GRAMS TO THE AFFECTED AREA(S) 4 TIMES PER DAY AS NEEDED, Disp: 300 g, Rfl: 2    fluticasone propionate (FLONASE) 50 mcg/actuation nasal spray, USE 2 SPRAYS IN EACH NOSTRIL DAILY, Disp: 48 g, Rfl: 2    krill-om-3-dha-epa-phospho-ast [id number] mg capsule, Take 1 capsule by mouth daily, Disp: , Rfl:     multivitamin (THERAGRAN) tablet tablet, 1 Every Day, Disp: , Rfl:     acetaminophen (TYLENOL EXTRA STRENGTH) 500 mg tablet, Take 2 tablets (1,000 mg total) by mouth 2 (two) times a day, Disp: , Rfl:     Current Facility-Administered Medications:     iohexoL (OMNIPAQUE) 240 mg iodine/mL injection 3 mL, 3 mL, injection, PRN, , 3 mL at 06/13/24 0916  Allergies   Allergen Reactions    Bee Venom Protein (Honey Bee) Swelling    No Known Drug Allergies     Tizanidine           Objective Findings:    Gait: Patient ambulates using FWW showing step-through pattern.    Knee ROM:  Flexion: 94 deg  Extension: 0 deg    Strength Testing: able to perform SLR without extension lag.    Objective   Treatment:     Therapeutic Exercise: to develop strength, endurance, range of motion and flexibility  Reviewed current HEP given by home health  Instructed in modifications to HEP.  Education: Therapy plan of care, anticipated duration, & anticipated outcomes        Patient's FOTO functional outcome score is  /100 where a higher number = greater function.  Time Calculation  Start Time: 1445  Stop Time: 1530  Time Calculation (min): 45 min  Therapeutic Interventions (Time  Spent in Minutes)  Therapeutic Exercise: 25  Other Charges       Charge ID Procedure Code Description Qty. Modifiers Charge Entry User Diagnosis    [id number]  THERAPEUTIC PX 1/> AREAS EACH 15 MIN EXERCISES 2  Claudio Isaacs, PT     [id number] HC PT EVAL VISIT LOW COMPLEXITY 1  Claudio Isaacs, PT                Assessment/Plan   Assessment:    Level of Function and Prognosis  Prior Function Comments: Independent with all ADL's  Current Level of Function: limited ability to perform ADL's.  Limited in ability to perform community distancew ambulation.  Prognosis: Good for established goals  Barriers to Discharge: Co-morbidities  Assessment: Patient presents as expected post surgical.  She shows deficits in left knee ROM, left LE strength, and gait ability.  Patient requires skilled physical therapy to restore prior level of functioning utilizing the treatment and modalities described in this plan of care.     Therapy Goals    Short Term Goals  Start goal date: 2/7/25 End goal date: 3/7/25   ST Goal 1: At least 110 deg of left knee flexion.  Start goal date: 2/7/25 End goal date: 3/7/25   ST Goal 2: Demonstrate ability to ascend stairs in reciprocal pattern  Start goal date: 2/7/25 End goal date: 3/7/25   ST Goal 3: Demonstrate ability to ambulate with SPC safely.  Long Term Goals  Long term goal(s) met by: 5/8/25  LT Goal 1: At least 120 deg of left knee flexion  LT Goal 2: Demonstrate ability to descend stairs in reciprocal pattern.  LT Goal 3: Report ability to ambulate community distances with least   restrictive AD.  LT Goal 4: Patient will score at least the predicted goal score on the   FOTO functional outcome as a measure of improved functional ability.   LT Goal 5: Report 0/10 left knee pain during daily activities.       Plan:  Plan  Treatment Interventions: Therapeutic Exercise, Therapeutic Activity, Neuromuscular Re-education, Gait training, Manual Therapy  PT Frequency: up to  2x/wk  Plan for next treatment comment: Left knee ROM, strength, and gait training.   Treatment duration will be through 5/8/2025       Thank you for allowing us to share in the care of this patient. If you have any questions, recommendations, or further concerns regarding this patient, please feel free to contact me at [address] [facility] [address] [phone]      * I have reviewed the plan of care and certify a continuing need for medically necessary services    Co-signed by:_________________________ Date and Time:________________

## 2025-02-10 ENCOUNTER — LAB (OUTPATIENT)
Dept: LAB | Facility: CLINIC | Age: 70
End: 2025-02-10
Payer: MEDICARE

## 2025-02-10 DIAGNOSIS — E03.9 HYPOTHYROIDISM, UNSPECIFIED TYPE: ICD-10-CM

## 2025-02-10 DIAGNOSIS — E67.3 HYPERVITAMINOSIS D: ICD-10-CM

## 2025-02-10 DIAGNOSIS — R73.01 IMPAIRED FASTING GLUCOSE: ICD-10-CM

## 2025-02-10 DIAGNOSIS — E66.01 CLASS 3 SEVERE OBESITY DUE TO EXCESS CALORIES WITH SERIOUS COMORBIDITY AND BODY MASS INDEX (BMI) OF 45.0 TO 49.9 IN ADULT (CMS/HCC): ICD-10-CM

## 2025-02-10 DIAGNOSIS — E66.813 CLASS 3 SEVERE OBESITY DUE TO EXCESS CALORIES WITH SERIOUS COMORBIDITY AND BODY MASS INDEX (BMI) OF 45.0 TO 49.9 IN ADULT (CMS/HCC): ICD-10-CM

## 2025-02-10 LAB
25(OH)D3 SERPL-MCNC: 51 NG/ML (ref 30–100)
ALBUMIN SERPL-MCNC: 4.8 G/DL (ref 3.5–5.3)
ALP SERPL-CCNC: 65 U/L (ref 55–142)
ALT SERPL-CCNC: 26 U/L (ref 7–52)
ANION GAP SERPL CALC-SCNC: 10 MMOL/L (ref 3–11)
AST SERPL-CCNC: 26 U/L
BASOPHILS # BLD AUTO: 0 10*3/UL
BASOPHILS NFR BLD AUTO: 0.7 % (ref 0–2)
BILIRUB SERPL-MCNC: 0.72 MG/DL (ref 0.2–1.4)
BUN SERPL-MCNC: 17 MG/DL (ref 7–25)
CALCIUM ALBUM COR SERPL-MCNC: 9.1 MG/DL (ref 8.6–10.3)
CALCIUM SERPL-MCNC: 9.7 MG/DL (ref 8.6–10.3)
CHLORIDE SERPL-SCNC: 104 MMOL/L (ref 98–107)
CHOLEST SERPL-MCNC: 233 MG/DL (ref 0–199)
CO2 SERPL-SCNC: 28 MMOL/L (ref 21–32)
CREAT SERPL-MCNC: 0.81 MG/DL (ref 0.6–1.1)
EGFRCR SERPLBLD CKD-EPI 2021: 79 ML/MIN/1.73M*2
EOSINOPHIL # BLD AUTO: 0.2 10*3/UL
EOSINOPHIL NFR BLD AUTO: 2.9 % (ref 0–3)
ERYTHROCYTE [DISTWIDTH] IN BLOOD BY AUTOMATED COUNT: 13.8 % (ref 11.5–14)
EST. AVERAGE GLUCOSE BLD GHB EST-MCNC: 122.6 MG/DL
FASTING STATUS PATIENT QL REPORTED: YES
GLUCOSE SERPL-MCNC: 137 MG/DL (ref 70–105)
HBA1C MFR BLD: 5.9 % (ref 4–6)
HCT VFR BLD AUTO: 41.3 % (ref 34–45)
HDLC SERPL-MCNC: 64 MG/DL
HGB BLD-MCNC: 14.4 G/DL (ref 11.5–15.5)
LDLC SERPL CALC-MCNC: 140 MG/DL (ref 20–99)
LYMPHOCYTES # BLD AUTO: 1 10*3/UL
LYMPHOCYTES NFR BLD AUTO: 19.9 % (ref 11–47)
MCH RBC QN AUTO: 33.3 PG (ref 28–33)
MCHC RBC AUTO-ENTMCNC: 34.9 G/DL (ref 32–36)
MCV RBC AUTO: 95.4 FL (ref 81–97)
MONOCYTES # BLD AUTO: 0.4 10*3/UL
MONOCYTES NFR BLD AUTO: 8.5 % (ref 3–11)
NEUTROPHILS # BLD AUTO: 3.6 10*3/UL
NEUTROPHILS NFR BLD AUTO: 68 % (ref 41–81)
PLATELET # BLD AUTO: 244 10*3/UL (ref 140–350)
PMV BLD AUTO: 6.5 FL (ref 6.9–10.8)
POTASSIUM SERPL-SCNC: 4.5 MMOL/L (ref 3.5–5.1)
PROT SERPL-MCNC: 7.2 G/DL (ref 6–8.3)
RBC # BLD AUTO: 4.33 10*6/UL (ref 3.7–5.3)
SODIUM SERPL-SCNC: 142 MMOL/L (ref 135–145)
TRIGL SERPL-MCNC: 145 MG/DL
TSH SERPL DL<=0.05 MIU/L-ACNC: 1.74 UIU/ML (ref 0.34–4.82)
VIT B12 SERPL-MCNC: 697 PG/ML (ref 180–914)
WBC # BLD AUTO: 5.2 10*3/UL (ref 4.5–10.5)

## 2025-02-10 PROCEDURE — 85025 COMPLETE CBC W/AUTO DIFF WBC: CPT | Mod: PO

## 2025-02-10 PROCEDURE — 82607 VITAMIN B-12: CPT

## 2025-02-10 PROCEDURE — 83036 HEMOGLOBIN GLYCOSYLATED A1C: CPT | Mod: PO

## 2025-02-10 PROCEDURE — 82306 VITAMIN D 25 HYDROXY: CPT

## 2025-02-10 PROCEDURE — 36415 COLL VENOUS BLD VENIPUNCTURE: CPT | Mod: PO

## 2025-02-10 PROCEDURE — 80061 LIPID PANEL: CPT | Mod: PO,NCP

## 2025-02-10 PROCEDURE — 84443 ASSAY THYROID STIM HORMONE: CPT | Mod: PO

## 2025-02-10 PROCEDURE — 80053 COMPREHEN METABOLIC PANEL: CPT | Mod: PO

## 2025-02-11 ENCOUNTER — HOSPITAL ENCOUNTER (OUTPATIENT)
Dept: PHYSICAL THERAPY | Facility: HOSPITAL | Age: 70
Setting detail: THERAPIES SERIES
Discharge: STILL ADMITTED OR ADMINISTRATIVE | End: 2025-02-11
Payer: MEDICARE

## 2025-02-11 DIAGNOSIS — R26.2 DIFFICULTY WALKING: ICD-10-CM

## 2025-02-11 DIAGNOSIS — R53.1 WEAKNESS: ICD-10-CM

## 2025-02-11 DIAGNOSIS — M25.662 KNEE STIFFNESS, LEFT: ICD-10-CM

## 2025-02-11 DIAGNOSIS — Z96.652 AFTERCARE FOLLOWING LEFT KNEE JOINT REPLACEMENT SURGERY: Primary | ICD-10-CM

## 2025-02-11 DIAGNOSIS — Z47.1 AFTERCARE FOLLOWING LEFT KNEE JOINT REPLACEMENT SURGERY: Primary | ICD-10-CM

## 2025-02-11 PROCEDURE — 97110 THERAPEUTIC EXERCISES: CPT | Mod: GP

## 2025-02-11 NOTE — INTERDISCIPLINARY/THERAPY
[address] [facility] [id number] [phone]      Outpatient Physical Therapy Daily Treatment Note    Date of Service: 25  Patient Name: Corrine Holden  : 1955  Referring Provider: David Montano PA-C  Today's Visit Number: 2  Onset Date: 2025  SOC Date: 2025  Certification Period: 2025  HICN: [id number]  Medical Diagnosis listed first. Additional are Treatment Diagnoses:  1. Aftercare following left knee joint replacement surgery  2. Knee stiffness, left  3. Weakness  4. Difficulty walking      Subjective   Subjective Comments: Knee feels stiff today.  Has patient fallen since last visit? No     Have there been any changes in medications? No          Objective      Knee ROM Left:  Flexion: 101 deg  Extension: 0 deg    Treatment:     Therapeutic Exercise: to develop strength, endurance, range of motion and flexibility  Sci-Fit recumbent stepper level 1 x 10 minutes  Instructed in modifications to HEP: standing marches, heel raises, toe raises instead of seated version.  Seated active knee flexion and extension  Manual left knee flexion stretching  Instructed in SLS and sidestepping at counter as new exercises for HEP.        Time Calculation  Start Time: 1250  Stop Time: 1330  Time Calculation (min): 40 min  Therapeutic Interventions (Time Spent in Minutes)  Therapeutic Exercise: 40   Other Charges       Charge ID Procedure Code Description Qty. Modifiers Charge Entry User Diagnosis    [id number] HC THERAPEUTIC PX 1/> AREAS EACH 15 MIN EXERCISES 3 GP Claudio Isaacs, PT                Assessment/Plan   Assessment  Level of Function and Prognosis  Assessment: Patient shows an improvement in left knee flexion mobility.  HEP was progressed today.     Therapy Goals    Short Term Goals  Start goal date: 25 End goal date: 3/7/25   ST Goal 1: At least 110 deg of left knee flexion.  Start goal date: 25 End goal date: 3/7/25   ST Goal 2: Demonstrate ability to  ascend stairs in reciprocal pattern  Start goal date: 2/7/25 End goal date: 3/7/25   ST Goal 3: Demonstrate ability to ambulate with SPC safely.  Long Term Goals  Long term goal(s) met by: 5/8/25  LT Goal 1: At least 120 deg of left knee flexion  LT Goal 2: Demonstrate ability to descend stairs in reciprocal pattern.  LT Goal 3: Report ability to ambulate community distances with least   restrictive AD.  LT Goal 4: Patient will score at least the predicted goal score on the   FOTO functional outcome as a measure of improved functional ability.   LT Goal 5: Report 0/10 left knee pain during daily activities.       Plan  Plan for next treatment comment: Left knee ROM, strength, and gait training.  Treatment duration will be through 5/8/2025

## 2025-02-14 ENCOUNTER — HOSPITAL ENCOUNTER (OUTPATIENT)
Dept: PHYSICAL THERAPY | Facility: HOSPITAL | Age: 70
Setting detail: THERAPIES SERIES
Discharge: STILL ADMITTED OR ADMINISTRATIVE | End: 2025-02-14
Payer: MEDICARE

## 2025-02-14 DIAGNOSIS — Z47.1 AFTERCARE FOLLOWING LEFT KNEE JOINT REPLACEMENT SURGERY: Primary | ICD-10-CM

## 2025-02-14 DIAGNOSIS — R53.1 WEAKNESS: ICD-10-CM

## 2025-02-14 DIAGNOSIS — R26.2 DIFFICULTY WALKING: ICD-10-CM

## 2025-02-14 DIAGNOSIS — Z96.652 AFTERCARE FOLLOWING LEFT KNEE JOINT REPLACEMENT SURGERY: Primary | ICD-10-CM

## 2025-02-14 DIAGNOSIS — M25.662 KNEE STIFFNESS, LEFT: ICD-10-CM

## 2025-02-14 PROCEDURE — 97110 THERAPEUTIC EXERCISES: CPT | Mod: GP

## 2025-02-14 NOTE — INTERDISCIPLINARY/THERAPY
"  [address] [facility] [id number] [phone]      Outpatient Physical Therapy Daily Treatment Note    Date of Service: 25  Patient Name: Corrine Holden  : 1955  Referring Provider: David Montano PA-C  Today's Visit Number: 3  Onset Date: 2025  SOC Date: 2025  Certification Period: 2025  HICN: [id number]  Medical Diagnosis listed first. Additional are Treatment Diagnoses:  1. Aftercare following left knee joint replacement surgery  2. Knee stiffness, left  3. Weakness  4. Difficulty walking      Subjective   Subjective Comments: Knee feels stiff when she sits for any length of time.  Has patient fallen since last visit? No     Have there been any changes in medications? No          Objective    Knee ROM Left:  Flexion: 106 deg  Extension: 0 deg    Treatment:     Therapeutic Exercise: to develop strength, endurance, range of motion and flexibility  Nu-Step level 4 x 10 minutes  2nd step knee flexion loading  4\" step-ups x 8  Manual left knee flexion stretching          Time Calculation  Start Time: 1225  Stop Time: 1300  Time Calculation (min): 35 min  Therapeutic Interventions (Time Spent in Minutes)  Therapeutic Exercise: 35   Other Charges       Charge ID Procedure Code Description Qty. Modifiers Charge Entry User Diagnosis    [id number] HC THERAPEUTIC PX 1/> AREAS EACH 15 MIN EXERCISES 2 GP Claudio Isaacs, PT                Assessment/Plan   Assessment  Level of Function and Prognosis  Assessment: Patient was able to perform 3 step-ups onto a 6\" step today, but it took quite a bit of effort.  She will see if she has anything around her house she can use for a 4\" step to add to her HEP after next visit.  She shows another gain in left knee flexion ROM.     Therapy Goals    Short Term Goals  Start goal date: 25 End goal date: 3/7/25   ST Goal 1: At least 110 deg of left knee flexion.  Start goal date: 25 End goal date: 3/7/25   ST Goal 2: Demonstrate " ability to ascend stairs in reciprocal pattern  Start goal date: 2/7/25 End goal date: 3/7/25   ST Goal 3: Demonstrate ability to ambulate with SPC safely.  Long Term Goals  Long term goal(s) met by: 5/8/25  LT Goal 1: At least 120 deg of left knee flexion  LT Goal 2: Demonstrate ability to descend stairs in reciprocal pattern.  LT Goal 3: Report ability to ambulate community distances with least   restrictive AD.  LT Goal 4: Patient will score at least the predicted goal score on the   FOTO functional outcome as a measure of improved functional ability.   LT Goal 5: Report 0/10 left knee pain during daily activities.       Plan  Plan for next treatment comment: Left knee ROM, strength, and gait training.  Treatment duration will be through 5/8/2025

## 2025-02-19 ENCOUNTER — HOSPITAL ENCOUNTER (OUTPATIENT)
Dept: PHYSICAL THERAPY | Facility: HOSPITAL | Age: 70
Setting detail: THERAPIES SERIES
Discharge: STILL ADMITTED OR ADMINISTRATIVE | End: 2025-02-19
Payer: MEDICARE

## 2025-02-19 DIAGNOSIS — Z47.1 AFTERCARE FOLLOWING LEFT KNEE JOINT REPLACEMENT SURGERY: Primary | ICD-10-CM

## 2025-02-19 DIAGNOSIS — Z96.652 AFTERCARE FOLLOWING LEFT KNEE JOINT REPLACEMENT SURGERY: Primary | ICD-10-CM

## 2025-02-19 DIAGNOSIS — M25.662 KNEE STIFFNESS, LEFT: ICD-10-CM

## 2025-02-19 DIAGNOSIS — R26.2 DIFFICULTY WALKING: ICD-10-CM

## 2025-02-19 DIAGNOSIS — R53.1 WEAKNESS: ICD-10-CM

## 2025-02-19 PROCEDURE — 97110 THERAPEUTIC EXERCISES: CPT | Mod: GP

## 2025-02-19 PROCEDURE — 97140 MANUAL THERAPY 1/> REGIONS: CPT | Mod: GP

## 2025-02-19 NOTE — INTERDISCIPLINARY/THERAPY
"  [address] [facility] [id number] [phone]      Outpatient Physical Therapy Daily Treatment Note    Date of Service: 25  Patient Name: Corrine Holden  : 1955  Referring Provider: David Montano PA-C  Today's Visit Number: 4  Onset Date: 2025  SOC Date: 2025  Certification Period: 2025  HICN: [id number]  Medical Diagnosis listed first. Additional are Treatment Diagnoses:  1. Aftercare following left knee joint replacement surgery  2. Knee stiffness, left  3. Weakness  4. Difficulty walking      Subjective   Subjective Comments: Patient had a f/u at [facility] yesterday, everything looked good, no more f/u's with them needed.  Patient reports that her knee has been feeling better.  Has patient fallen since last visit? No     Have there been any changes in medications? No          Objective    Knee ROM Left:  Flexion: 111 deg  Extension: 0 deg    Treatment:     Therapeutic Exercise: to develop strength, endurance, range of motion and flexibility  Nu-Step level 4 x 11 minutes  2nd step knee flexion loading  4\" step-ups x 8      Manual Therapy:  Joint mobilization, soft tissue mobilization, manual techniques to decrease pain and improve joint and soft tissue mobility.   Manual left knee flexion stretching            Time Calculation  Start Time: 1000  Stop Time: 1040  Time Calculation (min): 40 min  Therapeutic Interventions (Time Spent in Minutes)  Manual Therapy: 10  Therapeutic Exercise: 30   Other Charges       Charge ID Procedure Code Description Qty. Modifiers Charge Entry User Diagnosis    [id number]  THERAPEUTIC PX 1/> AREAS EACH 15 MIN EXERCISES 2 GP Claudio Isaacs, MARK     [id number]  MANUAL THERAPY TQS 1/> REGIONS EACH 15 MINUTES 1 GP Claudio Isaacs, PT                Assessment/Plan   Assessment  Level of Function and Prognosis  Assessment: Patient shows another gain in left knee flexion ROM.  She had a good f/u with her surgeon's LIZETT.  She " is close to being able to perform step-ups onto a standard step as a new strength activity.     Therapy Goals    Short Term Goals  Start goal date: 2/7/25 End goal date: 3/7/25   ST Goal 1: At least 110 deg of left knee flexion.  Start goal date: 2/7/25 End goal date: 3/7/25   ST Goal 2: Demonstrate ability to ascend stairs in reciprocal pattern  Start goal date: 2/7/25 End goal date: 3/7/25   ST Goal 3: Demonstrate ability to ambulate with SPC safely.  Long Term Goals  Long term goal(s) met by: 5/8/25  LT Goal 1: At least 120 deg of left knee flexion  LT Goal 2: Demonstrate ability to descend stairs in reciprocal pattern.  LT Goal 3: Report ability to ambulate community distances with least   restrictive AD.  LT Goal 4: Patient will score at least the predicted goal score on the   FOTO functional outcome as a measure of improved functional ability.   LT Goal 5: Report 0/10 left knee pain during daily activities.       Plan  Plan for next treatment comment: Left knee ROM, strength, and gait training.  Treatment duration will be through 5/8/2025

## 2025-02-21 ENCOUNTER — HOSPITAL ENCOUNTER (OUTPATIENT)
Dept: PHYSICAL THERAPY | Facility: HOSPITAL | Age: 70
Setting detail: THERAPIES SERIES
Discharge: STILL ADMITTED OR ADMINISTRATIVE | End: 2025-02-21
Payer: MEDICARE

## 2025-02-21 DIAGNOSIS — R53.1 WEAKNESS: ICD-10-CM

## 2025-02-21 DIAGNOSIS — R26.2 DIFFICULTY WALKING: ICD-10-CM

## 2025-02-21 DIAGNOSIS — Z96.652 AFTERCARE FOLLOWING LEFT KNEE JOINT REPLACEMENT SURGERY: Primary | ICD-10-CM

## 2025-02-21 DIAGNOSIS — Z47.1 AFTERCARE FOLLOWING LEFT KNEE JOINT REPLACEMENT SURGERY: Primary | ICD-10-CM

## 2025-02-21 DIAGNOSIS — M25.662 KNEE STIFFNESS, LEFT: ICD-10-CM

## 2025-02-21 PROCEDURE — 97110 THERAPEUTIC EXERCISES: CPT | Mod: GP

## 2025-02-21 PROCEDURE — 97140 MANUAL THERAPY 1/> REGIONS: CPT | Mod: GP

## 2025-02-21 NOTE — INTERDISCIPLINARY/THERAPY
[address] [facility] [id number] [phone]      Outpatient Physical Therapy Daily Treatment Note    Date of Service: 25  Patient Name: Corrine Holden  : 1955  Referring Provider: David Montano PA-C  Today's Visit Number: 5  Onset Date: 2025  SOC Date: 2025  Certification Period: 2025  HICN: [id number]  Medical Diagnosis listed first. Additional are Treatment Diagnoses:  1. Aftercare following left knee joint replacement surgery  2. Knee stiffness, left  3. Weakness  4. Difficulty walking      Subjective   Subjective Comments: Patient was able to ascend some stairs at home.  She feels more stiffness in the knee than pain.  Has patient fallen since last visit? No     Have there been any changes in medications? No          Objective    Knee ROM Left:  Flexion: 115 deg  Extension: 0 deg    Treatment:     Therapeutic Exercise: to develop strength, endurance, range of motion and flexibility  Nu-Step level 4 x 12 minutes  2nd step knee flexion loading  // bar activities: marching (cuing for foot placement), leg curls  Seated left knee flexion stretch      Manual Therapy:  Joint mobilization, soft tissue mobilization, manual techniques to decrease pain and improve joint and soft tissue mobility.   Manual left knee flexion stretching            Time Calculation  Start Time: 1305  Stop Time: 1350  Time Calculation (min): 45 min  Therapeutic Interventions (Time Spent in Minutes)  Manual Therapy: 12  Therapeutic Exercise: 33   Other Charges       Charge ID Procedure Code Description Qty. Modifiers Charge Entry User Diagnosis    [id number]  THERAPEUTIC PX 1/> AREAS EACH 15 MIN EXERCISES 2 GP Claudio Isaacs, PT     [id number] HC MANUAL THERAPY TQS 1/> REGIONS EACH 15 MINUTES 1 GP Claudio Isaacs, PT                Assessment/Plan   Assessment  Level of Function and Prognosis  Assessment: Patient had difficutly with the // bar activities today.  She shows an  improvement in left knee flexion ROM.     Therapy Goals    Short Term Goals  Start goal date: 2/7/25 End goal date: 3/7/25   ST Goal 1: At least 110 deg of left knee flexion.  Start goal date: 2/7/25 End goal date: 3/7/25   ST Goal 2: Demonstrate ability to ascend stairs in reciprocal pattern  Start goal date: 2/7/25 End goal date: 3/7/25   ST Goal 3: Demonstrate ability to ambulate with SPC safely.  Long Term Goals  Long term goal(s) met by: 5/8/25  LT Goal 1: At least 120 deg of left knee flexion  LT Goal 2: Demonstrate ability to descend stairs in reciprocal pattern.  LT Goal 3: Report ability to ambulate community distances with least   restrictive AD.  LT Goal 4: Patient will score at least the predicted goal score on the   FOTO functional outcome as a measure of improved functional ability.   LT Goal 5: Report 0/10 left knee pain during daily activities.       Plan  Plan for next treatment comment: Left knee ROM, strength, and gait training.  Treatment duration will be through 5/8/2025

## 2025-02-24 DIAGNOSIS — K21.9 GASTROESOPHAGEAL REFLUX DISEASE WITHOUT ESOPHAGITIS: ICD-10-CM

## 2025-02-24 RX ORDER — OMEPRAZOLE 20 MG/1
20 CAPSULE, DELAYED RELEASE ORAL DAILY
Qty: 90 CAPSULE | Refills: 3 | Status: SHIPPED | OUTPATIENT
Start: 2025-02-24

## 2025-02-24 NOTE — TELEPHONE ENCOUNTER
No care due was identified.  [facility] Embedded Care Due Messages. Reference number: [id number]. 2/24/2025 3:02:21 AM MST

## 2025-02-25 ENCOUNTER — HOSPITAL ENCOUNTER (OUTPATIENT)
Dept: PHYSICAL THERAPY | Facility: HOSPITAL | Age: 70
Setting detail: THERAPIES SERIES
Discharge: STILL ADMITTED OR ADMINISTRATIVE | End: 2025-02-25
Payer: MEDICARE

## 2025-02-25 DIAGNOSIS — Z96.652 AFTERCARE FOLLOWING LEFT KNEE JOINT REPLACEMENT SURGERY: Primary | ICD-10-CM

## 2025-02-25 DIAGNOSIS — R53.1 WEAKNESS: ICD-10-CM

## 2025-02-25 DIAGNOSIS — R26.2 DIFFICULTY WALKING: ICD-10-CM

## 2025-02-25 DIAGNOSIS — Z47.1 AFTERCARE FOLLOWING LEFT KNEE JOINT REPLACEMENT SURGERY: Primary | ICD-10-CM

## 2025-02-25 DIAGNOSIS — M25.662 KNEE STIFFNESS, LEFT: ICD-10-CM

## 2025-02-25 PROCEDURE — 97110 THERAPEUTIC EXERCISES: CPT | Mod: GP

## 2025-02-25 PROCEDURE — 97140 MANUAL THERAPY 1/> REGIONS: CPT | Mod: GP

## 2025-02-25 NOTE — INTERDISCIPLINARY/THERAPY
[address] [facility] [id number] [phone]      Outpatient Physical Therapy Daily Treatment Note    Date of Service: 25  Patient Name: Corrine Holden  : 1955  Referring Provider: David Montano PA-C  Today's Visit Number: 6  Onset Date: 2025  SOC Date: 2025  Certification Period: 2025  HICN: [id number]  Medical Diagnosis listed first. Additional are Treatment Diagnoses:  1. Aftercare following left knee joint replacement surgery  2. Knee stiffness, left  3. Weakness  4. Difficulty walking      Subjective   Subjective Comments: Patient reports that she felt good for the rest of the day and the following day after her last session.  Did too much walking yesterday and feels more stiff today.  Has patient fallen since last visit? No     Have there been any changes in medications? No          Objective    Knee ROM Left:  Flexion at start of session: 105 deg  Flexion: 117 deg  Extension: 0 deg    Treatment:     Therapeutic Exercise: to develop strength, endurance, range of motion and flexibility  Nu-Step level 4 x 12 minutes  2nd step knee flexion loading  // bar activities: marching (cuing for foot placement), leg curls  Seated left knee flexion stretch      Manual Therapy:  Joint mobilization, soft tissue mobilization, manual techniques to decrease pain and improve joint and soft tissue mobility.   Manual left knee flexion stretching            Time Calculation  Start Time: 1200  Stop Time: 1240  Time Calculation (min): 40 min  Therapeutic Interventions (Time Spent in Minutes)  Manual Therapy: 10  Therapeutic Exercise: 30   Other Charges       Charge ID Procedure Code Description Qty. Modifiers Charge Entry User Diagnosis    [id number] HC THERAPEUTIC PX 1/> AREAS EACH 15 MIN EXERCISES 2 GP Claudio Isaacs, PT     [id number] HC MANUAL THERAPY TQS 1/> REGIONS EACH 15 MINUTES 1 GP Claudio Isaacs, PT                Assessment/Plan   Assessment  Level of  Function and Prognosis  Assessment: Patient's left knee flexion ROMat the start of the session, prior to any stretching, was assessed today in order to see where her ROM is at without working on it.  She reports that she feels better for a period of time after her PT sessions.     Therapy Goals    Short Term Goals  Start goal date: 2/7/25 End goal date: 3/7/25   ST Goal 1: At least 110 deg of left knee flexion.  Start goal date: 2/7/25 End goal date: 3/7/25   ST Goal 2: Demonstrate ability to ascend stairs in reciprocal pattern  Start goal date: 2/7/25 End goal date: 3/7/25   ST Goal 3: Demonstrate ability to ambulate with SPC safely.  Long Term Goals  Long term goal(s) met by: 5/8/25  LT Goal 1: At least 120 deg of left knee flexion  LT Goal 2: Demonstrate ability to descend stairs in reciprocal pattern.  LT Goal 3: Report ability to ambulate community distances with least   restrictive AD.  LT Goal 4: Patient will score at least the predicted goal score on the   FOTO functional outcome as a measure of improved functional ability.   LT Goal 5: Report 0/10 left knee pain during daily activities.       Plan  Plan for next treatment comment: Left knee ROM, strength, and gait training.  Treatment duration will be through 5/8/2025

## 2025-02-28 ENCOUNTER — HOSPITAL ENCOUNTER (OUTPATIENT)
Dept: PHYSICAL THERAPY | Facility: HOSPITAL | Age: 70
Setting detail: THERAPIES SERIES
Discharge: STILL ADMITTED OR ADMINISTRATIVE | End: 2025-02-28
Payer: MEDICARE

## 2025-02-28 DIAGNOSIS — M25.662 KNEE STIFFNESS, LEFT: ICD-10-CM

## 2025-02-28 DIAGNOSIS — Z47.1 AFTERCARE FOLLOWING LEFT KNEE JOINT REPLACEMENT SURGERY: Primary | ICD-10-CM

## 2025-02-28 DIAGNOSIS — R53.1 WEAKNESS: ICD-10-CM

## 2025-02-28 DIAGNOSIS — Z96.652 AFTERCARE FOLLOWING LEFT KNEE JOINT REPLACEMENT SURGERY: Primary | ICD-10-CM

## 2025-02-28 DIAGNOSIS — R26.2 DIFFICULTY WALKING: ICD-10-CM

## 2025-02-28 PROCEDURE — 97140 MANUAL THERAPY 1/> REGIONS: CPT | Mod: GP

## 2025-02-28 PROCEDURE — 97110 THERAPEUTIC EXERCISES: CPT | Mod: GP

## 2025-02-28 NOTE — INTERDISCIPLINARY/THERAPY
[address] [facility] [id number] [phone]      Outpatient Physical Therapy Daily Treatment Note    Date of Service: 25  Patient Name: Corrine Holden  : 1955  Referring Provider: David Montano PA-C  Today's Visit Number: 7  Onset Date: 2025  SOC Date: 2025  Certification Period: 2025  HICN: [id number]  Medical Diagnosis listed first. Additional are Treatment Diagnoses:  1. Aftercare following left knee joint replacement surgery  2. Knee stiffness, left  3. Weakness  4. Difficulty walking      Subjective   Subjective Comments: Started riding her exercise bike at home, feels good.  Has patient fallen since last visit? No     Have there been any changes in medications? No          Objective    Knee ROM Left:  Flexion at start of session: 105 deg  Flexion: 117 deg  Extension: 0 deg    Treatment:     Therapeutic Exercise: to develop strength, endurance, range of motion and flexibility  Upright bike x 8 minutes (full revolutions)  2nd step knee flexion loading  // bar activities: marching (cuing for foot placement), leg curls  Seated left knee flexion stretch      Manual Therapy:  Joint mobilization, soft tissue mobilization, manual techniques to decrease pain and improve joint and soft tissue mobility.   Manual left knee flexion stretching              Time Calculation  Start Time: 1130  Stop Time: 1200  Time Calculation (min): 30 min  Therapeutic Interventions (Time Spent in Minutes)  Manual Therapy: 10  Therapeutic Exercise: 20   Other Charges       Charge ID Procedure Code Description Qty. Modifiers Charge Entry User Diagnosis    [id number]  THERAPEUTIC PX 1/> AREAS EACH 15 MIN EXERCISES 1 GP Claudio Isaacs, MARK     [id number]  MANUAL THERAPY TQS 1/> REGIONS EACH 15 MINUTES 1 GP Claudio Isaacs, PT                Assessment/Plan   Assessment  Level of Function and Prognosis  Assessment: Patient was able to perform full revolutions on the upright  stationary bike today.  Strengthening and endurance are becoming more of the focus than the ROM, since her ROM has been improving.     Therapy Goals    Short Term Goals  Start goal date: 2/7/25 End goal date: 3/7/25   ST Goal 1: At least 110 deg of left knee flexion.  Start goal date: 2/7/25 End goal date: 3/7/25   ST Goal 2: Demonstrate ability to ascend stairs in reciprocal pattern  Start goal date: 2/7/25 End goal date: 3/7/25   ST Goal 3: Demonstrate ability to ambulate with SPC safely.  Long Term Goals  Long term goal(s) met by: 5/8/25  LT Goal 1: At least 120 deg of left knee flexion  LT Goal 2: Demonstrate ability to descend stairs in reciprocal pattern.  LT Goal 3: Report ability to ambulate community distances with least   restrictive AD.  LT Goal 4: Patient will score at least the predicted goal score on the   FOTO functional outcome as a measure of improved functional ability.   LT Goal 5: Report 0/10 left knee pain during daily activities.       Plan  Plan for next treatment comment: Left knee ROM, strength, and gait training.  Treatment duration will be through 5/8/2025

## 2025-03-04 ENCOUNTER — HOSPITAL ENCOUNTER (OUTPATIENT)
Dept: PHYSICAL THERAPY | Facility: HOSPITAL | Age: 70
Setting detail: THERAPIES SERIES
Discharge: STILL ADMITTED OR ADMINISTRATIVE | End: 2025-03-04
Payer: MEDICARE

## 2025-03-04 DIAGNOSIS — M25.662 KNEE STIFFNESS, LEFT: ICD-10-CM

## 2025-03-04 DIAGNOSIS — Z96.652 AFTERCARE FOLLOWING LEFT KNEE JOINT REPLACEMENT SURGERY: Primary | ICD-10-CM

## 2025-03-04 DIAGNOSIS — Z47.1 AFTERCARE FOLLOWING LEFT KNEE JOINT REPLACEMENT SURGERY: Primary | ICD-10-CM

## 2025-03-04 DIAGNOSIS — R53.1 WEAKNESS: ICD-10-CM

## 2025-03-04 DIAGNOSIS — R26.2 DIFFICULTY WALKING: ICD-10-CM

## 2025-03-04 PROCEDURE — 97110 THERAPEUTIC EXERCISES: CPT | Mod: GP

## 2025-03-04 PROCEDURE — 97140 MANUAL THERAPY 1/> REGIONS: CPT | Mod: GP

## 2025-03-04 NOTE — INTERDISCIPLINARY/THERAPY
"  [address] [facility] [id number] [phone]      Outpatient Physical Therapy Daily Treatment Note    Date of Service: 3/04/25  Patient Name: Corrine Holden  : 1955  Referring Provider: David Montano PA-C  Today's Visit Number: 8  Onset Date: 2025  SOC Date: 2025  Certification Period: 2025  HICN: [id number]  Medical Diagnosis listed first. Additional are Treatment Diagnoses:  1. Aftercare following left knee joint replacement surgery  2. Knee stiffness, left  3. Weakness  4. Difficulty walking      Subjective   Subjective Comments: Patient reports that she didn't sleep well last night and the left knee is more swollen today.  She can't relate it to anything different she did yesterday.  Has patient fallen since last visit? No     Have there been any changes in medications? No          Objective    Knee ROM Left:  Flexion at start of session: 105 deg  Flexion: 119 deg  Extension: 0 deg    Treatment:     Therapeutic Exercise: to develop strength, endurance, range of motion and flexibility  Upright bike x 11 minutes (three holes visible for seat position)  // bar activities: marching, leg curls, 4\" step-ups  Seated LAQ  Seated left knee flexion stretch      Manual Therapy:  Joint mobilization, soft tissue mobilization, manual techniques to decrease pain and improve joint and soft tissue mobility.   Manual left knee flexion stretching          Time Calculation  Start Time: 1155  Stop Time: 1240  Time Calculation (min): 45 min  Therapeutic Interventions (Time Spent in Minutes)  Manual Therapy: 15  Therapeutic Exercise: 25   Other Charges       Charge ID Procedure Code Description Qty. Modifiers Charge Entry User Diagnosis    [id number] HC THERAPEUTIC PX 1/> AREAS EACH 15 MIN EXERCISES 2 GP Claudio Isaacs, PT     [id number] HC MANUAL THERAPY TQS 1/> REGIONS EACH 15 MINUTES 1 GP Claudio Isaacs, PT                Assessment/Plan   Assessment  Level of Function " and Prognosis  Assessment: Patient expresses some frustration on the stiffness she feels in the left knee when she first gets up to move from sitting.  Her knee flexion ROM at the start of the session didn't change from last measurement, but her end of the session measurement did progress slightly.  Left LE weakness is more of her deficit at tis point than her knee mobility.     Therapy Goals    Short Term Goals  Start goal date: 2/7/25 End goal date: 3/7/25   ST Goal 1: At least 110 deg of left knee flexion.  Start goal date: 2/7/25 End goal date: 3/7/25   ST Goal 2: Demonstrate ability to ascend stairs in reciprocal pattern  Start goal date: 2/7/25 End goal date: 3/7/25   ST Goal 3: Demonstrate ability to ambulate with SPC safely.  Long Term Goals  Long term goal(s) met by: 5/8/25  LT Goal 1: At least 120 deg of left knee flexion  LT Goal 2: Demonstrate ability to descend stairs in reciprocal pattern.  LT Goal 3: Report ability to ambulate community distances with least   restrictive AD.  LT Goal 4: Patient will score at least the predicted goal score on the   FOTO functional outcome as a measure of improved functional ability.   LT Goal 5: Report 0/10 left knee pain during daily activities.       Plan  Plan for next treatment comment: Left knee ROM, strength, and gait training.  Treatment duration will be through 5/8/2025

## 2025-03-06 ENCOUNTER — HOSPITAL ENCOUNTER (OUTPATIENT)
Dept: PHYSICAL THERAPY | Facility: HOSPITAL | Age: 70
Setting detail: THERAPIES SERIES
Discharge: STILL ADMITTED OR ADMINISTRATIVE | End: 2025-03-06
Payer: MEDICARE

## 2025-03-06 DIAGNOSIS — R53.1 WEAKNESS: ICD-10-CM

## 2025-03-06 DIAGNOSIS — R26.2 DIFFICULTY WALKING: ICD-10-CM

## 2025-03-06 DIAGNOSIS — Z47.1 AFTERCARE FOLLOWING LEFT KNEE JOINT REPLACEMENT SURGERY: Primary | ICD-10-CM

## 2025-03-06 DIAGNOSIS — Z96.652 AFTERCARE FOLLOWING LEFT KNEE JOINT REPLACEMENT SURGERY: Primary | ICD-10-CM

## 2025-03-06 DIAGNOSIS — M25.662 KNEE STIFFNESS, LEFT: ICD-10-CM

## 2025-03-06 PROCEDURE — 97140 MANUAL THERAPY 1/> REGIONS: CPT | Mod: GP

## 2025-03-06 PROCEDURE — 97110 THERAPEUTIC EXERCISES: CPT | Mod: GP

## 2025-03-06 NOTE — INTERDISCIPLINARY/THERAPY
[address] [facility] [id number] [phone]      Outpatient Physical Therapy Daily Treatment Note    Date of Service: 3/06/25  Patient Name: Corrine Holden  : 1955  Referring Provider: David Montano PA-C  Today's Visit Number: 9  Onset Date: 2025  SOC Date: 2025  Certification Period: 2025  HICN: [id number]  Medical Diagnosis listed first. Additional are Treatment Diagnoses:  1. Aftercare following left knee joint replacement surgery  2. Knee stiffness, left  3. Weakness  4. Difficulty walking      Subjective   Subjective Comments: Patient reports she is much better today, got some good sleep last night.  Has patient fallen since last visit? No     Have there been any changes in medications? No  Pain Assessment Scale  Pain Scale: 0-10  Pain Score: 2-Notice pain, does not interfere with activities  Patient's Stated Pain Goal: 0-No pain  Has Pain Adversely Affected Your Usual Activity, Sleep, Mood or Stress in the Last 24 Hours?: No       Objective    Knee ROM Left:  Flexion at start of session: 104 deg  Flexion: 116 deg  Extension: 0 deg    Treatment:     Therapeutic Exercise: to develop strength, endurance, range of motion and flexibility  Upright bike x 11 minutes (three holes visible for seat position) at 30W  Quad set x10   Sit to stand from 22 inches x10  6 in step up x10  // bar march   Seated LAQ 3# x15      Manual Therapy:  Joint mobilization, soft tissue mobilization, manual techniques to decrease pain and improve joint and soft tissue mobility  Manual left knee flexion stretching  Gentle posterior tibial glides into flexion- reaches 115  STM/MFR adductors, VL            Time Calculation  Start Time: 1140  Stop Time: 1222  Time Calculation (min): 42 min  Therapeutic Interventions (Time Spent in Minutes)  Manual Therapy: 12  Therapeutic Exercise: 30   Other Charges       Charge ID Procedure Code Description Qty. Modifiers Charge Entry User Diagnosis    [id number]  [id number]  THERAPEUTIC PX 1/> AREAS EACH 15 MIN EXERCISES 2 GP Tj Whitehead PT     [id number]  MANUAL THERAPY TQS 1/> REGIONS EACH 15 MINUTES 1 GP Tj Whitehead PT                Assessment/Plan   Assessment  Level of Function and Prognosis  Assessment: Patient is progressing well. ROM is good today following manual interventions, tolerates exercise well. Will continue to benefit from emphasis on strength, maintain flexion ROM.     Therapy Goals    Short Term Goals  Start goal date: 2/7/25 End goal date: 3/7/25   ST Goal 1: At least 110 deg of left knee flexion.  Start goal date: 2/7/25 End goal date: 3/7/25   ST Goal 2: Demonstrate ability to ascend stairs in reciprocal pattern  Start goal date: 2/7/25 End goal date: 3/7/25   ST Goal 3: Demonstrate ability to ambulate with SPC safely.  Long Term Goals  Long term goal(s) met by: 5/8/25  LT Goal 1: At least 120 deg of left knee flexion  LT Goal 2: Demonstrate ability to descend stairs in reciprocal pattern.  LT Goal 3: Report ability to ambulate community distances with least   restrictive AD.  LT Goal 4: Patient will score at least the predicted goal score on the   FOTO functional outcome as a measure of improved functional ability.   LT Goal 5: Report 0/10 left knee pain during daily activities.       Plan  Plan for next treatment comment: Continue with emphasis on left knee strength, mobility, and gait  Treatment duration will be through 5/8/2025

## 2025-03-10 ENCOUNTER — OFFICE VISIT (OUTPATIENT)
Dept: INTERNAL MEDICINE | Facility: CLINIC | Age: 70
End: 2025-03-10
Payer: MEDICARE

## 2025-03-10 ENCOUNTER — TELEPHONE (OUTPATIENT)
Dept: INTERNAL MEDICINE | Facility: CLINIC | Age: 70
End: 2025-03-10

## 2025-03-10 VITALS
OXYGEN SATURATION: 94 % | SYSTOLIC BLOOD PRESSURE: 136 MMHG | RESPIRATION RATE: 18 BRPM | DIASTOLIC BLOOD PRESSURE: 88 MMHG | TEMPERATURE: 98.6 F | HEART RATE: 83 BPM

## 2025-03-10 DIAGNOSIS — R73.01 IMPAIRED FASTING BLOOD SUGAR: ICD-10-CM

## 2025-03-10 DIAGNOSIS — K21.9 GASTROESOPHAGEAL REFLUX DISEASE WITHOUT ESOPHAGITIS: ICD-10-CM

## 2025-03-10 DIAGNOSIS — E03.9 HYPOTHYROIDISM, UNSPECIFIED TYPE: ICD-10-CM

## 2025-03-10 DIAGNOSIS — R03.0 ELEVATED BLOOD PRESSURE READING: ICD-10-CM

## 2025-03-10 DIAGNOSIS — R06.02 SOB (SHORTNESS OF BREATH): ICD-10-CM

## 2025-03-10 DIAGNOSIS — M62.838 MUSCLE SPASM: ICD-10-CM

## 2025-03-10 DIAGNOSIS — D36.9 TUBULAR ADENOMA: ICD-10-CM

## 2025-03-10 DIAGNOSIS — I35.8 AORTIC VALVE SCLEROSIS: ICD-10-CM

## 2025-03-10 DIAGNOSIS — Z00.00 MEDICARE ANNUAL WELLNESS VISIT, SUBSEQUENT: Primary | ICD-10-CM

## 2025-03-10 DIAGNOSIS — Z13.820 SCREENING FOR OSTEOPOROSIS: ICD-10-CM

## 2025-03-10 DIAGNOSIS — E78.5 BORDERLINE HYPERLIPIDEMIA: ICD-10-CM

## 2025-03-10 DIAGNOSIS — Z78.0 ASYMPTOMATIC POSTMENOPAUSAL STATE: ICD-10-CM

## 2025-03-10 DIAGNOSIS — Z12.11 COLON CANCER SCREENING: ICD-10-CM

## 2025-03-10 PROCEDURE — G0439 PPPS, SUBSEQ VISIT: HCPCS | Performed by: INTERNAL MEDICINE

## 2025-03-10 PROCEDURE — 99214 OFFICE O/P EST MOD 30 MIN: CPT | Mod: 25 | Performed by: INTERNAL MEDICINE

## 2025-03-10 PROCEDURE — G0463 HOSPITAL OUTPT CLINIC VISIT: HCPCS | Mod: PO | Performed by: INTERNAL MEDICINE

## 2025-03-10 RX ORDER — OMEPRAZOLE 20 MG/1
20 CAPSULE, DELAYED RELEASE ORAL 2 TIMES DAILY
Qty: 180 CAPSULE | Refills: 0 | Status: SHIPPED | OUTPATIENT
Start: 2025-03-10

## 2025-03-10 RX ORDER — OMEPRAZOLE 20 MG/1
20 CAPSULE, DELAYED RELEASE ORAL DAILY
Status: CANCELLED | OUTPATIENT
Start: 2025-03-10

## 2025-03-10 RX ORDER — DIAZEPAM 5 MG/1
5 TABLET ORAL EVERY 12 HOURS PRN
Status: CANCELLED | OUTPATIENT
Start: 2025-03-10

## 2025-03-10 RX ORDER — ALBUTEROL SULFATE 90 UG/1
2 INHALANT RESPIRATORY (INHALATION) AS NEEDED
COMMUNITY
Start: 2024-12-11 | End: 2025-03-10 | Stop reason: SDUPTHER

## 2025-03-10 RX ORDER — ALBUTEROL SULFATE 90 UG/1
2 INHALANT RESPIRATORY (INHALATION) EVERY 6 HOURS PRN
Qty: 18 G | Refills: 2 | Status: SHIPPED | OUTPATIENT
Start: 2025-03-10

## 2025-03-10 RX ORDER — DIAZEPAM 5 MG/1
5 TABLET ORAL EVERY 12 HOURS PRN
Qty: 30 TABLET | Refills: 0 | Status: SHIPPED | OUTPATIENT
Start: 2025-03-10

## 2025-03-10 RX ORDER — LEVOTHYROXINE SODIUM 125 UG/1
125 TABLET ORAL DAILY
Qty: 90 TABLET | Refills: 3 | Status: SHIPPED | OUTPATIENT
Start: 2025-03-10 | End: 2026-03-10

## 2025-03-10 RX ORDER — ALBUTEROL SULFATE 90 UG/1
2 INHALANT RESPIRATORY (INHALATION) AS NEEDED
Qty: 18 G | Refills: 2 | Status: SHIPPED | OUTPATIENT
Start: 2025-03-10 | End: 2025-03-10 | Stop reason: SDUPTHER

## 2025-03-10 ASSESSMENT — PATIENT HEALTH QUESTIONNAIRE - PHQ9
3. TROUBLE FALLING OR STAYING ASLEEP: NOT AT ALL
4. FEELING TIRED OR HAVING LITTLE ENERGY: SEVERAL DAYS
SUM OF ALL RESPONSES TO PHQ9 QUESTIONS 1 & 2: 4
2. FEELING DOWN, DEPRESSED OR HOPELESS: MORE THAN HALF THE DAYS
1. LITTLE INTEREST OR PLEASURE IN DOING THINGS: MORE THAN HALF THE DAYS
5. POOR APPETITE OR OVEREATING: SEVERAL DAYS

## 2025-03-10 ASSESSMENT — PAIN SCALES - GENERAL: PAINLEVEL_OUTOF10: 4

## 2025-03-10 NOTE — TELEPHONE ENCOUNTER
No care due was identified.  [facility] Embedded Care Due Messages. Reference number: [id number]. 3/10/2025 3:11:16 PM MDT

## 2025-03-10 NOTE — PATIENT INSTRUCTIONS
Let me know if blood pressure trending high    Stop aspirin    ----------------------------------------------------------      Preventative Care for Women    Patient’s Personalized Health Advice and 5-10 Year Plan:      Physical Activity:   Physical activity can help you maintain a healthy weight, prevent or control illness, reduce stress, and sleep better. It can also help you improve your balance to avoid falls. Try to build up to and maintain a total of 30 minutes of activity each day. If you are able, try walking, doing yard or housework, and taking the stairs more often. You can also strengthen your muscles with exercises done while sitting or lying down.   Emotional Health:   Feeling “down in the dumps” or anxious every now and then is a natural part of life. If this feeling lasts for a few weeks or more, talk with me as soon as possible. It could be a sign of a problem that needs treatment. There are many types of treatment available.   Falls:   You can reduce your risk of falling by making changes in your home. Remove items that may cause tripping, improve lighting, and consider installing grab bars.   Talk with me if you have problems with balance and walking. To prevent falls, you may need your vision, hearing, or blood pressure checked. Exercises to improve your strength and balance, or using a cane or walker, may help.   Review your medicines with me at every visit because some can affect balance. Please be sure to let me know if you fall or are fearful you may fall.   Pain:   We all have aches and pains at times, but chronic pain can change how you feel and live every day. Please talk with me about any symptoms of chronic pain so that we can determine how best to treat.   Sleep:   Getting a good night’s sleep is vital to your health and well-being and can help prevent or manage health problems. Often, sleep can be improved by changing behaviors, including when you go to bed and what you do before bed.  Sleep apnea can cause problems such as struggling to stay awake during the day. Please let me know if you would like to learn more about improving your sleep and/or think you may have sleep apnea.   Home Safety:   You may benefit from a safety check to identify hazards in your home.   Seat Belt:   Please remember to wear a seat belt when driving or riding in a vehicle. It is one of the most important things you can do to stay safe in a car.   Nutrition:   Remember to eat plenty of fruits, vegetables, whole grains, and dairy. Drink at least 64 ounces (8 full glasses) of water a day unless you have been advised to limit fluids.   Alcohol:   Alcohol can have a greater effect on older people, who may feel its effects at a lower amount. Older people should limit alcoholic drinks (no more than one a day for women and no more than two a day for men). Please let me know if alcohol use becomes a problem.   Tobacco:   Not smoking or using other forms of tobacco is one of the most important things you can do for your health.      Additional Support:   Sometimes it can be challenging to manage all aspects of daily life. Finding the right support can help you maintain or improve your health and independence. Please let me know if you would like to talk further about finding resources to assist you.   Advance Directives:   There may come a time when medical decisions need to be made on your behalf. Please talk with your family and with me about your wishes. It is important to provide information about your decisions and any formal advance directives for your medical record.    SCREENINGS:  What Coverage & Frequency Why Previously Done   EKG One-time covered benefit during Welcome To Medicare Physical (IPPE) Check heart rate and rhythm for baseline. Date of Last ECG Order    Last order of ECG 12-LEAD was found on 2/5/2025 from Hospital Encounter on 2/5/2025            Lung Cancer Screening via Low Dos Chest CT Medicare covers  annually if history of smoking with no signs or symptoms of lung cancer.  Screening for lung cancer.  Provider will need to advise of the importance of quitting/avoiding smoking and provide smoking cessation services. Date of Last Chest CT Order      No order of CT CHEST LUNG CANCER LOW DOSE SCREENING is found.             AAA Screen Ultrasound for Abdominal Aortic Aneurysm One-time benefit with Welcome to Medicare Visit for men age 65-75 with history of smoking or positive family history of AAA or has smoked 100 or more cigarettes in his lifetime.   For early detection of abdominal aortic aneurysm. Date of Last AAA US Ordered      No order of US ABDOMINAL AORTA ANEURYSM SCREENING is found.             Osteoporosis Screening Every two years after the age of 65 for those at risk. Women who are estrogen deficient, have vertebral abnormalities on x- ray, or have received daily steroid for more than 3 months have increased risk for bone loss. Date of Last Dexa Ordered      Last order of DXA BONE DENSITY was found on 3/10/2025 from Office Visit on 3/10/2025             Heart Disease Screening  Covers blood tests for all Medicare beneficiaries every 5 years to test for: Cholesterol Levels To check for high cholesterol, which can increase your risk of heart attack, stroke and other problems. Most Recent Cholesterol Results    Lab Results   Component Value Date    CHOL 233 (H) 02/10/2025      Lab Results   Component Value Date    HDL 64 02/10/2025        Lab Results   Component Value Date    LDLCALC 140 (H) 02/10/2025        Lab Results   Component Value Date    TRIG 145 02/10/2025      Colon cancer screening Recommend initiating colon cancer screening at age 45. Medicare will reimburse colonoscopy every 10 years and more frequently if at increased risk for developing colon cancer.? Medicare will cover FIT or 3 send home FOBTs annually (age 45 or greater) or Cologuard every 3 years (between the ages of 45-85).  Ordered as a  "screening to detect colon cancer before there are symptoms, so it can be more effectively treated. Last Colon Screening(s) Ordered     No order of COLOGUARD CANCER SCREENING KIT is found.          Last Referral To General Surgery    Last order of AMB REFERRAL TO GENERAL SURGERY was found on 5/29/2019 from Consult on 5/29/2019         Last Referral to Gastroenterology    No order of AMB REFERRAL TO GASTROENTEROLOGY is found.        Diabetes screening Once yearly for patients previously tested but not diagnosed with pre-diabetes or patients who were never tested. Twice yearly for patients with pre-diabetes.  To identify diabetes that may not have symptoms. Last Fasting Glucose and A1C    No results found for: \"GLUF\"    Lab Results   Component Value Date    HGBA1C 5.9 02/10/2025          Breast Cancer Screening Ages 35-39 one baseline screen. Ages 40+ annual screening. To detect breast cancer, for early treatment. Date Last Mammogram Ordered    Last order of BI SCREENING MAMMOGRAM BILATERAL was found on 5/11/2017 from Conversion Encounter on 5/11/2017         Last order of BI SCREENING MAMMOGRAM BILATERAL W TRISTAN was found on 7/18/2024 from Hospital Encounter on 7/18/2024                Cervical Cancer Screening At age 65+, not recommended if previous PAP screenings were negative.  Once every 5 years for females ages 30-65 without symptoms.. To detect cervical early, so that it can be more effectively treated. Date of Last Pap Smear Order        Sexually Transmitted Disease Screening Consider if sexually active and at risk for STI. Chlamydia, gonorrhea and syphilis annually for woman. Syphilis annually for men. To detect infections that may not have symptoms, to prevent complications.     Testing for Hepatitis C Covered Medicare benefit once per lifetime - based on risk factors. If born between 4561-[birth year]70, had a blood transfusion before 1992, or considered high risk due to history of drug abuse. To detect hepatitis C " "infection that may have no symptoms, to prevent complications. Last Hepatitis C Screening Result    No results found for: \"HEPCAB\"   Glaucoma Screening  Covered benefit annually if diabetic, family history of glaucoma,  Americans age 50+,  Americans age 65+. Screen for glaucoma and prevent complications.  Date of Last Referral Placed to Ophthalmology    No order of AMB REFERRAL TO OPHTHALMOLOGY is found.         Date of Last Referral Placed to Optometry    No order of AMB REFERRAL TO OPTOMETRY is found.                   Health Maintenance:    Health Maintenance Due   Topic Date Due    Hepatitis C Screening  Never done    RSV 60 Years and Older or Pregnant (1 - Risk 60-74 years 1-dose series) Never done    COVID-19 Vaccine (1 - 2024-25 season) Never done    Influenza Vaccine (1) 09/01/2024    Depression Screening  02/12/2025             IMMUNIZATIONS:   Immunizations you have received:   Immunization History   Administered Date(s) Administered    Fluarix/Flulaval/Fluzone Quad 11/13/2019, 10/30/2020    Flulaval/Fluzone/Afluria Quad 10/01/2016    Pneumococcal Conjugate 13-Valent 10/25/2021    Pneumococcal Polysaccharide - PPSV23 01/27/2020    Shingrix IM 10/19/2018, 12/20/2018, 01/09/2019    Tdap 08/17/2015    Zoster SQ 01/01/2014       See grid below for the immunization recommendations.    What Age How Often Why   Flu Vaccine   Age 18 and older Every year Protects against flu virus and its complications.   Pneumonia Vaccine Age 65+  < 65 based on risk factors. Medicare covers an initial pneumonia vaccine and a booster pneumonia vaccine at least one year after the first vaccine. Protects against common types of pneumonia.   Shingles (Zoster) Vaccine Age 50 Not a covered benefit under Medicare.  Once in your lifetime Protects against shingles.   Tetanus Vaccine Age 18 and older Not a covered benefit under Medicare.  Every 10 years (at least one-time Tdap) Protects against tetanus infection.   Hepatitis " B Vaccine Age 18 and older based on risk factors Covered if you are at high risk for hepatitis.  Your risk increases if you have End Stage Renal Disease (ESRD), diabetes, living with someone who has Hep B, or if health care worker. Protects against hepatitis, which can cause illness and complications.

## 2025-03-10 NOTE — PROGRESS NOTES
Subjective     Corrine Holden is a 70 y.o. female who presents for Subsequent Medicare Wellness Exam (S-MWE)      Corrine is here for follow-up and for Medicare wellness.    She had her left total knee arthroplasty January 8.  Her recovery is going a little slower than she would like but she was deconditioned going into the surgery as she had multiple surgeries on her ankle prior to this.  She did home health for 4 weeks and now is doing outpatient physical therapy.  She is taking 600 mg of ibuprofen in the morning and 400 mg later in the day.  Also taking 3000 mg of Tylenol daily.  She is no longer on any of the narcotic pain medication.  She is requesting refill of her chronic diazepam.    She was on a baby aspirin twice daily for DVT prophylaxis.  She has continued that and wonders if she should stay on it.    Her sleep was disrupted partly after her surgery because of a flare of left hip bursitis.  She did end up getting a steroid injection which has been helpful.    On February 5, she went to the ER with acute shortness of breath.  She had tried her inhaler at home which did not help.  She tried to burp to relieve the pressure which did not help.  She eventually went to the emergency room.  Her lab work including BNP and troponin were normal.  CBC CMP UA also normal.  CT angio chest was normal.  Blood pressure has been mildly elevated around the time of that episode but she has not had any further episodes.  She has continued to check her blood pressure at home and they are bouncing around.  Some of them look good in the 120s to 130s but other days she had systolics in the 140s to 150s.    No changes in her family history.  No tobacco, alcohol or drug use.    Screenings  Medicare Health Status Self-Assessment  General Health  Have you been hospitalized since we last saw you?: No  Have you seen a health care provider outside our clinic?: Yes  In general, how would you say your health is?: Good  In general, how  satisfied are you with your life?: Good  How would you describe the condition of your mouth and teeth?: Good  Do you go to the dentist regularly?: Yes  Do you feel tired during the daytime?: No  Do you or friends or family have any concerns about memory?: No  Do you have any problems with your hearing?: No  Are you on opioids for pain?: No  Do you have an addiction problem?: No  Do you have trouble managing your anger?: No    Safety and ADLs  Safety and ADLs  Do you always fasten your seat belt when you are in the car?: Yes  Do you know where to locate and properly use a first aid kit and fire extinguisher in case of an emergency?: Unwilling or unable to answer  Does your home have rugs in the walkways?: Unwilling or unable to answer  Does your home have grab bars in the bathroom?: Unwilling or unable to answer  Does your home have handrails on the stairs?: Unwilling or unable to answer  Does your home have good lighting?: Unwilling or unable to answer  Do you struggle to care for yourself or your home?: Unwilling or unable to answer       Diet and Exercise  Diet and Exercise  Do you follow a special diet at home?: Yes  What special diet do you follow? : Low Carbs    Advance Care Planning  Advance Care Planning  Would you like information on advance care planning such as living will?: Already in place    Depression Screening  Over the past 2 weeks, how often have you been bothered by any of the following problems?  Little interest or pleasure in doing things: More than half the days  Feeling down, depressed, or hopeless: More than half the days  Patient Health Questionnaire-2 Score: 4  Over the past 2 weeks, how often have you been bothered by any of the following problems?  Trouble falling or staying asleep, or sleeping too much: Not at all  Feeling tired or having little energy: Several days  Poor appetite or overeating: Several days           Fall Risk Assessment  Get Up and Go  Get Up and Go Score: Mild: Slow  "(greater than 11 sec with good balance)    Durable Medical Equipment  Durable Medical Equipment  Durable Medical Equipment: Cane    Patient Care Team:  Becka Joy MD as PCP - General (Internal Medicine)        SLUMS (if blank, screening not completed)                            Mini-Mental Exam (if blank, screening not completed)                                Comprehensive Medical and Social History  Past Medical History:   Diagnosis Date    GERD (gastroesophageal reflux disease)     H. pylori infection 2009    Hypothyroidism     Injury of back     STATES, \"THIS IS REASON FOR MRI\"    Microscopic hematuria     SHANA (obstructive sleep apnea)     USES CPAP    Osteoarthritis     Palpitations     Respiratory disease     Supraventricular tachycardia (CMS/HCC)     STATES, \"COFFEE RELATED\", VERY INTERMITTENT    Tinnitus      Past Surgical History:   Procedure Laterality Date    CARDIAC ELECTROPHYSIOLOGY STUDY AND ABLATION  01/01/2007    attempted ablation for SVT    CARPAL TUNNEL RELEASE Bilateral 06/26/2020    Procedure: LEFT ENDOSCOPIC CARPAL TUNNEL RELEASE, RIGHT OPEN CARPAL TUNNEL;  Surgeon: Mendez Sun MD;  Location: [facility] OR;  Service: Orthopedics;  Laterality: Bilateral;    COLONOSCOPY  01/21/2015    cecal polyp-tubular adenoma-recommend repeat 5 years    COLONOSCOPY N/A 06/22/2020    repeat 5 years - adenomatous polyp    DILATION AND CURETTAGE OF UTERUS  01/01/2006    ENDOMETRIAL BIOPSY  01/01/2012    pt reports negative    ESOPHAGOGASTRODUODENOSCOPY  01/01/2009    H pylori    ESOPHAGOGASTRODUODENOSCOPY N/A 06/22/2020    Procedure: ESOPHAGOGASTRODUODENOSCOPY;  Surgeon: Julian Faulkner MD;  Location: [facility] End[facility];  Service: Endoscopy;  Laterality: N/A;    KNEE ARTHROPLASTY Right 04/01/2015    Dr Placido BRICE    KNEE ARTHROPLASTY Left 01/08/2025    TOTAL ANKLE ARTHROPLASTY Left 10/03/2022    Dr. Guerra, with talonavicular and subtalar fusion    TOTAL ANKLE ARTHROPLASTY Left 02/20/2023    total revision of " "previous surgery, fusion of non-unions    TOTAL ANKLE ARTHROPLASTY Left 11/13/2023    w/ fusion to foot joints    VAGINAL HYSTERECTOMY  12/16/2013    with BSO, fibroids     Social History     Socioeconomic History    Marital status: Single   Tobacco Use    Smoking status: Never    Smokeless tobacco: Never   Vaping Use    Vaping status: Never Used   Substance and Sexual Activity    Alcohol use: No    Drug use: No    Sexual activity: Defer     Comment: declines to answer     Social Drivers of Health     Tobacco Use: Low Risk  (3/18/2025)    Patient History     Smoking Tobacco Use: Never     Smokeless Tobacco Use: Never   Transportation Needs: No Transportation Needs (2/3/2025)    OASIS : Transportation     Lack of Transportation (Medical): No     Lack of Transportation (Non-Medical): No     Patient Unable or Declines to Respond: No   Social Connections: Feeling Socially Integrated (2/3/2025)    OASIS : Social Isolation     Frequency of experiencing loneliness or isolation: Never   Depression: Not at risk (2/7/2022)    PHQ-2     PHQ-2 Score: 0     Family History   Problem Relation Age of Onset    Cancer Mother 84        Bladder    Osteoporosis Mother     Hypertension Mother     Colon cancer Father 65    Kidney cancer Father 77    Other Father's Brother         sudden cardiac death, 60s    Heart disease Maternal Grandfather     Heart attack Maternal Grandfather 59    Cancer Paternal Grandfather      Allergies   Allergen Reactions    Bee Venom Protein (Honey Bee) Swelling    Hydrocodone      Other Reaction(s): VOMITING, \"LOOPY\" TOLERATES OXYCODONE (SNOMED-CT: null)    No Known Drug Allergies     Tizanidine      Current Outpatient Medications   Medication Sig Dispense Refill    aspirin 81 mg chewable tablet Take 1 tablet (81 mg total) by mouth 2 (two) times a day      ibuprofen (ADVIL,MOTRIN) 200 mg tablet Take 3 tablets (600 mg total) by mouth every 6 (six) hours as needed for pain scale 1-3/10      CRANBERRY " ORAL Take 500 mg by mouth daily.      magnesium hydroxide (MILK OF MAGNESIA) 400 mg/5 mL suspension Take 30 mL by mouth daily as needed (constipation)      ketorolac (TORADOL) 10 mg tablet Take 1 tablet (10 mg total) by mouth every 8 (eight) hours as needed for pain scale 8-10/10 for up to 5 days 15 tablet 0    calc/FA/B/D3/E/bioflav/soybean (CALCI-MAX ORAL)       cholecalciferol, vitamin D3, 25 mcg (1,000 unit) tablet Take 2 tablets (2,000 Units total) by mouth 2 (two) times a day      ascorbic acid, vitamin C, (ascorbic acid with vane hips) 500 mg tablet Take 2 tablets (1,000 mg total) by mouth daily      methylPREDNISolone (Medrol, Maxi,) 4 mg tablet follow package directions 21 tablet 0    vit C/E/Zn/coppr/lutein/zeaxan (OCUVITE LUTEIN AND ZEAXANTHIN ORAL) PreserVision AREDS-2      zinc 50 mg tablet Take 50 mg by mouth daily      diclofenac sodium (VOLTAREN) 1 % gel APPLY 2 GRAMS TO THE AFFECTED AREA(S) 4 TIMES PER DAY AS NEEDED 300 g 2    fluticasone propionate (FLONASE) 50 mcg/actuation nasal spray USE 2 SPRAYS IN EACH NOSTRIL DAILY 48 g 2    krill-om-3-dha-epa-phospho-ast [id number] mg capsule Take 1 capsule by mouth daily      multivitamin (THERAGRAN) tablet tablet 1 Every Day      acetaminophen (TYLENOL EXTRA STRENGTH) 500 mg tablet Take 2 tablets (1,000 mg total) by mouth 2 (two) times a day Indications: pain      levothyroxine (SYNTHROID, LEVOTHROID) 125 mcg tablet Take 1 tablet (125 mcg total) by mouth daily 90 tablet 3    omeprazole (PriLOSEC) 20 mg capsule Take 1 capsule (20 mg total) by mouth 2 (two) times a day 180 capsule 0    diazePAM (VALIUM) 5 mg tablet Take 1 tablet (5 mg total) by mouth every 12 (twelve) hours as needed for anxiety or muscle spasms Max Daily Amount: 10 mg 30 tablet 0    albuterol HFA 90 mcg/actuation inhaler Inhale 2 puffs by mouth every 6 (six) hours as needed for shortness of breath 18 g 2    sennosides-docusate sodium (Senna Plus) 8.6-50 mg Take 1 tablet by mouth daily as  needed for constipation       Current Facility-Administered Medications   Medication Dose Route Frequency Provider Last Rate Last Admin    iohexoL (OMNIPAQUE) 240 mg iodine/mL injection 3 mL  3 mL injection PRN    3 mL at 06/13/24 0916       The following have been reviewed and updated as appropriate in this visit:          Review of Systems   Constitutional: Negative.  Negative for activity change, appetite change, chills, diaphoresis, fatigue, fever and unexpected weight change.   HENT:  Negative for congestion, ear pain, hearing loss, mouth sores, nosebleeds, postnasal drip, rhinorrhea, sinus pressure, sneezing, sore throat, tinnitus, trouble swallowing and voice change.    Eyes: Negative.  Negative for photophobia and visual disturbance.   Respiratory: Negative.  Negative for cough, chest tightness, shortness of breath and wheezing.    Cardiovascular: Negative.  Negative for chest pain, palpitations and leg swelling.   Gastrointestinal: Negative.  Negative for abdominal distention, abdominal pain, blood in stool, constipation, diarrhea, nausea and vomiting.   Endocrine: Negative.  Negative for cold intolerance, heat intolerance, polydipsia, polyphagia and polyuria.   Genitourinary: Negative.  Negative for difficulty urinating, dysuria, flank pain, frequency, hematuria and urgency.   Musculoskeletal:  Positive for arthralgias and joint swelling. Negative for back pain, gait problem, myalgias, neck pain and neck stiffness.   Skin: Negative.  Negative for rash.   Neurological: Negative.  Negative for dizziness, tremors, seizures, syncope, speech difficulty, weakness, light-headedness, numbness and headaches.   Hematological: Negative.  Negative for adenopathy. Does not bruise/bleed easily.   Psychiatric/Behavioral: Negative.  Negative for dysphoric mood and sleep disturbance. The patient is not nervous/anxious.        Objective     Vitals:    03/10/25 1313   BP: 136/88   Temp: 37 °C (98.6 °F)   TempSrc: Temporal    Pulse: 83   Resp: 18   SpO2: 94%   PainSc:   4   PainLoc: Hip  Comment: Left hip all the way through the ankle still healing from knee surgery.   Patient Position: Sitting     There is no height or weight on file to calculate BMI.    Physical Exam  GEN: pleasant, NAD  HEENT: PERRLA, sclera anicteric  NECK: no LAD or thyromegaly, no carotid bruits  LUNGS: CTAB, no wheezing rhonchi or rales  HEART: RRR 2-6 systolic murmur, no rubs or gallops  Breast: Symmetric, no masses  ABD: Soft, non tender, non distended, normal bowel sounds , no hepatosplenomegaly  : Deferred  Rectal: Deferred  Ext: Warm and well perfused, 2+ edema  SKIN: no rash  Neuro:  CN 2-12 grossly intact, non focal, moving all extremities        Assessment/Plan   Diagnoses and all orders for this visit:    Medicare annual wellness visit, subsequent    Muscle spasm    Gastroesophageal reflux disease without esophagitis  -     omeprazole (PriLOSEC) 20 mg capsule; Take 1 capsule (20 mg total) by mouth 2 (two) times a day    Hypothyroidism, unspecified type  -     levothyroxine (SYNTHROID, LEVOTHROID) 125 mcg tablet; Take 1 tablet (125 mcg total) by mouth daily    Screening for osteoporosis  -     DXA bone density; Future    Asymptomatic postmenopausal state  -     DXA bone density; Future    SOB (shortness of breath)  -     albuterol HFA 90 mcg/actuation inhaler; Inhale 2 puffs by mouth every 6 (six) hours as needed for shortness of breath    Colon cancer screening  -     Ambulatory referral to Gastroenterology; Future    Tubular adenoma  -     Ambulatory referral to Gastroenterology; Future    Impaired fasting blood sugar  -     Hemoglobin A1c (glycosylated) Blood, Venous; Future    Borderline hyperlipidemia  -     Comprehensive metabolic panel Blood, Venous; Future  -     Lipid panel Blood, Venous; Future  -     Thyroid Stimulating Hormone, Ultrasensitive Blood, Venous; Future    Elevated blood pressure reading      1.  Adult health examination.  No  family history of abdominal aortic aneurysm.  Bone density is due this year so we will order that.  Lipids have been borderline we will recheck in 6 months.  Colonoscopy is due in June of this year. A1c was 5.9 but fasting sugar was 137.  Will not make diagnosis of diabetes today but if 1 additional fasting sugar elevated, she will meet criteria.  Declines flu shot.  Last pneumonia shot 2021.  Will be due for Tdap in August.  Up-to-date on Shingrix.  No longer needs Pap.  Had eye exam last year.  Declines HIV and hepatitis C screening.  Does not need lung cancer screening.  2.  Impaired fasting glucose, recheck fasting sugar and A1c in 6 months.  3.  Borderline hyperlipidemia.  Will recheck lipids in 6 months.  4.  Muscle spasms.  Refill her diazepam.  5.  Shortness of breath.  Had unusual episode of shortness of breath.  Etiology not clear.  Will refill her albuterol that she has at home.  Does not have definitive asthma diagnosis.  6.  Hypothyroidism.  Referral thyroid hormone.  7.  Recent left total knee arthroplasty.  I think she can stop the baby aspirin that was started for DVT prophylaxis as she is far enough away from surgery now.  Also, she continues to take ibuprofen daily and is at risk for GI bleeding.  Will have her go up to twice daily on her omeprazole while on the ibuprofen.  8.  Elevated blood pressure readings.  Blood pressures trending higher but some of them are still not too bad.  I want her to continue checking as she is at home and let me know if consistently staying high.  9.  Aortic valve sclerosis.  Last echo 2022.  Consider ordering repeat echo next time.      During the course of the visit the patient was educated and counseled about appropriate screening and preventive services including:   Advanced Directives and End of Life Planning: Reviewed and discussed with patient.  Breast Cancer Screening: Reviewed and discussed with patient.  Calcium 1200mg and Vitamin D 1000-2000iu recommended  daily.  Cervical Cancer Screening: Reviewed and discussed with patient.  Cholesterol Screening: Reviewed and discussed with patient.  Colorectal Cancer Screening: Reviewed and discussed with patient.  Depression screening performed, results documented in note.  Diabetes Screening: Reviewed and discussed with patient.  Glaucoma Screening: Reviewed and discussed with patient.  Hepatitis B Vaccine:  Reviewed and discussed with patient.  Hepatitis C Screening (patient born between 1071-1285 or at high risk): Reviewed and discussed with patient.  Indicated labs ordered as above  Influenza vaccine:  Reviewed need for annual immunization.  Pneumococcal Vaccine:  Reviewed and discussed with patient.  Shingles Vaccine:  Reviewed and discussed with patient.    Written screening schedule individualized for the patient based on current USPSTF, age-appropriate medicare services and ACIP as described above was given and viewable in Patient Instructions.    Mental health conditions and/or risk factors are as listed (if any) in the ROS above as specific to this patient.  Direct cognitive impairment was assessed.      Patient's lifestyle was evaluated to promote wellness in terms of but not limited to: fall prevention, nutrition, physical activity, tobacco/alcohol if present and weight management.  These were addressed specifically with this patient as listed within the social history, discussion notes, and above.    Total time spent was 60 min with 30 min spent regarding physical and 30 min spent regarding other medical issues with greater than 50% of total time spent in direct patient contact and coordination of care regarding reviewing medications, reviewing labs, symptoms.

## 2025-03-10 NOTE — TELEPHONE ENCOUNTER
[facility] pharmacy is calling regarding dosage clarification  for the pt.    Writer advised call of a call back from the nurse within 24 hours.    Patient: Corrine Holden    Caller Name (last and first, relation/role): Bertha    Name of Facility: [facility]    Callback Number: [phone]    Fax Number: [phone]    Additional Info: Need dosage clarification on Albuterol for insurance. How many times a day?    Did you confirm the message with the caller: Yes    Is it okay that the nurse communicates your response through MyChart? No

## 2025-03-12 ENCOUNTER — HOSPITAL ENCOUNTER (OUTPATIENT)
Dept: PHYSICAL THERAPY | Facility: HOSPITAL | Age: 70
Setting detail: THERAPIES SERIES
Discharge: STILL ADMITTED OR ADMINISTRATIVE | End: 2025-03-12
Payer: MEDICARE

## 2025-03-12 DIAGNOSIS — M25.662 KNEE STIFFNESS, LEFT: ICD-10-CM

## 2025-03-12 DIAGNOSIS — R53.1 WEAKNESS: ICD-10-CM

## 2025-03-12 DIAGNOSIS — R26.2 DIFFICULTY WALKING: ICD-10-CM

## 2025-03-12 DIAGNOSIS — Z96.652 AFTERCARE FOLLOWING LEFT KNEE JOINT REPLACEMENT SURGERY: Primary | ICD-10-CM

## 2025-03-12 DIAGNOSIS — Z47.1 AFTERCARE FOLLOWING LEFT KNEE JOINT REPLACEMENT SURGERY: Primary | ICD-10-CM

## 2025-03-12 PROCEDURE — 97110 THERAPEUTIC EXERCISES: CPT | Mod: GP

## 2025-03-12 PROCEDURE — 97140 MANUAL THERAPY 1/> REGIONS: CPT | Mod: GP

## 2025-03-12 NOTE — INTERDISCIPLINARY/THERAPY
"  [address] [facility] [id number] [phone]      Outpatient Physical Therapy Daily Treatment Note    Date of Service: 3/12/25  Patient Name: Corrine Holden  : 1955  Referring Provider: David Montano PA-C  Today's Visit Number: 10  Onset Date: 2025  SOC Date: 2025  Certification Period: 2025  HICN: [id number]  Medical Diagnosis listed first. Additional are Treatment Diagnoses:  1. Aftercare following left knee joint replacement surgery  2. Knee stiffness, left  3. Weakness  4. Difficulty walking      Subjective   Subjective Comments: Patient reports that her left knee is feeling better.  Planning on going to the swim center tomorrow.  Has been riding her exercise bike at home still.  Has patient fallen since last visit? No     Have there been any changes in medications? No          Objective    Knee ROM Left:  Flexion at start of session: 104 deg  Flexion: 118 deg  Extension: 0 deg    Treatment:     Therapeutic Exercise: to develop strength, endurance, range of motion and flexibility  Upright bike x 11 minutes (three holes visible for seat position) at 30W  // bar march, leg curls  4\" forward step-ups and lateral step-ups   Seated LAQ 3# x15      Manual Therapy:  Joint mobilization, soft tissue mobilization, manual techniques to decrease pain and improve joint and soft tissue mobility  Manual left knee flexion stretching            Time Calculation  Start Time: 1115  Stop Time: 1155  Time Calculation (min): 40 min  Therapeutic Interventions (Time Spent in Minutes)  Manual Therapy: 15  Therapeutic Exercise: 25   Other Charges       Charge ID Procedure Code Description Qty. Modifiers Charge Entry User Diagnosis    [id number] HC THERAPEUTIC PX 1/> AREAS EACH 15 MIN EXERCISES 2 GP Claudio Isaacs, PT     [id number] HC MANUAL THERAPY TQS 1/> REGIONS EACH 15 MINUTES 1 GP Claudio Isaacs, PT                Assessment/Plan   Assessment  Progress " Report    Reporting Dates 2/7/25 through 3/12/25.  Patient's treatment has focused on left knee ROM and left LE strength. Patient is demonstrating improvements both of these issues. Patient has not yet reached maximal benefit the goal knee flexion ROM and functional left LE strength.    The patient's FOTO Functional Outcome score is  /100 where a higher number equals greater function.    It is recommended that the client attend skilled rehabilitative therapy for up to up to 2x/wk  with an expected duration through 5/8/2025.   Treatment may consist of: Therapeutic Exercise; Therapeutic Activity; Neuromuscular Re-education; Gait training; Manual Therapy           Therapy Goals    Short Term Goals  End goal date: 3/7/25  ST Goal 1: At least 110 deg of left knee flexion.  Goal 1 Status: met  End goal date: 3/7/25  ST Goal 2: Demonstrate ability to ascend stairs in reciprocal pattern  Goal 2 Status: met  End goal date: 3/7/25  ST Goal 3: Demonstrate ability to ambulate with SPC safely.  Goal 3 Status: met  Long Term Goals  Long term goal(s) met by: 5/8/25  LT Goal 1: At least 120 deg of left knee flexion  Goal Status: progressing  LT Goal 2: Demonstrate ability to descend stairs in reciprocal pattern.  Goal Status: progressing  LT Goal 3: Report ability to ambulate community distances with least   restrictive AD.  Goal Status: progressing  LT Goal 4: Patient will score at least the predicted goal score on the   FOTO functional outcome as a measure of improved functional ability.   LT Goal 5: Report 0/10 left knee pain during daily activities.  Goal Status: progressing       Plan  Treatment Interventions: Therapeutic Exercise, Therapeutic Activity, Neuromuscular Re-education, Gait training, Manual Therapy  PT Frequency: up to 2x/wk  Plan for next treatment comment: Continue with emphasis on left knee strength, mobility, and gait  Treatment duration will be through 5/8/2025

## 2025-03-14 ENCOUNTER — HOSPITAL ENCOUNTER (OUTPATIENT)
Dept: PHYSICAL THERAPY | Facility: HOSPITAL | Age: 70
Setting detail: THERAPIES SERIES
Discharge: STILL ADMITTED OR ADMINISTRATIVE | End: 2025-03-14
Payer: MEDICARE

## 2025-03-14 DIAGNOSIS — R26.2 DIFFICULTY WALKING: ICD-10-CM

## 2025-03-14 DIAGNOSIS — Z96.652 AFTERCARE FOLLOWING LEFT KNEE JOINT REPLACEMENT SURGERY: Primary | ICD-10-CM

## 2025-03-14 DIAGNOSIS — Z47.1 AFTERCARE FOLLOWING LEFT KNEE JOINT REPLACEMENT SURGERY: Primary | ICD-10-CM

## 2025-03-14 DIAGNOSIS — R53.1 WEAKNESS: ICD-10-CM

## 2025-03-14 DIAGNOSIS — M25.662 KNEE STIFFNESS, LEFT: ICD-10-CM

## 2025-03-14 PROCEDURE — 97140 MANUAL THERAPY 1/> REGIONS: CPT | Mod: GP

## 2025-03-14 PROCEDURE — 97110 THERAPEUTIC EXERCISES: CPT | Mod: GP

## 2025-03-14 NOTE — INTERDISCIPLINARY/THERAPY
"  [address] [facility] [id number] [phone]      Outpatient Physical Therapy Daily Treatment Note    Date of Service: 3/14/25  Patient Name: Corrine Holden  : 1955  Referring Provider: David Montano PA-C  Today's Visit Number: 11  Onset Date: 2025  SOC Date: 2025  Certification Period: 2025  HICN: [id number]  Medical Diagnosis listed first. Additional are Treatment Diagnoses:  1. Aftercare following left knee joint replacement surgery  2. Knee stiffness, left  3. Weakness  4. Difficulty walking      Subjective   Subjective Comments: Patient went to the pool yesterday, did some modified lap swimming and some walking.  She reports that it went well.  Has patient fallen since last visit? No     Have there been any changes in medications? No          Objective    Knee ROM Left:  Flexion at start of session: 104 deg  Flexion: 118 deg  Extension: 0 deg    Treatment:     Therapeutic Exercise: to develop strength, endurance, range of motion and flexibility  Nu-Step level 5 x 10 minutes  2nd step knee flexion and extension  6\" forward step-ups  Seated knee flexion stretching  Supine knee flexion stretching      Manual Therapy:  Joint mobilization, soft tissue mobilization, manual techniques to decrease pain and improve joint and soft tissue mobility  Manual left knee flexion stretching          Time Calculation  Start Time: 1340  Stop Time: 1420  Time Calculation (min): 40 min  Therapeutic Interventions (Time Spent in Minutes)  Manual Therapy: 15  Therapeutic Exercise: 25   Other Charges       Charge ID Procedure Code Description Qty. Modifiers Charge Entry User Diagnosis    [id number]  THERAPEUTIC PX 1/> AREAS EACH 15 MIN EXERCISES 2 GP Claudio Isaacs, PT     [id number]  MANUAL THERAPY TQS 1/> REGIONS EACH 15 MINUTES 1 GP Claudio Isaacs, PT                Assessment/Plan   Assessment  Level of Function and Prognosis  Assessment: Patient went back to the " pool this week and it felt good for the left knee.  We discussed that the pool is a good option for her knee and ankle.     Therapy Goals    Short Term Goals  End goal date: 3/7/25  ST Goal 1: At least 110 deg of left knee flexion.  Goal 1 Status: met  End goal date: 3/7/25  ST Goal 2: Demonstrate ability to ascend stairs in reciprocal pattern  Goal 2 Status: met  End goal date: 3/7/25  ST Goal 3: Demonstrate ability to ambulate with SPC safely.  Goal 3 Status: met  Long Term Goals  Long term goal(s) met by: 5/8/25  LT Goal 1: At least 120 deg of left knee flexion  Goal Status: progressing  LT Goal 2: Demonstrate ability to descend stairs in reciprocal pattern.  Goal Status: progressing  LT Goal 3: Report ability to ambulate community distances with least   restrictive AD.  Goal Status: progressing  LT Goal 4: Patient will score at least the predicted goal score on the   FOTO functional outcome as a measure of improved functional ability.   LT Goal 5: Report 0/10 left knee pain during daily activities.  Goal Status: progressing       Plan  Plan for next treatment comment: Continue with emphasis on left knee strength, mobility, and gait  Treatment duration will be through 5/8/2025

## 2025-03-16 ENCOUNTER — TELEPHONE (OUTPATIENT)
Dept: SURGERY | Facility: CLINIC | Age: 70
End: 2025-03-16
Payer: MEDICARE

## 2025-03-17 ENCOUNTER — HOSPITAL ENCOUNTER (OUTPATIENT)
Dept: PHYSICAL THERAPY | Facility: HOSPITAL | Age: 70
Setting detail: THERAPIES SERIES
Discharge: STILL ADMITTED OR ADMINISTRATIVE | End: 2025-03-17
Payer: MEDICARE

## 2025-03-17 DIAGNOSIS — M25.662 KNEE STIFFNESS, LEFT: ICD-10-CM

## 2025-03-17 DIAGNOSIS — Z47.1 AFTERCARE FOLLOWING LEFT KNEE JOINT REPLACEMENT SURGERY: Primary | ICD-10-CM

## 2025-03-17 DIAGNOSIS — R26.2 DIFFICULTY WALKING: ICD-10-CM

## 2025-03-17 DIAGNOSIS — Z96.652 AFTERCARE FOLLOWING LEFT KNEE JOINT REPLACEMENT SURGERY: Primary | ICD-10-CM

## 2025-03-17 DIAGNOSIS — R53.1 WEAKNESS: ICD-10-CM

## 2025-03-17 PROCEDURE — 97110 THERAPEUTIC EXERCISES: CPT | Mod: GP

## 2025-03-17 PROCEDURE — 97140 MANUAL THERAPY 1/> REGIONS: CPT | Mod: GP

## 2025-03-17 NOTE — INTERDISCIPLINARY/THERAPY
"  [address] [facility] [id number] [phone]      Outpatient Physical Therapy Daily Treatment Note    Date of Service: 3/17/25  Patient Name: Corrine Holden  : 1955  Referring Provider: David Montano PA-C  Today's Visit Number: 12  Onset Date: 2025  SOC Date: 2025  Certification Period: 2025  HICN: [id number]  Medical Diagnosis listed first. Additional are Treatment Diagnoses:  1. Aftercare following left knee joint replacement surgery  2. Knee stiffness, left  3. Weakness  4. Difficulty walking      Subjective   Subjective Comments: Patient did the pool this morning, felt good, but may be a little stiff now from the workout.  Has patient fallen since last visit? No     Have there been any changes in medications? No          Objective    Knee ROM Left:  Flexion at start of session: 104 deg  Flexion: 118 deg  Extension: 0 deg    Treatment:     Therapeutic Exercise: to develop strength, endurance, range of motion and flexibility  Upright bike 30-40W  x 10 minutes  2nd step knee flexion and extension  // bar exercises: sidestepping, marches, leg curls  4\" forward and lateral step-ups  Seated knee flexion stretching      Manual Therapy:  Joint mobilization, soft tissue mobilization, manual techniques to decrease pain and improve joint and soft tissue mobility  Manual left knee flexion stretching            Time Calculation  Start Time: 1345  Stop Time: 1425  Time Calculation (min): 40 min  Therapeutic Interventions (Time Spent in Minutes)  Manual Therapy: 15  Therapeutic Exercise: 25   Other Charges       Charge ID Procedure Code Description Qty. Modifiers Charge Entry User Diagnosis    [id number]  THERAPEUTIC PX 1/> AREAS EACH 15 MIN EXERCISES 2 GP Claudio Isaacs, PT     [id number]  MANUAL THERAPY TQS 1/> REGIONS EACH 15 MINUTES 1 GP Claudio Isaacs, PT                Assessment/Plan   Assessment  Level of Function and Prognosis  Assessment: Patient " continues to show progress.  She feels that her left knee is beginning to surpass her left ankle in how it is feeling.  The left ankle is a chronic issue for her.  We will continue to focus on improving functional left LE strength.     Therapy Goals    Short Term Goals  End goal date: 3/7/25  ST Goal 1: At least 110 deg of left knee flexion.  Goal 1 Status: met  End goal date: 3/7/25  ST Goal 2: Demonstrate ability to ascend stairs in reciprocal pattern  Goal 2 Status: met  End goal date: 3/7/25  ST Goal 3: Demonstrate ability to ambulate with SPC safely.  Goal 3 Status: met  Long Term Goals  Long term goal(s) met by: 5/8/25  LT Goal 1: At least 120 deg of left knee flexion  Goal Status: progressing  LT Goal 2: Demonstrate ability to descend stairs in reciprocal pattern.  Goal Status: progressing  LT Goal 3: Report ability to ambulate community distances with least   restrictive AD.  Goal Status: progressing  LT Goal 4: Patient will score at least the predicted goal score on the   FOTO functional outcome as a measure of improved functional ability.   LT Goal 5: Report 0/10 left knee pain during daily activities.  Goal Status: progressing       Plan  Plan for next treatment comment: Continue with emphasis on left knee strength, mobility, and gait  Treatment duration will be through 5/8/2025

## 2025-03-17 NOTE — TELEPHONE ENCOUNTER
Patient called in and left a message that she wants to restart weight management appointments with Angela Pace. It has been almost a year since she was last seen and will need to be booked for a 90 minute consult appointment.

## 2025-03-18 ENCOUNTER — TELEPHONE (OUTPATIENT)
Dept: PAIN MEDICINE | Facility: HOSPITAL | Age: 70
End: 2025-03-18

## 2025-03-18 ENCOUNTER — HOSPITAL ENCOUNTER (OUTPATIENT)
Dept: FLUOROSCOPY | Facility: HOSPITAL | Age: 70
Discharge: STILL ADMITTED OR ADMINISTRATIVE | End: 2025-03-18
Payer: MEDICARE

## 2025-03-18 ENCOUNTER — PROCEDURE VISIT (OUTPATIENT)
Dept: PAIN MEDICINE | Facility: HOSPITAL | Age: 70
End: 2025-03-18
Payer: MEDICARE

## 2025-03-18 VITALS
BODY MASS INDEX: 48.13 KG/M2 | OXYGEN SATURATION: 94 % | SYSTOLIC BLOOD PRESSURE: 156 MMHG | HEIGHT: 64 IN | DIASTOLIC BLOOD PRESSURE: 104 MMHG | HEART RATE: 75 BPM

## 2025-03-18 DIAGNOSIS — M46.1 SACROILIITIS (CMS/HCC): ICD-10-CM

## 2025-03-18 PROCEDURE — 27096 INJECT SACROILIAC JOINT: CPT | Mod: 50 | Performed by: ANESTHESIOLOGY

## 2025-03-18 PROCEDURE — 6360000200 HC RX 636 W HCPCS (ALT 250 FOR IP): Mod: JZ

## 2025-03-18 PROCEDURE — 77002 NEEDLE LOCALIZATION BY XRAY: CPT | Mod: 59

## 2025-03-18 PROCEDURE — 27096 INJECT SACROILIAC JOINT: CPT | Mod: 50

## 2025-03-18 RX ORDER — BUPIVACAINE HYDROCHLORIDE 5 MG/ML
30 INJECTION, SOLUTION EPIDURAL; INTRACAUDAL; PERINEURAL ONCE
Status: COMPLETED | OUTPATIENT
Start: 2025-03-18 | End: 2025-03-18

## 2025-03-18 RX ORDER — LIDOCAINE HYDROCHLORIDE 10 MG/ML
30 INJECTION, SOLUTION EPIDURAL; INFILTRATION; INTRACAUDAL; PERINEURAL ONCE
Status: COMPLETED | OUTPATIENT
Start: 2025-03-18 | End: 2025-03-18

## 2025-03-18 RX ORDER — IOPAMIDOL 408 MG/ML
10 INJECTION, SOLUTION INTRATHECAL ONCE
Status: COMPLETED | OUTPATIENT
Start: 2025-03-18 | End: 2025-03-18

## 2025-03-18 RX ORDER — TRIAMCINOLONE ACETONIDE 40 MG/ML
40 INJECTION, SUSPENSION INTRA-ARTICULAR; INTRAMUSCULAR ONCE
Status: COMPLETED | OUTPATIENT
Start: 2025-03-18 | End: 2025-03-18

## 2025-03-18 RX ADMIN — TRIAMCINOLONE ACETONIDE 40 MG: 40 INJECTION, SUSPENSION INTRA-ARTICULAR; INTRAMUSCULAR at 09:28

## 2025-03-18 RX ADMIN — IOPAMIDOL 3 ML: 408 INJECTION, SOLUTION INTRATHECAL at 09:27

## 2025-03-18 RX ADMIN — BUPIVACAINE HYDROCHLORIDE 10 ML: 5 INJECTION, SOLUTION EPIDURAL; INTRACAUDAL; PERINEURAL at 09:28

## 2025-03-18 RX ADMIN — LIDOCAINE HYDROCHLORIDE 10 ML: 10 INJECTION, SOLUTION EPIDURAL; INFILTRATION; INTRACAUDAL; PERINEURAL at 09:27

## 2025-03-18 ASSESSMENT — ENCOUNTER SYMPTOMS
PSYCHIATRIC NEGATIVE: 1
APPETITE CHANGE: 0
ABDOMINAL PAIN: 0
POLYPHAGIA: 0
TROUBLE SWALLOWING: 0
CHEST TIGHTNESS: 0
NUMBNESS: 0
SPEECH DIFFICULTY: 0
VOMITING: 0
FLANK PAIN: 0
BLOOD IN STOOL: 0
FATIGUE: 0
TREMORS: 0
NECK PAIN: 0
WHEEZING: 0
WEAKNESS: 0
NEUROLOGICAL NEGATIVE: 1
GASTROINTESTINAL NEGATIVE: 1
CONSTIPATION: 0
DYSURIA: 0
LIGHT-HEADEDNESS: 0
ARTHRALGIAS: 1
SLEEP DISTURBANCE: 0
FREQUENCY: 0
CHILLS: 0
RHINORRHEA: 0
PALPITATIONS: 0
RESPIRATORY NEGATIVE: 1
PHOTOPHOBIA: 0
NAUSEA: 0
JOINT SWELLING: 1
SORE THROAT: 0
SEIZURES: 0
BACK PAIN: 0
POLYDIPSIA: 0
CARDIOVASCULAR NEGATIVE: 1
COUGH: 0
HEADACHES: 0
VOICE CHANGE: 0
HEMATOLOGIC/LYMPHATIC NEGATIVE: 1
DIFFICULTY URINATING: 0
DIARRHEA: 0
DIAPHORESIS: 0
DYSPHORIC MOOD: 0
HEMATURIA: 0
DIZZINESS: 0
UNEXPECTED WEIGHT CHANGE: 0
NECK STIFFNESS: 0
SHORTNESS OF BREATH: 0
ABDOMINAL DISTENTION: 0
FEVER: 0
ADENOPATHY: 0
EYES NEGATIVE: 1
CONSTITUTIONAL NEGATIVE: 1
BRUISES/BLEEDS EASILY: 0
ENDOCRINE NEGATIVE: 1
MYALGIAS: 0
NERVOUS/ANXIOUS: 0
ACTIVITY CHANGE: 0
SINUS PRESSURE: 0

## 2025-03-18 ASSESSMENT — PAIN - FUNCTIONAL ASSESSMENT: PAIN_FUNCTIONAL_ASSESSMENT: 0-10

## 2025-03-18 NOTE — PATIENT INSTRUCTIONS
Sacroiliac Joint Injection    A sacroiliac (SI) joint injection is a procedure to inject a numbing medicine (anesthetic block)--and sometimes a strong anti-inflammatory medicine (steroid)--into the SI joint. The SI joint is the joint between two bones of the pelvis called the sacrum and the ilium. The sacrum is the bone at the base of the spine. The ilium is the large bone that forms the hip.  You may need this procedure if you have pain because of an inflamed or diseased SI joint. Various conditions can cause pain in the SI joint, including rheumatoid arthritis, gout, psoriatic arthritis, infection, or injury. SI joint pain is a common cause of lower back pain. It may also cause pain in your buttocks or leg.    SI joint injection may be done to:  Find out if an anesthetic block relieves pain. This can confirm that the SI joint is the cause of pain (diagnostic use).  Treat a painful SI joint with steroids, anesthetic medicine, or both (therapeutic use).    Tell a health care provider about:    Any allergies you have.  All medicines you are taking, including vitamins, herbs, eye drops, creams, and over-the-counter medicines.  Any problems you or family members have had with anesthetic medicines.  Any blood disorders you have.  Any surgeries you have had.  Any medical conditions you have.  Whether you are pregnant or may be pregnant.    What are the risks?    Generally, this is a safe procedure. However, problems may occur, including:  Infection.  Bleeding.  Nerve injury.  Temporary increase in pain or failure to relieve pain.  Headache.  Bruising or soreness at the joint, in deep tissues, or at the injection site.  Allergic reactions to medicines or dyes.  There may also be side effects from the steroid medicine. These may include facial flushing, increased appetite, diarrhea, and increased blood sugar.    What happens before the procedure?    Eating and drinking restrictions    You may have a light breakfast the day  of this procedure and if in the afternoon a light lunch, there is no need to fast prior to this procedure.    Medicines    Take all of your regular medications including blood thinners.    General instructions    You may have a physical exam.  You may have imaging tests, such as an X-ray, CT scan, or MRI.  Ask if you must have a responsible adult take you home from the clinic after your procedure. Some providers do require this, and will cancel your procedure if you do not have a .Plan to have a responsible adult take you home from the hospital or clinic.Plan to have a responsible adult take you home from the hospital or clinic.    What happens during the procedure?      You will be awake during the procedure and may be given one or more of the following:  A medicine to numb the area (local anesthetic). Your health care provider will inject a local anesthetic into the skin above your SI joint.  You will be placed in the proper position on a procedure table to give the health care team the best access to your SI joint.  An X-ray machine that produces moving X-ray images (fluoroscopy) will be placed above the procedure table.  A long, thin needle will be inserted through your skin and down to your SI joint. The position of the needle will be checked with fluoroscopy imaging.  An X-ray dye will be injected to make sure the needle enters the joint space. You may be asked if you feel any pain.  Long-acting anesthetic medicine will be injected. Long-acting steroid medicine may also be injected.  The needle will be removed, and a bandage will be placed over the injection site.  The procedure may vary among health care providers and clinics.    What happens after the procedure?    Your blood pressure, heart rate, breathing rate, and blood oxygen level will be monitored after the procedure.  Since dye was used, you will be told to drink plenty of water to wash (flush) the dye out of your body.  You may be asked if you  have pain relief from the injection.  You will likely be able to go home shortly after the procedure.    Summary  A sacroiliac (SI) joint injection is a procedure to inject a numbing medicine (anesthetic block)--and sometimes a strong anti-inflammatory medicine (steroid)--into the SI joint.  You will be awake during the procedure.     This information is not intended to replace advice given to you by your health care provider. Make sure you discuss any questions you have with your health care provider.  Document Revised: 04/29/2021 Document Reviewed: 04/29/2021  Elsevier Patient Education © 2023 [employer]

## 2025-03-18 NOTE — PROGRESS NOTES
Procedures  Date of service:3/18/2025    Procedure: Bilateral therapeutic sacroiliac joint steroid injection    Preprocedure diagnosis:  1.  Bilateral Sacroiliitis    Postprocedure diagnosis:  1.  Bilateral Sacroiliitis    Complications: None    Findings: Successful block    Anesthesia: Local    History:  69-year-old female presents with severe low back pain.  This patient feels that their pain is moderate to severe in nature over the anatomic locations of the sacroiliac joint.  It has been going on for over 3 months.  It does seem to be below the L5 level.  The patient denies any significant radicular symptoms and feels that the axial component of the pain is the worst component of the pain.  Imaging studies were reviewed and do not support additional primary pain generators beyond sacroiliitis.  On physical examination they have at least 3 positive provocative maneuvers including Houston's, thigh thrust, SI compression, gaenslen.  This low back pain persists despite over 4 weeks of conservative treatment options.  The patient would like to try SI joint injections along with continued conservative treatment options.  The patient will continue rehabilitative therapy.  She has had excellent relief after having diagnostic injections, she presents today for therapeutic injections.  Preprocedure pain score 7 of 10.    Procedure: Consent obtained.  Patient brought to the fluoroscopy suite and positioned in a prone position.  Timeout procedure completed.  Back ChloroPrep prepped and draped in a sterile fashion.  Utilizing fluoroscopy the left and right sacroiliac joints were identified.  Utilizing a 22-gauge 3-1/2 inch spinal needle the joints were entered where therapeutic solution consisting of 40 mg of Kenalog and 3 mL of 0.5% bupivacaine was divided between the two sides.  Contrast was utilized.  Patient tolerated procedure well.  After short observational stay patient was discharged home in stable condition.    Plan:  We will have patient follow-up repeat on an as-needed basis, in the future she may also benefit from lumbar epidural steroid injection

## 2025-03-18 NOTE — TELEPHONE ENCOUNTER
Called pt. Pt was seen today for a Bilateral SI joint inj. Pt asking if she needs follow up appt or if she can just get injection.   Per Dr. Tavarez's note it states we will have pt follow-up repeat on an as-needed basis. Informed pt on Dr. Tavarez's note. Pt verbalized understanding

## 2025-03-18 NOTE — TELEPHONE ENCOUNTER
Patient would like a callback to main phone on file, from nurses regarding potentially getting scheduled for another injection.  Order at appt checkout was for follow up to 3/18 appt, advised patient per nurses that a future follow up could be changed to injection by provider request.

## 2025-03-19 ENCOUNTER — TELEPHONE (OUTPATIENT)
Dept: PAIN MEDICINE | Facility: HOSPITAL | Age: 70
End: 2025-03-19
Payer: MEDICARE

## 2025-03-20 ENCOUNTER — OFFICE VISIT (OUTPATIENT)
Dept: UROLOGY | Facility: CLINIC | Age: 70
End: 2025-03-20
Payer: MEDICARE

## 2025-03-20 VITALS — WEIGHT: 280.43 LBS | HEIGHT: 64 IN | BODY MASS INDEX: 47.88 KG/M2

## 2025-03-20 DIAGNOSIS — N39.0 RECURRENT UTI: Primary | ICD-10-CM

## 2025-03-20 PROCEDURE — G0463 HOSPITAL OUTPT CLINIC VISIT: HCPCS | Performed by: UROLOGY

## 2025-03-20 PROCEDURE — 99213 OFFICE O/P EST LOW 20 MIN: CPT | Performed by: UROLOGY

## 2025-03-20 ASSESSMENT — PAIN SCALES - GENERAL: PAINLEVEL_OUTOF10: 0-NO PAIN

## 2025-03-20 NOTE — PROGRESS NOTES
Subjective     Patient ID: Corrine Holden is a 70 y.o. female.    HPI  The patient is a 70-year-old female previously evaluated for frequent UTIs and microscopic hematuria here for follow-up on the above.  On 10/6/2023 she was diagnosed with Klebsiella and Enterococcus UTI treated with 7-day course of Ceftin.  Urine culture 10/23/2023 with Klebsiella treated with Keflex was changed to Cipro.  Cystoscopy on 11/2/2023 with urethral calibration with otherwise normal bladder.  CT urogram on 10/27/2023 with normal findings.  On 7/25/2024 she was started on Bactrim DS 1 p.o. twice daily x 5 days as needed for UTI symptoms.  She has not required antibiotics.  She reports recent knee surgery without UTI symptoms.  Review of Systems    Objective     Physical Exam    Vitals:       Assessment/Plan     There are no diagnoses linked to this encounter.  1.  Recurrent UTI now stable  2.  No evidence of incomplete bladder emptying  - Patient may continue with Bactrim DS 1 p.o. twice daily x 5 days as needed for UTI symptoms  - Follow-up as needed  - Patient in agreement the above plan, questions answered    20 minutes spent on today's encounter

## 2025-03-21 ENCOUNTER — HOSPITAL ENCOUNTER (OUTPATIENT)
Dept: PHYSICAL THERAPY | Facility: HOSPITAL | Age: 70
Setting detail: THERAPIES SERIES
Discharge: STILL ADMITTED OR ADMINISTRATIVE | End: 2025-03-21
Payer: MEDICARE

## 2025-03-21 DIAGNOSIS — Z96.652 AFTERCARE FOLLOWING LEFT KNEE JOINT REPLACEMENT SURGERY: Primary | ICD-10-CM

## 2025-03-21 DIAGNOSIS — Z47.1 AFTERCARE FOLLOWING LEFT KNEE JOINT REPLACEMENT SURGERY: Primary | ICD-10-CM

## 2025-03-21 DIAGNOSIS — R53.1 WEAKNESS: ICD-10-CM

## 2025-03-21 DIAGNOSIS — R26.2 DIFFICULTY WALKING: ICD-10-CM

## 2025-03-21 DIAGNOSIS — M25.662 KNEE STIFFNESS, LEFT: ICD-10-CM

## 2025-03-21 PROCEDURE — 97140 MANUAL THERAPY 1/> REGIONS: CPT | Mod: GP

## 2025-03-21 PROCEDURE — 97110 THERAPEUTIC EXERCISES: CPT | Mod: GP

## 2025-03-21 NOTE — INTERDISCIPLINARY/THERAPY
"  [address] [facility] [id number] [phone]      Outpatient Physical Therapy Daily Treatment Note    Date of Service: 3/21/25  Patient Name: Corrine Holden  : 1955  Referring Provider: David Montano PA-C  Today's Visit Number: 13  Onset Date: 2025  SOC Date: 2025  Certification Period: 2025  HICN: [id number]  Medical Diagnosis listed first. Additional are Treatment Diagnoses:  1. Aftercare following left knee joint replacement surgery  2. Knee stiffness, left  3. Weakness  4. Difficulty walking      Subjective   Subjective Comments: Had a SI joint injection a few days ago.  Seemed to get a HA in response to the injection, which she relates to her blood pressure going up.  Blood pressure has come back to normal.  Has patient fallen since last visit? No     Have there been any changes in medications? No          Objective    Knee ROM Left:  Flexion at start of session: 104 deg  Flexion: 119 deg  Extension: 0 deg    Treatment:     Therapeutic Exercise: to develop strength, endurance, range of motion and flexibility  Upright bike 30-40W  x 10 minutes  2nd step knee flexion and extension  // bar exercises: sidestepping, marches, leg curls  4\" forward and lateral step-ups  Seated knee flexion stretching      Manual Therapy:  Joint mobilization, soft tissue mobilization, manual techniques to decrease pain and improve joint and soft tissue mobility  Manual left knee flexion stretching              Time Calculation  Start Time: 1045  Stop Time: 1130  Time Calculation (min): 45 min  Therapeutic Interventions (Time Spent in Minutes)  Manual Therapy: 15  Therapeutic Exercise: 30   Other Charges       Charge ID Procedure Code Description Qty. Modifiers Charge Entry User Diagnosis    [id number]  THERAPEUTIC PX 1/> AREAS EACH 15 MIN EXERCISES 2 GP Claudio Isaacs, PT     [id number] HC MANUAL THERAPY TQS 1/> REGIONS EACH 15 MINUTES 1 GP Claudio Isaacs, PT            "     Assessment/Plan   Assessment  Level of Function and Prognosis  Assessment: Patient has continued to do some exercise at the pool.  Left knee ROM is nearly to the goal flexion.  LE strength is coming along.     Therapy Goals    Short Term Goals  End goal date: 3/7/25  ST Goal 1: At least 110 deg of left knee flexion.  Goal 1 Status: met  End goal date: 3/7/25  ST Goal 2: Demonstrate ability to ascend stairs in reciprocal pattern  Goal 2 Status: met  End goal date: 3/7/25  ST Goal 3: Demonstrate ability to ambulate with SPC safely.  Goal 3 Status: met  Long Term Goals  Long term goal(s) met by: 5/8/25  LT Goal 1: At least 120 deg of left knee flexion  Goal Status: progressing  LT Goal 2: Demonstrate ability to descend stairs in reciprocal pattern.  Goal Status: progressing  LT Goal 3: Report ability to ambulate community distances with least   restrictive AD.  Goal Status: progressing  LT Goal 4: Patient will score at least the predicted goal score on the   FOTO functional outcome as a measure of improved functional ability.   LT Goal 5: Report 0/10 left knee pain during daily activities.  Goal Status: progressing       Plan  Plan for next treatment comment: Continue with emphasis on left knee strength, mobility, and gait  Treatment duration will be through 5/8/2025

## 2025-03-25 ENCOUNTER — HOSPITAL ENCOUNTER (OUTPATIENT)
Dept: PHYSICAL THERAPY | Facility: HOSPITAL | Age: 70
Setting detail: THERAPIES SERIES
Discharge: STILL ADMITTED OR ADMINISTRATIVE | End: 2025-03-25
Payer: MEDICARE

## 2025-03-25 DIAGNOSIS — R53.1 WEAKNESS: ICD-10-CM

## 2025-03-25 DIAGNOSIS — Z47.1 AFTERCARE FOLLOWING LEFT KNEE JOINT REPLACEMENT SURGERY: Primary | ICD-10-CM

## 2025-03-25 DIAGNOSIS — Z96.652 AFTERCARE FOLLOWING LEFT KNEE JOINT REPLACEMENT SURGERY: Primary | ICD-10-CM

## 2025-03-25 DIAGNOSIS — M25.662 KNEE STIFFNESS, LEFT: ICD-10-CM

## 2025-03-25 DIAGNOSIS — R26.2 DIFFICULTY WALKING: ICD-10-CM

## 2025-03-25 PROCEDURE — 97110 THERAPEUTIC EXERCISES: CPT | Mod: GP

## 2025-03-25 PROCEDURE — 97140 MANUAL THERAPY 1/> REGIONS: CPT | Mod: GP

## 2025-03-25 NOTE — INTERDISCIPLINARY/THERAPY
"  [address] [facility] [id number] [phone]      Outpatient Physical Therapy Daily Treatment Note    Date of Service: 3/25/25  Patient Name: Corrine Holden  : 1955  Referring Provider: David Montano PA-C  Today's Visit Number: 14  Onset Date: 2025  SOC Date: 2025  Certification Period: 2025  HICN: [id number]  Medical Diagnosis listed first. Additional are Treatment Diagnoses:  1. Aftercare following left knee joint replacement surgery  2. Knee stiffness, left  3. Weakness  4. Difficulty walking      Subjective   Subjective Comments: Doing good overall.  Has patient fallen since last visit? No     Have there been any changes in medications? No          Objective    Knee ROM Left:  Flexion at start of session: 104 deg  Flexion: 119 deg  Extension: 0 deg    Treatment:     Therapeutic Exercise: to develop strength, endurance, range of motion and flexibility  Upright bike 30-40W  x 10 minutes  2nd step knee flexion and extension  // bar exercises: sidestepping, marches, leg curls  4\" step-overs in // bars (left posterior ankle tightness reported)  Instructed in runner's stretch for left calf.  Seated knee flexion stretching      Manual Therapy:  Joint mobilization, soft tissue mobilization, manual techniques to decrease pain and improve joint and soft tissue mobility  Manual left knee flexion stretching            Time Calculation  Start Time: 1300  Stop Time: 1340  Time Calculation (min): 40 min  Therapeutic Interventions (Time Spent in Minutes)  Manual Therapy: 15  Therapeutic Exercise: 25   Other Charges       Charge ID Procedure Code Description Qty. Modifiers Charge Entry User Diagnosis    [id number]  THERAPEUTIC PX 1/> AREAS EACH 15 MIN EXERCISES 2 GP Claudio Isaacs, PT     [id number]  MANUAL THERAPY TQS 1/> REGIONS EACH 15 MINUTES 1 GP Claudio Isaacs, PT                Assessment/Plan   Assessment  Level of Function and Prognosis  Assessment: " "Patient had difficulty with the 4\" step-overs due to tightness in the left posterior ankle.  She has had previous surgeries on that ankle that limit the mobility.     Therapy Goals    Short Term Goals  End goal date: 3/7/25  ST Goal 1: At least 110 deg of left knee flexion.  Goal 1 Status: met  End goal date: 3/7/25  ST Goal 2: Demonstrate ability to ascend stairs in reciprocal pattern  Goal 2 Status: met  End goal date: 3/7/25  ST Goal 3: Demonstrate ability to ambulate with SPC safely.  Goal 3 Status: met  Long Term Goals  Long term goal(s) met by: 5/8/25  LT Goal 1: At least 120 deg of left knee flexion  Goal Status: progressing  LT Goal 2: Demonstrate ability to descend stairs in reciprocal pattern.  Goal Status: progressing  LT Goal 3: Report ability to ambulate community distances with least   restrictive AD.  Goal Status: progressing  LT Goal 4: Patient will score at least the predicted goal score on the   FOTO functional outcome as a measure of improved functional ability.   LT Goal 5: Report 0/10 left knee pain during daily activities.  Goal Status: progressing       Plan  Plan for next treatment comment: Continue with emphasis on left knee strength, mobility, and gait  Treatment duration will be through 5/8/2025       "

## 2025-03-28 ENCOUNTER — HOSPITAL ENCOUNTER (OUTPATIENT)
Dept: PHYSICAL THERAPY | Facility: HOSPITAL | Age: 70
Setting detail: THERAPIES SERIES
Discharge: STILL ADMITTED OR ADMINISTRATIVE | End: 2025-03-28
Payer: MEDICARE

## 2025-03-28 DIAGNOSIS — Z47.1 AFTERCARE FOLLOWING LEFT KNEE JOINT REPLACEMENT SURGERY: Primary | ICD-10-CM

## 2025-03-28 DIAGNOSIS — R26.2 DIFFICULTY WALKING: ICD-10-CM

## 2025-03-28 DIAGNOSIS — R53.1 WEAKNESS: ICD-10-CM

## 2025-03-28 DIAGNOSIS — M25.662 KNEE STIFFNESS, LEFT: ICD-10-CM

## 2025-03-28 DIAGNOSIS — Z96.652 AFTERCARE FOLLOWING LEFT KNEE JOINT REPLACEMENT SURGERY: Primary | ICD-10-CM

## 2025-03-28 PROCEDURE — 97140 MANUAL THERAPY 1/> REGIONS: CPT | Mod: GP

## 2025-03-28 PROCEDURE — 97110 THERAPEUTIC EXERCISES: CPT | Mod: GP

## 2025-03-28 NOTE — INTERDISCIPLINARY/THERAPY
"  [address] [facility] [id number] [phone]      Outpatient Physical Therapy Daily Treatment Note    Date of Service: 3/28/25  Patient Name: Corrine Holden  : 1955  Referring Provider: David Montano PA-C  Today's Visit Number: 15  Onset Date: 2025  SOC Date: 2025  Certification Period: 2025  HICN: [id number]  Medical Diagnosis listed first. Additional are Treatment Diagnoses:  1. Aftercare following left knee joint replacement surgery  2. Knee stiffness, left  3. Weakness  4. Difficulty walking      Subjective   Subjective Comments: Didn't make it to the pool yesterday.  Has patient fallen since last visit? No     Have there been any changes in medications? No          Objective    Knee ROM Left:  Flexion at start of session: 104 deg  Flexion: 119 deg  Extension: 0 deg    Treatment:     Therapeutic Exercise: to develop strength, endurance, range of motion and flexibility  Upright bike 30-40W  x 10 minutes  2nd step knee flexion and extension  // bar exercises: sidestepping, marches, leg curls  Alternating foot taps to 4\" step  4\" forward and lateral step-ups  in // bars  Seated knee flexion stretching      Manual Therapy:  Joint mobilization, soft tissue mobilization, manual techniques to decrease pain and improve joint and soft tissue mobility  Manual left knee flexion stretching              Time Calculation  Start Time: 1045  Stop Time: 1125  Time Calculation (min): 40 min  Therapeutic Interventions (Time Spent in Minutes)  Manual Therapy: 15  Therapeutic Exercise: 25   Other Charges       Charge ID Procedure Code Description Qty. Modifiers Charge Entry User Diagnosis    [id number]  THERAPEUTIC PX 1/> AREAS EACH 15 MIN EXERCISES 2 GP Claudio Isaacs, PT     [id number]  MANUAL THERAPY TQS 1/> REGIONS EACH 15 MINUTES 1 GP Claudio Isaacs, PT                Assessment/Plan   Assessment  Level of Function and Prognosis  Assessment: Patient's left LE is " improving, which is evident by how she is performing the strength activities during her sessions.     Therapy Goals    Short Term Goals  End goal date: 3/7/25  ST Goal 1: At least 110 deg of left knee flexion.  Goal 1 Status: met  End goal date: 3/7/25  ST Goal 2: Demonstrate ability to ascend stairs in reciprocal pattern  Goal 2 Status: met  End goal date: 3/7/25  ST Goal 3: Demonstrate ability to ambulate with SPC safely.  Goal 3 Status: met  Long Term Goals  Long term goal(s) met by: 5/8/25  LT Goal 1: At least 120 deg of left knee flexion  Goal Status: progressing  LT Goal 2: Demonstrate ability to descend stairs in reciprocal pattern.  Goal Status: progressing  LT Goal 3: Report ability to ambulate community distances with least   restrictive AD.  Goal Status: progressing  LT Goal 4: Patient will score at least the predicted goal score on the   FOTO functional outcome as a measure of improved functional ability.   LT Goal 5: Report 0/10 left knee pain during daily activities.  Goal Status: progressing       Plan  Plan for next treatment comment: Continue with emphasis on left knee strength, mobility, and gait  Treatment duration will be through 5/8/2025

## 2025-04-01 ENCOUNTER — HOSPITAL ENCOUNTER (OUTPATIENT)
Dept: PHYSICAL THERAPY | Facility: HOSPITAL | Age: 70
Setting detail: THERAPIES SERIES
Discharge: STILL ADMITTED OR ADMINISTRATIVE | End: 2025-04-01
Payer: MEDICARE

## 2025-04-01 DIAGNOSIS — R53.1 WEAKNESS: ICD-10-CM

## 2025-04-01 DIAGNOSIS — R26.2 DIFFICULTY WALKING: ICD-10-CM

## 2025-04-01 DIAGNOSIS — Z96.652 AFTERCARE FOLLOWING LEFT KNEE JOINT REPLACEMENT SURGERY: Primary | ICD-10-CM

## 2025-04-01 DIAGNOSIS — Z47.1 AFTERCARE FOLLOWING LEFT KNEE JOINT REPLACEMENT SURGERY: Primary | ICD-10-CM

## 2025-04-01 DIAGNOSIS — M25.662 KNEE STIFFNESS, LEFT: ICD-10-CM

## 2025-04-01 PROCEDURE — 97140 MANUAL THERAPY 1/> REGIONS: CPT | Mod: GP

## 2025-04-01 PROCEDURE — 97110 THERAPEUTIC EXERCISES: CPT | Mod: GP

## 2025-04-01 NOTE — INTERDISCIPLINARY/THERAPY
"  [address] [facility] [id number] [phone]      Outpatient Physical Therapy Daily Treatment Note    Date of Service: 25  Patient Name: Corrine Holden  : 1955  Referring Provider: David Montano PA-C  Today's Visit Number: 16  Onset Date: 2025  SOC Date: 2025  Certification Period: 2025  HICN: [id number]  Medical Diagnosis listed first. Additional are Treatment Diagnoses:  1. Aftercare following left knee joint replacement surgery  2. Knee stiffness, left  3. Weakness  4. Difficulty walking      Subjective   Subjective Comments: Patient reports she has had good pool workouts this past week.  She notices the left knee the most when she sits for an extended period of time, then gets up to move.  Has patient fallen since last visit? No     Have there been any changes in medications? No          Objective    Knee ROM Left:  Flexion at start of session: 104 deg  Flexion: 119 deg  Extension: 0 deg    Treatment:     Therapeutic Exercise: to develop strength, endurance, range of motion and flexibility  Upright bike 30-40W  x 10 minutes  2nd step knee flexion and extension  // bar exercises: sidestepping, marches, leg curls  6\" step-ups  Seated knee flexion stretching      Manual Therapy:  Joint mobilization, soft tissue mobilization, manual techniques to decrease pain and improve joint and soft tissue mobility  Manual left knee flexion stretching          Time Calculation  Start Time: 1345  Stop Time: 1425  Time Calculation (min): 40 min  Therapeutic Interventions (Time Spent in Minutes)  Manual Therapy: 15  Therapeutic Exercise: 25   Other Charges       Charge ID Procedure Code Description Qty. Modifiers Charge Entry User Diagnosis    [id number] HC THERAPEUTIC PX 1/> AREAS EACH 15 MIN EXERCISES 2 GP Claudio Isaacs, PT     [id number] HC MANUAL THERAPY TQS 1/> REGIONS EACH 15 MINUTES 1 GP Claudio Isaacs, PT                Assessment/Plan   Assessment  Level " "of Function and Prognosis  Assessment: Patient was challegened by the 6\" step-ups, but was able to perform better than the last time we did that activity.     Therapy Goals    Short Term Goals  End goal date: 3/7/25  ST Goal 1: At least 110 deg of left knee flexion.  Goal 1 Status: met  End goal date: 3/7/25  ST Goal 2: Demonstrate ability to ascend stairs in reciprocal pattern  Goal 2 Status: met  End goal date: 3/7/25  ST Goal 3: Demonstrate ability to ambulate with SPC safely.  Goal 3 Status: met  Long Term Goals  Long term goal(s) met by: 5/8/25  LT Goal 1: At least 120 deg of left knee flexion  Goal Status: progressing  LT Goal 2: Demonstrate ability to descend stairs in reciprocal pattern.  Goal Status: progressing  LT Goal 3: Report ability to ambulate community distances with least   restrictive AD.  Goal Status: progressing  LT Goal 4: Patient will score at least the predicted goal score on the   FOTO functional outcome as a measure of improved functional ability.   LT Goal 5: Report 0/10 left knee pain during daily activities.  Goal Status: progressing       Plan  Plan for next treatment comment: Continue with emphasis on left knee strength, mobility, and gait  Treatment duration will be through 5/8/2025       "

## 2025-04-08 ENCOUNTER — HOSPITAL ENCOUNTER (OUTPATIENT)
Dept: PHYSICAL THERAPY | Facility: HOSPITAL | Age: 70
Setting detail: THERAPIES SERIES
Discharge: STILL ADMITTED OR ADMINISTRATIVE | End: 2025-04-08
Payer: MEDICARE

## 2025-04-08 DIAGNOSIS — R26.2 DIFFICULTY WALKING: ICD-10-CM

## 2025-04-08 DIAGNOSIS — Z47.1 AFTERCARE FOLLOWING LEFT KNEE JOINT REPLACEMENT SURGERY: Primary | ICD-10-CM

## 2025-04-08 DIAGNOSIS — Z96.652 AFTERCARE FOLLOWING LEFT KNEE JOINT REPLACEMENT SURGERY: Primary | ICD-10-CM

## 2025-04-08 DIAGNOSIS — R53.1 WEAKNESS: ICD-10-CM

## 2025-04-08 DIAGNOSIS — M25.662 KNEE STIFFNESS, LEFT: ICD-10-CM

## 2025-04-08 PROCEDURE — 97140 MANUAL THERAPY 1/> REGIONS: CPT | Mod: GP

## 2025-04-08 PROCEDURE — 97110 THERAPEUTIC EXERCISES: CPT | Mod: GP

## 2025-04-08 NOTE — INTERDISCIPLINARY/THERAPY
"  [address] [facility] [id number] [phone]      Outpatient Physical Therapy Daily Treatment Note    Date of Service: 25  Patient Name: Corrine Holden  : 1955  Referring Provider: David Montano PA-C  Today's Visit Number: 17  Onset Date: 2025  SOC Date: 2025  Certification Period: 2025  HICN: [id number]  Medical Diagnosis listed first. Additional are Treatment Diagnoses:  1. Aftercare following left knee joint replacement surgery  2. Knee stiffness, left  3. Weakness  4. Difficulty walking      Subjective   Subjective Comments: Patient reports she was doing some work in her garage on Friday through  and it made her left leg sore.  Might be more from the ankle than the knee though.  Has patient fallen since last visit? No     Have there been any changes in medications? No          Objective    Knee ROM Left:    Flexion: 120 deg  Extension: 0 deg    Treatment:     Therapeutic Exercise: to develop strength, endurance, range of motion and flexibility  Upright bike 30-40W  x 10 minutes  // bar exercises: sidestepping, marches, leg curls  4\" step-ups  Seated knee flexion stretching      Manual Therapy:  Joint mobilization, soft tissue mobilization, manual techniques to decrease pain and improve joint and soft tissue mobility  Manual left knee flexion stretching            Time Calculation  Start Time: 1300  Stop Time: 1340  Time Calculation (min): 40 min  Therapeutic Interventions (Time Spent in Minutes)  Manual Therapy: 10  Therapeutic Exercise: 30   Other Charges       Charge ID Procedure Code Description Qty. Modifiers Charge Entry User Diagnosis    [id number]  THERAPEUTIC PX 1/> AREAS EACH 15 MIN EXERCISES 2 GP Claudio Isaacs, PT     [id number]  MANUAL THERAPY TQS 1/> REGIONS EACH 15 MINUTES 1 GP Claudio Isaacs, PT                Assessment/Plan   Assessment  Level of Function and Prognosis  Assessment: Patient is still needing to build " strength in the left LE.  Her left ankle may be more of her issue at this point than the knee is.  She will lwork on finding something she can use to start doing step-ups at home.     Therapy Goals    Short Term Goals  End goal date: 3/7/25  ST Goal 1: At least 110 deg of left knee flexion.  Goal 1 Status: met  End goal date: 3/7/25  ST Goal 2: Demonstrate ability to ascend stairs in reciprocal pattern  Goal 2 Status: met  End goal date: 3/7/25  ST Goal 3: Demonstrate ability to ambulate with SPC safely.  Goal 3 Status: met  Long Term Goals  Long term goal(s) met by: 5/8/25  LT Goal 1: At least 120 deg of left knee flexion  Goal Status: progressing  LT Goal 2: Demonstrate ability to descend stairs in reciprocal pattern.  Goal Status: progressing  LT Goal 3: Report ability to ambulate community distances with least   restrictive AD.  Goal Status: progressing  LT Goal 4: Patient will score at least the predicted goal score on the   FOTO functional outcome as a measure of improved functional ability.   LT Goal 5: Report 0/10 left knee pain during daily activities.  Goal Status: progressing       Plan  Plan for next treatment comment: Continue with emphasis on left knee strength, mobility, and gait  Treatment duration will be through 5/8/2025

## 2025-04-11 ENCOUNTER — HOSPITAL ENCOUNTER (OUTPATIENT)
Dept: PHYSICAL THERAPY | Facility: HOSPITAL | Age: 70
Setting detail: THERAPIES SERIES
Discharge: STILL ADMITTED OR ADMINISTRATIVE | End: 2025-04-11
Payer: MEDICARE

## 2025-04-11 DIAGNOSIS — Z96.652 AFTERCARE FOLLOWING LEFT KNEE JOINT REPLACEMENT SURGERY: Primary | ICD-10-CM

## 2025-04-11 DIAGNOSIS — R53.1 WEAKNESS: ICD-10-CM

## 2025-04-11 DIAGNOSIS — Z47.1 AFTERCARE FOLLOWING LEFT KNEE JOINT REPLACEMENT SURGERY: Primary | ICD-10-CM

## 2025-04-11 DIAGNOSIS — M25.662 KNEE STIFFNESS, LEFT: ICD-10-CM

## 2025-04-11 DIAGNOSIS — R26.2 DIFFICULTY WALKING: ICD-10-CM

## 2025-04-11 PROCEDURE — 97110 THERAPEUTIC EXERCISES: CPT | Mod: GP

## 2025-04-11 NOTE — INTERDISCIPLINARY/THERAPY
"  [address] [facility] [id number] [phone]      Outpatient Physical Therapy Daily Treatment Note    Date of Service: 25  Patient Name: Corrine Holden  : 1955  Referring Provider: David Montano PA-C  Today's Visit Number: 18  Onset Date: 2025  SOC Date: 2025  Certification Period: 2025  HICN: [id number]  Medical Diagnosis listed first. Additional are Treatment Diagnoses:  1. Aftercare following left knee joint replacement surgery  2. Knee stiffness, left  3. Weakness  4. Difficulty walking      Subjective   Subjective Comments: Patient states that she does feel like more of her limitations are related to her left ankle, more so than her left knee.  Has patient fallen since last visit? No     Have there been any changes in medications? No          Objective    Knee ROM Left:    Flexion: 120 deg  Extension: 0 deg    Treatment:     Therapeutic Exercise: to develop strength, endurance, range of motion and flexibility  Upright bike 30-40W  x 10 minutes  // bar exercises: sidestepping, leg curls  2\" step overs  4\" step-ups      Manual Therapy:  Joint mobilization, soft tissue mobilization, manual techniques to decrease pain and improve joint and soft tissue mobility  Manual left knee flexion stretching  Manual left knee extension stretching with ankle DF stretch          Time Calculation  Start Time: 1230  Stop Time: 1300  Time Calculation (min): 30 min  Therapeutic Interventions (Time Spent in Minutes)  Therapeutic Exercise: 30   Other Charges       Charge ID Procedure Code Description Qty. Modifiers Charge Entry User Diagnosis    [id number] HC THERAPEUTIC PX 1/> AREAS EACH 15 MIN EXERCISES 2 GP Claudio Isaacs, PT                Assessment/Plan   Assessment  Level of Function and Prognosis  Assessment: Patient's left ankle DF mobility is changing her gait mechanics so we continued to discuss ways she can work on the mobility at home.     Therapy Goals    Short " Term Goals  End goal date: 3/7/25  ST Goal 1: At least 110 deg of left knee flexion.  Goal 1 Status: met  End goal date: 3/7/25  ST Goal 2: Demonstrate ability to ascend stairs in reciprocal pattern  Goal 2 Status: met  End goal date: 3/7/25  ST Goal 3: Demonstrate ability to ambulate with SPC safely.  Goal 3 Status: met  Long Term Goals  Long term goal(s) met by: 5/8/25  LT Goal 1: At least 120 deg of left knee flexion  Goal Status: progressing  LT Goal 2: Demonstrate ability to descend stairs in reciprocal pattern.  Goal Status: progressing  LT Goal 3: Report ability to ambulate community distances with least   restrictive AD.  Goal Status: progressing  LT Goal 4: Patient will score at least the predicted goal score on the   FOTO functional outcome as a measure of improved functional ability.   LT Goal 5: Report 0/10 left knee pain during daily activities.  Goal Status: progressing       Plan  Plan for next treatment comment: Continue with emphasis on left knee strength, mobility, and gait  Treatment duration will be through 5/8/2025

## 2025-04-15 ENCOUNTER — HOSPITAL ENCOUNTER (OUTPATIENT)
Dept: PHYSICAL THERAPY | Facility: HOSPITAL | Age: 70
Setting detail: THERAPIES SERIES
Discharge: STILL ADMITTED OR ADMINISTRATIVE | End: 2025-04-15
Payer: MEDICARE

## 2025-04-15 DIAGNOSIS — R26.2 DIFFICULTY WALKING: ICD-10-CM

## 2025-04-15 DIAGNOSIS — M25.662 KNEE STIFFNESS, LEFT: ICD-10-CM

## 2025-04-15 DIAGNOSIS — Z47.1 AFTERCARE FOLLOWING LEFT KNEE JOINT REPLACEMENT SURGERY: Primary | ICD-10-CM

## 2025-04-15 DIAGNOSIS — R53.1 WEAKNESS: ICD-10-CM

## 2025-04-15 DIAGNOSIS — Z96.652 AFTERCARE FOLLOWING LEFT KNEE JOINT REPLACEMENT SURGERY: Primary | ICD-10-CM

## 2025-04-15 PROCEDURE — 97110 THERAPEUTIC EXERCISES: CPT | Mod: GP

## 2025-04-15 PROCEDURE — 97140 MANUAL THERAPY 1/> REGIONS: CPT | Mod: GP

## 2025-04-15 NOTE — PROGRESS NOTES
"Subjective   Return Dermatology Skin examination  Corrine Holden is a 70 y.o. female with a history of Actinic Keratoses who presents for a full body skin exam . Lesions of concern include: dry patches.        Review of Systems   Constitutional:  Negative for fatigue and fever.   Skin:  Negative for rash.   Allergic/Immunologic: Negative for environmental allergies and immunocompromised state.   Hematological:  Bruises/bleeds easily.   All other systems reviewed and are negative.      Past Medical History:   Diagnosis Date    GERD (gastroesophageal reflux disease)     H. pylori infection 2009    Hypothyroidism     Injury of back     STATES, \"THIS IS REASON FOR MRI\"    Microscopic hematuria     SHANA (obstructive sleep apnea)     USES CPAP    Osteoarthritis     Palpitations     Respiratory disease     Supraventricular tachycardia (CMS/HCC)     STATES, \"COFFEE RELATED\", VERY INTERMITTENT    Tinnitus        Current Outpatient Medications on File Prior to Visit   Medication Sig Dispense Refill    meloxicam (MOBIC) 7.5 mg tablet Take 1 tablet (7.5 mg total) by mouth daily 90 tablet 3    levothyroxine (SYNTHROID, LEVOTHROID) 125 mcg tablet Take 1 tablet (125 mcg total) by mouth daily 90 tablet 3    omeprazole (PriLOSEC) 20 mg capsule Take 1 capsule (20 mg total) by mouth 2 (two) times a day 180 capsule 0    diazePAM (VALIUM) 5 mg tablet Take 1 tablet (5 mg total) by mouth every 12 (twelve) hours as needed for anxiety or muscle spasms Max Daily Amount: 10 mg 30 tablet 0    albuterol HFA 90 mcg/actuation inhaler Inhale 2 puffs by mouth every 6 (six) hours as needed for shortness of breath 18 g 2    ibuprofen (ADVIL,MOTRIN) 200 mg tablet Take 3 tablets (600 mg total) by mouth every 6 (six) hours as needed for pain scale 1-3/10      CRANBERRY ORAL Take 500 mg by mouth daily.      magnesium hydroxide (MILK OF MAGNESIA) 400 mg/5 mL suspension Take 30 mL by mouth daily as needed (constipation)      ketorolac (TORADOL) 10 mg " tablet Take 1 tablet (10 mg total) by mouth every 8 (eight) hours as needed for pain scale 8-10/10 for up to 5 days 15 tablet 0    cholecalciferol, vitamin D3, 25 mcg (1,000 unit) tablet Take 2 tablets (2,000 Units total) by mouth 2 (two) times a day      ascorbic acid, vitamin C, (ascorbic acid with vane hips) 500 mg tablet Take 2 tablets (1,000 mg total) by mouth daily      methylPREDNISolone (Medrol, Maxi,) 4 mg tablet follow package directions 21 tablet 0    vit C/E/Zn/coppr/lutein/zeaxan (OCUVITE LUTEIN AND ZEAXANTHIN ORAL) PreserVision AREDS-2      zinc 50 mg tablet Take 50 mg by mouth daily      diclofenac sodium (VOLTAREN) 1 % gel APPLY 2 GRAMS TO THE AFFECTED AREA(S) 4 TIMES PER DAY AS NEEDED 300 g 2    fluticasone propionate (FLONASE) 50 mcg/actuation nasal spray USE 2 SPRAYS IN EACH NOSTRIL DAILY 48 g 2    krill-om-3-dha-epa-phospho-ast 332-045-72-75 mg capsule Take 1 capsule by mouth daily      multivitamin (THERAGRAN) tablet tablet 1 Every Day      acetaminophen (TYLENOL EXTRA STRENGTH) 500 mg tablet Take 2 tablets (1,000 mg total) by mouth 2 (two) times a day Indications: pain      aspirin 81 mg chewable tablet Take 1 tablet (81 mg total) by mouth 2 (two) times a day (Patient not taking: Reported on 4/30/2025)      sennosides-docusate sodium (Senna Plus) 8.6-50 mg Take 1 tablet by mouth daily as needed for constipation (Patient not taking: Reported on 4/30/2025)      calc/FA/B/D3/E/bioflav/soybean (CALCI-MAX ORAL)  (Patient not taking: Reported on 4/30/2025)       No current facility-administered medications on file prior to visit.       Allergies  Bee venom protein (honey bee), Hydrocodone, No known drug allergies, and Tizanidine    The following have been reviewed and updated as appropriate in this visit          Physical Examination    Vital Signs  vitals were not taken for this visit.         Physical Exam Findings:     Constitutional: General Appearance: healthy-appearing, well-nourished, and  well-developed. Level of Distress: NAD.           A skin examination was performed of the following sites:    Trunk (including back, chest, abdomen): Stuck on tan brown papules, well demarcated tan brown macules, bright red cherry papules  Arms/Hands: Gritty erythematous papule on left upper arm. Stuck on tan brown papules, well demarcated tan brown macules  Buttocks: No suspicious lesions noted  Groin: No suspicious lesions noted  Legs: Gritty erythematous papule on right medial pretibial leg. Stuck on tan brown papules, well demarcated tan brown macules  Feet: Toenails polished on examination  Scalp: No suspicious lesions noted   Face: Makeup on examination. Gritty erythematous papule on right forehead. Stuck on tan brown papules, well demarcated tan brown macules      Lesion in question:  Tan stuck on papules on right cheek, left temple      Procedure Note:  Destruction of Benign or Premalignant Lesion    Date/Time: 4/30/2025 1:49 PM    Performed by: Elvie Barajas MD      Consent  Verbal consent was obtained from the patient. Written consent was not obtained from the patient. Risks include permanent scarring, light or dark discoloration, infection, pain, bleeding, bruising, redness, blister formation and recurrence of the lesion. Consent given by patient. The patient states understanding of the procedure being performed. Patient ID confirmed verbally with patient.     Location:  3 total lesions removed     Head/neck location(s):  Number of head/neck lesions removed: 1  Head/neck location: Rt forehead.      Upper Extremity location(s):  Number of Upper Extremity lesions removed: 1  Upper extremity location: Lt upper arm.              Lower Extremity location(s):   Number of Lower Extremity lesions removed: 1  Lower extremity location: Rt medial pretibial leg.    Procedure Details   Lesion(s) are pre-malignant.   Area(s) treated with alcohol 70%.  Local anesthesia was not used.   Lesion(s) destroyed with:  liquid nitrogen. Liquid nitrogen application completed 2 times. Lesion(s) were frozen until an ice ball extended beyond the lesion.     Post-Procedure  Patient tolerated procedure well with no complications.     Procedure Comments  Actinic keratosis          Corrine was seen today for full body skin check.    Diagnoses and all orders for this visit:    Keratosis, actinic  -     Destruction of Benign or Premalignant Lesion  I discussed the pathogenesis of actinic keratosis with patient in detail.  I informed patient these are a precursor to skin cancer and are related to long-term sun damage.  We discussed cryotherapy is the recommended treatment and patient is in agreement with pursuing cryotherapy in the office. Please see above procedure for details. We discussed the increased risk of development of additional actinic keratoses in future.      Seborrheic keratoses  Diagnosis discussed. Discussed that these are skin thickenings that occur over time with age. Patient may obtain more over time. Reassured patient that lesions are benign and no treatment is necessary unless bothersome.     Lentigo  Discussed benign nature of lentigos, informed patient they are sun-induced brown flat lesions. Recommend diligent sun protection.     [place] hemangioma  Discussed benign nature of cherry angiomas in detail inform patient these are genetic and hormonally induced vascular growths with no potential for malignant transformation    History of actinic keratosis  Actinic skin damage  Explained to patient that actinic damage is a chronic life long condition.  Discussed the nature of actinic damage with patient.  Informed patient to prevent further damage and to lower the risk of skin cancer a broad spectrum sunscreen should be used SPF 30-50 reapplying every two hours while outdoors            I emphasized daily sunscreen use with an SPF of 30-50, broad spectrum and water resistant.  I directed reapplication every two hours.  I  recommended wide brimmed hats.  Finally, we discussed danger signs of changing skin lesions including sores not healing and moles changing in size, shape or color.  Should any of these lesions occur before next appointment, I instructed patent to call sooner for evaluation.    Patient expresses understanding and agreement with the plan of care, and had no further questions at the end of the exam today.     Return in about 1 year (around 4/30/2026) for Skin check.

## 2025-04-15 NOTE — INTERDISCIPLINARY/THERAPY
"  [address] [facility] [id number] [phone]      Outpatient Physical Therapy Daily Treatment Note    Date of Service: 4/15/25  Patient Name: Corrine Holden  : 1955  Referring Provider: David Montano PA-C  Today's Visit Number: 19  Onset Date: 2025  SOC Date: 2025  Certification Period: 2025  HICN: [id number]  Medical Diagnosis listed first. Additional are Treatment Diagnoses:  1. Aftercare following left knee joint replacement surgery  2. Knee stiffness, left  3. Weakness  4. Difficulty walking      Subjective   Subjective Comments: Patient reports she is doing well, stiffness in the knee is present. Still impacted by left ankle.  Has patient fallen since last visit? No     Have there been any changes in medications? No  Pain Assessment Scale  Pain Scale: 0-10  Pain Score: 2-Notice pain, does not interfere with activities  Patient's Stated Pain Goal: 2-Notice pain, does not interfere with activities       Objective    Knee ROM Left:    Flexion: 116 deg  Extension: 0 deg    Treatment:     Therapeutic Exercise: to develop strength, endurance, range of motion and flexibility  Upright bike 30-40W  x 10 minutes  TKE x10, TKE + HR x10  // bar exercises: march, sidestepping, leg curls, cone tap   6\" step-ups 2x10  Bridge x15      Manual Therapy:  Joint mobilization, soft tissue mobilization, manual techniques to decrease pain and improve joint and soft tissue mobility  Manual left knee flexion stretching  Manual left knee extension stretching with ankle DF stretch            Time Calculation  Start Time: 1318  Stop Time: 1358  Time Calculation (min): 40 min  Therapeutic Interventions (Time Spent in Minutes)  Manual Therapy: 10  Therapeutic Exercise: 30   Other Charges       Charge ID Procedure Code Description Qty. Modifiers Charge Entry User Diagnosis    [id number]  THERAPEUTIC PX 1/> AREAS EACH 15 MIN EXERCISES 2 GP Tj Whitehead, MARK     [id number]  " MANUAL THERAPY TQS 1/> REGIONS EACH 15 MINUTES 1 GP Tj Whitehead, PT                Assessment/Plan   Assessment  Level of Function and Prognosis  Assessment: Patient is progressing well. Demonstrates good tolerance to all interventions today. ROM remains good, and she does seem more limited by ankle vs knee. Due for reassessment next sessions.     Therapy Goals    Short Term Goals  End goal date: 3/7/25  ST Goal 1: At least 110 deg of left knee flexion.  Goal 1 Status: met  End goal date: 3/7/25  ST Goal 2: Demonstrate ability to ascend stairs in reciprocal pattern  Goal 2 Status: met  End goal date: 3/7/25  ST Goal 3: Demonstrate ability to ambulate with SPC safely.  Goal 3 Status: met  Long Term Goals  Long term goal(s) met by: 5/8/25  LT Goal 1: At least 120 deg of left knee flexion  Goal Status: progressing  LT Goal 2: Demonstrate ability to descend stairs in reciprocal pattern.  Goal Status: progressing  LT Goal 3: Report ability to ambulate community distances with least   restrictive AD.  Goal Status: progressing  LT Goal 4: Patient will score at least the predicted goal score on the   FOTO functional outcome as a measure of improved functional ability.   LT Goal 5: Report 0/10 left knee pain during daily activities.  Goal Status: progressing       Plan  Plan for next treatment comment: Continue with emphasis on gait, balance, LE strength  Treatment duration will be through 5/8/2025

## 2025-04-17 ENCOUNTER — HOSPITAL ENCOUNTER (OUTPATIENT)
Dept: PHYSICAL THERAPY | Facility: HOSPITAL | Age: 70
Setting detail: THERAPIES SERIES
Discharge: STILL ADMITTED OR ADMINISTRATIVE | End: 2025-04-17
Payer: MEDICARE

## 2025-04-17 DIAGNOSIS — R26.2 DIFFICULTY WALKING: ICD-10-CM

## 2025-04-17 DIAGNOSIS — Z96.652 AFTERCARE FOLLOWING LEFT KNEE JOINT REPLACEMENT SURGERY: Primary | ICD-10-CM

## 2025-04-17 DIAGNOSIS — R53.1 WEAKNESS: ICD-10-CM

## 2025-04-17 DIAGNOSIS — M25.662 KNEE STIFFNESS, LEFT: ICD-10-CM

## 2025-04-17 DIAGNOSIS — Z47.1 AFTERCARE FOLLOWING LEFT KNEE JOINT REPLACEMENT SURGERY: Primary | ICD-10-CM

## 2025-04-17 PROCEDURE — 97110 THERAPEUTIC EXERCISES: CPT | Mod: GP

## 2025-04-17 PROCEDURE — 97140 MANUAL THERAPY 1/> REGIONS: CPT | Mod: GP

## 2025-04-17 NOTE — INTERDISCIPLINARY/THERAPY
"  [address] [facility]  [facility] [id number] [phone]      Outpatient Physical Therapy Progress Note     Date of Service: 25  Patient Name: Corrine Holden  : 1955  Referring Provider: David Montano PA-C  Today's Visit Number: 20  Medicare or Medicaid note, cosigner has been added.: Yes  Onset Date: 2025  SOC Date: 2025  Certification Period: 2025  HICN: [id number]  Medical Diagnosis listed first. Additional are Treatment Diagnoses:  1. Aftercare following left knee joint replacement surgery  2. Knee stiffness, left  3. Weakness  4. Difficulty walking      Subjective   Subjective Comments: Patient reports she is doing well, stiff in the knee but doing well overall.         Pain Assessment Scale  Pain Scale: 0-10  Pain Score: 0-No pain  Patient's Stated Pain Goal: 0-No pain  Past Medical History:   Diagnosis Date    GERD (gastroesophageal reflux disease)     H. pylori infection     Hypothyroidism     Injury of back     STATES, \"THIS IS REASON FOR MRI\"    Microscopic hematuria     SHANA (obstructive sleep apnea)     USES CPAP    Osteoarthritis     Palpitations     Respiratory disease     Supraventricular tachycardia (CMS/HCC)     STATES, \"COFFEE RELATED\", VERY INTERMITTENT    Tinnitus        Current Outpatient Medications:     levothyroxine (SYNTHROID, LEVOTHROID) 125 mcg tablet, Take 1 tablet (125 mcg total) by mouth daily, Disp: 90 tablet, Rfl: 3    omeprazole (PriLOSEC) 20 mg capsule, Take 1 capsule (20 mg total) by mouth 2 (two) times a day, Disp: 180 capsule, Rfl: 0    diazePAM (VALIUM) 5 mg tablet, Take 1 tablet (5 mg total) by mouth every 12 (twelve) hours as needed for anxiety or muscle spasms Max Daily Amount: 10 mg, Disp: 30 tablet, Rfl: 0    albuterol HFA 90 mcg/actuation inhaler, Inhale 2 puffs by mouth every 6 (six) hours as needed for shortness of breath, Disp: 18 g, Rfl: 2    aspirin 81 mg chewable tablet, Take 1 tablet (81 mg total) by mouth 2 (two) times a day, " Disp: , Rfl:     ibuprofen (ADVIL,MOTRIN) 200 mg tablet, Take 3 tablets (600 mg total) by mouth every 6 (six) hours as needed for pain scale 1-3/10, Disp: , Rfl:     CRANBERRY ORAL, Take 500 mg by mouth daily., Disp: , Rfl:     sennosides-docusate sodium (Senna Plus) 8.6-50 mg, Take 1 tablet by mouth daily as needed for constipation, Disp: , Rfl:     magnesium hydroxide (MILK OF MAGNESIA) 400 mg/5 mL suspension, Take 30 mL by mouth daily as needed (constipation), Disp: , Rfl:     ketorolac (TORADOL) 10 mg tablet, Take 1 tablet (10 mg total) by mouth every 8 (eight) hours as needed for pain scale 8-10/10 for up to 5 days, Disp: 15 tablet, Rfl: 0    calc/FA/B/D3/E/bioflav/soybean (CALCI-MAX ORAL), , Disp: , Rfl:     cholecalciferol, vitamin D3, 25 mcg (1,000 unit) tablet, Take 2 tablets (2,000 Units total) by mouth 2 (two) times a day, Disp: , Rfl:     ascorbic acid, vitamin C, (ascorbic acid with vane hips) 500 mg tablet, Take 2 tablets (1,000 mg total) by mouth daily, Disp: , Rfl:     methylPREDNISolone (Medrol, Maxi,) 4 mg tablet, follow package directions, Disp: 21 tablet, Rfl: 0    vit C/E/Zn/coppr/lutein/zeaxan (OCUVITE LUTEIN AND ZEAXANTHIN ORAL), PreserVision AREDS-2, Disp: , Rfl:     zinc 50 mg tablet, Take 50 mg by mouth daily, Disp: , Rfl:     diclofenac sodium (VOLTAREN) 1 % gel, APPLY 2 GRAMS TO THE AFFECTED AREA(S) 4 TIMES PER DAY AS NEEDED, Disp: 300 g, Rfl: 2    fluticasone propionate (FLONASE) 50 mcg/actuation nasal spray, USE 2 SPRAYS IN EACH NOSTRIL DAILY, Disp: 48 g, Rfl: 2    krill-om-3-dha-epa-phospho-ast [id number] mg capsule, Take 1 capsule by mouth daily, Disp: , Rfl:     multivitamin (THERAGRAN) tablet tablet, 1 Every Day, Disp: , Rfl:     acetaminophen (TYLENOL EXTRA STRENGTH) 500 mg tablet, Take 2 tablets (1,000 mg total) by mouth 2 (two) times a day Indications: pain, Disp: , Rfl:     Current Facility-Administered Medications:     iohexoL (OMNIPAQUE) 240 mg iodine/mL injection 3 mL, 3  "mL, injection, PRN, , 3 mL at 06/13/24 0916  Allergies   Allergen Reactions    Bee Venom Protein (Honey Bee) Swelling    Hydrocodone      Other Reaction(s): VOMITING, \"LOOPY\" TOLERATES OXYCODONE (SNOMED-CT: null)    No Known Drug Allergies     Tizanidine           Objective Findings:  Prior Objective Findings: Visit 10  Knee ROM Left:  Flexion at start of session: 104 deg  Flexion: 118 deg  Extension: 0 deg     Current Objective Findings:    Range of motion  Flexion: 118  Extension: 0    Gait: mild antalgia with increased lateral lean, single point cane in right hand     Objective      Treatment:     Therapeutic Exercise: to develop strength, endurance, range of motion and flexibility  Upright bike 30-40W  x 10 minutes  SLR x 15  TKE x10, TKE + HR x10  // bar exercises: march, sidestepping, leg curls, cone tap   6\" step-ups 2x10  Bridge x15      Manual Therapy:  Joint mobilization, soft tissue mobilization, manual techniques to decrease pain and improve joint and soft tissue mobility  Manual left knee flexion stretching  Manual left knee extension stretching with ankle DF stretch              Patient's FOTO functional outcome score is  /100 where a higher number = greater function.     Time Calculation  Start Time: 1058  Stop Time: 1140  Time Calculation (min): 42 min  Therapeutic Interventions (Time Spent in Minutes)  Manual Therapy: 10  Therapeutic Exercise: 32  Other Charges       Charge ID Procedure Code Description Qty. Modifiers Charge Entry User Diagnosis    [id number]  THERAPEUTIC PX 1/> AREAS EACH 15 MIN EXERCISES 2 GP Tj Whitehead PT     [id number] HC MANUAL THERAPY TQS 1/> REGIONS EACH 15 MINUTES 1 GP Tj Whitehead PT                Assessment/Plan   Assessment  Level of Function and Prognosis  Assessment: Patient does well in session today, continues to be limited by her left ankle, but knee is doing well overall.  Good tolerance to all interventions with exception of heel " raise exercises that does aggravate the left heel.  Progress intensity per tolerance, likely discharge in mid May.  Progress Report    Reporting Dates 2/7/25 through 4/17/25.  Patient's treatment has focused on improving range of motion, lower extremity strength, and tolerance to functional activities. Patient is demonstrating improvements in all metrics, shows improvements in her gait, range of motion at the knee, and functional strength. Patient has not yet reached maximal benefit has not met all goals at this time.  Discharge planning in place with 2-3 more visits, followed by physical therapy for 1 additional visit to confirm patient is doing well on her own.    The patient's FOTO Functional Outcome score is  /100 where a higher number equals greater function.      It is recommended that the client attend skilled rehabilitative therapy for up to up to 2x/wk  with an expected duration through 5/31/2025.   Treatment may consist of: Therapeutic Exercise; Therapeutic Activity; Neuromuscular Re-education; Manual Therapy; ADL self care; Gait training         Therapy Goals    Short Term Goals  End goal date: 3/7/25  ST Goal 1: At least 110 deg of left knee flexion.  Goal 1 Status: met  End goal date: 3/7/25  ST Goal 2: Demonstrate ability to ascend stairs in reciprocal pattern  Goal 2 Status: met  End goal date: 3/7/25  ST Goal 3: Demonstrate ability to ambulate with SPC safely.  Goal 3 Status: met  Long Term Goals  Long term goal(s) met by: 5/31/25  LT Goal 1: At least 120 deg of left knee flexion  Goal Status: met  LT Goal 2: Demonstrate ability to descend stairs in reciprocal pattern.  Goal Status: progressing  LT Goal 3: Report ability to ambulate community distances with least   restrictive AD.  Goal Status: met  LT Goal 4: Patient will score at least the predicted goal score on the   FOTO functional outcome as a measure of improved functional ability.   LT Goal 5: Report 0/10 left knee pain during daily  activities.  Goal Status: progressing       Plan  Plan  Treatment Interventions: Therapeutic Exercise, Therapeutic Activity, Neuromuscular Re-education, Manual Therapy, ADL self care, Gait training  PT Frequency: up to 2x/wk  Plan for next treatment comment: Prepare for discharge, likely discharging mid - May. Quentin with emphasis on strength and coordination  Treatment duration will be through 5/31/2025       Thank you for allowing us to share in the care of this patient. If you have any questions, recommendations, or further concerns regarding this patient, please feel free to contact us at  [facility] [facility] [address] [phone]      * I have reviewed the plan of care and certify a continuing need for medically necessary services    Co-signed by:_________________________ Date and Time:________________

## 2025-04-25 ENCOUNTER — HOSPITAL ENCOUNTER (OUTPATIENT)
Dept: PHYSICAL THERAPY | Facility: HOSPITAL | Age: 70
Setting detail: THERAPIES SERIES
Discharge: STILL ADMITTED OR ADMINISTRATIVE | End: 2025-04-25
Payer: MEDICARE

## 2025-04-25 DIAGNOSIS — Z96.652 AFTERCARE FOLLOWING LEFT KNEE JOINT REPLACEMENT SURGERY: Primary | ICD-10-CM

## 2025-04-25 DIAGNOSIS — R53.1 WEAKNESS: ICD-10-CM

## 2025-04-25 DIAGNOSIS — Z47.1 AFTERCARE FOLLOWING LEFT KNEE JOINT REPLACEMENT SURGERY: Primary | ICD-10-CM

## 2025-04-25 DIAGNOSIS — R26.2 DIFFICULTY WALKING: ICD-10-CM

## 2025-04-25 DIAGNOSIS — M25.662 KNEE STIFFNESS, LEFT: ICD-10-CM

## 2025-04-25 PROCEDURE — 97110 THERAPEUTIC EXERCISES: CPT | Mod: GP

## 2025-04-25 PROCEDURE — 97140 MANUAL THERAPY 1/> REGIONS: CPT | Mod: GP

## 2025-04-25 NOTE — INTERDISCIPLINARY/THERAPY
"  [address] [facility] [id number] [phone]      Outpatient Physical Therapy Daily Treatment Note    Date of Service: 25  Patient Name: Corrine Holden  : 1955  Referring Provider: David Montano PA-C  Today's Visit Number: 21  Onset Date: 2025  SOC Date: 2025  Certification Period: 2025  HICN: [id number]  Medical Diagnosis listed first. Additional are Treatment Diagnoses:  1. Aftercare following left knee joint replacement surgery  2. Knee stiffness, left  3. Weakness  4. Difficulty walking      Subjective   Subjective Comments: Patient reports that her left knee seems to still be improving.  Feels like her left ankle is more of a liiting factor for her.  Planning on making an appointment with Dr. Guerra regarding her left ankle.  Has patient fallen since last visit? No     Have there been any changes in medications? No          Objective    Treatment:     Therapeutic Exercise: to develop strength, endurance, range of motion and flexibility  Upright bike 30-40W  x 10 minutes  // bar exercises: march, sidestepping, leg curls, 6\" step-ups 2x10        Manual Therapy:  Joint mobilization, soft tissue mobilization, manual techniques to decrease pain and improve joint and soft tissue mobility  Manual left knee flexion stretching  Manual left knee extension stretching with ankle DF stretch        Time Calculation  Start Time: 1230  Stop Time: 1302  Time Calculation (min): 32 min  Therapeutic Interventions (Time Spent in Minutes)  Manual Therapy: 10  Therapeutic Exercise: 22   Other Charges       Charge ID Procedure Code Description Qty. Modifiers Charge Entry User Diagnosis    [id number]  THERAPEUTIC PX 1/> AREAS EACH 15 MIN EXERCISES 1 GP Claudio Isaacs, PT     [id number]  MANUAL THERAPY TQS 1/> REGIONS EACH 15 MINUTES 1 GP Claudio Isaacs, PT                Assessment/Plan   Assessment  Level of Function and Prognosis  Assessment: Patient feels " like her left knee continues to make progress.  Her left foot/ankle is more of her limiting factor.     Therapy Goals    Short Term Goals  End goal date: 3/7/25  ST Goal 1: At least 110 deg of left knee flexion.  Goal 1 Status: met  End goal date: 3/7/25  ST Goal 2: Demonstrate ability to ascend stairs in reciprocal pattern  Goal 2 Status: met  End goal date: 3/7/25  ST Goal 3: Demonstrate ability to ambulate with SPC safely.  Goal 3 Status: met  Long Term Goals  Long term goal(s) met by: 5/31/25  LT Goal 1: At least 120 deg of left knee flexion  Goal Status: met  LT Goal 2: Demonstrate ability to descend stairs in reciprocal pattern.  Goal Status: progressing  LT Goal 3: Report ability to ambulate community distances with least   restrictive AD.  Goal Status: met  LT Goal 4: Patient will score at least the predicted goal score on the   FOTO functional outcome as a measure of improved functional ability.   LT Goal 5: Report 0/10 left knee pain during daily activities.  Goal Status: progressing       Plan  Plan for next treatment comment: Patient will be seen in three weeks.  Plan will be to dischsrge patient at that time, barring any setbacks.  Treatment duration will be through 5/31/2025

## 2025-04-28 ENCOUNTER — TELEPHONE (OUTPATIENT)
Dept: INTERNAL MEDICINE | Facility: CLINIC | Age: 70
End: 2025-04-28
Payer: MEDICARE

## 2025-04-28 DIAGNOSIS — M15.9 GENERALIZED OSTEOARTHROSIS, INVOLVING MULTIPLE SITES: ICD-10-CM

## 2025-04-28 DIAGNOSIS — M47.26 OSTEOARTHRITIS OF SPINE WITH RADICULOPATHY, LUMBAR REGION: Primary | ICD-10-CM

## 2025-04-28 RX ORDER — MELOXICAM 7.5 MG/1
7.5 TABLET ORAL DAILY
Qty: 90 TABLET | Refills: 3 | Status: SHIPPED | OUTPATIENT
Start: 2025-04-28 | End: 2026-04-28

## 2025-04-28 NOTE — TELEPHONE ENCOUNTER
Patient has contacted the pharmacy? No    Patient Advised:  72 hours    Pharmacy for refill? (Provide Name and Location/Address/Phone): [facility] [address]  P: [phone]  F: [phone]    Name of Medications (Have patient read from the bottle or COPY from Medication Orders): Meloxicam 7.5 mg    Are you having any trouble with the medication:   No    How many doses do you have left: (Multiple med requests; Report the doses of the medication with the least amount left) 7-10 left    Is the Diagnosis (DX) active? not applicable    Additional Information:  Patient was on this previously  and needs a new prescription.     Patient's preferred pharmacy has been noted and populated.    Is it okay that the nurse communicates your response through [employer]? No      Caller has been advised: Your request does not guarantee an immediate refill. Please allow up to 72 hours for completion.

## 2025-04-30 ENCOUNTER — OFFICE VISIT (OUTPATIENT)
Dept: DERMATOLOGY | Facility: CLINIC | Age: 70
End: 2025-04-30
Payer: MEDICARE

## 2025-04-30 ENCOUNTER — TELEPHONE (OUTPATIENT)
Dept: INTERNAL MEDICINE | Facility: CLINIC | Age: 70
End: 2025-04-30
Payer: MEDICARE

## 2025-04-30 DIAGNOSIS — L81.4 LENTIGO: ICD-10-CM

## 2025-04-30 DIAGNOSIS — L57.8 ACTINIC SKIN DAMAGE: ICD-10-CM

## 2025-04-30 DIAGNOSIS — D18.01 CHERRY HEMANGIOMA: ICD-10-CM

## 2025-04-30 DIAGNOSIS — L57.0 KERATOSIS, ACTINIC: Primary | ICD-10-CM

## 2025-04-30 DIAGNOSIS — Z87.2 HISTORY OF ACTINIC KERATOSIS: ICD-10-CM

## 2025-04-30 DIAGNOSIS — L82.1 SEBORRHEIC KERATOSES: ICD-10-CM

## 2025-04-30 PROCEDURE — G0463 HOSPITAL OUTPT CLINIC VISIT: HCPCS | Mod: 25,PO | Performed by: STUDENT IN AN ORGANIZED HEALTH CARE EDUCATION/TRAINING PROGRAM

## 2025-04-30 PROCEDURE — 17003 DESTRUCT PREMALG LES 2-14: CPT | Mod: PO | Performed by: STUDENT IN AN ORGANIZED HEALTH CARE EDUCATION/TRAINING PROGRAM

## 2025-04-30 ASSESSMENT — ENCOUNTER SYMPTOMS
BRUISES/BLEEDS EASILY: 1
FEVER: 0
FATIGUE: 0

## 2025-04-30 NOTE — TELEPHONE ENCOUNTER
[facility] pharmacy is calling regarding medicaiton interaction for the meloxicam.    Writer advised call of a call back from the nurse within 24 hours.    Patient: Corrine Holden    Caller Name (last and first, relation/role): Bertha    Name of Facility: [facility]    Callback Number: [phone]    Fax Number: [phone]    Additional Info: They want to inform you this med is interacting with Eliquis     Did you confirm the message with the caller: Yes    Is it okay that the nurse communicates your response through [employer]? No

## 2025-05-05 ENCOUNTER — OFFICE VISIT (OUTPATIENT)
Dept: NEUROLOGY | Facility: CLINIC | Age: 70
End: 2025-05-05
Payer: MEDICARE

## 2025-05-05 DIAGNOSIS — G47.33 OBSTRUCTIVE SLEEP APNEA SYNDROME: Primary | ICD-10-CM

## 2025-05-05 PROCEDURE — G0463 HOSPITAL OUTPT CLINIC VISIT: HCPCS | Performed by: PHYSICIAN ASSISTANT

## 2025-05-05 PROCEDURE — 99213 OFFICE O/P EST LOW 20 MIN: CPT | Mod: 24 | Performed by: PHYSICIAN ASSISTANT

## 2025-05-05 ASSESSMENT — ENCOUNTER SYMPTOMS
PALPITATIONS: 0
CHILLS: 0
BACK PAIN: 1
SINUS PAIN: 0
NAUSEA: 0
HEADACHES: 0
EYE PAIN: 0
FEVER: 0
COUGH: 0
EYE ITCHING: 0
NECK PAIN: 0
SINUS PRESSURE: 0
ARTHRALGIAS: 1
SORE THROAT: 0
EYE REDNESS: 0
AGITATION: 0
SHORTNESS OF BREATH: 0
SLEEP DISTURBANCE: 1
VOMITING: 0
FATIGUE: 0
ROS GI COMMENTS: GERD
WEAKNESS: 0
APNEA: 1
DIZZINESS: 0

## 2025-05-05 ASSESSMENT — SLEEP AND FATIGUE QUESTIONNAIRES
HOW LIKELY ARE YOU TO NOD OFF OR FALL ASLEEP WHILE SITTING INACTIVE IN A PUBLIC PLACE: WOULD NEVER DOZE
HOW LIKELY ARE YOU TO NOD OFF OR FALL ASLEEP WHILE SITTING QUIETLY AFTER LUNCH WITHOUT ALCOHOL: WOULD NEVER DOZE
HOW LIKELY ARE YOU TO NOD OFF OR FALL ASLEEP IN A CAR, WHILE STOPPED FOR A FEW MINUTES IN TRAFFIC: WOULD NEVER DOZE
HOW LIKELY ARE YOU TO NOD OFF OR FALL ASLEEP WHILE SITTING AND READING: WOULD NEVER DOZE
HOW LIKELY ARE YOU TO NOD OFF OR FALL ASLEEP WHILE SITTING AND TALKING TO SOMEONE: WOULD NEVER DOZE
HOW LIKELY ARE YOU TO NOD OFF OR FALL ASLEEP WHILE WATCHING TV: WOULD NEVER DOZE
HOW LIKELY ARE YOU TO NOD OFF OR FALL ASLEEP WHILE LYING DOWN TO REST IN THE AFTERNOON WHEN CIRCUMSTANCES PERMIT: SLIGHT CHANCE OF DOZING
HOW LIKELY ARE YOU TO NOD OFF OR FALL ASLEEP WHEN YOU ARE A PASSENGER IN A CAR FOR AN HOUR WITHOUT A BREAK: WOULD NEVER DOZE

## 2025-05-05 NOTE — PROGRESS NOTES
Sleep Follow up Note   Name: Corrine Holden  MRN: [id number]  : 1955  Visit Date: 25    Primary Diagnosis   Obstructive sleep apnea syndrome [G47.33]     Chief Complaint:   SHANA follow up on  PAP therapy.    Sleep History:   - Diagnostic PSG ()   Severity: Mild to moderate    AHI: 13.4    Minimum SpO2: 82%   AVG SpO2: 91%      - PAP titration (14): CPAP 8 cmH2O  - Repeat diagnostic for Medicare (2024)   Severity: Mild     AH: 8      Minimum SpO2: 81%   AVG SpO2: 89%    WT: 291 lb    HPI:     History of Present Illness  Corrine Holden is a 70 year old female with sleep apnea who presents for annual CPAP follow-up.  Patient was last evaluated by this examiner for follow-up on 2024.  It is noted that the patient has a longstanding history of SHANA dating back to .  Initial study was completed secondary to complaints of excessive daytime fatigability and somnolence.  She also had complaints of morning headaches.  She underwent most recent diagnostic study in  for Medicare purposes. She has utilized PAP therapy consistently since original diagnosis.      Treatment: CPAP - AirSense 11  Pressure: 8.6 cmH2O, EPR 2  Mask Interface: Nasal   Chin strap: Yes  Fit: Good  DME: [facility]  Patient perceived outcome: Good      Current Pt Report:     She recently acquired a new CPAP machine, the Airsense 11, and uses her old machine as a backup for travel. Both machines are functioning well.     She wakes every two hours to use the bathroom but falls back asleep immediately. She has visited with urology regarding these complaints.     She mentions a history of leg surgeries and notes that if she sits too long, her leg becomes stiff. She is 17 weeks post-op from total knee replacement surgery on  and reports that her knee is fine, but she has some issues with her ankle, for which she plans to see her doctor.      Respiratory Symptoms:   Mouth Breathing:  "Denies  Dry Mouth in AM: Denies  Ongoing Snoring: Denies  Nocturnal Gasping: Denies    Sleep Pattern:   Sleep Aids: Denies  Sleep Location: Bedroom in Bed   Number of pillows: 2 - feather pillows  Position: Sides, both, back at times  HOB: Flat  Bedtime: 8 PM  (weekends: same)  Lights out: 8:30 PM  Latency: within 5 minutes  Awakenings: every 2 hours  Reason: bathroom - back to sleep fine  Rise time: 5 AM (weekends: same)   Patients estimate of total sleep time: 7-8 hours  Patient reports feeling \"refreshed\" in the morning    Daytime Symptoms:   Morning headaches: Denies  Intentional Naps: Denies  Involuntary Dozing (passenger in car, at school, etc.): Denies  Driving: Yes  Difficulty with sleepiness and driving?: Denies  Close/Accidents calls related to sleepiness: Denies    [place] Sleepiness Score:   [place] Sleepiness Scale  Sitting and reading: Would never doze  Watching TV: Would never doze  Sitting, inactive in a public place (e.g. a theatre or a meeting): Would never doze  As a passenger in a car for an hour without a break: Would never doze  Lying down to rest in the afternoon when circumstances permit: Slight chance of dozing  Sitting and talking to someone: Would never doze  Sitting quietly after a lunch without alcohol: Would never doze  In a car, while stopped for a few minutes in traffic: Would never doze  Total score: 1     Social History:   Work/School: Retired nurse  Shift Work: Not currently  Caffeine: 1 cup coffee  Alcohol: Denies  Tobacco: Denies  Illicit Drug Use: Denies    Past Medical History:     Past Medical History:   Diagnosis Date    GERD (gastroesophageal reflux disease)     H. pylori infection 2009    Hypothyroidism     Injury of back     STATES, \"THIS IS REASON FOR MRI\"    Microscopic hematuria     SHANA (obstructive sleep apnea)     USES CPAP    Osteoarthritis     Palpitations     Respiratory disease     Supraventricular tachycardia (CMS/HCC)     STATES, \"COFFEE RELATED\", VERY " INTERMITTENT    Tinnitus        Medications:     Current Outpatient Medications   Medication Instructions    acetaminophen (TYLENOL EXTRA STRENGTH) 500 mg tablet 2 tablets, 2 times daily    albuterol HFA 90 mcg/actuation inhaler 2 puffs, inhalation, Every 6 hours PRN    ascorbic acid (vitamin C) (ASCORBIC ACID WITH BIB HIPS) 1,000 mg, Daily    aspirin 81 mg, 2 times daily    calc/FA/B/D3/E/bioflav/soybean (CALCI-MAX ORAL)     cholecalciferol (vitamin D3) 2,000 Units, 2 times daily    CRANBERRY ORAL 500 mg, Daily    diazePAM (VALIUM) 5 mg, oral, Every 12 hours PRN    diclofenac sodium (VOLTAREN) 1 % gel APPLY 2 GRAMS TO THE AFFECTED AREA(S) 4 TIMES PER DAY AS NEEDED    fluticasone propionate (FLONASE) 50 mcg/actuation nasal spray USE 2 SPRAYS IN EACH NOSTRIL DAILY    ibuprofen (ADVIL,MOTRIN) 600 mg, Every 6 hours PRN    ketorolac (TORADOL) 10 mg, oral, Every 8 hours PRN    krill-om-3-dha-epa-phospho-ast 186-826-90-75 mg capsule 1 capsule, Daily    levothyroxine (SYNTHROID, LEVOTHROID) 125 mcg, oral, Daily    magnesium hydroxide (MILK OF MAGNESIA) 400 mg/5 mL suspension 30 mL, Daily PRN    meloxicam (MOBIC) 7.5 mg, oral, Daily    methylPREDNISolone (Medrol, Maxi,) 4 mg tablet follow package directions    multivitamin (THERAGRAN) tablet tablet 1 Every Day    omeprazole (PRILOSEC) 20 mg, oral, 2 times daily    sennosides-docusate sodium (Senna Plus) 8.6-50 mg 1 tablet, Daily PRN    vit C/E/Zn/coppr/lutein/zeaxan (OCUVITE LUTEIN AND ZEAXANTHIN ORAL) PreserVision AREDS-2    zinc 50 mg, Daily        Review of Systems:   Review of Systems   Constitutional:  Negative for chills, fatigue and fever.   HENT:  Negative for congestion, ear pain, sinus pressure, sinus pain and sore throat.    Eyes:  Negative for pain, redness and itching.   Respiratory:  Positive for apnea (Known diagnosis of SHANA. On CPAP). Negative for cough and shortness of breath.         No snoring, unusual movements or abnormal dream activity, restless legs,  or trouble sleeping (insomnia) and not excessively sleepy during daytime.   Cardiovascular:  Negative for chest pain, palpitations and leg swelling.        History of supraventricular tachycardia   Gastrointestinal:  Negative for nausea and vomiting.        GERD   Endocrine:        Hypothyriodism   Musculoskeletal:  Positive for arthralgias and back pain (lower back). Negative for neck pain.        Osteoarthritis   Allergic/Immunologic: Negative for environmental allergies.   Neurological:  Negative for dizziness, syncope, weakness and headaches.   Psychiatric/Behavioral:  Positive for sleep disturbance. Negative for agitation and behavioral problems.         Vital Signs/Weight   There is no height or weight on file to calculate BMI.  Vitals:       Wt Readings from Last 5 Encounters:   03/20/25 127.2 kg (280 lb 6.8 oz)   02/05/25 127.2 kg (280 lb 6.8 oz)   01/13/25 124.7 kg (275 lb)   12/30/24 124.7 kg (275 lb)   12/12/24 124.7 kg (275 lb)       Results of Compliance Report:   Date Range: 4/2/25 - 5/1/25  Pressure: 8.6 cmH2O EPR 2  Residual AHI: 0.5 [OAI 0.3, GILBERT 0.0, HI 0.1)  Leak (L/min): Median: 0.0, 95th% 1.7  Average usage days used: 7:58  % Days used: 100%  % Days with usage > 4 hours: 100%  O2: Denies      Physical Exam:   Physical Exam  Vitals reviewed.   Constitutional:       General: She is not in acute distress.     Appearance: She is well-developed.      Comments: Utilizing ayala when outside the home   HENT:      Head: Normocephalic and atraumatic.   Eyes:      Conjunctiva/sclera: Conjunctivae normal.   Cardiovascular:      Rate and Rhythm: Normal rate and regular rhythm.   Pulmonary:      Effort: Pulmonary effort is normal.      Breath sounds: Normal breath sounds.   Musculoskeletal:      Cervical back: Neck supple.      Comments: Utilizing ayala to ambulate   Neurological:      Mental Status: She is alert.   Psychiatric:         Behavior: Behavior normal.       Assessment and Plan:     Assessment &  Plan  Obstructive Sleep Apnea  - Patient underwent most recent diagnostic sleep study for Medicare purposes on 4/14/2024 and was found to have mild obstructive sleep apnea with an AHI of 8 and a minimum oxygen saturation of 81%. She is currently on CPAP with a set pressure of 8.6 cmH2O with EPR of 2.   -Patient is currently using and benefiting from CPAP therapy.  She is tolerating CPAP without complaint. She is noted to be compliant with 100% usage over 4 hours.  AHI is well-controlled at 0.5.   - Obstructive sleep apnea is well-controlled with CPAP therapy. No major changes or complaints. CPAP pressure of 8.6 is effective with consistent usage averaging 7 hours and 58 minutes per night. No adjustments needed.  - Continue current CPAP therapy with Airsense 11 machine.  - Ensure regular replacement of nasal mask and filters.  - Provide guidance on turning off humidity if needed.  - The patient will be provided with a prescription to obtain new CPAP supplies today.  - Discussed that untreated obstructive sleep apnea presents a moderate risk of morbidity (i.e. hypertension etc.). Patient indicates understanding of these issues and agrees with plan.   - Annual follow-up for SHANA and CPAP management, sooner if needed. Patient instructed to call office or message via [facility] with questions or concerns.     General maintenance  - She is to stay active both physically and mentally.   - Driving safety has been reviewed.   - Patient will follow up with her PCP for general medical needs.     The diagnostic assessment has been reviewed. Patient has expressed a good understanding of the assessment and recommendation from today's visit. There are no apparent barriers to understanding this information. She has been advised to contact this office or the answering service with questions or concerns that may arise. On this date of service 25 minutes of total time was spent in evaluation and management on this encounter.      A voice  recognition program was used to aid in documentation of the record. Sometimes words are not printed exactly as they were spoken. While efforts were made to carefully edit and correct any inaccuracies, some may be present; please take these into context. Please contact the provider if areas are identified.      Nakita Holcomb PA-C

## 2025-05-06 NOTE — TELEPHONE ENCOUNTER
I spoke with pharmacy and they were informed by patient that she is no longer on Eliquis. They did dispense the Meloxicam.

## 2025-05-09 ENCOUNTER — TELEPHONE (OUTPATIENT)
Dept: PAIN MEDICINE | Facility: HOSPITAL | Age: 70
End: 2025-05-09
Payer: MEDICARE

## 2025-05-09 NOTE — TELEPHONE ENCOUNTER
Cancel 5/20 pain appt due to patient feeling well & wants later date, requesting July, added to July recall

## 2025-05-15 ENCOUNTER — HOSPITAL ENCOUNTER (OUTPATIENT)
Dept: PHYSICAL THERAPY | Facility: HOSPITAL | Age: 70
Setting detail: THERAPIES SERIES
Discharge: STILL ADMITTED OR ADMINISTRATIVE | End: 2025-05-15
Payer: MEDICARE

## 2025-05-15 DIAGNOSIS — Z47.1 AFTERCARE FOLLOWING LEFT KNEE JOINT REPLACEMENT SURGERY: Primary | ICD-10-CM

## 2025-05-15 DIAGNOSIS — M25.662 KNEE STIFFNESS, LEFT: ICD-10-CM

## 2025-05-15 DIAGNOSIS — R26.2 DIFFICULTY WALKING: ICD-10-CM

## 2025-05-15 DIAGNOSIS — Z96.652 AFTERCARE FOLLOWING LEFT KNEE JOINT REPLACEMENT SURGERY: Primary | ICD-10-CM

## 2025-05-15 DIAGNOSIS — R53.1 WEAKNESS: ICD-10-CM

## 2025-05-15 PROCEDURE — 97140 MANUAL THERAPY 1/> REGIONS: CPT | Mod: GP

## 2025-05-15 PROCEDURE — 97110 THERAPEUTIC EXERCISES: CPT | Mod: GP

## 2025-05-15 NOTE — INTERDISCIPLINARY/THERAPY
"  [address] [facility] [place] [id number] [phone]      Outpatient Physical Therapy Discharge Note    Date of Service: 5/15/25  Patient Name: Corrine Holden  : 1955  Today's Visit Number: 22  Onset Date: 2025  SOC Date: 2025  Certification Period: 2025  HICN: [id number]  1. Aftercare following left knee joint replacement surgery  2. Knee stiffness, left  3. Weakness  4. Difficulty walking      Subjective   Subjective Comments: Patient reports that her left knee is doing ok.  Had an appointment with Dr. Guerra regarding her left foot/ankle, imaging shows one of the screws broke, has the option to have it removed or leave it.  Her pool routine and PT HEP are going well.            Past Medical History:   Diagnosis Date    GERD (gastroesophageal reflux disease)     H. pylori infection     Hypothyroidism     Injury of back     STATES, \"THIS IS REASON FOR MRI\"    Microscopic hematuria     SHANA (obstructive sleep apnea)     USES CPAP    Osteoarthritis     Palpitations     Respiratory disease     Supraventricular tachycardia (CMS/HCC)     STATES, \"COFFEE RELATED\", VERY INTERMITTENT    Tinnitus        Current Outpatient Medications:     meloxicam (MOBIC) 7.5 mg tablet, Take 1 tablet (7.5 mg total) by mouth daily, Disp: 90 tablet, Rfl: 3    levothyroxine (SYNTHROID, LEVOTHROID) 125 mcg tablet, Take 1 tablet (125 mcg total) by mouth daily, Disp: 90 tablet, Rfl: 3    omeprazole (PriLOSEC) 20 mg capsule, Take 1 capsule (20 mg total) by mouth 2 (two) times a day, Disp: 180 capsule, Rfl: 0    diazePAM (VALIUM) 5 mg tablet, Take 1 tablet (5 mg total) by mouth every 12 (twelve) hours as needed for anxiety or muscle spasms Max Daily Amount: 10 mg, Disp: 30 tablet, Rfl: 0    albuterol HFA 90 mcg/actuation inhaler, Inhale 2 puffs by mouth every 6 (six) hours as needed for shortness of breath, Disp: 18 g, Rfl: 2    aspirin 81 mg chewable tablet, Take 1 tablet (81 mg total) by mouth 2 (two) times a " day (Patient not taking: Reported on 4/30/2025), Disp: , Rfl:     ibuprofen (ADVIL,MOTRIN) 200 mg tablet, Take 3 tablets (600 mg total) by mouth every 6 (six) hours as needed for pain scale 1-3/10, Disp: , Rfl:     CRANBERRY ORAL, Take 500 mg by mouth daily., Disp: , Rfl:     sennosides-docusate sodium (Senna Plus) 8.6-50 mg, Take 1 tablet by mouth daily as needed for constipation (Patient not taking: Reported on 5/5/2025), Disp: , Rfl:     magnesium hydroxide (MILK OF MAGNESIA) 400 mg/5 mL suspension, Take 30 mL by mouth daily as needed (constipation), Disp: , Rfl:     ketorolac (TORADOL) 10 mg tablet, Take 1 tablet (10 mg total) by mouth every 8 (eight) hours as needed for pain scale 8-10/10 for up to 5 days, Disp: 15 tablet, Rfl: 0    calc/FA/B/D3/E/bioflav/soybean (CALCI-MAX ORAL), , Disp: , Rfl:     cholecalciferol, vitamin D3, 25 mcg (1,000 unit) tablet, Take 2 tablets (2,000 Units total) by mouth 2 (two) times a day, Disp: , Rfl:     ascorbic acid, vitamin C, (ascorbic acid with vane hips) 500 mg tablet, Take 2 tablets (1,000 mg total) by mouth daily, Disp: , Rfl:     methylPREDNISolone (Medrol, Maxi,) 4 mg tablet, follow package directions, Disp: 21 tablet, Rfl: 0    vit C/E/Zn/coppr/lutein/zeaxan (OCUVITE LUTEIN AND ZEAXANTHIN ORAL), PreserVision AREDS-2, Disp: , Rfl:     zinc 50 mg tablet, Take 50 mg by mouth daily, Disp: , Rfl:     diclofenac sodium (VOLTAREN) 1 % gel, APPLY 2 GRAMS TO THE AFFECTED AREA(S) 4 TIMES PER DAY AS NEEDED, Disp: 300 g, Rfl: 2    fluticasone propionate (FLONASE) 50 mcg/actuation nasal spray, USE 2 SPRAYS IN EACH NOSTRIL DAILY, Disp: 48 g, Rfl: 2    krill-om-3-dha-epa-phospho-ast 208-058-13-75 mg capsule, Take 1 capsule by mouth daily, Disp: , Rfl:     multivitamin (THERAGRAN) tablet tablet, 1 Every Day, Disp: , Rfl:     acetaminophen (TYLENOL EXTRA STRENGTH) 500 mg tablet, Take 2 tablets (1,000 mg total) by mouth 2 (two) times a day Indications: pain, Disp: , Rfl:   Allergies  "  Allergen Reactions    Bee Venom Protein (Honey Bee) Swelling    Hydrocodone      Other Reaction(s): VOMITING, \"LOOPY\" TOLERATES OXYCODONE (SNOMED-CT: null)    No Known Drug Allergies     Tizanidine           Objective Findings:  Prior Objective Findings:    Left knee ROM: 0-94 deg    Current Objective Findings:    Left Knee ROM: 0-125 deg    Objective    Treatment:     Therapeutic Exercise: to develop strength, endurance, range of motion and flexibility  Upright bike 30-40W  x 10 minutes  Reviewed HEP and Pool workout routine        Manual Therapy:  Joint mobilization, soft tissue mobilization, manual techniques to decrease pain and improve joint and soft tissue mobility  Manual left knee flexion stretching  Manual left knee extension stretching with ankle DF stretch          Patient's FOTO functional outcome score is  /100 where a higher number = greater function.      Time Calculation  Start Time: 1145  Stop Time: 1215  Time Calculation (min): 30 min  Therapeutic Interventions (Time Spent in Minutes)  Manual Therapy: 15  Therapeutic Exercise: 15  Other Charges       Charge ID Procedure Code Description Qty. Modifiers Charge Entry User Diagnosis    [id number]  THERAPEUTIC PX 1/> AREAS EACH 15 MIN EXERCISES 1 GP Claudio Isaacs, PT     [id number]  MANUAL THERAPY TQS 1/> REGIONS EACH 15 MINUTES 1 GP Claudio Isaacs, PT                Assessment/Plan   Assessment  Level of Function and Prognosis  Assessment: Patient is ready to continue on her own at this point.  Her left foot is more of her issue at this point, she is planning to have a procedure done to remove a broken screw from her previous surgery.  She has met the ROM, ambulation and pain goals.  Therapy Goals    Short Term Goals  End goal date: 3/7/25  ST Goal 1: At least 110 deg of left knee flexion.  Goal 1 Status: met  End goal date: 3/7/25  ST Goal 2: Demonstrate ability to ascend stairs in reciprocal pattern  Goal 2 Status: " met  End goal date: 3/7/25  ST Goal 3: Demonstrate ability to ambulate with SPC safely.  Goal 3 Status: met  Long Term Goals  Long term goal(s) met by: 5/31/25  LT Goal 1: At least 120 deg of left knee flexion  Goal Status: met  LT Goal 2: Demonstrate ability to descend stairs in reciprocal pattern.  Goal Status: progressing  LT Goal 3: Report ability to ambulate community distances with least   restrictive AD.  Goal Status: met  LT Goal 4: Patient will score at least the predicted goal score on the   FOTO functional outcome as a measure of improved functional ability.   Goal Status: progressing  LT Goal 5: Report 0/10 left knee pain during daily activities.  Goal Status: met     This patient has been discharged from therapy because: patient has received maximum benefit from therapy treatment  Date of last visit 5/15/25  Total number of therapy visits provided: 22        Plan

## 2025-05-16 PROBLEM — Z47.1 AFTERCARE FOLLOWING LEFT KNEE JOINT REPLACEMENT SURGERY: Status: RESOLVED | Noted: 2025-02-11 | Resolved: 2025-05-16

## 2025-05-16 PROBLEM — R53.1 WEAKNESS: Status: RESOLVED | Noted: 2025-02-11 | Resolved: 2025-05-16

## 2025-05-16 PROBLEM — M25.662 KNEE STIFFNESS, LEFT: Status: RESOLVED | Noted: 2025-02-11 | Resolved: 2025-05-16

## 2025-05-16 PROBLEM — Z96.652 AFTERCARE FOLLOWING LEFT KNEE JOINT REPLACEMENT SURGERY: Status: RESOLVED | Noted: 2025-02-11 | Resolved: 2025-05-16

## 2025-05-19 ENCOUNTER — TELEPHONE (OUTPATIENT)
Dept: INTERNAL MEDICINE | Facility: CLINIC | Age: 70
End: 2025-05-19

## 2025-05-19 NOTE — TELEPHONE ENCOUNTER
Caller would like to discuss (a) appointment Writer has advised caller of a callback from within 24 hours.    Patient: Corrine Holden    Caller Name (Last and first, relation/role): self    Name of Facility: [facility]    Callback Number: [phone]    Best Availability: any    Fax Number: na    Additional Info: Needs a pre-op appt for surgery 5/29/25. Pt only wants to see Rufino. Please call pt and advise.    Did you confirm the message with the caller: Yes    Is it okay that the nurse communicates your response through [facility]? No

## 2025-05-20 ENCOUNTER — LAB (OUTPATIENT)
Dept: LAB | Facility: CLINIC | Age: 70
End: 2025-05-20
Payer: MEDICARE

## 2025-05-20 ENCOUNTER — OFFICE VISIT (OUTPATIENT)
Dept: FAMILY MEDICINE | Facility: CLINIC | Age: 70
End: 2025-05-20
Payer: MEDICARE

## 2025-05-20 VITALS
DIASTOLIC BLOOD PRESSURE: 84 MMHG | OXYGEN SATURATION: 93 % | SYSTOLIC BLOOD PRESSURE: 132 MMHG | TEMPERATURE: 98.3 F | RESPIRATION RATE: 18 BRPM | HEART RATE: 71 BPM

## 2025-05-20 DIAGNOSIS — Z01.818 PREOP EXAMINATION: Primary | ICD-10-CM

## 2025-05-20 DIAGNOSIS — E03.9 HYPOTHYROIDISM, UNSPECIFIED TYPE: ICD-10-CM

## 2025-05-20 DIAGNOSIS — G47.33 OBSTRUCTIVE SLEEP APNEA SYNDROME: ICD-10-CM

## 2025-05-20 DIAGNOSIS — T84.84XD PAIN DUE TO INTERNAL ORTHOPEDIC PROSTHETIC DEVICE, SUBSEQUENT ENCOUNTER: ICD-10-CM

## 2025-05-20 DIAGNOSIS — Z01.818 PREOP EXAMINATION: ICD-10-CM

## 2025-05-20 DIAGNOSIS — M19.072 PRIMARY OSTEOARTHRITIS OF LEFT ANKLE: ICD-10-CM

## 2025-05-20 PROBLEM — Z86.79 HISTORY OF PAROXYSMAL SUPRAVENTRICULAR TACHYCARDIA: Status: ACTIVE | Noted: 2025-05-20

## 2025-05-20 LAB
ALBUMIN SERPL-MCNC: 4.7 G/DL (ref 3.5–5.3)
ALP SERPL-CCNC: 71 U/L (ref 55–142)
ALT SERPL-CCNC: 23 U/L (ref 7–52)
ANION GAP SERPL CALC-SCNC: 6 MMOL/L (ref 3–11)
AST SERPL-CCNC: 18 U/L
BASOPHILS # BLD AUTO: 0.02 10*3/UL
BASOPHILS NFR BLD AUTO: 0.3 % (ref 0–2)
BILIRUB SERPL-MCNC: 0.36 MG/DL (ref 0.2–1.4)
BUN SERPL-MCNC: 25 MG/DL (ref 7–25)
CALCIUM ALBUM COR SERPL-MCNC: 9.1 MG/DL (ref 8.6–10.3)
CALCIUM SERPL-MCNC: 9.7 MG/DL (ref 8.6–10.3)
CHLORIDE SERPL-SCNC: 105 MMOL/L (ref 98–107)
CO2 SERPL-SCNC: 30 MMOL/L (ref 21–32)
CREAT SERPL-MCNC: 0.89 MG/DL (ref 0.6–1.1)
EGFRCR SERPLBLD CKD-EPI 2021: 70 ML/MIN/1.73M*2
EOSINOPHIL # BLD AUTO: 0.16 10*3/UL
EOSINOPHIL NFR BLD AUTO: 2.5 % (ref 0–3)
ERYTHROCYTE DISTRIBUTION WIDTH STANDARD DEVIATION: 47.8 FL (ref 36.4–46.3)
ERYTHROCYTE [DISTWIDTH] IN BLOOD BY AUTOMATED COUNT: 13.6 % (ref 11.5–14)
GLUCOSE SERPL-MCNC: 93 MG/DL (ref 70–105)
HCT VFR BLD AUTO: 44.6 % (ref 34–45)
HGB BLD-MCNC: 15.3 G/DL (ref 11.5–15.5)
IMMATURE GRANULOCYTES (10*3/UL) LEUKOCYTES IN BLOOD BY AUTOMATED COUNT: <0.03 10*3/UL
IMMATURE GRANULOCYTES/100 LEUKOCYTES IN BLOOD BY AUTOMATED COUNT: 0.3 %
LYMPHOCYTES # BLD AUTO: 1.37 10*3/UL
LYMPHOCYTES NFR BLD AUTO: 21.3 % (ref 11–47)
MCH RBC QN AUTO: 32.6 PG (ref 28–33)
MCHC RBC AUTO-ENTMCNC: 34.3 G/DL (ref 32–36)
MCV RBC AUTO: 95.1 FL (ref 81–97)
MONOCYTES # BLD AUTO: 0.64 10*3/UL
MONOCYTES NFR BLD AUTO: 10 % (ref 3–11)
NEUTROPHILS # BLD AUTO: 4.21 10*3/UL
NEUTROPHILS NFR BLD AUTO: 65.6 % (ref 41–81)
NRBC (10*3/UL) BY AUTOMATED COUNT: 0 10*3/UL (ref 0–0.1)
NRBC BLD-RTO: 0 /100 WBCS (ref 0–2)
PLATELET # BLD AUTO: 289 10*3/UL (ref 140–350)
PMV BLD AUTO: 8.9 FL (ref 6.9–10.8)
POTASSIUM SERPL-SCNC: 5 MMOL/L (ref 3.5–5.1)
PROT SERPL-MCNC: 7.1 G/DL (ref 6–8.3)
RBC # BLD AUTO: 4.69 10*6/UL (ref 3.7–5.3)
SODIUM SERPL-SCNC: 141 MMOL/L (ref 135–145)
WBC # BLD AUTO: 6.4 10*3/UL (ref 4.5–10.5)

## 2025-05-20 PROCEDURE — 85025 COMPLETE CBC W/AUTO DIFF WBC: CPT | Mod: PO

## 2025-05-20 PROCEDURE — 93010 ELECTROCARDIOGRAM REPORT: CPT | Performed by: INTERNAL MEDICINE

## 2025-05-20 PROCEDURE — 99214 OFFICE O/P EST MOD 30 MIN: CPT

## 2025-05-20 PROCEDURE — 80053 COMPREHEN METABOLIC PANEL: CPT | Mod: PO

## 2025-05-20 PROCEDURE — 36415 COLL VENOUS BLD VENIPUNCTURE: CPT | Mod: PO

## 2025-05-20 PROCEDURE — G0463 HOSPITAL OUTPT CLINIC VISIT: HCPCS | Mod: PO,25

## 2025-05-20 PROCEDURE — 93005 ELECTROCARDIOGRAM TRACING: CPT | Mod: PO

## 2025-05-20 ASSESSMENT — PAIN SCALES - GENERAL: PAINLEVEL_OUTOF10: 2

## 2025-05-20 ASSESSMENT — ENCOUNTER SYMPTOMS
CHEST TIGHTNESS: 0
SHORTNESS OF BREATH: 0

## 2025-05-20 NOTE — PATIENT INSTRUCTIONS
Here is a summary of today's visit with the [facility]:       Thank you for allowing us to participate in your healthcare. You are the reason we are here, and I hope that we provided you with the excellent service you deserve. Please let us know before you leave if there is anything else we can do for you today. Our goal is to partner with you and your primary care team to meet your health care needs today.      You were seen today for preoperative exam  Labs today  I will fax results to your surgeon     Other Instructions:   - Please check out with the  before leaving today. They will help you make your next appointment, check you into lab, or provide you the form to sign for your previous/outside records.   - If x-rays were ordered, please go to the 3rd floor to check in with the imaging desk after you have checked out on this floor.     If you have any questions that arise after your visit today, please follow up with your primary care or relevant specialty care team.  If you have any new or urgent concerns, we are happy to see you again!     Best,    Alma Gibbons PA-C    We believe in information transparency, and we believe you deserve to see your information as soon as it is available. We believe this builds trust and better relationships.    We release ALL lab & imaging results to you via "[employer] as soon as they are available. Therefore, you may see some results even before we do. Please give us two business days to review and let you know our thoughts. We look at every result. We will contact you with any results that concern us.    If your results are not concerning, we will mail a letter, or send an online letter or message about the results.    If you were treated for a bladder infection  (UTI), we will not contact you if the urine culture shows you were placed on the correct antibiotic. We will only contact you if it appears we need to change the medication.     If you have an  immediate concern, you can send us a message or call the clinic. Otherwise, we prefer that you wait two business days for us to contact you or that we discuss the results at your next appointment.

## 2025-05-20 NOTE — TELEPHONE ENCOUNTER
Spoke with patient, she reports she needs a pro op exam for her surgery that is scheduled on 5/29/2025. Patient was hoping to schedule with either Dr. Joy but no available time for pre op. Patient scheduled with PCN at 3:15 today.     Patient is scheduled to have extraction of broken hardware in ankle with Dr. Guerra at [facility] on 5/29/25.    Attempted to contact Dr. Talley's tech to obtain pre op paperwork, no answer, left voicemail to return call.

## 2025-05-20 NOTE — PROGRESS NOTES
PRE-OP EXAMINATION    Subjective    Corrine Holden is a 70 y.o. female who presents for Preoperative Evaluation for extraction of broken hardware in the ankle. Patient is referred to this provider by his/her  surgery team. Patient's PCP is Becka Joy MD. Seen in cross coverage today.     scheduled to have left foot hardware removal with Dr. Guerra at [facility] on 5/29/25     In terms of pre-operative evaluation, the patient was evaluated in term of angina, dyspnea, syncope, and palpitations as well as a history of heart disease including ischemic, valvular, or myopathic disease, and a history of hypertension, diabetes, chronic kidney disease, and cerebrovascular or peripheral artery disease. Patient underwent screening via the Revised (Sukhdev) cardiac risk index (RCRI). Please see discussion notes as below for details.     Fauzia-operative concerns  Alcohol or substance use/abuse/dependence: no  Heart valve stenosis or regurgitation: no  Arrhythmia (AF, SVT, heart block, etc): History of SVT, attempted ablation 20 years ago, . Ocasional heart palpitations worse with coffee.  CHF: no  Coronary artery disease: no   Pacemaker or defibrillator: no   MI in the past 6 months  :no  Anesthesia complications, personal or family  :no  Anticoagulation: no  Bleeding tendency, personal or family history: no  Current or recent illness: no  History of falls in the past 6 months: no   Recent steroids  :no  Immunosuppression: no  Memory loss / dementia: no    C spine concerns (Rheumatoid Arthritis):no  Loose teeth or dentures: no  Asthma or COPD: no, does have albuterol from URI  Current smoker: no  Diabetes: no, prediabetic  Thyroid disease: yes, hypothyroidism on levothyroxine   Malnutrition (BMI< 18, low albumin, unplanned weight loss in the past 6 months, eaten <50% of normal meals in past week): no   Hepatitis, HIV, blood transfusion: no  Medications requiring perioperative management (steroids, insulin, metformin,  HRT)    Steroids Medrol Dosepak as needed from pain clinic, has not taken this in over a year  On meloxicam twice daily, discussed will hold for 1 week prior to surgery unless directed differently from surgery team  Hold all OTC medications for 1 week  SHANA : uses CPAP nightly  DVT prophylaxis: Per surgeon  Chronic kidney disease: no  Liver disease/cirrhosis: no (if yes, calculate FIB-4 score)  Acid reflux: Takes omeprazole, well controlled   History of MRSA: no  Covid test: per hospital protocol    Assessed for risks of delirium:    Predisposing factors reviewed: Age 65 and older, hospitalized, multiple comorbidities, polypharmacy, poor functional status, poor vision or hearing, terminally ill   Medical conditions that increase risk: Chronic heart disease, chronic kidney disease, chronic lung disease, chronic liver disease, dementia, dialysis dependent, malnutrition, psychiatric conditions, substance use disorder   Medications that can contribute to delirium: NSAIDs, opiates, antiarrhythmics, antibiotics, anticholinergics, anticonvulsants, antidepressants, antihistamines, antihypertensives, antipsychotics, antivirals, corticosteroids, dopamine agonists, antiemetics, antispasmodics, herbal supplements, hypnotics and sedatives, hypoglycemic agents, muscle relaxants      Other chronic health conditions:   Mild concentric left ventricular hypertrophy  Aortic valve sclerosis      Activity level:     Can take care of self, such as eat, dress, or use the toilet (1 MET). Patient response = YES  Can walk up a flight of steps, or take a brisk walk (3-4 mph) (4 METs). Patient response = YES   Can do heavy work around the house such as scrubbing floors or lifting heavy furniture, golfing (walking), yard work (between 4 and 10 METs) Patient response = no, limited due to foot and knee pain, not limited due to chest pain or shortness of breath.  Can participate in strenuous sports such as running, skiing, or shoveling / digging  "ditches, chopping wood, (>10 METs) Patient response = no       The following have been reviewed and updated as appropriate in this visit:  Allergies:  Allergies   Allergen Reactions    Bee Venom Protein (Honey Bee) Swelling    Hydrocodone      Other Reaction(s): VOMITING, \"LOOPY\" TOLERATES OXYCODONE (SNOMED-CT: null)    No Known Drug Allergies     Tizanidine        Medications:  Current Outpatient Medications   Medication Sig Dispense Refill    meloxicam (MOBIC) 7.5 mg tablet Take 1 tablet (7.5 mg total) by mouth daily 90 tablet 3    levothyroxine (SYNTHROID, LEVOTHROID) 125 mcg tablet Take 1 tablet (125 mcg total) by mouth daily 90 tablet 3    omeprazole (PriLOSEC) 20 mg capsule Take 1 capsule (20 mg total) by mouth 2 (two) times a day 180 capsule 0    diazePAM (VALIUM) 5 mg tablet Take 1 tablet (5 mg total) by mouth every 12 (twelve) hours as needed for anxiety or muscle spasms Max Daily Amount: 10 mg 30 tablet 0    albuterol HFA 90 mcg/actuation inhaler Inhale 2 puffs by mouth every 6 (six) hours as needed for shortness of breath 18 g 2    ibuprofen (ADVIL,MOTRIN) 200 mg tablet Take 3 tablets (600 mg total) by mouth every 6 (six) hours as needed for pain scale 1-3/10      CRANBERRY ORAL Take 500 mg by mouth daily.      magnesium hydroxide (MILK OF MAGNESIA) 400 mg/5 mL suspension Take 30 mL by mouth daily as needed (constipation)      ketorolac (TORADOL) 10 mg tablet Take 1 tablet (10 mg total) by mouth every 8 (eight) hours as needed for pain scale 8-10/10 for up to 5 days 15 tablet 0    cholecalciferol, vitamin D3, 25 mcg (1,000 unit) tablet Take 2 tablets (2,000 Units total) by mouth 2 (two) times a day      ascorbic acid, vitamin C, (ascorbic acid with vane hips) 500 mg tablet Take 2 tablets (1,000 mg total) by mouth daily      methylPREDNISolone (Medrol, Maxi,) 4 mg tablet follow package directions 21 tablet 0    vit C/E/Zn/coppr/lutein/zeaxan (OCUVITE LUTEIN AND ZEAXANTHIN ORAL) PreserVision AREDS-2      " "zinc 50 mg tablet Take 50 mg by mouth daily      diclofenac sodium (VOLTAREN) 1 % gel APPLY 2 GRAMS TO THE AFFECTED AREA(S) 4 TIMES PER DAY AS NEEDED 300 g 2    fluticasone propionate (FLONASE) 50 mcg/actuation nasal spray USE 2 SPRAYS IN EACH NOSTRIL DAILY 48 g 2    krill-om-3-dha-epa-phospho-ast [id number] mg capsule Take 1 capsule by mouth daily      multivitamin (THERAGRAN) tablet tablet 1 Every Day      acetaminophen (TYLENOL EXTRA STRENGTH) 500 mg tablet Take 2 tablets (1,000 mg total) by mouth 2 (two) times a day Indications: pain      calc/FA/B/D3/E/bioflav/soybean (CALCI-MAX ORAL)  (Patient not taking: Reported on 5/20/2025)       No current facility-administered medications for this visit.       Past Medical History:   Diagnosis Date    GERD (gastroesophageal reflux disease)     H. pylori infection 2009    Hypothyroidism     Injury of back     STATES, \"THIS IS REASON FOR MRI\"    Microscopic hematuria     SHANA (obstructive sleep apnea)     USES CPAP    Osteoarthritis     Palpitations     Respiratory disease     Supraventricular tachycardia (CMS/HCC)     STATES, \"COFFEE RELATED\", VERY INTERMITTENT    Tinnitus      Past Surgical History:   Procedure Laterality Date    CARDIAC ELECTROPHYSIOLOGY STUDY AND ABLATION  01/01/2007    attempted ablation for SVT    CARPAL TUNNEL RELEASE Bilateral 06/26/2020    Procedure: LEFT ENDOSCOPIC CARPAL TUNNEL RELEASE, RIGHT OPEN CARPAL TUNNEL;  Surgeon: Mendez Sun MD;  Location: [facility] OR;  Service: Orthopedics;  Laterality: Bilateral;    COLONOSCOPY  01/21/2015    cecal polyp-tubular adenoma-recommend repeat 5 years    COLONOSCOPY N/A 06/22/2020    repeat 5 years - adenomatous polyp    DILATION AND CURETTAGE OF UTERUS  01/01/2006    ENDOMETRIAL BIOPSY  01/01/2012    pt reports negative    ESOPHAGOGASTRODUODENOSCOPY  01/01/2009    H pylori    ESOPHAGOGASTRODUODENOSCOPY N/A 06/22/2020    Procedure: ESOPHAGOGASTRODUODENOSCOPY;  Surgeon: Julian Faulkner MD;  Location: [facility] " Endoscopy;  Service: Endoscopy;  Laterality: N/A;    KNEE ARTHROPLASTY Right 04/01/2015    Dr Placido BRICE    KNEE ARTHROPLASTY Left 01/08/2025    TOTAL ANKLE ARTHROPLASTY Left 10/03/2022    Dr. Guerra, with talonavicular and subtalar fusion    TOTAL ANKLE ARTHROPLASTY Left 02/20/2023    total revision of previous surgery, fusion of non-unions    TOTAL ANKLE ARTHROPLASTY Left 11/13/2023    w/ fusion to foot joints    VAGINAL HYSTERECTOMY  12/16/2013    with BSO, fibroids     Family History   Problem Relation Age of Onset    Cancer Mother 84        Bladder    Osteoporosis Mother     Hypertension Mother     Colon cancer Father 65    Kidney cancer Father 77    Other Father's Brother         sudden cardiac death, 60s    Heart disease Maternal Grandfather     Heart attack Maternal Grandfather 59    Cancer Paternal Grandfather      Social History     Socioeconomic History    Marital status: Single   Tobacco Use    Smoking status: Never    Smokeless tobacco: Never   Vaping Use    Vaping status: Never Used   Substance and Sexual Activity    Alcohol use: No    Drug use: No    Sexual activity: Defer     Comment: declines to answer     Social Drivers of Health     Tobacco Use: Low Risk  (5/20/2025)    Patient History     Smoking Tobacco Use: Never     Smokeless Tobacco Use: Never   Transportation Needs: No Transportation Needs (2/3/2025)    OASIS : Transportation     Lack of Transportation (Medical): No     Lack of Transportation (Non-Medical): No     Patient Unable or Declines to Respond: No   Social Connections: Feeling Socially Integrated (2/3/2025)    OASIS : Social Isolation     Frequency of experiencing loneliness or isolation: Never   Depression: None or minimal depression (3/10/2025)    PHQ-9     PHQ-9 Score: 4       Review of Systems   Respiratory:  Negative for chest tightness and shortness of breath.    Cardiovascular:  Negative for chest pain.          Objective  Vitals:    05/20/25 1459   BP: 132/84   BP  Location: Left arm   Patient Position: Sitting   Cuff Size: Large Long Adult   Pulse: 71   Resp: 18   Temp: 36.8 °C (98.3 °F)   TempSrc: Temporal   SpO2: 93%     There is no height or weight on file to calculate BMI.    Physical Exam  Vitals and nursing note reviewed.   Constitutional:       General: She is not in acute distress.     Appearance: Normal appearance.   HENT:      Right Ear: Tympanic membrane, ear canal and external ear normal.      Left Ear: Tympanic membrane, ear canal and external ear normal.      Nose: No congestion.      Mouth/Throat:      Mouth: Mucous membranes are moist.      Dentition: Normal dentition.      Palate: No mass and lesions.      Pharynx: Oropharynx is clear. Uvula midline.      Comments: Clear. Uvula midline. MP Class 2 view. No tongue lesions. Good dentition.  Eyes:      Extraocular Movements: Extraocular movements intact.   Cardiovascular:      Rate and Rhythm: Normal rate and regular rhythm.      Heart sounds: No murmur heard.  Pulmonary:      Effort: Pulmonary effort is normal.      Breath sounds: Normal breath sounds. No wheezing, rhonchi or rales.   Abdominal:      General: Abdomen is flat. Bowel sounds are normal. There is no distension.      Palpations: Abdomen is soft.      Tenderness: There is no abdominal tenderness. There is no right CVA tenderness or left CVA tenderness.   Musculoskeletal:      Cervical back: Normal range of motion.   Skin:     Comments: No open wounds or rashes   Neurological:      Mental Status: She is alert.      Comments: Grossly nonfocal   Psychiatric:         Mood and Affect: Mood normal.         Behavior: Behavior normal.           Labs/Imaging/Micro    Lab on 05/20/2025   Component Date Value Ref Range Status    WBC 05/20/2025 6.4  4.5 - 10.5 10*3/uL Final    RBC 05/20/2025 4.69  3.70 - 5.30 10*6/uL Final    Hemoglobin 05/20/2025 15.3  11.5 - 15.5 g/dL Final    Hematocrit 05/20/2025 44.6  34.0 - 45.0 % Final    MCV 05/20/2025 95.1  81.0 - 97.0  fL Final    MCH 05/20/2025 32.6  28.0 - 33.0 pg Final    MCHC 05/20/2025 34.3  32.0 - 36.0 g/dL Final    RDW-SD 05/20/2025 47.8 (H)  36.4 - 46.3 fl Final    RDW 05/20/2025 13.6  11.5 - 14.0 % Final    Platelets 05/20/2025 289  140 - 350 10*3/uL Final    MPV 05/20/2025 8.9  6.9 - 10.8 fL Final    Neutrophils% 05/20/2025 65.6  41.0 - 81.0 % Final    Lymphocytes% 05/20/2025 21.3  11.0 - 47.0 % Final    Monocytes% 05/20/2025 10.0  3.0 - 11.0 % Final    Eosinophils% 05/20/2025 2.5  0.0 - 3.0 % Final    Basophils% 05/20/2025 0.3  0.0 - 2.0 % Final    Immature Granulocyte% 05/20/2025 0.3  <1.0 % Final    ANC (auto diff) 05/20/2025 4.21  10*3/uL Final    Lymphocytes Absolute 05/20/2025 1.37  10*3/uL Final    Monocytes Absolute 05/20/2025 0.64  10*3/uL Final    Eosinophils Absolute 05/20/2025 0.16  10*3/uL Final    Basophils Absolute 05/20/2025 0.02  10*3/uL Final    Immature Granulocytes Absolute 05/20/2025 <0.03  10*3/uL Final    nRBC 05/20/2025 0  0 - 2 /100 WBCs Final    nRBC Absolute 05/20/2025 0.0  0.0 - 0.1 10*3/uL Final    Sodium 05/20/2025 141  135 - 145 MMOL/L Final    Potassium 05/20/2025 5.0  3.5 - 5.1 MMOL/L Final    Chloride 05/20/2025 105  98 - 107 MMOL/L Final    CO2 05/20/2025 30  21 - 32 MMOL/L Final    Anion Gap 05/20/2025 6  3 - 11 mmol/L Final    BUN 05/20/2025 25  7 - 25 mg/dL Final    Creatinine 05/20/2025 0.89  0.60 - 1.10 mg/dL Final    Glucose 05/20/2025 93  70 - 105 MG/DL Final    Calcium 05/20/2025 9.7  8.6 - 10.3 MG/DL Final    AST 05/20/2025 18  <40 U/L Final    ALT (SGPT) 05/20/2025 23  7 - 52 U/L Final    Alkaline Phosphatase 05/20/2025 71  55 - 142 U/L Final    Total Protein 05/20/2025 7.1  6.0 - 8.3 g/dL Final    Albumin 05/20/2025 4.7  3.5 - 5.3 g/dL Final    Total Bilirubin 05/20/2025 0.36  0.20 - 1.40 mg/dL Final    Corrected Calcium 05/20/2025 9.1  8.6 - 10.3 mg/dL Final    eGFR 05/20/2025 70  >60 mL/min/1.73m*2 Final     Lab Results   Component Value Date    HGBA1C 5.9 02/10/2025        EKG is normal sinus rhythm. Rate of 63. No arrhythmia. No T wave inversions. No ectopy No ST elevations or depressions or signs to suggest ischemia. Some nonspecific changes in comparison from EKG in February 3 months ago.  Possible Q waves in V1 and V2    Assessment/Plan  Corrine Holden is a 70 y.o. who is here today for pre-op clearance for:     Operation: Left foot hardware removal  Surgeon: Edna Guerra MD  Date of surgery: 05/29/2025  Location: [facility]    History of primary ankle replacement, two revisions.  Arthritis was original indication. Will be removing broken screw in foot.    Prior general anesthesia well tolerated: yes    Today's preoperative cardiac clearance is based on the Revised Malone cardiac risk index (RCRI). The patient's score is = 0 which indicates the risk of major cardiac complications at = 3.9%   Calculated with MDCalc, based on Oralia 2017.     This score has been based on the following risk factors for this patient:   - High-risk type of surgery = 0   - ischemic heart disease = 0   - CHF = 0    - CVA = 0    - Diabetes mellitus requiring insulin = 0    - Creatinine >2.0 mg/dL = 0    malone.     No risk factors - 3.9% (95% CI: 2.8-5.4%)   One risk factor -6 % (95% CI: 4.9-7.4%)   Two risk factors - 10.1% (95% CI: 8.1-12.6%)  Three or more risk factors - 15% (95% CI: 11.1-20%)     Indications for cardiac stress testing are based on METs and patient's medical history. Patient's history is as follows in this regard:  She has no history of MI, CVA, CKD, DM, CHF, valvular heart disease.  She has had hx of SVT but has not had problems recently. She is able to exercise at more than 4 METS of activity without chest pain or shortness of breath and can proceed to elective surgery without further cardiac workup.     Able to perform 1 MET: yes   Perform 4 METS: yes   Perform 4-10 METS: no  Perform > 10 METS: no    Mallampati score: 2  Modified Mallampati  Classification from MD calc    Labs and studies used for risk stratification include:  CBC within normal limits/unremarkable today  CMP normal kidney function, normal liver function, normal electrolytes today  Oxygen 93%, not hypoxic, lungs clear to auscultation today, no shortness of breath.  ECG - is normal sinus rhythm. Rate of 63. No arrhythmia. No T wave inversions. No ectopy No ST elevations or depressions or signs to suggest ischemia. Some nonspecific changes in comparison from EKG in February 3 months ago.  Possible Q waves in V1 and V2. overall not a reason to delay surgery for need for further cardiac testing, will follow-up with PCP  No history of diabetes, A1c 3 months ago 5.9 %  No history of MRSA, no need for MRSA nasal swab    OVERALL EVALUATION: Patient is medically optimized  and cleared for the above operation with the risk stratification profile as above. Patient expressed understanding and recognition of these risks.       No problem-specific Assessment & Plan notes found for this encounter.     Diagnosis Plan   1. Preop examination  CBC w/auto differential Blood, Venous    Comprehensive metabolic panel Blood, Venous      2. Pain due to internal orthopedic prosthetic device, subsequent encounter  CBC w/auto differential Blood, Venous    Comprehensive metabolic panel Blood, Venous      3. Primary osteoarthritis of left ankle  CBC w/auto differential Blood, Venous    Comprehensive metabolic panel Blood, Venous      4. Obstructive sleep apnea syndrome  ECG 12 lead -Normal, Today      5. Hypothyroidism, unspecified type               Patient's case was reviewed with supervising physician Dulce Cummings MD who is in agreement with assessment and plan.     No follow-ups on file.    Alma Gibbons PA-C  05/21/25  8:35 AM

## 2025-05-20 NOTE — LETTER
[facility]  [facility]  [address] [facility] [address]   Dept: [phone]    May 21, 2025     Edna Guerra MD  [address] [place] [address]    Patient: Corrine Holden   YOB: 1955   Date of Visit: 5/20/2025       Dear Dr. Guerra:    Thank you for referring Corrine Holden to me for evaluation. Below are my notes for this consultation.    Please sent any additional correspondence to patient's PCP, Becka Joy MD, as I am seeing her in cross coverage today.         Sincerely,        Alma Gibbons PA-C        CC: MD Edwige Boogie Sara M, PA-C  5/21/2025  8:35 AM  Sign when Signing Visit  PRE-OP EXAMINATION    Subjective    Corrine Holden is a 70 y.o. female who presents for Preoperative Evaluation for extraction of broken hardware in the ankle. Patient is referred to this provider by his/her  surgery team. Patient's PCP is Becka Joy MD. Seen in cross coverage today.     scheduled to have left foot hardware removal with Dr. Guerra at [facility] on 5/29/25     In terms of pre-operative evaluation, the patient was evaluated in term of angina, dyspnea, syncope, and palpitations as well as a history of heart disease including ischemic, valvular, or myopathic disease, and a history of hypertension, diabetes, chronic kidney disease, and cerebrovascular or peripheral artery disease. Patient underwent screening via the Revised (Sukhdev) cardiac risk index (RCRI). Please see discussion notes as below for details.     Fauzia-operative concerns  Alcohol or substance use/abuse/dependence: no  Heart valve stenosis or regurgitation: no  Arrhythmia (AF, SVT, heart block, etc): History of SVT, attempted ablation 20 years ago, . Ocasional heart palpitations worse with coffee.  CHF: no  Coronary artery disease: no   Pacemaker or defibrillator: no   MI in the past 6 months  :no  Anesthesia complications, personal or family   :no  Anticoagulation: no  Bleeding tendency, personal or family history: no  Current or recent illness: no  History of falls in the past 6 months: no   Recent steroids  :no  Immunosuppression: no  Memory loss / dementia: no    C spine concerns (Rheumatoid Arthritis):no  Loose teeth or dentures: no  Asthma or COPD: no, does have albuterol from URI  Current smoker: no  Diabetes: no, prediabetic  Thyroid disease: yes, hypothyroidism on levothyroxine   Malnutrition (BMI< 18, low albumin, unplanned weight loss in the past 6 months, eaten <50% of normal meals in past week): no   Hepatitis, HIV, blood transfusion: no  Medications requiring perioperative management (steroids, insulin, metformin, HRT)    Steroids Medrol Dosepak as needed from pain clinic, has not taken this in over a year  On meloxicam twice daily, discussed will hold for 1 week prior to surgery unless directed differently from surgery team  Hold all OTC medications for 1 week  SHANA : uses CPAP nightly  DVT prophylaxis: Per surgeon  Chronic kidney disease: no  Liver disease/cirrhosis: no (if yes, calculate FIB-4 score)  Acid reflux: Takes omeprazole, well controlled   History of MRSA: no  Covid test: per hospital protocol    Assessed for risks of delirium:    Predisposing factors reviewed: Age 65 and older, hospitalized, multiple comorbidities, polypharmacy, poor functional status, poor vision or hearing, terminally ill   Medical conditions that increase risk: Chronic heart disease, chronic kidney disease, chronic lung disease, chronic liver disease, dementia, dialysis dependent, malnutrition, psychiatric conditions, substance use disorder   Medications that can contribute to delirium: NSAIDs, opiates, antiarrhythmics, antibiotics, anticholinergics, anticonvulsants, antidepressants, antihistamines, antihypertensives, antipsychotics, antivirals, corticosteroids, dopamine agonists, antiemetics, antispasmodics, herbal supplements, hypnotics and sedatives,  "hypoglycemic agents, muscle relaxants      Other chronic health conditions:   Mild concentric left ventricular hypertrophy  Aortic valve sclerosis      Activity level:     Can take care of self, such as eat, dress, or use the toilet (1 MET). Patient response = YES  Can walk up a flight of steps, or take a brisk walk (3-4 mph) (4 METs). Patient response = YES   Can do heavy work around the house such as scrubbing floors or lifting heavy furniture, golfing (walking), yard work (between 4 and 10 METs) Patient response = no, limited due to foot and knee pain, not limited due to chest pain or shortness of breath.  Can participate in strenuous sports such as running, skiing, or shoveling / digging ditches, chopping wood, (>10 METs) Patient response = no       The following have been reviewed and updated as appropriate in this visit:  Allergies:  Allergies   Allergen Reactions   • Bee Venom Protein (Honey Bee) Swelling   • Hydrocodone      Other Reaction(s): VOMITING, \"LOOPY\" TOLERATES OXYCODONE (SNOMED-CT: null)   • No Known Drug Allergies    • Tizanidine        Medications:  Current Outpatient Medications   Medication Sig Dispense Refill   • meloxicam (MOBIC) 7.5 mg tablet Take 1 tablet (7.5 mg total) by mouth daily 90 tablet 3   • levothyroxine (SYNTHROID, LEVOTHROID) 125 mcg tablet Take 1 tablet (125 mcg total) by mouth daily 90 tablet 3   • omeprazole (PriLOSEC) 20 mg capsule Take 1 capsule (20 mg total) by mouth 2 (two) times a day 180 capsule 0   • diazePAM (VALIUM) 5 mg tablet Take 1 tablet (5 mg total) by mouth every 12 (twelve) hours as needed for anxiety or muscle spasms Max Daily Amount: 10 mg 30 tablet 0   • albuterol HFA 90 mcg/actuation inhaler Inhale 2 puffs by mouth every 6 (six) hours as needed for shortness of breath 18 g 2   • ibuprofen (ADVIL,MOTRIN) 200 mg tablet Take 3 tablets (600 mg total) by mouth every 6 (six) hours as needed for pain scale 1-3/10     • CRANBERRY ORAL Take 500 mg by mouth daily.  " "   • magnesium hydroxide (MILK OF MAGNESIA) 400 mg/5 mL suspension Take 30 mL by mouth daily as needed (constipation)     • ketorolac (TORADOL) 10 mg tablet Take 1 tablet (10 mg total) by mouth every 8 (eight) hours as needed for pain scale 8-10/10 for up to 5 days 15 tablet 0   • cholecalciferol, vitamin D3, 25 mcg (1,000 unit) tablet Take 2 tablets (2,000 Units total) by mouth 2 (two) times a day     • ascorbic acid, vitamin C, (ascorbic acid with vane hips) 500 mg tablet Take 2 tablets (1,000 mg total) by mouth daily     • methylPREDNISolone (Medrol, Maxi,) 4 mg tablet follow package directions 21 tablet 0   • vit C/E/Zn/coppr/lutein/zeaxan (OCUVITE LUTEIN AND ZEAXANTHIN ORAL) PreserVision AREDS-2     • zinc 50 mg tablet Take 50 mg by mouth daily     • diclofenac sodium (VOLTAREN) 1 % gel APPLY 2 GRAMS TO THE AFFECTED AREA(S) 4 TIMES PER DAY AS NEEDED 300 g 2   • fluticasone propionate (FLONASE) 50 mcg/actuation nasal spray USE 2 SPRAYS IN EACH NOSTRIL DAILY 48 g 2   • krill-om-3-dha-epa-phospho-ast 381-659-81-75 mg capsule Take 1 capsule by mouth daily     • multivitamin (THERAGRAN) tablet tablet 1 Every Day     • acetaminophen (TYLENOL EXTRA STRENGTH) 500 mg tablet Take 2 tablets (1,000 mg total) by mouth 2 (two) times a day Indications: pain     • calc/FA/B/D3/E/bioflav/soybean (CALCI-MAX ORAL)  (Patient not taking: Reported on 5/20/2025)       No current facility-administered medications for this visit.       Past Medical History:   Diagnosis Date   • GERD (gastroesophageal reflux disease)    • H. pylori infection 2009   • Hypothyroidism    • Injury of back     STATES, \"THIS IS REASON FOR MRI\"   • Microscopic hematuria    • SHANA (obstructive sleep apnea)     USES CPAP   • Osteoarthritis    • Palpitations    • Respiratory disease    • Supraventricular tachycardia (CMS/HCC)     STATES, \"COFFEE RELATED\", VERY INTERMITTENT   • Tinnitus      Past Surgical History:   Procedure Laterality Date   • CARDIAC " ELECTROPHYSIOLOGY STUDY AND ABLATION  01/01/2007    attempted ablation for SVT   • CARPAL TUNNEL RELEASE Bilateral 06/26/2020    Procedure: LEFT ENDOSCOPIC CARPAL TUNNEL RELEASE, RIGHT OPEN CARPAL TUNNEL;  Surgeon: Mendez Sun MD;  Location: [facility] OR;  Service: Orthopedics;  Laterality: Bilateral;   • COLONOSCOPY  01/21/2015    cecal polyp-tubular adenoma-recommend repeat 5 years   • COLONOSCOPY N/A 06/22/2020    repeat 5 years - adenomatous polyp   • DILATION AND CURETTAGE OF UTERUS  01/01/2006   • ENDOMETRIAL BIOPSY  01/01/2012    pt reports negative   • ESOPHAGOGASTRODUODENOSCOPY  01/01/2009    H pylori   • ESOPHAGOGASTRODUODENOSCOPY N/A 06/22/2020    Procedure: ESOPHAGOGASTRODUODENOSCOPY;  Surgeon: Julian Faulkner MD;  Location: [facility];  Service: Endoscopy;  Laterality: N/A;   • KNEE ARTHROPLASTY Right 04/01/2015    Dr Placido BRICE   • KNEE ARTHROPLASTY Left 01/08/2025   • TOTAL ANKLE ARTHROPLASTY Left 10/03/2022    Dr. Guerra, with talonavicular and subtalar fusion   • TOTAL ANKLE ARTHROPLASTY Left 02/20/2023    total revision of previous surgery, fusion of non-unions   • TOTAL ANKLE ARTHROPLASTY Left 11/13/2023    w/ fusion to foot joints   • VAGINAL HYSTERECTOMY  12/16/2013    with BSO, fibroids     Family History   Problem Relation Age of Onset   • Cancer Mother 84        Bladder   • Osteoporosis Mother    • Hypertension Mother    • Colon cancer Father 65   • Kidney cancer Father 77   • Other Father's Brother         sudden cardiac death, 60s   • Heart disease Maternal Grandfather    • Heart attack Maternal Grandfather 59   • Cancer Paternal Grandfather      Social History     Socioeconomic History   • Marital status: Single   Tobacco Use   • Smoking status: Never   • Smokeless tobacco: Never   Vaping Use   • Vaping status: Never Used   Substance and Sexual Activity   • Alcohol use: No   • Drug use: No   • Sexual activity: Defer     Comment: declines to answer     Social Drivers of Health      Tobacco Use: Low Risk  (5/20/2025)    Patient History    • Smoking Tobacco Use: Never    • Smokeless Tobacco Use: Never   Transportation Needs: No Transportation Needs (2/3/2025)    OASIS : Transportation    • Lack of Transportation (Medical): No    • Lack of Transportation (Non-Medical): No    • Patient Unable or Declines to Respond: No   Social Connections: Feeling Socially Integrated (2/3/2025)    OASIS : Social Isolation    • Frequency of experiencing loneliness or isolation: Never   Depression: None or minimal depression (3/10/2025)    PHQ-9    • PHQ-9 Score: 4       Review of Systems   Respiratory:  Negative for chest tightness and shortness of breath.    Cardiovascular:  Negative for chest pain.          Objective  Vitals:    05/20/25 1459   BP: 132/84   BP Location: Left arm   Patient Position: Sitting   Cuff Size: Large Long Adult   Pulse: 71   Resp: 18   Temp: 36.8 °C (98.3 °F)   TempSrc: Temporal   SpO2: 93%     There is no height or weight on file to calculate BMI.    Physical Exam  Vitals and nursing note reviewed.   Constitutional:       General: She is not in acute distress.     Appearance: Normal appearance.   HENT:      Right Ear: Tympanic membrane, ear canal and external ear normal.      Left Ear: Tympanic membrane, ear canal and external ear normal.      Nose: No congestion.      Mouth/Throat:      Mouth: Mucous membranes are moist.      Dentition: Normal dentition.      Palate: No mass and lesions.      Pharynx: Oropharynx is clear. Uvula midline.      Comments: Clear. Uvula midline. MP Class 2 view. No tongue lesions. Good dentition.  Eyes:      Extraocular Movements: Extraocular movements intact.   Cardiovascular:      Rate and Rhythm: Normal rate and regular rhythm.      Heart sounds: No murmur heard.  Pulmonary:      Effort: Pulmonary effort is normal.      Breath sounds: Normal breath sounds. No wheezing, rhonchi or rales.   Abdominal:      General: Abdomen is flat. Bowel  sounds are normal. There is no distension.      Palpations: Abdomen is soft.      Tenderness: There is no abdominal tenderness. There is no right CVA tenderness or left CVA tenderness.   Musculoskeletal:      Cervical back: Normal range of motion.   Skin:     Comments: No open wounds or rashes   Neurological:      Mental Status: She is alert.      Comments: Grossly nonfocal   Psychiatric:         Mood and Affect: Mood normal.         Behavior: Behavior normal.           Labs/Imaging/Micro    Lab on 05/20/2025   Component Date Value Ref Range Status   • WBC 05/20/2025 6.4  4.5 - 10.5 10*3/uL Final   • RBC 05/20/2025 4.69  3.70 - 5.30 10*6/uL Final   • Hemoglobin 05/20/2025 15.3  11.5 - 15.5 g/dL Final   • Hematocrit 05/20/2025 44.6  34.0 - 45.0 % Final   • MCV 05/20/2025 95.1  81.0 - 97.0 fL Final   • MCH 05/20/2025 32.6  28.0 - 33.0 pg Final   • MCHC 05/20/2025 34.3  32.0 - 36.0 g/dL Final   • RDW-SD 05/20/2025 47.8 (H)  36.4 - 46.3 fl Final   • RDW 05/20/2025 13.6  11.5 - 14.0 % Final   • Platelets 05/20/2025 289  140 - 350 10*3/uL Final   • MPV 05/20/2025 8.9  6.9 - 10.8 fL Final   • Neutrophils% 05/20/2025 65.6  41.0 - 81.0 % Final   • Lymphocytes% 05/20/2025 21.3  11.0 - 47.0 % Final   • Monocytes% 05/20/2025 10.0  3.0 - 11.0 % Final   • Eosinophils% 05/20/2025 2.5  0.0 - 3.0 % Final   • Basophils% 05/20/2025 0.3  0.0 - 2.0 % Final   • Immature Granulocyte% 05/20/2025 0.3  <1.0 % Final   • ANC (auto diff) 05/20/2025 4.21  10*3/uL Final   • Lymphocytes Absolute 05/20/2025 1.37  10*3/uL Final   • Monocytes Absolute 05/20/2025 0.64  10*3/uL Final   • Eosinophils Absolute 05/20/2025 0.16  10*3/uL Final   • Basophils Absolute 05/20/2025 0.02  10*3/uL Final   • Immature Granulocytes Absolute 05/20/2025 <0.03  10*3/uL Final   • nRBC 05/20/2025 0  0 - 2 /100 WBCs Final   • nRBC Absolute 05/20/2025 0.0  0.0 - 0.1 10*3/uL Final   • Sodium 05/20/2025 141  135 - 145 MMOL/L Final   • Potassium 05/20/2025 5.0  3.5 - 5.1  MMOL/L Final   • Chloride 05/20/2025 105  98 - 107 MMOL/L Final   • CO2 05/20/2025 30  21 - 32 MMOL/L Final   • Anion Gap 05/20/2025 6  3 - 11 mmol/L Final   • BUN 05/20/2025 25  7 - 25 mg/dL Final   • Creatinine 05/20/2025 0.89  0.60 - 1.10 mg/dL Final   • Glucose 05/20/2025 93  70 - 105 MG/DL Final   • Calcium 05/20/2025 9.7  8.6 - 10.3 MG/DL Final   • AST 05/20/2025 18  <40 U/L Final   • ALT (SGPT) 05/20/2025 23  7 - 52 U/L Final   • Alkaline Phosphatase 05/20/2025 71  55 - 142 U/L Final   • Total Protein 05/20/2025 7.1  6.0 - 8.3 g/dL Final   • Albumin 05/20/2025 4.7  3.5 - 5.3 g/dL Final   • Total Bilirubin 05/20/2025 0.36  0.20 - 1.40 mg/dL Final   • Corrected Calcium 05/20/2025 9.1  8.6 - 10.3 mg/dL Final   • eGFR 05/20/2025 70  >60 mL/min/1.73m*2 Final     Lab Results   Component Value Date    HGBA1C 5.9 02/10/2025       EKG is normal sinus rhythm. Rate of 63. No arrhythmia. No T wave inversions. No ectopy No ST elevations or depressions or signs to suggest ischemia. Some nonspecific changes in comparison from EKG in February 3 months ago.  Possible Q waves in V1 and V2    Assessment/Plan  Corrine Holden is a 70 y.o. who is here today for pre-op clearance for:     Operation: Left foot hardware removal  Surgeon: Edna Guerra MD  Date of surgery: 05/29/2025  Location: [facility]    History of primary ankle replacement, two revisions.  Arthritis was original indication. Will be removing broken screw in foot.    Prior general anesthesia well tolerated: yes    Today's preoperative cardiac clearance is based on the Revised Núñez cardiac risk index (RCRI). The patient's score is = 0 which indicates the risk of major cardiac complications at = 3.9%   Calculated with MDCalc, based on Ducjosepe 2017.     This score has been based on the following risk factors for this patient:   - High-risk type of surgery = 0   - ischemic heart disease = 0   - CHF = 0    - CVA = 0    - Diabetes mellitus  requiring insulin = 0    - Creatinine >2.0 mg/dL = 0    malone.     No risk factors - 3.9% (95% CI: 2.8-5.4%)   One risk factor -6 % (95% CI: 4.9-7.4%)   Two risk factors - 10.1% (95% CI: 8.1-12.6%)  Three or more risk factors - 15% (95% CI: 11.1-20%)     Indications for cardiac stress testing are based on METs and patient's medical history. Patient's history is as follows in this regard:  She has no history of MI, CVA, CKD, DM, CHF, valvular heart disease.  She has had hx of SVT but has not had problems recently. She is able to exercise at more than 4 METS of activity without chest pain or shortness of breath and can proceed to elective surgery without further cardiac workup.     Able to perform 1 MET: yes   Perform 4 METS: yes   Perform 4-10 METS: no  Perform > 10 METS: no    Mallampati score: 2  Modified Mallampati Classification from MD calc    Labs and studies used for risk stratification include:  CBC within normal limits/unremarkable today  CMP normal kidney function, normal liver function, normal electrolytes today  Oxygen 93%, not hypoxic, lungs clear to auscultation today, no shortness of breath.  ECG - is normal sinus rhythm. Rate of 63. No arrhythmia. No T wave inversions. No ectopy No ST elevations or depressions or signs to suggest ischemia. Some nonspecific changes in comparison from EKG in February 3 months ago.  Possible Q waves in V1 and V2. overall not a reason to delay surgery for need for further cardiac testing, will follow-up with PCP  No history of diabetes, A1c 3 months ago 5.9 %  No history of MRSA, no need for MRSA nasal swab    OVERALL EVALUATION: Patient is medically optimized  and cleared for the above operation with the risk stratification profile as above. Patient expressed understanding and recognition of these risks.       No problem-specific Assessment & Plan notes found for this encounter.     Diagnosis Plan   1. Preop examination  CBC w/auto differential Blood, Venous     Comprehensive metabolic panel Blood, Venous      2. Pain due to internal orthopedic prosthetic device, subsequent encounter  CBC w/auto differential Blood, Venous    Comprehensive metabolic panel Blood, Venous      3. Primary osteoarthritis of left ankle  CBC w/auto differential Blood, Venous    Comprehensive metabolic panel Blood, Venous      4. Obstructive sleep apnea syndrome  ECG 12 lead -Normal, Today      5. Hypothyroidism, unspecified type               Patient's case was reviewed with supervising physician Dulce Cummings MD who is in agreement with assessment and plan.     No follow-ups on file.    Alma Gibbons PA-C  05/21/25  8:35 AM

## 2025-05-20 NOTE — TELEPHONE ENCOUNTER
Spoke with  from [facility], she is requesting the pre op paper work from the surgery scheduler to be faxed to our stat fax.    no

## 2025-05-21 ENCOUNTER — RESULTS FOLLOW-UP (OUTPATIENT)
Dept: FAMILY MEDICINE | Facility: CLINIC | Age: 70
End: 2025-05-21
Payer: MEDICARE

## 2025-05-21 NOTE — TELEPHONE ENCOUNTER
8:38 AM called and spoke with patient regarding lab work results for preoperative appointment as she does not use MyChart.    Blood counts are normal, without signs of infection or anemia. Blood clotting factors (platelets) are also in the normal range.     All of your electrolytes are in the normal range, normal kidney function, normal liver function    Patient asked about fasting glucose, this was 93 mg/dL, patient had had a meal prior to 2 hours.  Explained that this is likely normal however patient was concerned that this was not fasting as she had eaten within the last 8 hours.  She asked about adding an A1c onto this encounter, I checked and confirmed that this could not be added to the sample from yesterday. Patient encouraged to talk to her primary care provider about these concerns, last A1c 5.9% 3 months ago, patient has been in the prediabetic range for last 4 years.    Patient confirmed that I will be sending EKG had some nonspecific changes compared to baseline to her primary care provider along with the preoperative note.

## 2025-05-23 ENCOUNTER — TELEPHONE (OUTPATIENT)
Dept: PAIN MEDICINE | Facility: HOSPITAL | Age: 70
End: 2025-05-23
Payer: MEDICARE

## 2025-05-23 NOTE — TELEPHONE ENCOUNTER
Schedule from July recall, (5/20 appt reschedule FU B) SI with Dr. Tavarez in 6 wks), added to 7/15

## 2025-06-12 ENCOUNTER — TELEPHONE (OUTPATIENT)
Dept: INTERNAL MEDICINE | Facility: CLINIC | Age: 70
End: 2025-06-12

## 2025-06-12 NOTE — TELEPHONE ENCOUNTER
Caller would like to discuss (a) clinical Writer has advised caller of a callback from within 24 hours.    Patient: Corrine Holden    Caller Name (Last and first, relation/role): Self    Name of Facility: na    Callback Number: [phone]     Best Availability: anytime    Fax Number: na    Additional Info: Pt is needing a handicap sticker for her car would like nurse to call back also would like this for 3 years instead of 1 year please advise     Did you confirm the message with the caller: Yes    Is it okay that the nurse communicates your response through Nextpeerhart? No

## 2025-06-14 DIAGNOSIS — K21.9 GASTROESOPHAGEAL REFLUX DISEASE WITHOUT ESOPHAGITIS: ICD-10-CM

## 2025-06-14 NOTE — TELEPHONE ENCOUNTER
No care due was identified.  [facility] Embedded Care Due Messages. Reference number: [id number]. 6/14/2025 3:02:42 AM JOSUÉ

## 2025-06-14 NOTE — TELEPHONE ENCOUNTER
For Clinic Staff: please review this note,  Centralized RN/Nurse Triage: medication(s) prilosec not filled due to med plan in place; please review

## 2025-06-19 RX ORDER — OMEPRAZOLE 20 MG/1
20 CAPSULE, DELAYED RELEASE ORAL DAILY
Qty: 90 CAPSULE | Refills: 3 | Status: SHIPPED | OUTPATIENT
Start: 2025-06-19 | End: 2026-06-19

## 2025-07-07 ENCOUNTER — RESULTS FOLLOW-UP (OUTPATIENT)
Dept: INTERNAL MEDICINE | Facility: CLINIC | Age: 70
End: 2025-07-07

## 2025-07-15 ENCOUNTER — HOSPITAL ENCOUNTER (OUTPATIENT)
Dept: FLUOROSCOPY | Facility: HOSPITAL | Age: 70
Discharge: STILL ADMITTED OR ADMINISTRATIVE | End: 2025-07-15
Payer: MEDICARE

## 2025-07-15 ENCOUNTER — OFFICE VISIT (OUTPATIENT)
Dept: PAIN MEDICINE | Facility: HOSPITAL | Age: 70
End: 2025-07-15
Payer: MEDICARE

## 2025-07-15 VITALS
BODY MASS INDEX: 48.13 KG/M2 | OXYGEN SATURATION: 94 % | HEIGHT: 64 IN | HEART RATE: 82 BPM | DIASTOLIC BLOOD PRESSURE: 92 MMHG | SYSTOLIC BLOOD PRESSURE: 136 MMHG

## 2025-07-15 DIAGNOSIS — M54.16 LUMBAR RADICULITIS: ICD-10-CM

## 2025-07-15 DIAGNOSIS — M46.1 SACROILIITIS (CMS/HCC): Primary | ICD-10-CM

## 2025-07-15 DIAGNOSIS — G89.29 CHRONIC PAIN OF LEFT ANKLE: ICD-10-CM

## 2025-07-15 DIAGNOSIS — M25.572 CHRONIC PAIN OF LEFT ANKLE: ICD-10-CM

## 2025-07-15 DIAGNOSIS — M46.1 SACROILIITIS (CMS/HCC): ICD-10-CM

## 2025-07-15 DIAGNOSIS — M47.816 LUMBAR SPONDYLOSIS: ICD-10-CM

## 2025-07-15 PROCEDURE — G0463 HOSPITAL OUTPT CLINIC VISIT: HCPCS

## 2025-07-15 PROCEDURE — 99214 OFFICE O/P EST MOD 30 MIN: CPT | Performed by: ANESTHESIOLOGY

## 2025-07-15 RX ORDER — TRIAMCINOLONE ACETONIDE 40 MG/ML
40 INJECTION, SUSPENSION INTRA-ARTICULAR; INTRAMUSCULAR ONCE
Status: DISCONTINUED | OUTPATIENT
Start: 2025-07-15 | End: 2025-07-15

## 2025-07-15 RX ORDER — IOPAMIDOL 408 MG/ML
10 INJECTION, SOLUTION INTRATHECAL ONCE
Status: DISCONTINUED | OUTPATIENT
Start: 2025-07-15 | End: 2025-07-15

## 2025-07-15 RX ORDER — LIDOCAINE HYDROCHLORIDE 10 MG/ML
30 INJECTION, SOLUTION EPIDURAL; INFILTRATION; INTRACAUDAL; PERINEURAL ONCE
Status: DISCONTINUED | OUTPATIENT
Start: 2025-07-15 | End: 2025-07-15

## 2025-07-15 RX ORDER — BUPIVACAINE HYDROCHLORIDE 5 MG/ML
30 INJECTION, SOLUTION EPIDURAL; INTRACAUDAL; PERINEURAL ONCE
Status: DISCONTINUED | OUTPATIENT
Start: 2025-07-15 | End: 2025-07-15

## 2025-07-15 ASSESSMENT — ENCOUNTER SYMPTOMS: CONSTITUTIONAL NEGATIVE: 1

## 2025-07-15 ASSESSMENT — PAIN DESCRIPTION - DESCRIPTORS: DESCRIPTORS: ACHING;DULL

## 2025-07-15 ASSESSMENT — PAIN - FUNCTIONAL ASSESSMENT: PAIN_FUNCTIONAL_ASSESSMENT: 0-10

## 2025-07-15 NOTE — PROGRESS NOTES
"  Subjective     Patient ID: Corrine Holden is a 70 y.o. female.    HPI:   70-year-old female presents for follow-up visit after having sacroiliac joint injections.  She states that she had greater than 90% relief of her pain over that area which continues.  She currently rates her pain 2 of 10 on the pain scale but feels this is more in the small of her back.  She describes it to be aching and dull in nature.  She feels in the past she has had excellent relief from lumbar epidural steroid injections and would like to have this repeated in the future if needed.  She does not feel she is at the point where she needs to have this repeated.  She continues to use home medications, heat applied, cold applied as a form of conservative measures which has been helpful for her.  She also has severe pain at times in the left ankle.  She has had an extensive surgical history to include fusion.  She feels over the last 3 years this has been somewhat limiting to her in terms of her activity.  She does use a walker at home for stability and feels that she is not able to walk on it as well as she had in the past.  She does plan to have a visit with her surgeon in the near future.        Review of Systems:  Review of Systems   Constitutional: Negative.        Objective     Vital Signs:  /92 (BP Location: Left arm, Patient Position: Sitting, Cuff Size: Long Adult)   Pulse 82   Ht 1.626 m (5' 4\")   SpO2 94%   BMI 48.13 kg/m²     Medications:    Current Outpatient Medications:     omeprazole (PriLOSEC) 20 mg capsule, Take 1 capsule (20 mg total) by mouth daily, Disp: 90 capsule, Rfl: 3    meloxicam (MOBIC) 7.5 mg tablet, Take 1 tablet (7.5 mg total) by mouth daily, Disp: 90 tablet, Rfl: 3    levothyroxine (SYNTHROID, LEVOTHROID) 125 mcg tablet, Take 1 tablet (125 mcg total) by mouth daily, Disp: 90 tablet, Rfl: 3    diazePAM (VALIUM) 5 mg tablet, Take 1 tablet (5 mg total) by mouth every 12 (twelve) hours as needed for " anxiety or muscle spasms Max Daily Amount: 10 mg, Disp: 30 tablet, Rfl: 0    albuterol HFA 90 mcg/actuation inhaler, Inhale 2 puffs by mouth every 6 (six) hours as needed for shortness of breath, Disp: 18 g, Rfl: 2    ibuprofen (ADVIL,MOTRIN) 200 mg tablet, Take 3 tablets (600 mg total) by mouth every 6 (six) hours as needed for pain scale 1-3/10, Disp: , Rfl:     CRANBERRY ORAL, Take 500 mg by mouth daily., Disp: , Rfl:     magnesium hydroxide (MILK OF MAGNESIA) 400 mg/5 mL suspension, Take 30 mL by mouth daily as needed (constipation), Disp: , Rfl:     ketorolac (TORADOL) 10 mg tablet, Take 1 tablet (10 mg total) by mouth every 8 (eight) hours as needed for pain scale 8-10/10 for up to 5 days, Disp: 15 tablet, Rfl: 0    cholecalciferol, vitamin D3, 25 mcg (1,000 unit) tablet, Take 2 tablets (2,000 Units total) by mouth 2 (two) times a day, Disp: , Rfl:     ascorbic acid, vitamin C, (ascorbic acid with vane hips) 500 mg tablet, Take 2 tablets (1,000 mg total) by mouth daily, Disp: , Rfl:     methylPREDNISolone (Medrol, Maxi,) 4 mg tablet, follow package directions, Disp: 21 tablet, Rfl: 0    vit C/E/Zn/coppr/lutein/zeaxan (OCUVITE LUTEIN AND ZEAXANTHIN ORAL), PreserVision AREDS-2, Disp: , Rfl:     zinc 50 mg tablet, Take 50 mg by mouth daily, Disp: , Rfl:     diclofenac sodium (VOLTAREN) 1 % gel, APPLY 2 GRAMS TO THE AFFECTED AREA(S) 4 TIMES PER DAY AS NEEDED, Disp: 300 g, Rfl: 2    fluticasone propionate (FLONASE) 50 mcg/actuation nasal spray, USE 2 SPRAYS IN EACH NOSTRIL DAILY, Disp: 48 g, Rfl: 2    krill-om-3-dha-epa-phospho-ast 190-921-50-75 mg capsule, Take 1 capsule by mouth daily, Disp: , Rfl:     multivitamin (THERAGRAN) tablet tablet, 1 Every Day, Disp: , Rfl:     acetaminophen (TYLENOL EXTRA STRENGTH) 500 mg tablet, Take 2 tablets (1,000 mg total) by mouth 2 (two) times a day Indications: pain, Disp: , Rfl:     calc/FA/B/D3/E/bioflav/soybean (CALCI-MAX ORAL), , Disp: , Rfl:     Physical Exam:  Physical  Exam  Vitals and nursing note reviewed.   Constitutional:       General: She is not in acute distress.     Appearance: She is well-developed.   HENT:      Head: Normocephalic and atraumatic.   Eyes:      Conjunctiva/sclera: Conjunctivae normal.      Pupils: Pupils are equal, round, and reactive to light.   Cardiovascular:      Rate and Rhythm: Normal rate and regular rhythm.   Pulmonary:      Effort: Pulmonary effort is normal.   Musculoskeletal:      Comments: Pain throughout the low back region, examination of the left ankle demonstrates an effusion circumferential, no erythema, decreased range of motion   Neurological:      General: No focal deficit present.   Psychiatric:         Mood and Affect: Mood normal.         Assessment:   1. Sacroiliitis (CMS/HCC)    2. Lumbar radiculitis    3. Chronic pain of left ankle    4. Lumbar spondylosis        Plan:  In regards to interventional therapy, in the future she certainly could have repeat bilateral sacroiliac joint injections.  Her last one continues to give her relief at about 90%.    In regards interventional therapy, if she has increased pain in the low back with a radicular component she certainly could have a lumbar epidural steroid injection repeated.  This has been very beneficial for her in the past and likely would be beneficial for her in the future if needed.    In regards to interventional therapy, she did have a positive test injection at the bilateral L3, 4, 5 medial branch nerves.  At this time she does not want to pursue radiofrequency ablation but this certainly would be an option for her in the future.    In regards to her ankle, she did have good relief with opioid therapy in the past, this certainly would be something that we could entertain at a very low dose in the future.  She also may benefit from physical therapy in regards to her ankle.  She plans to see her surgeon in regards to her ankle in the future.    Richard Tavarez,  MD      DISCUSSION  Today's plan was a combined effort between the patient and myself. The patient understood the plan, verbally consented, and agreed to participate. An After Visit Summary regarding today's office visit was given to the patient.     The diagnostic assessment has been reviewed. Patient and/or patient's surrogate has expressed a good understanding of the assessment and recommendations from today's visit. There are no apparent barriers to understanding this information. Patient’s questions were addressed.    Patient has been advised to contact this office or the answering service with questions or concerns that may arise. Patient has been advised to follow up with Primary Care Provider for general medical needs.

## 2025-07-28 ENCOUNTER — TELEPHONE (OUTPATIENT)
Dept: INTERNAL MEDICINE | Facility: CLINIC | Age: 70
End: 2025-07-28
Payer: MEDICARE

## 2025-07-28 DIAGNOSIS — K21.9 GASTROESOPHAGEAL REFLUX DISEASE WITHOUT ESOPHAGITIS: Primary | ICD-10-CM

## 2025-07-28 NOTE — TELEPHONE ENCOUNTER
Called patient to update her regarding referral, patient verbalized understanding and had no further questions at this time.

## 2025-07-28 NOTE — TELEPHONE ENCOUNTER
Spoke with patient, she reports that she has had frequent burping for the last several years, taking omeprazole for it. Patient requesting EGD order to be placed.     Last EGD completed 6/22/2020 for heartburn, with post op diagnosis of bile gastritis.

## 2025-07-28 NOTE — TELEPHONE ENCOUNTER
Caller would like to discuss (a) clinical question Writer has advised caller of a callback from within 24 hours.    Patient: Corrine Holden    Caller Name (Last and first, relation/role): self    Name of Facility: [facility]    Callback Number: [phone]    Best Availability: any    Fax Number: [phone]    Additional Info: pt says would like EGD order FAXd to [facility], Endoscopy to Dr Serrano, FAX [phone], medical indication - frequent burping, last EGD in 2020, says colonoscopy sched 8/6 would like EGD in conjunction with colonoscopy    Did you confirm the message with the caller: Yes    Is it okay that the nurse communicates your response through [place]? No

## 2025-08-07 ENCOUNTER — TELEPHONE (OUTPATIENT)
Dept: PAIN MEDICINE | Facility: HOSPITAL | Age: 70
End: 2025-08-07
Payer: MEDICARE

## 2025-08-27 ENCOUNTER — TELEPHONE (OUTPATIENT)
Dept: PAIN MEDICINE | Facility: HOSPITAL | Age: 70
End: 2025-08-27
Payer: MEDICARE

## 2025-09-04 ENCOUNTER — LAB (OUTPATIENT)
Dept: LAB | Facility: CLINIC | Age: 70
End: 2025-09-04
Payer: MEDICARE

## 2025-09-04 DIAGNOSIS — R73.01 IMPAIRED FASTING BLOOD SUGAR: ICD-10-CM

## 2025-09-04 DIAGNOSIS — E78.5 BORDERLINE HYPERLIPIDEMIA: ICD-10-CM

## 2025-09-04 LAB
ALBUMIN SERPL-MCNC: 4.9 G/DL (ref 3.5–5.3)
ALP SERPL-CCNC: 63 U/L (ref 55–142)
ALT SERPL-CCNC: 26 U/L (ref 7–52)
ANION GAP SERPL CALC-SCNC: 9 MMOL/L (ref 3–11)
AST SERPL-CCNC: 22 U/L
BILIRUB SERPL-MCNC: 0.58 MG/DL (ref 0.2–1.4)
BUN SERPL-MCNC: 24 MG/DL (ref 7–25)
CALCIUM ALBUM COR SERPL-MCNC: 8.5 MG/DL (ref 8.6–10.3)
CALCIUM SERPL-MCNC: 9.2 MG/DL (ref 8.6–10.3)
CHLORIDE SERPL-SCNC: 104 MMOL/L (ref 98–107)
CHOLEST SERPL-MCNC: 214 MG/DL (ref 0–199)
CO2 SERPL-SCNC: 28 MMOL/L (ref 21–32)
CREAT SERPL-MCNC: 0.78 MG/DL (ref 0.6–1.1)
EGFRCR SERPLBLD CKD-EPI 2021: 82 ML/MIN/1.73M*2
EST. AVERAGE GLUCOSE BLD GHB EST-MCNC: 142.7 MG/DL
FASTING STATUS PATIENT QL REPORTED: YES
GLUCOSE SERPL-MCNC: 120 MG/DL (ref 70–105)
HBA1C MFR BLD: 6.6 % (ref 4–6)
HDLC SERPL-MCNC: 65 MG/DL
LDLC SERPL CALC-MCNC: 122 MG/DL (ref 20–99)
POTASSIUM SERPL-SCNC: 4.4 MMOL/L (ref 3.5–5.1)
PROT SERPL-MCNC: 7 G/DL (ref 6–8.3)
SODIUM SERPL-SCNC: 141 MMOL/L (ref 135–145)
TRIGL SERPL-MCNC: 133 MG/DL
TSH SERPL DL<=0.05 MIU/L-ACNC: 2.22 UIU/ML (ref 0.34–4.82)

## 2025-09-04 PROCEDURE — 80053 COMPREHEN METABOLIC PANEL: CPT | Mod: PO

## 2025-09-04 PROCEDURE — 84443 ASSAY THYROID STIM HORMONE: CPT | Mod: PO

## 2025-09-04 PROCEDURE — 83036 HEMOGLOBIN GLYCOSYLATED A1C: CPT | Mod: PO

## 2025-09-04 PROCEDURE — 80061 LIPID PANEL: CPT | Mod: PO

## 2025-09-04 PROCEDURE — 36415 COLL VENOUS BLD VENIPUNCTURE: CPT | Mod: PO

## (undated) DEVICE — FORCEP BIOPSY 2.8MMX230CM WITH NEEDLE ENDOSCOPY

## (undated) DEVICE — KIT UPPER PROCEDURE ENDO CARRY ON

## (undated) DEVICE — SUCTION YANKAUER BULB TIP W 1 PIECE HANDLE

## (undated) DEVICE — KIT ENDO PROCEDURE CARRY ON

## (undated) DEVICE — CATH IV 20G 1 1/4" SAFETY

## (undated) DEVICE — TUBING SUCTION 3/16"X10'

## (undated) DEVICE — TRAP POLYP E TRAP

## (undated) DEVICE — SNARE LOOP HEXAGONAL 2.4MM OD SHEATH 240CML 13MMW

## (undated) DEVICE — WATER STL IRR 1000ML BTL USP PLASTIC POUR BOTTLE

## (undated) DEVICE — WATER STL IRR 250ML BTL USP PLASTIC POUR BOTTLE

## (undated) DEVICE — SOL LAC RING INJ 1000ML BAG USP VIAFLEX PLASTIC CONTAINER

## (undated) DEVICE — BLOCK BITE 60F OMNIBLOC YLW YELLOW